# Patient Record
Sex: FEMALE | Race: WHITE | NOT HISPANIC OR LATINO | Employment: FULL TIME | URBAN - METROPOLITAN AREA
[De-identification: names, ages, dates, MRNs, and addresses within clinical notes are randomized per-mention and may not be internally consistent; named-entity substitution may affect disease eponyms.]

---

## 2017-01-30 ENCOUNTER — TELEPHONE (OUTPATIENT)
Dept: FAMILY MEDICINE | Facility: CLINIC | Age: 37
End: 2017-01-30

## 2017-01-30 ENCOUNTER — OFFICE VISIT (OUTPATIENT)
Dept: ORTHOPEDICS | Facility: CLINIC | Age: 37
End: 2017-01-30
Payer: COMMERCIAL

## 2017-01-30 VITALS
BODY MASS INDEX: 27.16 KG/M2 | DIASTOLIC BLOOD PRESSURE: 62 MMHG | SYSTOLIC BLOOD PRESSURE: 118 MMHG | HEIGHT: 65 IN | WEIGHT: 163 LBS

## 2017-01-30 DIAGNOSIS — R20.0 NUMBNESS AND TINGLING IN RIGHT HAND: ICD-10-CM

## 2017-01-30 DIAGNOSIS — R20.2 NUMBNESS AND TINGLING IN RIGHT HAND: ICD-10-CM

## 2017-01-30 DIAGNOSIS — M77.11 RIGHT LATERAL EPICONDYLITIS: Primary | ICD-10-CM

## 2017-01-30 DIAGNOSIS — M25.511 ACUTE PAIN OF RIGHT SHOULDER: ICD-10-CM

## 2017-01-30 PROCEDURE — 99203 OFFICE O/P NEW LOW 30 MIN: CPT | Performed by: PEDIATRICS

## 2017-01-30 NOTE — TELEPHONE ENCOUNTER
Reason for Call:  Other appointment    Detailed comments: Patient requesting to see provider that can remove and replace her IUD, please call to schedule    Phone Number Patient can be reached at: Cell phone number: [phone]    Best Time: Any    Can we leave a detailed message on this number? YES    Call taken on 1/30/2017 at 2:55 PM by Estrellita Billingsley

## 2017-01-30 NOTE — MR AVS SNAPSHOT
After Visit Summary   1/30/2017    Angelica Gomez    MRN: [id number]           Patient Information     Date Of Birth          1980        Visit Information        Provider Department      1/30/2017 5:00 PM Preet Lewis DO [facility] Diagnoses     Right lateral epicondylitis    -  1     Numbness and tingling in right hand            Follow-ups after your visit        Additional Services     [facility] PT, HAND, AND CHIROPRACTIC REFERRAL       **This order will print in the EMIL Scheduling Office**    Physical Therapy, Hand Therapy and Chiropractic Care are available through:    *[facility]  *[facility]  *[facility]    Call one number to schedule at any of the above locations: [phone].    Your provider has referred you to: Hand Therapy    Indication/Reason for Referral: Elbow Pain and Hand Pain  Onset of Illness:   Therapy Orders: Evaluate and Treat  Special Programs: None  Special Request: None    April Chen      Additional Comments for the Therapist or Chiropractor:       Please be aware that coverage of these services is subject to the terms and limitations of your health insurance plan.  Call member services at your health plan with any benefit or coverage questions.      Please bring the following to your appointment:    *Your personal calendar for scheduling future appointments  *Comfortable clothing                  Your next 10 appointments already scheduled     Feb 06, 2017  9:00 AM   SHORT with Ale Michelle DO   [facility] ([facility]    [address] [place] [id number] [phone]            Feb 22, 2017 11:15 AM   Office Visit with Ale Michelle DO, NE PROC RM OB/COLP   [facility] ([facility])    [address] [place] [id number]    [phone]           Bring a current list of meds and any records pertaining to this visit.  For Physicals, please bring immunization records and any forms needing to be filled out.  Please arrive 10 minutes early to complete paperwork.              Who to contact     If you have questions or need follow up information about today's clinic visit or your schedule please contact [facility] directly at [phone].  Normal or non-critical lab and imaging results will be communicated to you by G[employer]on[employer], letter or phone within 4 business days after the clinic has received the results. If you do not hear from us within 7 days, please contact the clinic through [employer] or phone. If you have a critical or abnormal lab result, we will notify you by phone as soon as possible.  Submit refill requests through [employer] or call your pharmacy and they will forward the refill request to us. Please allow 3 business days for your refill to be completed.          Additional Information About Your Visit        [employer] Information     [employer] gives you secure access to your electronic health record. If you see a primary care provider, you can also send messages to your care team and make appointments. If you have questions, please call your primary care clinic.  If you do not have a primary care provider, please call [phone] and they will assist you.        Care EveryWhere ID     This is your Care EveryWhere ID. This could be used by other organizations to access your [facility] medical records  [id number]         Blood Pressure from Last 3 Encounters:   01/30/17 118/62   01/07/14 120/82   12/28/12 135/74    Weight from Last 3 Encounters:   01/07/14 163 lb (73.936 kg)   12/28/12 153 lb 14.4 oz (69.809 kg)   09/10/12 156 lb (70.761 kg)              We Performed the Following     [facility] PT, HAND, AND CHIROPRACTIC REFERRAL        Primary Care Provider Office Phone # Fax #    Analisa Harper PA-C  [phone]       [facility] [address] [place] [id number]        Thank you!     Thank you for choosing [facility]  for your care. Our goal is always to provide you with excellent care. Hearing back from our patients is one way we can continue to improve our services. Please take a few minutes to complete the written survey that you may receive in the mail after your visit with us. Thank you!             Your Updated Medication List - Protect others around you: Learn how to safely use, store and throw away your medicines at www.disposemymeds.org.          This list is accurate as of: 1/30/17  5:53 PM.  Always use your most recent med list.                   Brand Name Dispense Instructions for use    EXCEDRIN MIGRAINE 250-250-65 MG per tablet   Generic drug:  aspirin-acetaminophen-caffeine      Take 1 tablet by mouth every 6 hours as needed.        MG tablet   Generic drug:  ibuprofen      Take 400 mg by mouth every 6 hours as needed.       MIRENA (52 MG) 20 MCG/24HR IUD   Generic drug:  levonorgestrel     1    one in utero       MULTIVITAMIN & MINERAL PO      Take 1 each by mouth daily. [employer] women's active supplement       PROBIOTIC PO      Take  by mouth.

## 2017-01-30 NOTE — PROGRESS NOTES
Sports Medicine Clinic Visit    PCP: Analisa Harper    Angelica Gomez is a 36 year old female who is seen  as a self referral presenting with right elbow pain.  Pain has been present for about 3 weeks.  Pain on the lateral aspect of her right elbow.  Began with lifting.  Does occasionally have numbness and tingling in her thumb and index finger.    Does also have pain in her right shoulder, anterior.  Pain began around the same time as her right elbow.  No specific injury.  Pain with pulling or lifting.  Patient is right hand dominant.     Injury: gradual onset.  Some current numbness/tingling in right ulnar 2 digits. Also some pain lateral elbow and dorsal forearm.  Pain in shoulder is anterior, points to subacromial space; does not migrate from there.  N/T not as long as other overall symptoms. Has to use pillow/support overnight. Notes any degree of flexion causes symptoms.    Location of Pain: right lateral elbow and anterior shoulder  Duration of Pain: 3 week(s)  Rating of Pain at worst: 8/10  Rating of Pain Currently: 3/10  Symptoms are better with: Ice  Symptoms are worse with: lifting, pulling  Additional Features:   Positive: parasthesia    Negative: swelling, bruising, popping, grinding, catching, locking, instability, numbness, weakness, pain in other joints and systemic symptoms  Other evaluation and/or treatments so far consists of: Ibuprofen  Prior History of related problems: denies    Social History: pediatric hospitalist at [employer]     Review of Systems  Musculoskeletal: as above  Remainder of review of systems is negative including constitutional, CV, pulmonary, GI, Skin and Neurologic except as noted in HPI or medical history.    Past Medical History   Diagnosis Date     NO ACTIVE PROBLEMS      Past Surgical History   Procedure Laterality Date     C  delivery only       superficial wound dehiscence     Tubal ligation  2007     Family History   Problem Relation Age of Onset      Alcohol/Drug Paternal Grandfather      Thyroid Disease Mother      Breast Cancer Mother 56     breast     Asthma Brother      HEART DISEASE Father      2x heart attacks     Circulatory Father      blood clots     Cardiovascular Father      patent foramen ovale, repaired x 2, afib     CEREBROVASCULAR DISEASE Father      x2     C.A.D. Father      x2     Genitourinary Problems Father      kidney disease     CANCER Paternal Aunt      cervical cancer     Breast Cancer Paternal Aunt      Cancer - colorectal No family hx of      Social History     Social History     Marital Status:      Spouse Name: N/A     Number of Children: N/A     Years of Education: N/A     Occupational History     Not on file.     Social History Main Topics     Smoking status: Former Smoker     Smokeless tobacco: Never Used     Alcohol Use: Yes      Comment: 0-1     Drug Use: No     Sexual Activity:     Partners: Male     Birth Control/ Protection: Surgical      Comment: Tubal ligation, 12/13/2007     Other Topics Concern     Parent/Sibling W/ Cabg, Mi Or Angioplasty Before 65f 55m? Yes     father 2 MIs     Social History Narrative    Caffeine intake/servings daily - 2-3 pop    Calcium intake/servings daily - 2 yogurts and 1 glass of milk    Exercise 6 times weekly - describe strength training and cardio    Sunscreen used - Yes    Seatbelts used - Yes    Guns stored in the home - No    Self Breast Exam - Yes    Pap test up to date -  Yes, as of today    Eye exam up to date -  Yes    Dental exam up to date -  Yes    DEXA scan up to date -  Not Applicable    Flex Sig/Colonoscopy up to date -  Not Applicable    Mammography up to date -  Not Applicable    Immunizations reviewed and up to date - Yes, 03/2008    Abuse: Current or Past (Physical, Sexual or Emotional) - No    Do you feel safe in your environment - Yes    Do you cope well with stress - Yes    Do you suffer from insomnia - No    Last updated by: Lit Licona  10/27/2009              "      Objective  /62 mmHg  Ht 5' 5.25\" (1.657 m)  Wt 163 lb (73.936 kg)  BMI 26.93 kg/m2    GENERAL APPEARANCE: healthy, alert and no distress   GAIT: NORMAL  SKIN: no suspicious lesions or rashes  NEURO: Normal strength and tone, mentation intact and speech normal  PSYCH:  mentation appears normal and affect normal/bright  HEENT: no scleral icterus  CV: no extremity edema  RESP: nonlabored breathing    Right Elbow exam:    Inspection:       no ecchymosis       no edema or effusion    ROM:       full with flexion, extension, forearm supination and pronation.    Strength:       flexion 5/5       extension 5/5       forearm supination 5/5       forearm pronation 5/5    Tender:       lateral epicondyle    Non-Tender:       remainder of elbow area    Sensation:  Decreased light touch small finger     Skin:       well perfused       capillary refill less than 2 seconds    Tinel mild positive cubital tunnel.      Right Shoulder exam    ROM:        Full active and passive ROM with flexion, extension, abduction, internal and external rotation.    Tender:        acromioclavicular joint       subacromial space       Biceps tendon    Non Tender:       remainder of shoulder    Strength:        abduction 5/5       internal rotation 5/5       external rotation 5/5       adduction 5/5    Impingement testing:        neg (-) Neer       Min pain with Kramer       Mild pain with empty can       neg (-) crossover       neg (-) O'kaylie    Skin:        no visible deformities       well perfused       capillary refill brisk     speed positive  yergason negative      **  Spurling negative.      Radiology:  None today.    Assessment:  1. Right lateral epicondylitis    2. Numbness and tingling in right hand    3. Acute pain of right shoulder    shoulder most consistent with impingement.  N/T suspect ulnar neuropathy at elbow.    Plan:  Discussed the assessment with the patient. We discussed the following treatment options: symptom " treatment, activity modification/rest, imaging, rehab, support for the affected area and electrodiagnostic testing. Following discussion, plan:  Topical Treatments: Ice or Heat  Over the counter medication: Patient's preferred OTC medication as directed on packaging.  No imaging of the shoulder or elbow required currently  Activity Modification: discussed  Rehab: Occupational Therapy: [facility] - [phone]; mostly address elbow issues   Monitor shoulder for now, possible therapy pending course  Medical Equipment: discussed use of counterforce brace, provided  Recommend trial of towel roll overnight for her hand symptoms  Follow up: 3-4 weeks, sooner prn.  We discussed potentially concerning signs and symptoms related to the condition(s) listed above, including increase in pain, changing pain, increasing neurologic symptoms, and the patient was instructed to seek appropriate medical care if noted. All questions answered to patient's satisfaction. The patient expressed understanding of the plan.     Preet Lewis DO, CAQ          Disclaimer: This note consists of symbols derived from keyboarding, dictation and/or voice recognition software. As a result, there may be errors in the script that have gone undetected. Please consider this when interpreting information found in this chart.

## 2017-02-06 ENCOUNTER — OFFICE VISIT (OUTPATIENT)
Dept: FAMILY MEDICINE | Facility: CLINIC | Age: 37
End: 2017-02-06
Payer: COMMERCIAL

## 2017-02-06 VITALS
RESPIRATION RATE: 16 BRPM | TEMPERATURE: 98.4 F | HEIGHT: 65 IN | DIASTOLIC BLOOD PRESSURE: 72 MMHG | HEART RATE: 60 BPM | BODY MASS INDEX: 27.49 KG/M2 | WEIGHT: 165 LBS | SYSTOLIC BLOOD PRESSURE: 118 MMHG

## 2017-02-06 DIAGNOSIS — Z00.01 ENCOUNTER FOR ROUTINE ADULT HEALTH EXAMINATION WITH ABNORMAL FINDINGS: Primary | ICD-10-CM

## 2017-02-06 DIAGNOSIS — Z13.220 LIPID SCREENING: ICD-10-CM

## 2017-02-06 DIAGNOSIS — Z80.3 FAMILY HISTORY OF MALIGNANT NEOPLASM OF BREAST: ICD-10-CM

## 2017-02-06 DIAGNOSIS — D22.9 NUMEROUS MOLES: ICD-10-CM

## 2017-02-06 DIAGNOSIS — Z97.5 IUD (INTRAUTERINE DEVICE) IN PLACE: ICD-10-CM

## 2017-02-06 DIAGNOSIS — Z13.1 SCREENING FOR DIABETES MELLITUS: ICD-10-CM

## 2017-02-06 DIAGNOSIS — Z13.29 SCREENING FOR THYROID DISORDER: ICD-10-CM

## 2017-02-06 PROCEDURE — 99395 PREV VISIT EST AGE 18-39: CPT | Performed by: FAMILY MEDICINE

## 2017-02-06 NOTE — MR AVS SNAPSHOT
After Visit Summary   2/6/2017    Angelica Gomez    MRN: [id number]           Patient Information     Date Of Birth          1980        Visit Information        Provider Department      2/6/2017 9:00 AM Ale Michelle DO [facility]        Today's Diagnoses     Family history of malignant neoplasm of breast    -  1     Encounter for routine adult health examination with abnormal findings         IUD (intrauterine device) in place         Lipid screening         Screening for diabetes mellitus         Screening for thyroid disorder         Numerous moles           Care Instructions    Follow up for labs  Read iud handout  Use ibuprofen before coming in  Follow up with dermatology, genetics    Preventive Health Recommendations  Female Ages 26 - 39  Yearly exam:   See your health care provider every year in order to    Review health changes.     Discuss preventive care.      Review your medicines if you your doctor has prescribed any.    Until age 30: Get a Pap test every three years (more often if you have had an abnormal result).    After age 30: Talk to your doctor about whether you should have a Pap test every 3 years or have a Pap test with HPV screening every 5 years.   You do not need a Pap test if your uterus was removed (hysterectomy) and you have not had cancer.  You should be tested each year for STDs (sexually transmitted diseases), if you're at risk.   Talk to your provider about how often to have your cholesterol checked.  If you are at risk for diabetes, you should have a diabetes test (fasting glucose).  Shots: Get a flu shot each year. Get a tetanus shot every 10 years.   Nutrition:     Eat at least 5 servings of fruits and vegetables each day.    Eat whole-grain bread, whole-wheat pasta and brown rice instead of white grains and rice.    Talk to your provider about Calcium and Vitamin D.     Lifestyle    Exercise at least 150 minutes a week (30 minutes  a day, 5 days of the week). This will help you control your weight and prevent disease.    Limit alcohol to one drink per day.    No smoking.     Wear sunscreen to prevent skin cancer.    See your dentist every six months for an exam and cleaning.      [facility]   Discharged by : Sharri AVELAR Certified Medical Assistant (SANTI)February 6, 2017 9:40 AM    Paper scripts provided to patient : none   If you have any questions regarding to your visit please contact your care team:   Team Gold Clinic Hours Telephone Number   Dr. Sharri Chung, SAMANTHA   7am-7pm Monday - Thursday   7am-5pm Fridays  [phone]   (Appointment scheduling available 24/7)   RN Line   [phone] option 2     What options do I have for visits at the clinic other than the traditional office visit?   To expand how we care for you, many of our providers are utilizing electronic visits (e-visits) and telephone visits, when medically appropriate, for interactions with their patients rather than a visit in the clinic. We also offer nurse visits for many medical concerns. Just like any other service, we will bill your insurance company for this type of visit based on time spent on the phone with your provider. Not all insurance companies cover these visits. Please check with your medical insurance if this type of visit is covered. You will be responsible for any charges that are not paid by your insurance.   E-visits via Livemap: generally incur a $35.00 fee.   Telephone visits:   Time spent on the phone: *charged based on time that is spent on the phone in increments of 10 minutes. Estimated cost:   5-10 mins $30.00   11-20 mins. $59.00   21-30 mins. $85.00   Use FastSpringt (secure email communication and access to your chart) to send your primary care provider a message or make an appointment. Ask someone on your Team how to sign up for Livemap.   For a Price Quote for your services,  please call our Consumer Price Line at [phone].   As always, Thank you for trusting us with your health care needs!                    [facility]:    Scheduling Appointments  [place]  Call: [phone]    [place]ablo Ramirez, [facility]  Call: [phone]    [facility]  Call: [phone]    WHERE TO GO FOR CARE?    Clinic    Make an appointment if you:       Are sick (cold, cough, flu, sore throat, earache or in pain).       Have a small injury (sprain, small cut, burn or broken bone).       Need a physical exam, Pap smear, vaccine or prescription refill.       Have questions about your health or medicines.    To reach us:      Call [phone] ([phone]). Open 24 hours every day. (For counseling services, call [phone].)    Log into [employer] at eSellerPro. (Visit [email or web address] to create an account.) Hospital emergency room    An emergency is a serious or life- threatening problem that must be treated right away.    Call 993 or get to the hospital if you have:      Very bad or sudden:            - Chest pain or pressure         - Bleeding         - Head or belly pain         - Dizziness or trouble seeing, walking or                          Speaking      Problems breathing      Blood in your vomit or you are coughing up blood      A major injury (knocked out, loss of a finger or limb, rape, broken bone protruding from skin)    A mental health crisis. (Or call the Mental Health Crisis line at 1-490.864.1754 or Suicide Prevention Hotline at 1-553.834.3843.)    Open 24 hours every day. You don't need an appointment.     Urgent care    Visit urgent care for sickness or small injuries when the clinic is closed. You don't need an appointment. To check hours or find an urgent care near you, visit [email or web address]. Online care    Get online care from [facility] for more than 70 common problems, like  colds, allergies and infections. Open 24 hours every day at: [email or web address]   Need help deciding?    For advice about where to be seen, you may call your clinic and ask to speak with a nurse. We're here for you 24 hours every day.         If you are deaf or hard of hearing, please let us know. We provide many free services including sign language interpreters, oral interpreters, TTYs, telephone amplifiers, note takers and written materials.               Follow-ups after your visit        Additional Services     CANCER RISK MGMT/CANCER GENETIC COUNSELING REFERRAL       Your provider has referred you to the Cancer Risk Management Program - Cancer Genetic Counseling.    Reason for Referral: mother and aunts with breast cancers    We have a sent a notice to a staff member of the Cancer Risk Management Program to give you a call to assist with scheduling your appointment.  You may also call  [phone] ([phone]) to initiate scheduling.    Please be aware that coverage of these services is subject to the terms and limitations of your health insurance plan.  Call member services at your health plan with any benefit or coverage questions.      Please bring the completed family history sheet to your appointment in addition to any available outside medical records documenting your cancer diagnosis.            DERMATOLOGY REFERRAL       Your provider has referred you to: [facility] ER: [facility] [place] [phone]   [email or web address]    Please be aware that coverage of these services is subject to the terms and limitations of your health insurance plan.  Call member services at your health plan with any benefit or coverage questions.      Please bring the following with you to your appointment:    (1) Any X-Rays, CTs or MRIs which have been performed.  Contact the facility where they were done to arrange for  prior to your scheduled appointment.    (2) List of  current medications  (3) This referral request   (4) Any documents/labs given to you for this referral                  Your next 10 appointments already scheduled     Feb 22, 2017 11:15 AM   Office Visit with Ale Michelle DO, NE PROC RM OB/COLP   [facility]    [address] [address] [phone]           Bring a current list of meds and any records pertaining to this visit.  For Physicals, please bring immunization records and any forms needing to be filled out.  Please arrive 10 minutes early to complete paperwork.              Future tests that were ordered for you today     Open Future Orders        Priority Expected Expires Ordered    Glucose Routine  2/24/2017 2/6/2017    TSH with free T4 reflex Routine  2/24/2017 2/6/2017    Lipid panel reflex to direct LDL Routine  2/24/2017 2/6/2017    Hemoglobin Routine  2/24/2017 2/6/2017            Who to contact     If you have questions or need follow up information about today's clinic visit or your schedule please contact [facility] directly at [phone].  Normal or non-critical lab and imaging results will be communicated to you by [employer], letter or phone within 4 business days after the clinic has received the results. If you do not hear from us within 7 days, please contact the clinic through [employer] or phone. If you have a critical or abnormal lab result, we will notify you by phone as soon as possible.  Submit refill requests through [employer] or call your pharmacy and they will forward the refill request to us. Please allow 3 business days for your refill to be completed.          Additional Information About Your Visit        [employer] Information     [employer] gives you secure access to your electronic health record. If you see a primary care provider, you can also send messages to your care team and make appointments. If you have questions, please  "call your primary care clinic.  If you do not have a primary care provider, please call [phone] and they will assist you.        Care EveryWhere ID     This is your Care EveryWhere ID. This could be used by other organizations to access your [place] medical records  [id number]        Your Vitals Were     Pulse Temperature Respirations Height BMI (Body Mass Index) Breastfeeding?    60 98.4  F (36.9  C) (Oral) 16 5' 5.25\" (1.657 m) 27.26 kg/m2 No       Blood Pressure from Last 3 Encounters:   02/06/17 118/72   01/30/17 118/62   01/07/14 120/82    Weight from Last 3 Encounters:   02/06/17 165 lb (74.844 kg)   01/30/17 163 lb (73.936 kg)   01/07/14 163 lb (73.936 kg)              We Performed the Following     CANCER RISK MGMT/CANCER GENETIC COUNSELING REFERRAL     DERMATOLOGY REFERRAL          Today's Medication Changes          These changes are accurate as of: 2/6/17  9:40 AM.  If you have any questions, ask your nurse or doctor.               Stop taking these medicines if you haven't already. Please contact your care team if you have questions.     order for DME   Stopped by:  Ale Michelle,                     Primary Care Provider Office Phone # Fax #    Analisa Harper PA-C [phone]       [address] [address]        Thank you!     Thank you for choosing [facility]  for your care. Our goal is always to provide you with excellent care. Hearing back from our patients is one way we can continue to improve our services. Please take a few minutes to complete the written survey that you may receive in the mail after your visit with us. Thank you!             Your Updated Medication List - Protect others around you: Learn how to safely use, store and throw away your medicines at www.disposemymeds.org.          This list is accurate as of: 2/6/17  9:40 AM.  Always use your most recent med list.                "    Brand Name Dispense Instructions for use    EXCEDRIN MIGRAINE 250-250-65 MG per tablet   Generic drug:  aspirin-acetaminophen-caffeine      Take 1 tablet by mouth every 6 hours as needed.        MG tablet   Generic drug:  ibuprofen      Take 400 mg by mouth every 6 hours as needed.       MIRENA (52 MG) 20 MCG/24HR IUD   Generic drug:  levonorgestrel     1    one in utero       MULTIVITAMIN & MINERAL PO      Take 1 each by mouth daily. [employer] women's active supplement       PROBIOTIC PO      Take  by mouth.

## 2017-02-06 NOTE — PATIENT INSTRUCTIONS
Follow up for labs  Read iud handout  Use ibuprofen before coming in  Follow up with dermatology, genetics    Preventive Health Recommendations  Female Ages 26 - 39  Yearly exam:   See your health care provider every year in order to    Review health changes.     Discuss preventive care.      Review your medicines if you your doctor has prescribed any.    Until age 30: Get a Pap test every three years (more often if you have had an abnormal result).    After age 30: Talk to your doctor about whether you should have a Pap test every 3 years or have a Pap test with HPV screening every 5 years.   You do not need a Pap test if your uterus was removed (hysterectomy) and you have not had cancer.  You should be tested each year for STDs (sexually transmitted diseases), if you're at risk.   Talk to your provider about how often to have your cholesterol checked.  If you are at risk for diabetes, you should have a diabetes test (fasting glucose).  Shots: Get a flu shot each year. Get a tetanus shot every 10 years.   Nutrition:     Eat at least 5 servings of fruits and vegetables each day.    Eat whole-grain bread, whole-wheat pasta and brown rice instead of white grains and rice.    Talk to your provider about Calcium and Vitamin D.     Lifestyle    Exercise at least 150 minutes a week (30 minutes a day, 5 days of the week). This will help you control your weight and prevent disease.    Limit alcohol to one drink per day.    No smoking.     Wear sunscreen to prevent skin cancer.    See your dentist every six months for an exam and cleaning.      [facility]   Discharged by : Sharri AVELAR, Certified Medical Assistant (AAMA)February 6, 2017 9:40 AM    Paper scripts provided to patient : none   If you have any questions regarding to your visit please contact your care team:   [facility] Hours Telephone Number   Dr. Sharri Chung, SAMANTHA   7am-7pm Monday -  Thursday   7am-5pm Fridays  [phone]   (Appointment scheduling available 24/7)   RN Line   [phone] option 2     What options do I have for visits at the clinic other than the traditional office visit?   To expand how we care for you, many of our providers are utilizing electronic visits (e-visits) and telephone visits, when medically appropriate, for interactions with their patients rather than a visit in the clinic. We also offer nurse visits for many medical concerns. Just like any other service, we will bill your insurance company for this type of visit based on time spent on the phone with your provider. Not all insurance companies cover these visits. Please check with your medical insurance if this type of visit is covered. You will be responsible for any charges that are not paid by your insurance.   E-visits via [employer]: generally incur a $35.00 fee.   Telephone visits:   Time spent on the phone: *charged based on time that is spent on the phone in increments of 10 minutes. Estimated cost:   5-10 mins $30.00   11-20 mins. $59.00   21-30 mins. $85.00   Use [employer] (secure email communication and access to your chart) to send your primary care provider a message or make an appointment. Ask someone on your Team how to sign up for [employer].   For a Price Quote for your services, please call our Consumer Price Line at [phone].   As always, Thank you for trusting us with your health care needs!                    [facility]:    Scheduling Appointments  [place]  Call: [phone]    [facility]  Call: [phone]    [facility]  Call: [phone]    WHERE TO GO FOR CARE?    Clinic    Make an appointment if you:       Are sick (cold, cough, flu, sore throat, earache or in pain).       Have a small injury (sprain, small cut, burn or broken bone).       Need a physical exam, Pap smear, vaccine or  prescription refill.       Have questions about your health or medicines.    To reach us:      Call [phone] ([phone]). Open 24 hours every day. (For counseling services, call [phone].)    Log into [employer] at VivaReal. (Visit [email or web address] to create an account.) Hospital emergency room    An emergency is a serious or life- threatening problem that must be treated right away.    Call 911 or get to the hospital if you have:      Very bad or sudden:            - Chest pain or pressure         - Bleeding         - Head or belly pain         - Dizziness or trouble seeing, walking or                          Speaking      Problems breathing      Blood in your vomit or you are coughing up blood      A major injury (knocked out, loss of a finger or limb, rape, broken bone protruding from skin)    A mental health crisis. (Or call the Mental Health Crisis line at 1-130.133.3171 or Suicide Prevention Hotline at 1-741.675.7854.)    Open 24 hours every day. You don't need an appointment.     Urgent care    Visit urgent care for sickness or small injuries when the clinic is closed. You don't need an appointment. To check hours or find an urgent care near you, visit [email or web address]. Online care    Get online care from [employer] for more than 70 common problems, like colds, allergies and infections. Open 24 hours every day at: [email or web address]   Need help deciding?    For advice about where to be seen, you may call your clinic and ask to speak with a nurse. We're here for you 24 hours every day.         If you are deaf or hard of hearing, please let us know. We provide many free services including sign language interpreters, oral interpreters, TTYs, telephone amplifiers, note takers and written materials.

## 2017-02-06 NOTE — PROGRESS NOTES
SUBJECTIVE:     CC: Angelica Gomez is an 36 year old woman who presents for preventive health visit.     Healthy Habits:    Do you get at least three servings of calcium containing foods daily (dairy, green leafy vegetables, etc.)? yes    Amount of exercise or daily activities, outside of work: 5-6 day(s) per week    Problems taking medications regularly No    Medication side effects: No    Have you had an eye exam in the past two years? yes    Do you see a dentist twice per year? yes    Do you have sleep apnea, excessive snoring or daytime drowsiness?no      The patient is interested in Mirena IUD.  The patient meets and is agreeable to the following conditions:  She is parous.  She is not interested in conception in the near future.no0  She currently is in a stable, monogamous relationship.Yes  There is no previous history of pelvic inflammatory disease.Yes  There is no previous history of ectopic pregnancy.Yes  She is willing to check monthly for the IUD string.Yes  She is at least 6 weeks post-partum.Yes  There is no history of unresolved abnormal uterine bleeding.Yes  There is no history of an unresolved abnormal PAP smear.Yes  She has no history of Dejuan's disease or an allergy to copper (for ParaGard).Yes  She has no history of diabetes, AIDS, leukemia, IV drug use or chronic steroid use.Yes  She is willing to return annually for PAP smears.Yes  She has had a PAP smear within the past 6 months.Yes  She denies the possibility of pregnancy.Yes  Pregnancy test today is negative.no    The following risks were discussed with the patient:  Possibility of pregnancy and ectopic pregnancy.Yes  Possibility of pelvic inflammatory disease, particularly with new partners.Yes  Risk of uterine perforation or IUD expulsion.Yes  Possibility of difficult removal.Yes  Spotting or heavy bleeding.  Cramping, pain or infection during or after insertion.      The patient was given patient information on the IUD and the  patient education brochure from the .  This patient has completed her IUD consult and can be scheduled for the placement procedure.        Today's PHQ-2 Score:   PHQ-2 (  [employer]) 2017   Q1: Little interest or pleasure in doing things 0 0   Q2: Feeling down, depressed or hopeless 0 0   PHQ-2 Score 0 0       Abuse: Current or Past(Physical, Sexual or Emotional)- No  Do you feel safe in your environment - Yes    Social History   Substance Use Topics     Smoking status: Former Smoker     Smokeless tobacco: Never Used     Alcohol Use: Yes      Comment: 0-1     The patient does not drink >3 drinks per day nor >7 drinks per week.    Recent Labs   Lab Test  09   1325  09   0934   CHOL  122  146   HDL  58  66   LDL  52  70   TRIG  58  50   CHOLHDLRATIO  2.1  2.2       Reviewed orders with patient.  Reviewed health maintenance and updated orders accordingly - Yes    Mammo Decision Support:  Mammogram not appropriate for this patient based on age.    Pertinent mammograms are reviewed under the imaging tab.  History of abnormal Pap smear: NO - age 21-29 PAP every 3 years recommended  All Histories reviewed and updated in Epic.  Past Medical History   Diagnosis Date     NO ACTIVE PROBLEMS       Past Surgical History   Procedure Laterality Date     C  delivery only       superficial wound dehiscence     Tubal ligation       Obstetric History       T2      TAB0   SAB0   E0   M0   L2       # Outcome Date GA Lbr Morteza/2nd Weight Sex Delivery Anes PTL Lv   2 Term 07 40w0d   F CS-Unspec   Y   1 Term 03/10/03 40w0d   M    Y          ROS:  C: NEGATIVE for fever, chills, change in weight  I: NEGATIVE for worrisome rashes, moles or lesions  E: NEGATIVE for vision changes or irritation  ENT: NEGATIVE for ear, mouth and throat problems  R: NEGATIVE for significant cough or SOB  B: NEGATIVE for masses, tenderness or discharge  CV: NEGATIVE for chest pain,  "palpitations or peripheral edema  GI: NEGATIVE for nausea, abdominal pain, heartburn, or change in bowel habits  : NEGATIVE for unusual urinary or vaginal symptoms. Periods are regular.  M: NEGATIVE for significant arthralgias or myalgia  N: NEGATIVE for weakness, dizziness or paresthesias  P: NEGATIVE for changes in mood or affect    Problem list, Medication list, Allergies, and Medical/Social/Surgical histories reviewed in [facility] and updated as appropriate.  OBJECTIVE:     /72 mmHg  Pulse 60  Temp(Src) 98.4  F (36.9  C) (Oral)  Resp 16  Ht 5' 5.25\" (1.657 m)  Wt 165 lb (74.844 kg)  BMI 27.26 kg/m2  Breastfeeding? No  EXAM:  GENERAL: healthy, alert and no distress  EYES: Eyes grossly normal to inspection, PERRL and conjunctivae and sclerae normal  HENT: ear canals and TM's normal, nose and mouth without ulcers or lesions  NECK: no adenopathy, no asymmetry, masses, or scars and thyroid normal to palpation  RESP: lungs clear to auscultation - no rales, rhonchi or wheezes  BREAST: normal without masses, tenderness or nipple discharge and no palpable axillary masses or adenopathy  CV: regular rate and rhythm, normal S1 S2, no S3 or S4, no murmur, click or rub, no peripheral edema and peripheral pulses strong  ABDOMEN: soft, nontender, no hepatosplenomegaly, no masses and bowel sounds normal  MS: no gross musculoskeletal defects noted, no edema  SKIN: numerous moles  NEURO: Normal strength and tone, mentation intact and speech normal  PSYCH: mentation appears normal, affect normal/bright    ASSESSMENT/PLAN:         ICD-10-CM    1. Encounter for routine adult health examination with abnormal findings Z00.01 Hemoglobin   2. Family history of malignant neoplasm of breast Z80.3 CANCER RISK MGMT/CANCER GENETIC COUNSELING REFERRAL     OFFICE/OUTPT VISIT,EST,LEVL II   3. Numerous moles D22.9 DERMATOLOGY REFERRAL     OFFICE/OUTPT VISIT,EST,LEVL II   4. IUD (intrauterine device) in place Z97.5 OFFICE/OUTPT " "VISIT,EST,LEVL II   5. Lipid screening Z13.220 Lipid panel reflex to direct LDL   6. Screening for diabetes mellitus Z13.1 Glucose   7. Screening for thyroid disorder Z13.29 TSH with free T4 reflex   iud consult done-change mirena, reviewed risks and benefits, follow up as planned  Many moles-advised dermatology evalutaion  Family history of breast cancer-mother diagnosed at 56 and  from breast cancer, and aunts at a later age, advised genetic counseling  Screening fasting labs ordered    COUNSELING:   Reviewed preventive health counseling, as reflected in patient instructions         reports that she has quit smoking. She has never used smokeless tobacco.    Estimated body mass index is 27.26 kg/(m^2) as calculated from the following:    Height as of this encounter: 5' 5.25\" (1.657 m).    Weight as of this encounter: 165 lb (74.844 kg).       Counseling Resources:  ATP IV Guidelines  Pooled Cohorts Equation Calculator  Breast Cancer Risk Calculator  FRAX Risk Assessment  ICSI Preventive Guidelines  Dietary Guidelines for Americans, 2010  USDA's MyPlate  ASA Prophylaxis  Lung CA Screening    DO SHARON Balbuena [facility]  "

## 2017-02-06 NOTE — NURSING NOTE
"Chief Complaint   Patient presents with     Physical       Initial /72 mmHg  Pulse 60  Temp(Src) 98.4  F (36.9  C) (Oral)  Resp 16  Ht 5' 5.25\" (1.657 m)  Wt 165 lb (74.844 kg)  BMI 27.26 kg/m2  Breastfeeding? No Estimated body mass index is 27.26 kg/(m^2) as calculated from the following:    Height as of this encounter: 5' 5.25\" (1.657 m).    Weight as of this encounter: 165 lb (74.844 kg).  Medication Reconciliation: complete   Heather England CMA (AAMA)      "

## 2017-02-21 ENCOUNTER — THERAPY VISIT (OUTPATIENT)
Dept: OCCUPATIONAL THERAPY | Facility: CLINIC | Age: 37
End: 2017-02-21
Payer: COMMERCIAL

## 2017-02-21 ENCOUNTER — TELEPHONE (OUTPATIENT)
Dept: FAMILY MEDICINE | Facility: CLINIC | Age: 37
End: 2017-02-21

## 2017-02-21 DIAGNOSIS — M25.521 RIGHT ELBOW PAIN: Primary | ICD-10-CM

## 2017-02-21 DIAGNOSIS — G56.21 LESION OF RIGHT ULNAR NERVE: ICD-10-CM

## 2017-02-21 DIAGNOSIS — M77.11 LATERAL EPICONDYLITIS OF RIGHT ELBOW: ICD-10-CM

## 2017-02-21 DIAGNOSIS — Z13.1 SCREENING FOR DIABETES MELLITUS: ICD-10-CM

## 2017-02-21 DIAGNOSIS — Z13.29 SCREENING FOR THYROID DISORDER: ICD-10-CM

## 2017-02-21 DIAGNOSIS — Z00.01 ENCOUNTER FOR ROUTINE ADULT HEALTH EXAMINATION WITH ABNORMAL FINDINGS: ICD-10-CM

## 2017-02-21 DIAGNOSIS — Z13.220 LIPID SCREENING: ICD-10-CM

## 2017-02-21 LAB
CHOLEST SERPL-MCNC: 140 MG/DL
GLUCOSE SERPL-MCNC: 94 MG/DL (ref 70–99)
HDLC SERPL-MCNC: 58 MG/DL
HGB BLD-MCNC: 14.6 G/DL (ref 11.7–15.7)
LDLC SERPL CALC-MCNC: 67 MG/DL
NONHDLC SERPL-MCNC: 82 MG/DL
TRIGL SERPL-MCNC: 73 MG/DL
TSH SERPL DL<=0.005 MIU/L-ACNC: 1.62 MU/L (ref 0.4–4)

## 2017-02-21 PROCEDURE — 36415 COLL VENOUS BLD VENIPUNCTURE: CPT | Performed by: FAMILY MEDICINE

## 2017-02-21 PROCEDURE — 84443 ASSAY THYROID STIM HORMONE: CPT | Performed by: FAMILY MEDICINE

## 2017-02-21 PROCEDURE — 97165 OT EVAL LOW COMPLEX 30 MIN: CPT | Mod: GO | Performed by: OCCUPATIONAL THERAPIST

## 2017-02-21 PROCEDURE — 85018 HEMOGLOBIN: CPT | Performed by: FAMILY MEDICINE

## 2017-02-21 PROCEDURE — 80061 LIPID PANEL: CPT | Performed by: FAMILY MEDICINE

## 2017-02-21 PROCEDURE — 97110 THERAPEUTIC EXERCISES: CPT | Mod: GO | Performed by: OCCUPATIONAL THERAPIST

## 2017-02-21 PROCEDURE — 82947 ASSAY GLUCOSE BLOOD QUANT: CPT | Performed by: FAMILY MEDICINE

## 2017-02-21 PROCEDURE — 97112 NEUROMUSCULAR REEDUCATION: CPT | Mod: GO | Performed by: OCCUPATIONAL THERAPIST

## 2017-02-21 NOTE — PROGRESS NOTES
Hand Therapy Initial Evaluation    Current Date:  2017    Subjective:  Angelica Gomez is a 36 year old right hand dominant female.    Diagnosis:   Right elbow pain  DOI:  17 (therapy referral)    Patient reports symptoms of pain, stiffness/loss of motion, weakness/loss of strength, numbness and tingling  of the right elbow and shoulder which occurred due to lifting in the gym over the past 2 months. Since onset symptoms are gradually getting worse.  Special tests:  None.  Previous treatment: Wrapping.  General health as reported by patient is excellent.  Pertinent medical history includes:numbness/tingling, pain at rest/night  Medical allergies:none.  Surgical history: other: .  Medication history: anti-inflammatory.    Current occupation is Nurse Practitioner    Currently working in normal job without restrictions  Job Tasks: lifting/carrying, repetitive tasks, computer work  Barriers include:none  Prior functional level:  no limitations    Additional Occupational Profile Information (patterns of daily living, interests, values and needs): None    Functional Outcome Measure:  Upper Extremity Functional Index  SCORE:   Column Totals: 65/80  (A lower score indicates greater disability.)    Functional Exam:  - no pain, + mild, ++ moderate, +++ severe    ROM:  Note: Elbow hypermobility   Wrist 17   AROM(PROM) Right   Flexion with elbow at 90 70   Flexion with elbow extended 75     Resisted Testin17   Elbow Ext -   Elbow Flex in Pronation (bicipital bursa) -   Elbow Flex -   Supination + slight   Pronation + slight   Wrist Ext and RD ++   Wrist Ext and UD +   EDC +   Long finger test -   Wrist Flex and RD -   Wrist Flex and UD -     Strength:   (Measured in pounds)    17   Trials Left Right   1  2  3 48  55  46 55-  58-  47+   Average: 50 53       Elbow Ext  17   Trials Left Right   1 50 40++     Special Tests:   17   ULTT Radial Nerve + pain   Tinels at cubital  tunnel -   Elbow flexion test + 15 secs   ULTT Ulnar Nerve + pain     Palpation:  TP trigger point, + mild pain, ++ moderate pain, +++ severe pain   2/21/17   Supraspinatus -   Triceps -   Anconeus -   Bicep +   ECRL +   Supinator + slight   LEP/ECRB +   LEP/ECU ++   LEP/EDC ++   PIN ++   Extensors -     Sensation:  Decreased Ulnar Nerve distribution intermittently especially sleeping per patient report    Pain Report:  VAS(0-10) 2/21/17   At Rest: 0/10   With Use: 6-7/10   Location: Lateral elbow and forearm extensors, also shoulder at times  Pain Quality:  Sharp and Throbbing  Frequency: intermittent    Pain is worst:  daytime  Exacerbated by:  Lifting  Relieved by:  rest and wrap/taping  Progression:  Gradually worsening  Assessment:  Patient presents with symptoms consistent with diagnosis of Lateral Epicondylitis and ulnar neuropathy, with conservative intervention.     Patient's limitations or Problem List includes:  Pain, Weakness, Sensory disturbance, Decreased  and Tightness in musculature of the right elbow and forearm which interferes with the patient's ability to perform Sleep Patterns, Recreational Activities and Household Chores as compared to previous level of function.    Rehab Potential:  Excellent - Return to full activity, no limitations    Patient will benefit from skilled Occupational Therapy to increase flexibility, overall strength,  strength and sensation and decrease pain to return to previous activity level and resume normal daily tasks and to reach their rehab potential.    Barriers to Learning:  No barrier    Communication Issues:  Patient appears to be able to clearly communicate and understand verbal and written communication and follow directions correctly.    Assessment of Occupational Performance:  1-3 Performance Deficits  Identified Performance Deficits: home establishment and management, sleep and leisure activities      Clinical Decision Making (Complexity): Low  complexity    Treatment Explanation:  The following has been discussed with the patient:    RX ordered/plan of care  Anticipated outcomes  Possible risks and side effects    Plan:  Frequency:  1 X week, once daily  Duration:  for 6 weeks    Treatment Plan:    Modalities:  US   Therapeutic Exercise: PROM with stretch to wrist extensors and flexors,  and eccentric bicep and wrist extension strengthening  Manual Techniques: Friction massage, myofascial release   Neuromuscular:  Nerve gliding, Kinesiotaping  Orthotic Fabrication:  Elbow flexion block, wrist cock up orthosis, arm band     Discharge Plan:    Achieve all LTG.  Independent in home treatment program.  Reach maximal therapeutic benefit.    Home Program:   Warmth for stiffness to forearm extensors and bicep  Ice to lateral elbow after activity for pain  TFM to LEP  MFR and trigger point release with tennis ball to forearm extensors, supinator and bicep  PROM with stretch to forearm extensors and flexors  Eccentric wrist extension strengthening  Trial Kinesiotaping to LEP   Elbow strap/arm band as needed with activities, monitor for PIN site irritation  Avoid activities that exacerbate pain in the elbow  Lift with elbows at sides and palms up  Avoid leaning on elbow and prolonged elbow flexion to avoid ulnar nerve irritation   Use soft elbow flexion block sleeping    Next Visit:  Assess response to HEP and kinesiotaping   Consider US to LEP  Consider custom elbow flexion block or wrist cock up orthosis sleeping if no change  Progress to eccentric bicep strengthening and 3 position  strengthening

## 2017-02-21 NOTE — MR AVS SNAPSHOT
After Visit Summary   2/21/2017    Angelica Gomez    MRN: [id number]           Patient Information     Date Of Birth          1980        Visit Information        Provider Department      2/21/2017 10:00 AM Edith Boss [facility] [place]        Today's Diagnoses     Right elbow pain    -  1    Lateral epicondylitis of right elbow        Lesion of right ulnar nerve           Follow-ups after your visit        Your next 10 appointments already scheduled     Feb 22, 2017 11:15 AM CST   Office Visit with Ale Michelle DO, NE PROC RM OB/COLP   [facility] ([facility]    [address] [place] [id number] [phone]           Bring a current list of meds and any records pertaining to this visit.  For Physicals, please bring immunization records and any forms needing to be filled out.  Please arrive 10 minutes early to complete paperwork.            Feb 23, 2017  2:00 PM CST   EMIL Hand with Edith Boss   [facility] [place] ([facility] [place] [facility])    [address]  [place] [address]   [phone]            Mar 01, 2017  1:00 PM CST   EMIL Hand with Edith Boss   [facility] [place] ([facility] [place] [facility])    [address]  [place] [address]   [phone]              Who to contact     If you have questions or need follow up information about today's clinic visit or your schedule please contact [facility] [place] directly at [phone].  Normal or non-critical lab and imaging results will be communicated to you by MyChart, letter or phone within 4 business days after the clinic has received the results. If you do not hear from us within 7 days, please contact the clinic through MyChart or phone. If you have a critical or abnormal lab result, we  will notify you by phone as soon as possible.  Submit refill requests through [employer] or call your pharmacy and they will forward the refill request to us. Please allow 3 business days for your refill to be completed.          Additional Information About Your Visit        [employer] Information     [employer] gives you secure access to your electronic health record. If you see a primary care provider, you can also send messages to your care team and make appointments. If you have questions, please call your primary care clinic.  If you do not have a primary care provider, please call [phone] and they will assist you.        Care EveryWhere ID     This is your Care EveryWhere ID. This could be used by other organizations to access your [facility] medical records  [id number]         Blood Pressure from Last 3 Encounters:   02/06/17 118/72   01/30/17 118/62   01/07/14 120/82    Weight from Last 3 Encounters:   02/06/17 74.8 kg (165 lb)   01/30/17 73.9 kg (163 lb)   01/07/14 73.9 kg (163 lb)              We Performed the Following     HC OT EVAL, LOW COMPLEXITY     NEUROMUSCULAR RE-EDUCATION     THERAPEUTIC EXERCISES        Primary Care Provider Office Phone # Fax #    Analisa Harper PA-C [phone]       [address] [address]        Thank you!     Thank you for choosing [facility]  for your care. Our goal is always to provide you with excellent care. Hearing back from our patients is one way we can continue to improve our services. Please take a few minutes to complete the written survey that you may receive in the mail after your visit with us. Thank you!             Your Updated Medication List - Protect others around you: Learn how to safely use, store and throw away your medicines at www.disposemymeds.org.          This list is accurate as of: 2/21/17 12:24 PM.  Always use your most recent med list.                    Brand Name Dispense Instructions for use    EXCEDRIN MIGRAINE 250-250-65 MG per tablet   Generic drug:  aspirin-acetaminophen-caffeine      Take 1 tablet by mouth every 6 hours as needed.        MG tablet   Generic drug:  ibuprofen      Take 400 mg by mouth every 6 hours as needed.       MIRENA (52 MG) 20 MCG/24HR IUD   Generic drug:  levonorgestrel     1    one in utero       MULTIVITAMIN & MINERAL PO      Take 1 each by mouth daily. [employer] women's active supplement       PROBIOTIC PO      Take  by mouth.

## 2017-02-21 NOTE — LETTER
[facility]  [address] [place] [id number] [phone]      February 27, 2017      Angelica Harrellremba  [address] [place] [id number]          Dear Ms. Gomez    The results of your recent lab tests were within normal limits. Enclosed is a copy of these results.  If you have any further questions or problems, please contact our office.    Sincerely,      Ale Michelle, DO/ap    Results for orders placed or performed in visit on 02/21/17   Lipid panel reflex to direct LDL   Result Value Ref Range    Cholesterol 140 <200 mg/dL    Triglycerides 73 <150 mg/dL    HDL Cholesterol 58 >49 mg/dL    LDL Cholesterol Calculated 67 <100 mg/dL    Non HDL Cholesterol 82 <130 mg/dL   Hemoglobin   Result Value Ref Range    Hemoglobin 14.6 11.7 - 15.7 g/dL   Glucose   Result Value Ref Range    Glucose 94 70 - 99 mg/dL   TSH with free T4 reflex   Result Value Ref Range    TSH 1.62 0.40 - 4.00 mU/L

## 2017-02-22 NOTE — TELEPHONE ENCOUNTER
Reason for Call:  Other appointment    Detailed comments: Patient is needing a call back from the  to reschedule her appointment for IUD removal and placement.  It needs a procedure room.    Phone Number Patient can be reached at: Cell number on file:    Telephone Information:   Mobile [phone]       Best Time: Any     Can we leave a detailed message on this number? YES    Call taken on 2/21/2017 at 6:46 PM by Kiara Blackwood

## 2017-02-23 ENCOUNTER — THERAPY VISIT (OUTPATIENT)
Dept: OCCUPATIONAL THERAPY | Facility: CLINIC | Age: 37
End: 2017-02-23
Payer: COMMERCIAL

## 2017-02-23 DIAGNOSIS — G56.21 LESION OF RIGHT ULNAR NERVE: ICD-10-CM

## 2017-02-23 DIAGNOSIS — M25.521 RIGHT ELBOW PAIN: ICD-10-CM

## 2017-02-23 DIAGNOSIS — M77.11 LATERAL EPICONDYLITIS OF RIGHT ELBOW: ICD-10-CM

## 2017-02-23 PROCEDURE — 97035 APP MDLTY 1+ULTRASOUND EA 15: CPT | Mod: GO | Performed by: OCCUPATIONAL THERAPIST

## 2017-02-23 PROCEDURE — 97140 MANUAL THERAPY 1/> REGIONS: CPT | Mod: GO | Performed by: OCCUPATIONAL THERAPIST

## 2017-02-23 PROCEDURE — 97110 THERAPEUTIC EXERCISES: CPT | Mod: GO | Performed by: OCCUPATIONAL THERAPIST

## 2017-02-23 NOTE — MR AVS SNAPSHOT
After Visit Summary   2/23/2017    Angelica Gomez    MRN: [id number]           Patient Information     Date Of Birth          1980        Visit Information        Provider Department      2/23/2017 2:00 PM Edith Boss [facility] [place]        Today's Diagnoses     Right elbow pain        Lateral epicondylitis of right elbow        Lesion of right ulnar nerve           Follow-ups after your visit        Your next 10 appointments already scheduled     Mar 01, 2017  1:00 PM CST   EMIL Hand with Edith Boss   [facility] [place] ([facility] [place] [facility])    [address]  [place] [address]   [phone]            Mar 08, 2017  2:40 PM CST   Office Visit with Ale Michelle DO, NE PROC RM OB/COLP   [facility] ([facility])    [address] [address] [phone]           Bring a current list of meds and any records pertaining to this visit.  For Physicals, please bring immunization records and any forms needing to be filled out.  Please arrive 10 minutes early to complete paperwork.              Who to contact     If you have questions or need follow up information about today's clinic visit or your schedule please contact [facility] [place] directly at [phone].  Normal or non-critical lab and imaging results will be communicated to you by MyChart, letter or phone within 4 business days after the clinic has received the results. If you do not hear from us within 7 days, please contact the clinic through MyChart or phone. If you have a critical or abnormal lab result, we will notify you by phone as soon as possible.  Submit refill requests through [employer] or call your pharmacy and they will forward the refill request to us. Please allow 3 business days for your refill to be completed.           Additional Information About Your Visit        MyChart Information     [employer] gives you secure access to your electronic health record. If you see a primary care provider, you can also send messages to your care team and make appointments. If you have questions, please call your primary care clinic.  If you do not have a primary care provider, please call [phone] and they will assist you.        Care EveryWhere ID     This is your Care EveryWhere ID. This could be used by other organizations to access your [facility] medical records  [id number]         Blood Pressure from Last 3 Encounters:   02/06/17 118/72   01/30/17 118/62   01/07/14 120/82    Weight from Last 3 Encounters:   02/06/17 74.8 kg (165 lb)   01/30/17 73.9 kg (163 lb)   01/07/14 73.9 kg (163 lb)              We Performed the Following     MANUAL THER TECH,1+REGIONS,EA 15 MIN     THERAPEUTIC EXERCISES     ULTRASOUND THERAPY        Primary Care Provider Office Phone # Fax #    Analisa Lucie Harper PA-C [phone]       [address] [address]        Thank you!     Thank you for choosing [facility]  for your care. Our goal is always to provide you with excellent care. Hearing back from our patients is one way we can continue to improve our services. Please take a few minutes to complete the written survey that you may receive in the mail after your visit with us. Thank you!             Your Updated Medication List - Protect others around you: Learn how to safely use, store and throw away your medicines at www.disposemymeds.org.          This list is accurate as of: 2/23/17  2:33 PM.  Always use your most recent med list.                   Brand Name Dispense Instructions for use    EXCEDRIN MIGRAINE 250-250-65 MG per tablet   Generic drug:  aspirin-acetaminophen-caffeine      Take 1 tablet by mouth every 6 hours as needed.        MG  tablet   Generic drug:  ibuprofen      Take 400 mg by mouth every 6 hours as needed.       MIRENA (52 MG) 20 MCG/24HR IUD   Generic drug:  levonorgestrel     1    one in utero       MULTIVITAMIN & MINERAL PO      Take 1 each by mouth daily. [employer] women's active supplement       PROBIOTIC PO      Take  by mouth.

## 2017-02-23 NOTE — PROGRESS NOTES
HEP:   Added eccentric bicep strengthening      Please refer to the daily flowsheet for treatment today, total treatment time and time spent performing 1:1 timed codes.

## 2017-03-01 ENCOUNTER — OFFICE VISIT (OUTPATIENT)
Dept: FAMILY MEDICINE | Facility: CLINIC | Age: 37
End: 2017-03-01
Payer: COMMERCIAL

## 2017-03-01 VITALS
HEART RATE: 58 BPM | SYSTOLIC BLOOD PRESSURE: 120 MMHG | HEIGHT: 65 IN | WEIGHT: 167.4 LBS | TEMPERATURE: 98.3 F | OXYGEN SATURATION: 100 % | DIASTOLIC BLOOD PRESSURE: 73 MMHG | BODY MASS INDEX: 27.89 KG/M2

## 2017-03-01 DIAGNOSIS — J20.9 ACUTE BRONCHITIS WITH SYMPTOMS > 10 DAYS: Primary | ICD-10-CM

## 2017-03-01 PROCEDURE — 99213 OFFICE O/P EST LOW 20 MIN: CPT | Performed by: NURSE PRACTITIONER

## 2017-03-01 RX ORDER — FLUCONAZOLE 150 MG/1
150 TABLET ORAL ONCE
Qty: 1 TABLET | Refills: 0 | Status: SHIPPED | OUTPATIENT
Start: 2017-03-01 | End: 2017-03-01

## 2017-03-01 NOTE — PROGRESS NOTES
SUBJECTIVE:                                                    Angelica Gomez is a 36 year old female who presents to clinic today for the following health issues:      ENT Symptoms             Symptoms: cc Present Absent Comment   Fever/Chills  x  Fever and chills five days ago    Fatigue  x     Muscle Aches   x    Eye Irritation  x  Itchy    Sneezing  x     Nasal Dimitris/Drg  x     Sinus Pressure/Pain  x     Loss of smell  x     Dental pain  x     Sore Throat   x    Swollen Glands  x     Ear Pain/Fullness  x  Fullness and lack of hearing    Cough  x     Wheeze  x     Chest Pain  x  With cough    Shortness of breath  x     Rash  x     Other         Symptom duration:  20 days    Symptom severity:  Moderate    Treatments tried:  Day/Nitequil, Zyrtec    Contacts:  Yes   Patient is a pediatric hospitalist.      Problem list and histories reviewed & adjusted, as indicated.  Additional history: as documented    Recent Labs   Lab Test  02/21/17   0839  09/10/12   1229   10/24/10   0955  11/09/09   1325  08/07/09   0934   LDL  67   --    --    --   52  70   HDL  58   --    --    --   58  66   TRIG  73   --    --    --   58  50   ALT   --   27   --    --    --   16   CR   --   0.81   --   0.76   --   0.90   GFRESTIMATED   --   82   --   90   --   75   GFRESTBLACK   --   >90   --   >90   --   >90   POTASSIUM   --   4.2   --   3.9   --   3.8   TSH  1.62  1.66   < >   --   1.60  1.28    < > = values in this interval not displayed.      BP Readings from Last 3 Encounters:   03/01/17 120/73   02/06/17 118/72   01/30/17 118/62    Wt Readings from Last 3 Encounters:   03/01/17 167 lb 6.4 oz (75.9 kg)   02/06/17 165 lb (74.8 kg)   01/30/17 163 lb (73.9 kg)                  Labs reviewed in EPIC    Reviewed and updated as needed this visit by clinical staff       Reviewed and updated as needed this visit by Provider         ROS:  Constitutional, HEENT, cardiovascular, pulmonary, gi and gu systems are negative, except as otherwise  "noted.    OBJECTIVE:                                                    /73 (BP Location: Left arm, Patient Position: Chair, Cuff Size: Adult Regular)  Pulse 58  Temp 98.3  F (36.8  C) (Oral)  Ht 5' 5.25\" (1.657 m)  Wt 167 lb 6.4 oz (75.9 kg)  SpO2 100%  BMI 27.64 kg/m2  Body mass index is 27.64 kg/(m^2).  GENERAL: healthy, alert and no distress  EYES: Eyes grossly normal to inspection, PERRL and conjunctivae and sclerae normal  HENT: ear canals and TM's normal, nose and mouth without ulcers or lesions,  _ maxillary sinus tenderness, thick yellow nasal discharge  NECK: no adenopathy, no asymmetry, masses, or scars and thyroid normal to palpation  RESP: lungs clear to auscultation - no rales, rhonchi or wheezes  CV: regular rate and rhythm, normal S1 S2, no S3 or S4, no murmur, click or rub, no peripheral edema and peripheral pulses strong  MS: no gross musculoskeletal defects noted, no edema  PSYCH: mentation appears normal, affect normal/bright    Diagnostic Test Results:  none      ASSESSMENT/PLAN:                                                        BP Screening:   Last 3 BP Readings:    BP Readings from Last 3 Encounters:   03/01/17 120/73   02/06/17 118/72   01/30/17 118/62       The following was recommended to the patient:  Re-screen BP within a year and recommended lifestyle modifications  BMI:   Estimated body mass index is 27.64 kg/(m^2) as calculated from the following:    Height as of this encounter: 5' 5.25\" (1.657 m).    Weight as of this encounter: 167 lb 6.4 oz (75.9 kg).   Weight management plan: not discussed      1. Acute bronchitis with symptoms > 10 days  BRONCHITIS  Antibiotics indicated, see orders.  We discussed the pathophysiology of bronchitis,  reviewed the risks and benefits of various over the counter and prescription medications.  Additionally, we reviewed the infectious nature of this condition and techniques to minimize transmission and future infections.    Patient advised to " follow up if symptoms worsen or fail to improve as anticipated.     - amoxicillin-clavulanate (AUGMENTIN) 875-125 MG per tablet; Take 1 tablet by mouth 2 times daily  Dispense: 20 tablet; Refill: 0  - fluconazole (DIFLUCAN) 150 MG tablet; Take 1 tablet (150 mg) by mouth once for 1 dose  Dispense: 1 tablet; Refill: 0    See Patient Instructions    DEISI Turk [facility]

## 2017-03-01 NOTE — PATIENT INSTRUCTIONS
Based on your medical history and these are the current health maintenance or preventive care services that you are due for (some may have been done at this visit)  There are no preventive care reminders to display for this patient.      At [facility], we strive to deliver an exceptional experience to you, every time we see you.    If you receive a survey in the mail, please send us back your thoughts. We really do value your feedback.    Your care team's suggested websites for health information:  Www.[employer].org : Up to date and easily searchable information on multiple topics.  Www.medlineplus.gov : medication info, interactive tutorials, watch real surgeries online  Www.familydoctor.org : good info from the Academy of Family Physicians  Www.cdc.gov : public health info, travel advisories, epidemics (H1N1)  Www.aap.org : children's health info, normal development, vaccinations  Www.health.[facility].[place].us : [place] dept of health, public health issues in [place], N1N1    How to contact your care team:   Team Elise/Spirit [phone]         Pharmacy [phone]    Dr. Brumfield, Mahsa Gomez PA-C, Dr. Collado, Jennie LISPCOMB CNP, Lois Jamison PA-C, Dr. Jimenez, and DEISI Tolbert CNP    Team RNs: Ellie & Emily      Clinic hours  M-Th 7 am-7 pm   Fri 7 am-5 pm.   Urgent care M-F 11 am-9 pm,   Sat/Sun 9 am-5 pm.  Pharmacy M-Th 8 am-8 pm Fri 8 am-6 pm  Sat/Sun 9 am-5 pm.     All password changes, disabled accounts, or ID changes in [employer]/MyHealth will be done by our Access Services Department.    If you need help with your account or password, call: [phone]. Clinic staff no longer has the ability to change passwords.       Sinusitis (Antibiotic Treatment)    The sinuses are air-filled spaces within the bones of the face. They connect to the inside of the nose. Sinusitis is an inflammation of the tissue lining the sinus cavity. Sinus inflammation can occur during a cold. It  can also be due to allergies to pollens and other particles in the air. Sinusitis can cause symptoms of sinus congestion and fullness. A sinus infection causes fever, headache and facial pain. There is often green or yellow drainage from the nose or into the back of the throat (post-nasal drip). You have been given antibiotics to treat this condition.  Home care:    Take the full course of antibiotics as instructed. Do not stop taking them, even if you feel better.    Drink plenty of water, hot tea, and other liquids. This may help thin mucus. It also may promote sinus drainage.    Heat may help soothe painful areas of the face. Use a towel soaked in hot water. Or,  the shower and direct the hot spray onto your face. Using a vaporizer along with a menthol rub at night may also help.     An expectorant containing guaifenesin may help thin the mucus and promote drainage from the sinuses.    Over-the-counter decongestants may be used unless a similar medicine was prescribed. Nasal sprays work the fastest. Use one that contains phenylephrine or oxymetazoline. First blow the nose gently. Then use the spray. Do not use these medicines more often than directed on the label or symptoms may get worse. You may also use tablets containing pseudoephedrine. Avoid products that combine ingredients, because side effects may be increased. Read labels. You can also ask the pharmacist for help. (NOTE: Persons with high blood pressure should not use decongestants. They can raise blood pressure.)    Over-the-counter antihistamines may help if allergies contributed to your sinusitis.      Do not use nasal rinses or irrigation during an acute sinus infection, unless told to by your health care provider. Rinsing may spread the infection to other sinuses.    Use acetaminophen or ibuprofen to control pain, unless another pain medicine was prescribed. (If you have chronic liver or kidney disease or ever had a stomach ulcer, talk with  your doctor before using these medicines. Aspirin should never be used in anyone under 18 years of age who is ill with a fever. It may cause severe liver damage.)    Don't smoke. This can worsen symptoms.  Follow-up care  Follow up with your healthcare provider or our staff if you are not improving within the next week.  When to seek medical advice  Call your healthcare provider if any of these occur:    Facial pain or headache becoming more severe    Stiff neck    Unusual drowsiness or confusion    Swelling of the forehead or eyelids    Vision problems, including blurred or double vision    Fever of 100.4 F (38 C) or higher, or as directed by your healthcare provider    Seizure    Breathing problems    Symptoms not resolving within 10 days    3335-0135 [employer]. [address] [place] [address], [place] [address]. All rights reserved. This information is not intended as a substitute for professional medical care. Always follow your healthcare professional's instructions.

## 2017-03-01 NOTE — NURSING NOTE
"Chief Complaint   Patient presents with     URI       Initial /73 (BP Location: Left arm, Patient Position: Chair, Cuff Size: Adult Regular)  Pulse 58  Temp 98.3  F (36.8  C) (Oral)  Ht 5' 5.25\" (1.657 m)  Wt 167 lb 6.4 oz (75.9 kg)  SpO2 100%  BMI 27.64 kg/m2 Estimated body mass index is 27.64 kg/(m^2) as calculated from the following:    Height as of this encounter: 5' 5.25\" (1.657 m).    Weight as of this encounter: 167 lb 6.4 oz (75.9 kg).  Medication Reconciliation: complete   Althea Fernandez MA      "

## 2017-03-01 NOTE — MR AVS SNAPSHOT
After Visit Summary   3/1/2017    Angelica Gomez    MRN: [id number]           Patient Information     Date Of Birth          1980        Visit Information        Provider Department      3/1/2017 3:00 PM Beverley More APRN CNP [facility]        Today's Diagnoses     Acute bronchitis with symptoms > 10 days    -  1      Care Instructions    Based on your medical history and these are the current health maintenance or preventive care services that you are due for (some may have been done at this visit)  There are no preventive care reminders to display for this patient.      At [facility], we strive to deliver an exceptional experience to you, every time we see you.    If you receive a survey in the mail, please send us back your thoughts. We really do value your feedback.    Your care team's suggested websites for health information:  Www.[employer].Leadjini : Up to date and easily searchable information on multiple topics.  Www.medlineplus.gov : medication info, interactive tutorials, watch real surgeries online  Www.familydoctor.org : good info from the Academy of Family Physicians  Www.cdc.gov : public health info, travel advisories, epidemics (H1N1)  Www.aap.org : children's health info, normal development, vaccinations  Www.health.[facility].[place].us : [place] dept of health, public health issues in [place], N1N1    How to contact your care team:   Team Elise/Ulisses [phone]         Pharmacy [phone]    Dr. Brumfield, Mahsa Gomez PA-C, Dr. Collado, Jennie LIPSCOMB CNP, Lois Jamison PA-C, Dr. Jimenez, and DEISI Tolbert CNP    Team RNs: Ellie & Emily      Clinic hours  M-Th 7 am-7 pm   Fri 7 am-5 pm.   Urgent care M-F 11 am-9 pm,   Sat/Sun 9 am-5 pm.  Pharmacy M-Th 8 am-8 pm Fri 8 am-6 pm  Sat/Sun 9 am-5 pm.     All password changes, disabled accounts, or ID changes in [employer]/MyHealth will be done by our Access Services Department.    If  you need help with your account or password, call: [phone]. Clinic staff no longer has the ability to change passwords.       Sinusitis (Antibiotic Treatment)    The sinuses are air-filled spaces within the bones of the face. They connect to the inside of the nose. Sinusitis is an inflammation of the tissue lining the sinus cavity. Sinus inflammation can occur during a cold. It can also be due to allergies to pollens and other particles in the air. Sinusitis can cause symptoms of sinus congestion and fullness. A sinus infection causes fever, headache and facial pain. There is often green or yellow drainage from the nose or into the back of the throat (post-nasal drip). You have been given antibiotics to treat this condition.  Home care:    Take the full course of antibiotics as instructed. Do not stop taking them, even if you feel better.    Drink plenty of water, hot tea, and other liquids. This may help thin mucus. It also may promote sinus drainage.    Heat may help soothe painful areas of the face. Use a towel soaked in hot water. Or,  the shower and direct the hot spray onto your face. Using a vaporizer along with a menthol rub at night may also help.     An expectorant containing guaifenesin may help thin the mucus and promote drainage from the sinuses.    Over-the-counter decongestants may be used unless a similar medicine was prescribed. Nasal sprays work the fastest. Use one that contains phenylephrine or oxymetazoline. First blow the nose gently. Then use the spray. Do not use these medicines more often than directed on the label or symptoms may get worse. You may also use tablets containing pseudoephedrine. Avoid products that combine ingredients, because side effects may be increased. Read labels. You can also ask the pharmacist for help. (NOTE: Persons with high blood pressure should not use decongestants. They can raise blood pressure.)    Over-the-counter antihistamines may help if  allergies contributed to your sinusitis.      Do not use nasal rinses or irrigation during an acute sinus infection, unless told to by your health care provider. Rinsing may spread the infection to other sinuses.    Use acetaminophen or ibuprofen to control pain, unless another pain medicine was prescribed. (If you have chronic liver or kidney disease or ever had a stomach ulcer, talk with your doctor before using these medicines. Aspirin should never be used in anyone under 18 years of age who is ill with a fever. It may cause severe liver damage.)    Don't smoke. This can worsen symptoms.  Follow-up care  Follow up with your healthcare provider or our staff if you are not improving within the next week.  When to seek medical advice  Call your healthcare provider if any of these occur:    Facial pain or headache becoming more severe    Stiff neck    Unusual drowsiness or confusion    Swelling of the forehead or eyelids    Vision problems, including blurred or double vision    Fever of 100.4 F (38 C) or higher, or as directed by your healthcare provider    Seizure    Breathing problems    Symptoms not resolving within 10 days    4050-1682 [employer]. [address] [place] [address]. All rights reserved. This information is not intended as a substitute for professional medical care. Always follow your healthcare professional's instructions.              Follow-ups after your visit        Your next 10 appointments already scheduled     Mar 08, 2017  2:40 PM CST   Office Visit with Ale Michelle DO, NE PROC  [facility]P   [facility]    [address] [address] [phone]           Bring a current list of meds and any records pertaining to this visit.  For Physicals, please bring immunization records and any forms needing to be filled out.  Please arrive 10 minutes early to complete paperwork.            Mar  "15, 2017  8:30 AM CDT   [facility] Hand with Edith Boss   [facility] [place] ([facility] [place] [facility])    [address]  [place] [address] [phone]              Who to contact     If you have questions or need follow up information about today's clinic visit or your schedule please contact [facility] directly at [phone].  Normal or non-critical lab and imaging results will be communicated to you by [employer], letter or phone within 4 business days after the clinic has received the results. If you do not hear from us within 7 days, please contact the clinic through [employer] or phone. If you have a critical or abnormal lab result, we will notify you by phone as soon as possible.  Submit refill requests through [employer] or call your pharmacy and they will forward the refill request to us. Please allow 3 business days for your refill to be completed.          Additional Information About Your Visit        [employer] Information     [employer] gives you secure access to your electronic health record. If you see a primary care provider, you can also send messages to your care team and make appointments. If you have questions, please call your primary care clinic.  If you do not have a primary care provider, please call [phone] and they will assist you.        Care EveryWhere ID     This is your Care EveryWhere ID. This could be used by other organizations to access your [facility] medical records  [id number]        Your Vitals Were     Pulse Temperature Height Pulse Oximetry BMI (Body Mass Index)       58 98.3  F (36.8  C) (Oral) 5' 5.25\" (1.657 m) 100% 27.64 kg/m2        Blood Pressure from Last 3 Encounters:   03/01/17 120/73   02/06/17 118/72   01/30/17 118/62    Weight from Last 3 Encounters:   03/01/17 167 lb 6.4 oz (75.9 kg)   02/06/17 165 lb (74.8 kg)   01/30/17 163 lb (73.9 kg)              Today, you had the following     No orders " found for display         Today's Medication Changes          These changes are accurate as of: 3/1/17  3:15 PM.  If you have any questions, ask your nurse or doctor.               Start taking these medicines.        Dose/Directions    amoxicillin-clavulanate 875-125 MG per tablet   Commonly known as:  AUGMENTIN   Used for:  Acute bronchitis with symptoms > 10 days   Started by:  Beverley More APRN CNP        Dose:  1 tablet   Take 1 tablet by mouth 2 times daily   Quantity:  20 tablet   Refills:  0       fluconazole 150 MG tablet   Commonly known as:  DIFLUCAN   Used for:  Acute bronchitis with symptoms > 10 days   Started by:  Beverley More APRN CNP        Dose:  150 mg   Take 1 tablet (150 mg) by mouth once for 1 dose   Quantity:  1 tablet   Refills:  0            Where to get your medicines      These medications were sent to [facility] [address] [place] [address].  [address] [place] [address], [place] [address]     Phone:  [phone]     amoxicillin-clavulanate 875-125 MG per tablet    fluconazole 150 MG tablet                Primary Care Provider Office Phone # Fax #    Analisa Harper PA-C [phone]       [address] [address]        Thank you!     Thank you for choosing [facility]  for your care. Our goal is always to provide you with excellent care. Hearing back from our patients is one way we can continue to improve our services. Please take a few minutes to complete the written survey that you may receive in the mail after your visit with us. Thank you!             Your Updated Medication List - Protect others around you: Learn how to safely use, store and throw away your medicines at www.disposemymeds.org.          This list is accurate as of: 3/1/17  3:15 PM.  Always use your most recent med list.                   Brand Name Dispense Instructions for use     amoxicillin-clavulanate 875-125 MG per tablet    AUGMENTIN    20 tablet    Take 1 tablet by mouth 2 times daily       EXCEDRIN MIGRAINE 250-250-65 MG per tablet   Generic drug:  aspirin-acetaminophen-caffeine      Take 1 tablet by mouth every 6 hours as needed.       fluconazole 150 MG tablet    DIFLUCAN    1 tablet    Take 1 tablet (150 mg) by mouth once for 1 dose        MG tablet   Generic drug:  ibuprofen      Take 400 mg by mouth every 6 hours as needed.       MIRENA (52 MG) 20 MCG/24HR IUD   Generic drug:  levonorgestrel     1    one in utero       MULTIVITAMIN & MINERAL PO      Take 1 each by mouth daily. [facility] women's active supplement       PROBIOTIC PO      Reported on 3/1/2017

## 2017-03-08 ENCOUNTER — OFFICE VISIT (OUTPATIENT)
Dept: FAMILY MEDICINE | Facility: CLINIC | Age: 37
End: 2017-03-08
Payer: COMMERCIAL

## 2017-03-08 VITALS
TEMPERATURE: 98 F | WEIGHT: 162 LBS | BODY MASS INDEX: 26.99 KG/M2 | OXYGEN SATURATION: 100 % | DIASTOLIC BLOOD PRESSURE: 74 MMHG | HEART RATE: 88 BPM | RESPIRATION RATE: 20 BRPM | SYSTOLIC BLOOD PRESSURE: 118 MMHG | HEIGHT: 65 IN

## 2017-03-08 DIAGNOSIS — Z30.430 ENCOUNTER FOR IUD INSERTION: ICD-10-CM

## 2017-03-08 DIAGNOSIS — J34.89 SINUS PRESSURE: ICD-10-CM

## 2017-03-08 DIAGNOSIS — R05.9 COUGH: Primary | ICD-10-CM

## 2017-03-08 DIAGNOSIS — Z12.4 ENCOUNTER FOR PAPANICOLAOU SMEAR FOR CERVICAL CANCER SCREENING: ICD-10-CM

## 2017-03-08 DIAGNOSIS — Z30.432 ENCOUNTER FOR IUD REMOVAL: ICD-10-CM

## 2017-03-08 PROCEDURE — 58300 INSERT INTRAUTERINE DEVICE: CPT | Performed by: FAMILY MEDICINE

## 2017-03-08 PROCEDURE — 99213 OFFICE O/P EST LOW 20 MIN: CPT | Mod: 25 | Performed by: FAMILY MEDICINE

## 2017-03-08 PROCEDURE — 87801 DETECT AGNT MULT DNA AMPLI: CPT | Performed by: FAMILY MEDICINE

## 2017-03-08 PROCEDURE — G0145 SCR C/V CYTO,THINLAYER,RESCR: HCPCS | Performed by: FAMILY MEDICINE

## 2017-03-08 PROCEDURE — 58301 REMOVE INTRAUTERINE DEVICE: CPT | Performed by: FAMILY MEDICINE

## 2017-03-08 PROCEDURE — 87624 HPV HI-RISK TYP POOLED RSLT: CPT | Performed by: FAMILY MEDICINE

## 2017-03-08 NOTE — MR AVS SNAPSHOT
After Visit Summary   3/8/2017    Angelica Gomez    MRN: [id number]           Patient Information     Date Of Birth          1980        Visit Information        Provider Department      3/8/2017 2:40 PM Ale Michelle DO; NE PROC RM OB/[facility]        Today's Diagnoses     Cough    -  1    Sinus pressure        Encounter for IUD insertion        Encounter for IUD removal        Encounter for Papanicolaou smear for cervical cancer screening          Care Instructions    Finish antibiotics  Use flonase and nasal rinse  Call for levaquin if not resolving  Follow up as planned in one month  Ale Michelle D.O.          Follow-ups after your visit        Your next 10 appointments already scheduled     Mar 15, 2017  8:30 AM CDT   [facility] Hand with Edith Boss   [facility] [place] ([facility] [place] [facility])    [address]  [address]  [place] [address] [phone]              Who to contact     If you have questions or need follow up information about today's clinic visit or your schedule please contact [facility] directly at [phone].  Normal or non-critical lab and imaging results will be communicated to you by MyChart, letter or phone within 4 business days after the clinic has received the results. If you do not hear from us within 7 days, please contact the clinic through MyChart or phone. If you have a critical or abnormal lab result, we will notify you by phone as soon as possible.  Submit refill requests through [employer] or call your pharmacy and they will forward the refill request to us. Please allow 3 business days for your refill to be completed.          Additional Information About Your Visit        MyChart Information     [employer] gives you secure access to your electronic health record. If you see a primary care provider, you can also send messages to your care team and make  "appointments. If you have questions, please call your primary care clinic.  If you do not have a primary care provider, please call [phone] and they will assist you.        Care EveryWhere ID     This is your Care EveryWhere ID. This could be used by other organizations to access your [place] medical records  [id number]        Your Vitals Were     Pulse Temperature Respirations Height Pulse Oximetry BMI (Body Mass Index)    88 98  F (36.7  C) (Oral) 20 5' 5.25\" (1.657 m) 100% 26.75 kg/m2       Blood Pressure from Last 3 Encounters:   03/08/17 118/74   03/01/17 120/73   02/06/17 118/72    Weight from Last 3 Encounters:   03/08/17 162 lb (73.5 kg)   03/01/17 167 lb 6.4 oz (75.9 kg)   02/06/17 165 lb (74.8 kg)              We Performed the Following     Rodrigo marrero parapert DNA pcr     HPV High Risk Types DNA Cervical     INSERTION INTRAUTERINE DEVICE     Pap imaged thin layer screen with HPV - recommended age 30 - 65 years (select HPV order below)     REMOVE INTRAUTERINE DEVICE          Today's Medication Changes          These changes are accurate as of: 3/8/17  3:42 PM.  If you have any questions, ask your nurse or doctor.               These medicines have changed or have updated prescriptions.        Dose/Directions    * MIRENA (52 MG) 20 MCG/24HR IUD   This may have changed:  Another medication with the same name was added. Make sure you understand how and when to take each.   Used for:  Insertion of IUD   Generic drug:  levonorgestrel   Changed by:  Sarah Padron MD        one in utero   Quantity:  1   Refills:  0       * levonorgestrel 20 MCG/24HR IUD   Commonly known as:  MIRENA   This may have changed:  You were already taking a medication with the same name, and this prescription was added. Make sure you understand how and when to take each.   Used for:  Encounter for IUD removal   Changed by:  Ale Michelle DO        Dose:  1 each   1 each (20 mcg) by Intrauterine route once for " 1 dose   Quantity:  1 each   Refills:  0       * Notice:  This list has 2 medication(s) that are the same as other medications prescribed for you. Read the directions carefully, and ask your doctor or other care provider to review them with you.         Where to get your medicines      Some of these will need a paper prescription and others can be bought over the counter.  Ask your nurse if you have questions.     You don't need a prescription for these medications     levonorgestrel 20 MCG/24HR IUD                Primary Care Provider Office Phone # Fax #    Analisa Harper PA-C [phone]       [facility] [address] [address]        Thank you!     Thank you for choosing [facility]  for your care. Our goal is always to provide you with excellent care. Hearing back from our patients is one way we can continue to improve our services. Please take a few minutes to complete the written survey that you may receive in the mail after your visit with us. Thank you!             Your Updated Medication List - Protect others around you: Learn how to safely use, store and throw away your medicines at www.disposemymeds.org.          This list is accurate as of: 3/8/17  3:42 PM.  Always use your most recent med list.                   Brand Name Dispense Instructions for use    amoxicillin-clavulanate 875-125 MG per tablet    AUGMENTIN    20 tablet    Take 1 tablet by mouth 2 times daily       EXCEDRIN MIGRAINE 250-250-65 MG per tablet   Generic drug:  aspirin-acetaminophen-caffeine      Take 1 tablet by mouth every 6 hours as needed.        MG tablet   Generic drug:  ibuprofen      Take 400 mg by mouth every 6 hours as needed.       * MIRENA (52 MG) 20 MCG/24HR IUD   Generic drug:  levonorgestrel     1    one in utero       * levonorgestrel 20 MCG/24HR IUD    MIRENA    1 each    1 each (20 mcg) by Intrauterine route once for 1 dose        MULTIVITAMIN & MINERAL PO      Take 1 each by mouth daily. [facility] women's active supplement       PROBIOTIC PO      Reported on 3/1/2017       * Notice:  This list has 2 medication(s) that are the same as other medications prescribed for you. Read the directions carefully, and ask your doctor or other care provider to review them with you.

## 2017-03-08 NOTE — PROGRESS NOTES
SUBJECTIVE:                                                    Angelica Gomez is a 36 year old female who presents to clinic today for the following health issues:      ENT Symptoms             Symptoms: cc Present Absent Comment   Fever/Chills  x     Fatigue  x     Muscle Aches  x     Eye Irritation   x    Sneezing  x     Nasal Dimitris/Drg  x     Sinus Pressure/Pain x      Loss of smell  x     Dental pain  x     Sore Throat   x    Swollen Glands   x    Ear Pain/Fullness  x     Cough  x     Wheeze  x     Chest Pain   x    Shortness of breath  x     Rash   x    Other  x  Diarrhea since starting antibiotics     Symptom duration:  Since    Symptom severity:  moderate   Treatments tried:  Augmentin, Dayquil, Zyrtec D   Contacts:   and daughter             Problem list and histories reviewed & adjusted, as indicated.  Additional history: as documented    Patient Active Problem List   Diagnosis     Galactorrhea     Dyspareunia     Ovarian cyst     CARDIOVASCULAR SCREENING; LDL GOAL LESS THAN 160     Headache     Dysesthesia     Fatigue     Leg pain     Right elbow pain     Lateral epicondylitis of right elbow     Lesion of right ulnar nerve     Past Surgical History   Procedure Laterality Date     C  delivery only       superficial wound dehiscence     Tubal ligation         Social History   Substance Use Topics     Smoking status: Former Smoker     Smokeless tobacco: Never Used     Alcohol use Yes      Comment: 0-1     Family History   Problem Relation Age of Onset     Alcohol/Drug Paternal Grandfather      Thyroid Disease Mother      Breast Cancer Mother 56     breast, 3 years later     Asthma Brother      HEART DISEASE Father      2x heart attacks     Circulatory Father      blood clots     Cardiovascular Father      patent foramen ovale, repaired x 2, afib     CEREBROVASCULAR DISEASE Father      x2     C.A.D. Father      x2     Genitourinary Problems Father      kidney disease      "Hypertension Paternal Grandmother      CANCER Paternal Aunt      cervical cancer(another sisiter)     Breast Cancer Paternal Aunt      breast cancer at 70's     Cancer - colorectal No family hx of      DIABETES No family hx of            Reviewed and updated as needed this visit by clinical staff  Tobacco  Allergies  Meds  Med Hx  Surg Hx  Fam Hx  Soc Hx      Reviewed and updated as needed this visit by Provider         ROS:  Constitutional, HEENT, cardiovascular, pulmonary, GI, , musculoskeletal, neuro, skin, endocrine and psych systems are negative, except as otherwise noted.    OBJECTIVE:                                                    /74 (BP Location: Right arm, Patient Position: Chair, Cuff Size: Adult Regular)  Pulse 88  Temp 98  F (36.7  C) (Oral)  Resp 20  Ht 5' 5.25\" (1.657 m)  Wt 162 lb (73.5 kg)  SpO2 100%  BMI 26.75 kg/m2  Body mass index is 26.75 kg/(m^2).  GENERAL: healthy, alert and no distress  EYES: Eyes grossly normal to inspection, PERRL and conjunctivae and sclerae normal  HENT: ear canals and TM's normal, nose and mouth without ulcers or lesions  NECK: no adenopathy, no asymmetry, masses, or scars and thyroid normal to palpation  RESP: lungs clear to auscultation - no rales, rhonchi or wheezes  CV: regular rate and rhythm, normal S1 S2, no S3 or S4, no murmur, click or rub, no peripheral edema and peripheral pulses strong  ABDOMEN: soft, nontender, no hepatosplenomegaly, no masses and bowel sounds normal         IUD  INSERTION PROCEDURE   Angelica Gomez who presents for mirena IUD insertion. Indication for IUD insertion is contraception. Patient's last menstrual period was No LMP recorded. Patient is not currently having periods (Reason: IUD).. . The patient is currently using OCPs for contraception. She is in a monogamous sexual relationship.   Lab Results   Component Value Date    PAP NIL 12/28/2012      pregnancy test :neg  A complete discussion of the risks and benefits " of IUD use and the details of the insertion procedure was held with the patient.   All questions were answered. A consent form was signed.   Prior to the beginning of the procedure the team paused to verify the patient's identity, as well as the procedure to be performed and the site. All equipment required was ready and available. The patient was positioned appropriately.   IUD Lot #  [id number]  NDC # 85294-939-27 USE ONLY FOR MIRENA  NDC # 57862-336-20 USE ONLY FOR PARAGUARD  The patient was placed in low lithotomy. A bimanual exam was performed and the uterus noted to be retro.Sterile spec inserted, cervix visualized and cleaned with betadine x 3. IUD removed with ring forceps without difficulty. A speculum was placed and the cervix swabbed with Betadine. A tenaculum was placed on the anterior cervical lip. The uterus sounded to 6cm. The Mirena IUD was placed to the uterine fundus without difficulty. The strings were cut to 3 cms. The tenaculum was removed and hemostasis was ensured. The speculum was removed. The patient tolerated the procedure well.   PLAN:   The patient was asked to contact the clinic for any fever/chills/severe pelvic or abdominal pain or heavy bleeding. She was instructed in how to palpate her IUD strings.   FOLLOW-UP:   She was asked to follow up in one month for IUD check, and for her routine annual screening.                ASSESSMENT/PLAN:                                                        ICD-10-CM    1. Cough R05 Bordetella pert parapert DNA pcr   2. Sinus pressure J34.89    3. Encounter for IUD insertion Z30.430 INSERTION INTRAUTERINE DEVICE   4. Encounter for IUD removal Z30.432 REMOVE INTRAUTERINE DEVICE   5. Encounter for Papanicolaou smear for cervical cancer screening Z12.4 HPV High Risk Types DNA Cervical     Pap imaged thin layer screen with HPV - recommended age 30 - 65 years (select HPV order below)     iud placed without difficulty-patient tolerated it well  Follow up in  one month, pap done today    Cough , sinus issues for a month-treated with Augmentin for 7 days now, finishing out her antibiotics, get pertussus test today  Use zpak if positive, consider levaquin if persisting, she has had it few years back and worked, reviewed risks and benefits of meds.     See Patient Instructions    Ale Michelle,   [facility]

## 2017-03-08 NOTE — NURSING NOTE
"Chief Complaint   Patient presents with     IUD     Mirena Removal and Replacement     Sinus Problem     Has been on Augmentin since last week-- not seeing any improvement       Initial /74 (BP Location: Right arm, Patient Position: Chair, Cuff Size: Adult Regular)  Pulse 88  Temp 98  F (36.7  C) (Oral)  Resp 20  Ht 5' 5.25\" (1.657 m)  Wt 162 lb (73.5 kg)  SpO2 100%  BMI 26.75 kg/m2 Estimated body mass index is 26.75 kg/(m^2) as calculated from the following:    Height as of this encounter: 5' 5.25\" (1.657 m).    Weight as of this encounter: 162 lb (73.5 kg).  Medication Reconciliation: complete    "

## 2017-03-08 NOTE — PATIENT INSTRUCTIONS
Finish antibiotics  Use flonase and nasal rinse  Call for levaquin if not resolving  Follow up as planned in one month  Ale Michelle D.O.

## 2017-03-08 NOTE — LETTER
[facility]  [address] [address] [phone]      March 13, 2017      Angelica Jason  [address] [place] [id number]          Dear Ms. HarrellJason    The results of your recent lab tests were within normal limits. Enclosed is a copy of these results.  If you have any further questions or problems, please contact our office.      Component      Latest Ref Rng & Units 3/8/2017   Specimen Description       Nasal   Bordetella Per/Paraper PCR      NEG Negative . . .         Sincerely,      Ale Michelle, DO/jesus

## 2017-03-10 LAB
B PERT+PARAPERT DNA PNL SPEC NAA+PROBE: NORMAL
COPATH REPORT: NORMAL
PAP: NORMAL
SPECIMEN SOURCE: NORMAL

## 2017-03-13 LAB
FINAL DIAGNOSIS: NORMAL
HPV HR 12 DNA CVX QL NAA+PROBE: NEGATIVE
HPV16 DNA SPEC QL NAA+PROBE: NEGATIVE
HPV18 DNA SPEC QL NAA+PROBE: NEGATIVE
SPECIMEN DESCRIPTION: NORMAL

## 2017-04-05 ENCOUNTER — OFFICE VISIT (OUTPATIENT)
Dept: FAMILY MEDICINE | Facility: CLINIC | Age: 37
End: 2017-04-05
Payer: COMMERCIAL

## 2017-04-05 VITALS
HEIGHT: 65 IN | DIASTOLIC BLOOD PRESSURE: 72 MMHG | SYSTOLIC BLOOD PRESSURE: 110 MMHG | BODY MASS INDEX: 27.99 KG/M2 | WEIGHT: 168 LBS | TEMPERATURE: 97.4 F

## 2017-04-05 DIAGNOSIS — Z98.82 S/P BREAST AUGMENTATION: ICD-10-CM

## 2017-04-05 DIAGNOSIS — Z30.431 IUD CHECK UP: Primary | ICD-10-CM

## 2017-04-05 DIAGNOSIS — N64.4 BREAST PAIN: ICD-10-CM

## 2017-04-05 DIAGNOSIS — D22.9 NUMEROUS MOLES: ICD-10-CM

## 2017-04-05 PROCEDURE — 99214 OFFICE O/P EST MOD 30 MIN: CPT | Performed by: FAMILY MEDICINE

## 2017-04-05 NOTE — PROGRESS NOTES
"  SUBJECTIVE:                                                    Angelica Gomez is a 37 year old female who presents to clinic today for the following health issues:      Recheck after IUD placement on 3/8/17     Had mild period last week.  No pain or problems.    - Pt is request Dr Michelle look at a possible swollen lymph node .spot is tender and pt states she noticed area about a month ago . No discoloration or swelling. Pt describes as a small lump[.      History of breast augmentation, has some left side chest/laura breast pain off and on for weeks, she had recent UPPER RESPIRATORY INFECTION so thought it was due to that  She has family history of breast cancer, mother  at 59 of breast cancer, paternal aunt breast cancer in her 70's  No personal history of cancers  No swelling    iud without complications    History of moles, never seen dermatology, no family or personal history of melanoma, tries to stay out of the sun, history of biopsies as a teenager and all results negative      Problem list and histories reviewed & adjusted, as indicated.  Additional history: as documented        Reviewed and updated as needed this visit by clinical staff  Tobacco  Allergies  Med Hx  Surg Hx  Fam Hx  Soc Hx      Reviewed and updated as needed this visit by Provider         ROS:  Constitutional, HEENT, cardiovascular, pulmonary, GI, , musculoskeletal, neuro, skin, endocrine and psych systems are negative, except as otherwise noted.    OBJECTIVE:                                                    /72 (BP Location: Right arm, Patient Position: Chair, Cuff Size: Adult Regular)  Temp 97.4  F (36.3  C) (Oral)  Ht 5' 5.25\" (1.657 m)  Wt 168 lb (76.2 kg)  LMP 2017  BMI 27.74 kg/m2  Body mass index is 27.74 kg/(m^2).  GENERAL: healthy, alert and no distress  EYES: Eyes grossly normal to inspection, PERRL and conjunctivae and sclerae normal  HENT: ear canals and TM's normal, nose and mouth without ulcers " or lesions  NECK: no adenopathy, no asymmetry, masses, or scars and thyroid normal to palpation  RESP: lungs clear to auscultation - no rales, rhonchi or wheezes  BREAST: normal without masses, left outer quadrant tenderness, no nipple discharge and no palpable axillary masses or adenopathy  CV: regular rate and rhythm, normal S1 S2, no S3 or S4, no murmur, click or rub, no peripheral edema and peripheral pulses strong  ABDOMEN: soft, nontender, no hepatosplenomegaly, no masses and bowel sounds normal  -normal exam with IUD strings present  MS: no gross musculoskeletal defects noted, no edema  SKIN: numerous moles, one with irregular boarders and color on abdomen  NEURO: Normal strength and tone, mentation intact and speech normal  PSYCH: mentation appears normal, affect normal/bright    Diagnostic Test Results:  none      ASSESSMENT/PLAN:                                                        ICD-10-CM    1. IUD check up Z30.431    2. Breast pain N64.4 MA Diagnostic Digital Bilateral   3. Numerous moles D22.9 DERMATOLOGY REFERRAL   4. S/P breast augmentation Z98.82 MA Diagnostic Digital Bilateral   IUD check up-in place   Breast pain, family history of breast cancer-advised mammo/US  Numerous moles-advised dermatology follow up           See Patient Instructions    Ale Michelle DO  [facility]

## 2017-04-05 NOTE — NURSING NOTE
"Chief Complaint   Patient presents with     RECHECK     F/U after IUD placement       Initial /72 (BP Location: Right arm, Patient Position: Chair, Cuff Size: Adult Regular)  Temp 97.4  F (36.3  C) (Oral)  Ht 5' 5.25\" (1.657 m)  Wt 168 lb (76.2 kg)  LMP 03/29/2017  BMI 27.74 kg/m2 Estimated body mass index is 27.74 kg/(m^2) as calculated from the following:    Height as of this encounter: 5' 5.25\" (1.657 m).    Weight as of this encounter: 168 lb (76.2 kg).  Medication Reconciliation: complete    "

## 2017-04-05 NOTE — PATIENT INSTRUCTIONS
iud is in place  Follow up with dermatology and breast center for mammogram as advised  Ale Michelle D.O.    [facility]   Discharged by : Estrellita Wheeler MA    Paper scripts provided to patient : no     If you have any questions regarding your visit please contact your care team:     [facility] Clinic Hours Telephone Number   Dr. Sharri Chung PA-C   7am-7pm Monday - Thursday   7am-5pm Fridays  [phone]   (Appointment scheduling available 24/7)   RN Line   [phone] option 2       For a Price Quote for your services, please call our Consumer Price Line at [phone].     What options do I have for visits at the clinic other than the traditional office visit?     To expand how we care for you, many of our providers are utilizing electronic visits (e-visits) and telephone visits, when medically appropriate, for interactions with their patients rather than a visit in the clinic. We also offer nurse visits for many medical concerns. Just like any other service, we will bill your insurance company for this type of visit based on time spent on the phone with your provider. Not all insurance companies cover these visits. Please check with your medical insurance if this type of visit is covered. You will be responsible for any charges that are not paid by your insurance.   E-visits via [employer]: generally incur a $35.00 fee.     Telephone visits:   Time spent on the phone: *charged based on time that is spent on the phone in increments of 10 minutes. Estimated cost:   5-10 mins $30.00   11-20 mins. $59.00   21-30 mins. $85.00     Use [employer] (secure email communication and access to your chart) to send your primary care provider a message or make an appointment. Ask someone on your Team how to sign up for [employer].     As always, Thank you for trusting us with your health care needs!      [facility]  Services:    Scheduling Appointments  [facility]  Call: [phone]    [facility]  Call: [phone]    [facility]  Call: [phone]      WHERE TO GO FOR CARE?    Clinic    Make an appointment if you:       Are sick (cold, cough, flu, sore throat, earache or in pain).       Have a small injury (sprain, small cut, burn or broken bone).       Need a physical exam, Pap smear, vaccine or prescription refill.       Have questions about your health or medicines.    To reach us:      Call [phone] ([phone]). Open 24 hours every day. (For counseling services, call [phone].)    Log into [employer] at [employer]. (Visit [employer] to create an account.) Hospital emergency room    An emergency is a serious or life- threatening problem that must be treated right away.    Call 663 or get to the hospital if you have:      Very bad or sudden:            - Chest pain or pressure         - Bleeding         - Head or belly pain         - Dizziness or trouble seeing, walking or                          Speaking      Problems breathing      Blood in your vomit or you are coughing up blood      A major injury (knocked out, loss of a finger or limb, rape, broken bone protruding from skin)    A mental health crisis. (Or call the Mental Health Crisis line at 1-433.132.3610 or Suicide Prevention Hotline at 1-748.515.4535.)    Open 24 hours every day. You don't need an appointment.     Urgent care    Visit urgent care for sickness or small injuries when the clinic is closed. You don't need an appointment. To check hours or find an urgent care near you, visit www.[email or web address]. Online care    Get online care from [employer] for more than 70 common problems, like colds, allergies and infections. Open 24 hours every day at: [email or web address]   Need help deciding?    For advice about where to be seen, you may call your clinic  and ask to speak with a nurse. We're here for you 24 hours every day.         If you are deaf or hard of hearing, please let us know. We provide many free services including sign language interpreters, oral interpreters, TTYs, telephone amplifiers, note takers and written materials.

## 2017-04-05 NOTE — MR AVS SNAPSHOT
After Visit Summary   4/5/2017    Angelica Gomez    MRN: [id number]           Patient Information     Date Of Birth          1980        Visit Information        Provider Department      4/5/2017 2:40 PM Ale Michelle DO [facility]        Today's Diagnoses     IUD check up    -  1    Breast pain        Numerous moles          Care Instructions    iud is in place  Follow up with dermatology and breast center for mammogram as advised  Ale Michelle D.O.    [facility]   Discharged by : Estrellita Wheeler MA    Paper scripts provided to patient : no     If you have any questions regarding your visit please contact your care team:     Team Gold Clinic Hours Telephone Number   Dr. Sharri Chung PA-C   7am-7pm Monday - Thursday   7am-5pm Fridays  [phone]   (Appointment scheduling available 24/7)   RN Line   [phone] option 2       For a Price Quote for your services, please call our Consumer Price Line at [phone].     What options do I have for visits at the clinic other than the traditional office visit?     To expand how we care for you, many of our providers are utilizing electronic visits (e-visits) and telephone visits, when medically appropriate, for interactions with their patients rather than a visit in the clinic. We also offer nurse visits for many medical concerns. Just like any other service, we will bill your insurance company for this type of visit based on time spent on the phone with your provider. Not all insurance companies cover these visits. Please check with your medical insurance if this type of visit is covered. You will be responsible for any charges that are not paid by your insurance.   E-visits via Bunch: generally incur a $35.00 fee.     Telephone visits:   Time spent on the phone: *charged based on time that is spent on the phone in increments of 10  minutes. Estimated cost:   5-10 mins $30.00   11-20 mins. $59.00   21-30 mins. $85.00     Use Infomous (secure email communication and access to your chart) to send your primary care provider a message or make an appointment. Ask someone on your Team how to sign up for Infomous.     As always, Thank you for trusting us with your health care needs!      [facility]:    Scheduling Appointments  [facility]  Call: [phone]    [facility]  Call: [phone]    [facility]  Call: [phone]      WHERE TO GO FOR CARE?    Clinic    Make an appointment if you:       Are sick (cold, cough, flu, sore throat, earache or in pain).       Have a small injury (sprain, small cut, burn or broken bone).       Need a physical exam, Pap smear, vaccine or prescription refill.       Have questions about your health or medicines.    To reach us:      Call [phone] ([phone]). Open 24 hours every day. (For counseling services, call [phone].)    Log into Infomous at [email or web address]. (Visit [email or web address] to create an account.) Hospital emergency room    An emergency is a serious or life- threatening problem that must be treated right away.    Call 800 or get to the hospital if you have:      Very bad or sudden:            - Chest pain or pressure         - Bleeding         - Head or belly pain         - Dizziness or trouble seeing, walking or                          Speaking      Problems breathing      Blood in your vomit or you are coughing up blood      A major injury (knocked out, loss of a finger or limb, rape, broken bone protruding from skin)    A mental health crisis. (Or call the Mental Health Crisis line at 1-792.269.2901 or Suicide Prevention Hotline at 1-378.560.6123.)    Open 24 hours every day. You don't need an appointment.     Urgent care    Visit urgent care for sickness or small injuries when the  clinic is closed. You don't need an appointment. To check hours or find an urgent care near you, visit [email or web address]. Online care    Get online care from [facility] for more than 70 common problems, like colds, allergies and infections. Open 24 hours every day at: [email or web address]   Need help deciding?    For advice about where to be seen, you may call your clinic and ask to speak with a nurse. We're here for you 24 hours every day.         If you are deaf or hard of hearing, please let us know. We provide many free services including sign language interpreters, oral interpreters, TTYs, telephone amplifiers, note takers and written materials.                         Follow-ups after your visit        Additional Services     DERMATOLOGY REFERRAL       Your provider has referred you to: [facility]: Dermatology Clinic [facility] [phone]   [email or web address]    Please be aware that coverage of these services is subject to the terms and limitations of your health insurance plan.  Call member services at your health plan with any benefit or coverage questions.      Please bring the following with you to your appointment:    (1) Any X-Rays, CTs or MRIs which have been performed.  Contact the facility where they were done to arrange for  prior to your scheduled appointment.    (2) List of current medications  (3) This referral request   (4) Any documents/labs given to you for this referral                  Future tests that were ordered for you today     Open Future Orders        Priority Expected Expires Ordered    MA Diagnostic Digital Bilateral Routine  4/5/2018 4/5/2017            Who to contact     If you have questions or need follow up information about today's clinic visit or your schedule please contact [facility] directly at [phone].  Normal or non-critical lab and imaging results will be communicated to you by Gavin  "letter or phone within 4 business days after the clinic has received the results. If you do not hear from us within 7 days, please contact the clinic through ebindle or phone. If you have a critical or abnormal lab result, we will notify you by phone as soon as possible.  Submit refill requests through ebindle or call your pharmacy and they will forward the refill request to us. Please allow 3 business days for your refill to be completed.          Additional Information About Your Visit        AiCurisharHomeTouch Information     ebindle gives you secure access to your electronic health record. If you see a primary care provider, you can also send messages to your care team and make appointments. If you have questions, please call your primary care clinic.  If you do not have a primary care provider, please call [phone] and they will assist you.        Care EveryWhere ID     This is your Care EveryWhere ID. This could be used by other organizations to access your [facility] medical records  [id number]        Your Vitals Were     Temperature Height Last Period BMI (Body Mass Index)          97.4  F (36.3  C) (Oral) 5' 5.25\" (1.657 m) 03/29/2017 27.74 kg/m2         Blood Pressure from Last 3 Encounters:   04/05/17 110/72   03/08/17 118/74   03/01/17 120/73    Weight from Last 3 Encounters:   04/05/17 168 lb (76.2 kg)   03/08/17 162 lb (73.5 kg)   03/01/17 167 lb 6.4 oz (75.9 kg)              We Performed the Following     DERMATOLOGY REFERRAL        Primary Care Provider Office Phone # Fax #    Analisa Harper PA-C [phone]       [facility] [address] [facility] [facility] [address]        Thank you!     Thank you for choosing [facility]  for your care. Our goal is always to provide you with excellent care. Hearing back from our patients is one way we can continue to improve our services. Please take a few minutes to complete the written survey that you may receive " in the mail after your visit with us. Thank you!             Your Updated Medication List - Protect others around you: Learn how to safely use, store and throw away your medicines at www.disposemymeds.org.          This list is accurate as of: 4/5/17  3:15 PM.  Always use your most recent med list.                   Brand Name Dispense Instructions for use    EXCEDRIN MIGRAINE 250-250-65 MG per tablet   Generic drug:  aspirin-acetaminophen-caffeine      Take 1 tablet by mouth every 6 hours as needed.        MG tablet   Generic drug:  ibuprofen      Take 400 mg by mouth every 6 hours as needed.       MIRENA (52 MG) 20 MCG/24HR IUD   Generic drug:  levonorgestrel     1    one in utero       MULTIVITAMIN & MINERAL PO      Take 1 each by mouth daily. [facility] women's active supplement       PROBIOTIC PO      Reported on 3/1/2017

## 2017-05-17 PROBLEM — M77.11 LATERAL EPICONDYLITIS OF RIGHT ELBOW: Status: RESOLVED | Noted: 2017-02-21 | Resolved: 2017-05-17

## 2017-05-17 PROBLEM — M25.521 RIGHT ELBOW PAIN: Status: RESOLVED | Noted: 2017-02-21 | Resolved: 2017-05-17

## 2017-05-17 PROBLEM — G56.21 LESION OF RIGHT ULNAR NERVE: Status: RESOLVED | Noted: 2017-02-21 | Resolved: 2017-05-17

## 2017-05-17 NOTE — PROGRESS NOTES
Discharge Summary - Hand Therapy  Pt cancelled appointments on 3/1/17 and 3/15/17 and has not returned for therapy.  Assume all goals met to pt satisfaction.  D/C Hand Therapy.

## 2017-10-16 ENCOUNTER — RADIANT APPOINTMENT (OUTPATIENT)
Dept: MAMMOGRAPHY | Facility: CLINIC | Age: 37
End: 2017-10-16
Attending: FAMILY MEDICINE
Payer: COMMERCIAL

## 2017-10-16 ENCOUNTER — RADIANT APPOINTMENT (OUTPATIENT)
Dept: ULTRASOUND IMAGING | Facility: CLINIC | Age: 37
End: 2017-10-16
Attending: FAMILY MEDICINE
Payer: COMMERCIAL

## 2017-10-16 DIAGNOSIS — N64.4 BREAST PAIN: ICD-10-CM

## 2017-10-16 DIAGNOSIS — Z98.82 S/P BREAST AUGMENTATION: ICD-10-CM

## 2017-10-16 PROCEDURE — G0204 DX MAMMO INCL CAD BI: HCPCS | Performed by: RADIOLOGY

## 2017-10-16 PROCEDURE — 76642 ULTRASOUND BREAST LIMITED: CPT | Mod: LT | Performed by: RADIOLOGY

## 2017-10-16 PROCEDURE — G0279 TOMOSYNTHESIS, MAMMO: HCPCS | Performed by: RADIOLOGY

## 2017-10-22 NOTE — PROGRESS NOTES
Mammo results managed by the breast center. Results communicated to the patient by their office.   Ale Michelle D.O.

## 2017-12-29 ENCOUNTER — OFFICE VISIT (OUTPATIENT)
Dept: FAMILY MEDICINE | Facility: CLINIC | Age: 37
End: 2017-12-29
Payer: COMMERCIAL

## 2017-12-29 VITALS
OXYGEN SATURATION: 100 % | HEART RATE: 68 BPM | DIASTOLIC BLOOD PRESSURE: 77 MMHG | BODY MASS INDEX: 27.71 KG/M2 | TEMPERATURE: 97.1 F | SYSTOLIC BLOOD PRESSURE: 134 MMHG | WEIGHT: 167.8 LBS

## 2017-12-29 DIAGNOSIS — N76.0 BACTERIAL VAGINOSIS: ICD-10-CM

## 2017-12-29 DIAGNOSIS — N89.8 VAGINAL DISCHARGE: Primary | ICD-10-CM

## 2017-12-29 DIAGNOSIS — B96.89 BACTERIAL VAGINOSIS: ICD-10-CM

## 2017-12-29 LAB
SPECIMEN SOURCE: ABNORMAL
WET PREP SPEC: ABNORMAL

## 2017-12-29 PROCEDURE — 99213 OFFICE O/P EST LOW 20 MIN: CPT | Performed by: NURSE PRACTITIONER

## 2017-12-29 PROCEDURE — 87210 SMEAR WET MOUNT SALINE/INK: CPT | Performed by: NURSE PRACTITIONER

## 2017-12-29 RX ORDER — METRONIDAZOLE 500 MG/1
500 TABLET ORAL 2 TIMES DAILY
Qty: 14 TABLET | Refills: 0 | Status: SHIPPED | OUTPATIENT
Start: 2017-12-29 | End: 2021-08-08

## 2017-12-29 NOTE — MR AVS SNAPSHOT
After Visit Summary   12/29/2017    Angelica Gomez    MRN: [id number]           Patient Information     Date Of Birth          1980        Visit Information        Provider Department      12/29/2017 3:20 PM Beverley More APRN CNP [facility]        Today's Diagnoses     Vaginal discharge    -  1    Bacterial vaginosis          Care Instructions    At [facility], we strive to deliver an exceptional experience to you, every time we see you.  If you receive a survey in the mail, please send us back your thoughts. We really do value your feedback.    Based on your medical history, these are the current health maintenance/preventive care services that you are due for (some may have been done at this visit.)  Health Maintenance Due   Topic Date Due     INFLUENZA VACCINE (SYSTEM ASSIGNED)  09/01/2017         Suggested websites for health information:  Www.OPPRTUNITY : Up to date and easily searchable information on multiple topics.  Www.[employer]gov : medication info, interactive tutorials, watch real surgeries online  Www.familydoctor.org : good info from the Academy of Family Physicians  Www.cdc.gov : public health info, travel advisories, epidemics (H1N1)  Www.aap.org : children's health info, normal development, vaccinations  Www.health.[facility].[place].us : [place] dept of health, public health issues in [place], N1N1    Your care team:                            Family Medicine Internal Medicine   MD Calixto Quiroz MD Shantel Branch-Fleming, MD Katya Georgiev PA-C Nam Ho, MD Pediatrics   SAMANTHA Adam, MD Temi Lane CNP, MD Deborah Mielke, MD Kim Thein, APRN CNP      Clinic hours: Monday - Thursday 7 am-7 pm; Fridays 7 am-5 pm.   Urgent care: Monday - Friday 11 am-9 pm; Saturday and Sunday 9 am-5 pm.  Pharmacy : Monday -Thursday 8 am-8 pm; Friday 8 am-6 pm; Saturday and  Sunday 9 am-5 pm.     Clinic: [phone]   Pharmacy: [phone]      Preventing Vaginitis     Use mild, unscented soap when you bathe or shower to avoid irritating your vagina.    Vaginitis is irritation or infection of the vagina or vulva (the outside opening of the vagina). Vaginitis can be caused by bacteria, viruses, parasites, or yeast. Chemicals (such as in perfumes or soaps or in spermicides) can sometimes be a cause. Vaginitis can be caused by hormone changes in pregnancy or with menopause. You can help prevent vaginitis. Follow the tips below. And see your healthcare provider if you have any symptoms.  Hygiene    Avoid chemicals. Do not use vaginal sprays. Do not use scented toilet paper or tampons that are scented. Sprays and scents have chemicals that can irritate your vagina.    Do not douche unless you are told to by your healthcare provider. Douching is rarely needed. And it upsets the normal balance in the vagina.    Wash yourself well. Wash the outer vaginal area (vulva) every day with mild, unscented soap. Keep it as dry as possible.    Wipe correctly. Make sure to wipe from front to back after a bowel movement. This helps keep from spreading bacteria from your anus to your vagina.    Change your tampon often. During your period, make sure to change your tampon as often as directed on the package. This allows the normal flow of vaginal discharge and blood.  Lifestyle    Limit your number of sexual partners. The more partners you have, the greater your risk of infection. Using condoms helps reduce your risk.    Get enough sleep. Sleep helps keep your body s immune system healthy. This helps you fight infection.    Lose weight, if needed. Excess weight can reduce air circulation around your vagina. This can increase your risk of infection.    Exercise regularly. Regular activity helps keep your body healthy.    Take antibiotics only as directed. Antibiotics can change the normal chemical  balance in the vagina.    Clothing    Don t sit in wet clothes. Yeast thrives when it s warm and damp.    Don t wear tight pants. And don t wear tights, leggings, or hose without a cotton crotch. These types of clothing trap warmth and moisture.    Wear cotton underwear. Cotton lets air circulate around the vagina.  Symptoms of vaginitis    Irritation, swelling, or itching of the genital area    Vaginal discharge    Bad vaginal odor    Pain or burning during urination   Date Last Reviewed: 12/1/2016 2000-2017 [employer]. [address] [place] [address]. All rights reserved. This information is not intended as a substitute for professional medical care. Always follow your healthcare professional's instructions.                Follow-ups after your visit        Follow-up notes from your care team     Return if symptoms worsen or fail to improve.      Who to contact     If you have questions or need follow up information about today's clinic visit or your schedule please contact [facility] directly at [phone].  Normal or non-critical lab and imaging results will be communicated to you by Whiskhart, letter or phone within 4 business days after the clinic has received the results. If you do not hear from us within 7 days, please contact the clinic through Jumpidot or phone. If you have a critical or abnormal lab result, we will notify you by phone as soon as possible.  Submit refill requests through Carena or call your pharmacy and they will forward the refill request to us. Please allow 3 business days for your refill to be completed.          Additional Information About Your Visit        WhiskharPlanspot Information     Carena gives you secure access to your electronic health record. If you see a primary care provider, you can also send messages to your care team and make appointments. If you have questions, please call your primary care clinic.  If you do not have a primary  care provider, please call [phone] and they will assist you.        Care EveryWhere ID     This is your Care EveryWhere ID. This could be used by other organizations to access your [facility] medical records  [id number]        Your Vitals Were     Pulse Temperature Pulse Oximetry BMI (Body Mass Index)          68 97.1  F (36.2  C) (Oral) 100% 27.71 kg/m2         Blood Pressure from Last 3 Encounters:   12/29/17 134/77   04/05/17 110/72   03/08/17 118/74    Weight from Last 3 Encounters:   12/29/17 167 lb 12.8 oz (76.1 kg)   04/05/17 168 lb (76.2 kg)   03/08/17 162 lb (73.5 kg)              We Performed the Following     Wet prep          Today's Medication Changes          These changes are accurate as of: 12/29/17  6:26 PM.  If you have any questions, ask your nurse or doctor.               Start taking these medicines.        Dose/Directions    metroNIDAZOLE 500 MG tablet   Commonly known as:  FLAGYL   Used for:  Bacterial vaginosis   Started by:  Beverley More APRN CNP        Dose:  500 mg   Take 1 tablet (500 mg) by mouth 2 times daily   Quantity:  14 tablet   Refills:  0            Where to get your medicines      These medications were sent to [facility] [place] [address]     Phone:  [phone]     metroNIDAZOLE 500 MG tablet                Primary Care Provider Office Phone # Fax #    Analisa Harper PA-C [phone]       [address] [place] [address]        Equal Access to Services     [facility] AH: Hadii constance ku hadasho Soomaali, waaxda luqadaha, qaybta kaalmada augie lazo. So [facility] [phone].    ATENCIÓN: Si habla español, tiene a vega disposición servicios gratuitos de asistencia lingüística. Llame al [phone].    We comply with applicable federal civil rights laws and [place] laws. We do not discriminate on the basis of race,  color, national origin, age, disability, sex, sexual orientation, or gender identity.            Thank you!     Thank you for choosing [facility]  for your care. Our goal is always to provide you with excellent care. Hearing back from our patients is one way we can continue to improve our services. Please take a few minutes to complete the written survey that you may receive in the mail after your visit with us. Thank you!             Your Updated Medication List - Protect others around you: Learn how to safely use, store and throw away your medicines at www.disposemymeds.org.          This list is accurate as of: 12/29/17  6:26 PM.  Always use your most recent med list.                   Brand Name Dispense Instructions for use Diagnosis    EXCEDRIN MIGRAINE 250-250-65 MG per tablet   Generic drug:  aspirin-acetaminophen-caffeine      Take 1 tablet by mouth every 6 hours as needed.        FLONASE NA      Spray 1 spray in nostril daily         MG tablet   Generic drug:  ibuprofen      Take 400 mg by mouth every 6 hours as needed.        metroNIDAZOLE 500 MG tablet    FLAGYL    14 tablet    Take 1 tablet (500 mg) by mouth 2 times daily    Bacterial vaginosis       MIRENA (52 MG) 20 MCG/24HR IUD   Generic drug:  levonorgestrel     1    one in utero    Insertion of IUD       MULTIVITAMIN & MINERAL PO      Take 1 each by mouth daily. [facility] women's active supplement        PROBIOTIC PO      Reported on 3/1/2017        ZYRTEC ALLERGY PO      Take 10 mg by mouth daily

## 2017-12-29 NOTE — PATIENT INSTRUCTIONS
At [facility], we strive to deliver an exceptional experience to you, every time we see you.  If you receive a survey in the mail, please send us back your thoughts. We really do value your feedback.    Based on your medical history, these are the current health maintenance/preventive care services that you are due for (some may have been done at this visit.)  Health Maintenance Due   Topic Date Due     INFLUENZA VACCINE (SYSTEM ASSIGNED)  09/01/2017         Suggested websites for health information:  Www.E-Generator.org : Up to date and easily searchable information on multiple topics.  Www.[employer]ly[employer].gov : medication info, interactive tutorials, watch real surgeries online  Www.familydoctor.org : good info from the Academy of Family Physicians  Www.cdc.gov : public health info, travel advisories, epidemics (H1N1)  Www.aap.org : children's health info, normal development, vaccinations  Www.health.[facility].[place].us : [place] dept of health, public health issues in [place], N1N1    Your care team:                            Family Medicine Internal Medicine   MD Calixto Quiroz MD Shantel Branch-Fleming, MD Katya Georgiev PA-C Nam Ho, MD Pediatrics   SAMANTHA Adam, CNP Jennie Brown APRN CNP   MD Temi Ventura MD Deborah Mielke, MD Kim Thein, APRN CNP      Clinic hours: Monday - Thursday 7 am-7 pm; Fridays 7 am-5 pm.   Urgent care: Monday - Friday 11 am-9 pm; Saturday and Sunday 9 am-5 pm.  Pharmacy : Monday -Thursday 8 am-8 pm; Friday 8 am-6 pm; Saturday and Sunday 9 am-5 pm.     Clinic: [phone]   Pharmacy: [phone]      Preventing Vaginitis     Use mild, unscented soap when you bathe or shower to avoid irritating your vagina.    Vaginitis is irritation or infection of the vagina or vulva (the outside opening of the vagina). Vaginitis can be caused by bacteria, viruses, parasites, or yeast. Chemicals (such as in perfumes or soaps or  in spermicides) can sometimes be a cause. Vaginitis can be caused by hormone changes in pregnancy or with menopause. You can help prevent vaginitis. Follow the tips below. And see your healthcare provider if you have any symptoms.  Hygiene    Avoid chemicals. Do not use vaginal sprays. Do not use scented toilet paper or tampons that are scented. Sprays and scents have chemicals that can irritate your vagina.    Do not douche unless you are told to by your healthcare provider. Douching is rarely needed. And it upsets the normal balance in the vagina.    Wash yourself well. Wash the outer vaginal area (vulva) every day with mild, unscented soap. Keep it as dry as possible.    Wipe correctly. Make sure to wipe from front to back after a bowel movement. This helps keep from spreading bacteria from your anus to your vagina.    Change your tampon often. During your period, make sure to change your tampon as often as directed on the package. This allows the normal flow of vaginal discharge and blood.  Lifestyle    Limit your number of sexual partners. The more partners you have, the greater your risk of infection. Using condoms helps reduce your risk.    Get enough sleep. Sleep helps keep your body s immune system healthy. This helps you fight infection.    Lose weight, if needed. Excess weight can reduce air circulation around your vagina. This can increase your risk of infection.    Exercise regularly. Regular activity helps keep your body healthy.    Take antibiotics only as directed. Antibiotics can change the normal chemical balance in the vagina.    Clothing    Don t sit in wet clothes. Yeast thrives when it s warm and damp.    Don t wear tight pants. And don t wear tights, leggings, or hose without a cotton crotch. These types of clothing trap warmth and moisture.    Wear cotton underwear. Cotton lets air circulate around the vagina.  Symptoms of vaginitis    Irritation, swelling, or itching of the genital  area    Vaginal discharge    Bad vaginal odor    Pain or burning during urination   Date Last Reviewed: 12/1/2016 2000-2017 [employer]. [address] [place] [address], [place] [address]. All rights reserved. This information is not intended as a substitute for professional medical care. Always follow your healthcare professional's instructions.

## 2017-12-29 NOTE — PROGRESS NOTES
SUBJECTIVE:   Angelica Gomez is a 37 year old female who presents to clinic today for the following health issues:    Vaginal Symptoms  Onset: x2-3 weeks     Description:   Vaginal Discharge: white curd-like   Itching (Pruritis): no   Burning sensation:  no   Odor: YES    Accompanying Signs & Symptoms:  Pain with Urination: no   Abdominal Pain: intermittent plevic pain   Fever: no     History:   Sexually active: YES  Possibility of Pregnancy:  No    Precipitating factors:   Recent Antibiotic Use: no     Alleviating factors:  None     Therapies Tried and outcome: OTC vaginal cream for yeast     PT reports vaginal dryness, no dysuria, frequency, urgency, abdominal or flank pain.  NO nausea/vomiting, taking fluids/food well.        Problem list and histories reviewed & adjusted, as indicated.  Additional history: as documented    Patient Active Problem List   Diagnosis     Galactorrhea     Dyspareunia     Ovarian cyst     CARDIOVASCULAR SCREENING; LDL GOAL LESS THAN 160     Headache     Dysesthesia     Fatigue     Leg pain     Past Surgical History:   Procedure Laterality Date     C  DELIVERY ONLY      superficial wound dehiscence     TUBAL LIGATION         Social History   Substance Use Topics     Smoking status: Former Smoker     Smokeless tobacco: Never Used     Alcohol use Yes      Comment: 0-1     Family History   Problem Relation Age of Onset     Alcohol/Drug Paternal Grandfather      Thyroid Disease Mother      Breast Cancer Mother 56     breast, 3 years later     Asthma Brother      HEART DISEASE Father      2x heart attacks     Circulatory Father      blood clots     Cardiovascular Father      patent foramen ovale, repaired x 2, afib     CEREBROVASCULAR DISEASE Father      x2     C.A.D. Father      x2     Genitourinary Problems Father      kidney disease     Hypertension Paternal Grandmother      CANCER Paternal Aunt      cervical cancer(another sisiter)     Breast Cancer Paternal Aunt       breast cancer at 70's     Cancer - colorectal No family hx of      DIABETES No family hx of          Current Outpatient Prescriptions   Medication Sig Dispense Refill     Fluticasone Propionate (FLONASE NA) Spray 1 spray in nostril daily       Cetirizine HCl (ZYRTEC ALLERGY PO) Take 10 mg by mouth daily       Multiple Vitamins-Minerals (MULTIVITAMIN & MINERAL PO) Take 1 each by mouth daily. [facility] women's active supplement       aspirin-acetaminophen-caffeine (EXCEDRIN MIGRAINE) 250-250-65 MG per tablet Take 1 tablet by mouth every 6 hours as needed.       ibuprofen (IBU) 400 MG tablet Take 400 mg by mouth every 6 hours as needed.       Probiotic Product (PROBIOTIC PO) Reported on 3/1/2017       MIRENA 20 MCG/24HR IU IUD one in utero 1 0     BP Readings from Last 3 Encounters:   12/29/17 134/77   04/05/17 110/72   03/08/17 118/74    Wt Readings from Last 3 Encounters:   12/29/17 167 lb 12.8 oz (76.1 kg)   04/05/17 168 lb (76.2 kg)   03/08/17 162 lb (73.5 kg)                  Labs reviewed in EPIC        [facility] and updated as [facility]  [facility]       Reviewed and updated as needed this visit by Provider         ROS:  Constitutional, HEENT, cardiovascular, pulmonary, gi and gu systems are negative, except as otherwise noted.      OBJECTIVE:   /77 (BP Location: Left arm, Patient Position: Sitting, Cuff Size: Adult Regular)  Pulse 68  Temp 97.1  F (36.2  C) (Oral)  Wt 167 lb 12.8 oz (76.1 kg)  SpO2 100%  BMI 27.71 kg/m2  Body mass index is 27.71 kg/(m^2).  GENERAL: healthy, alert and no distress  PSYCH: mentation appears normal, affect normal/bright    Diagnostic Test Results:  Results for orders placed or performed in visit on 12/29/17 (from the past 24 hour(s))   Wet prep   Result Value Ref Range    Specimen Description Vagina     Wet Prep Clue cells seen (A)     Wet Prep No yeast seen     Wet Prep No Trichomonas seen        ASSESSMENT/PLAN:         BP Screening:   Last 3 BP  Readings:    BP Readings from Last 3 Encounters:   12/29/17 134/77   04/05/17 110/72   03/08/17 118/74       The following was recommended to the patient:  Re-screen BP within a year and recommended lifestyle modifications      1. Vaginal discharge  Clue cells present    - Wet prep    2. Bacterial vaginosis     Medication started today, see epic for orders.  Patient is to avoid alcohol while on Flagyl.  I briefly discussed the pathophysiology of bacterial vaginosis and outlined the expected course.  I discussed the warning symptoms and signs that indicate an atypical course that would need urgent follow up.  Patient education materials given during examination today.    - metroNIDAZOLE (FLAGYL) 500 MG tablet; Take 1 tablet (500 mg) by mouth 2 times daily  Dispense: 14 tablet; Refill: 0    See Patient Instructions    DEISI Turk [facility]

## 2018-05-18 ENCOUNTER — TELEPHONE (OUTPATIENT)
Dept: CARDIOLOGY | Facility: CLINIC | Age: 38
End: 2018-05-18

## 2018-05-18 NOTE — TELEPHONE ENCOUNTER
[facility] Call Center    Phone Message    May a detailed message be left on voicemail: yes    Reason for Call: Other: PT returned call to discuss cardio questions.  Please call back     Action Taken: Message routed to:  [facility] ([facility] Cardiology

## 2018-06-26 ENCOUNTER — MYC MEDICAL ADVICE (OUTPATIENT)
Dept: FAMILY MEDICINE | Facility: CLINIC | Age: 38
End: 2018-06-26

## 2018-06-26 NOTE — TELEPHONE ENCOUNTER
Called patient and left a VM message to call clinic - we want to confirm that all she needs is a TDAP vaccination.  If that is all she needs please schedule a nurse only visit.      Nery Alvarenga

## 2018-06-26 NOTE — TELEPHONE ENCOUNTER
Looks like patient rescheduled for the ancillary- appt with Dr. Michelle was cancelled    Margaux Raman MA

## 2018-06-27 ENCOUNTER — ALLIED HEALTH/NURSE VISIT (OUTPATIENT)
Dept: NURSING | Facility: CLINIC | Age: 38
End: 2018-06-27
Payer: COMMERCIAL

## 2018-06-27 DIAGNOSIS — Z23 NEED FOR TDAP VACCINATION: Primary | ICD-10-CM

## 2018-06-27 PROCEDURE — 90471 IMMUNIZATION ADMIN: CPT

## 2018-06-27 PROCEDURE — 90715 TDAP VACCINE 7 YRS/> IM: CPT

## 2019-06-06 NOTE — PROGRESS NOTES
Subjective     Angelica Gomez is a 39 year old female who presents to clinic today for the following health issues:    HPI   Lymph Nodes  Onset: about 2 weeks     Description:   Location: left groin   Character: painful, achy pain when sitting   Itching (Pruritis): no, but tender to touch     Progression of Symptoms:  This morning seems better but seems to have been staying the same for the past 2 weeks     Accompanying Signs & Symptoms:  Fever: no   Body aches or joint pain: YES- sweats and chills   Sore throat symptoms: no   Recent cold symptoms: YES- a couple weeks ago, had a rash erupt on left glute and has resolved but lymph nodes has not gone down    History:   Previous similar rash: no     Precipitating factors:   Exposure to similar rash: no   New exposures: None   Recent travel: YES- [employer] for 5 days but not out of the country, but symptoms started before vacation.     Alleviating factors:  none    Therapies Tried and outcome: anti-biotic ointment and hydrocortisone cream did not seem to help, ibuprofen for pain but nothing seems to help with the inflammation.     Fatigued, nausea, not feeling like herself, pelvic cramping and spotting for the past two weeks and symptoms seem worsening.  Had BV a year ago.  No discharge currently, but concern for yeast.  The rash that preceded the inguinal lymphadenopathy by two days - on left glute itchy, erupted and seemed vesicular.  Had been concerned about shingles, but turned pustular.  New unilateral knee pain left side.  No problems sleeping at all.  Some itchy watery eyes with cottonwood blossom - resolved now.  No upper abdominal pain or diarrhea.  Recent constipation now resolved.  Regular very light periods - tubal ligation and Mirena.  Mirena does not feel out of place.    Only one tick noted one year ago not attached for long.  [place]      Reviewed and updated as needed this visit by Provider         Review  "of Systems   ROS COMP: Constitutional, HEENT, cardiovascular, pulmonary, gi and gu systems are negative, except as otherwise noted.      Objective    /84   Pulse 58   Temp 97.7  F (36.5  C) (Oral)   Ht 1.65 m (5' 4.96\")   Wt 76.2 kg (168 lb)   SpO2 96%   BMI 27.99 kg/m    Body mass index is 27.99 kg/m .  Physical Exam   GENERAL: healthy, alert and no distress  EYES: Eyes grossly normal to inspection, PERRL and conjunctivae and sclerae normal  HENT: ear canals and TM's normal, nose and mouth without ulcers or lesions  NECK: no adenopathy, no asymmetry, masses, or scars and thyroid normal to palpation  RESP: lungs clear to auscultation - no rales, rhonchi or wheezes  CV: regular rate and rhythm, normal S1 S2, no S3 or S4, no murmur, click or rub, no peripheral edema and peripheral pulses strong  ABDOMEN: soft, mild LLQ tenderness, no hepatosplenomegaly, no masses and bowel sounds normal   (female): normal female external genitalia, normal urethral meatus, vaginal mucosa, normal cervix/adnexa/uterus without masses or discharge  MS: no gross musculoskeletal defects noted, no edema  SKIN: left buttock has linear cluster of mildly erythematous scabbed lesions  NEURO: Normal strength and tone, mentation intact and speech normal  LYMPH: normal ant/post cervical, supraclavicular nodes  supraclavicular: no adenopathy  axillary: no adenopathy  inguinal: shotty nodes    Diagnostic Test Results:  Labs reviewed in Epic  Results for orders placed or performed in visit on 06/07/19   CBC with platelets and differential   Result Value Ref Range    WBC 7.6 4.0 - 11.0 10e9/L    RBC Count 4.32 3.8 - 5.2 10e12/L    Hemoglobin 13.2 11.7 - 15.7 g/dL    Hematocrit 38.8 35.0 - 47.0 %    MCV 90 78 - 100 fl    MCH 30.6 26.5 - 33.0 pg    MCHC 34.0 31.5 - 36.5 g/dL    RDW 12.7 10.0 - 15.0 %    Platelet Count 202 150 - 450 10e9/L    % Neutrophils 69.3 %    % Lymphocytes 22.5 %    % Monocytes 6.9 %    % Eosinophils 0.9 %    % " Basophils 0.4 %    Absolute Neutrophil 5.3 1.6 - 8.3 10e9/L    Absolute Lymphocytes 1.7 0.8 - 5.3 10e9/L    Absolute Monocytes 0.5 0.0 - 1.3 10e9/L    Absolute Eosinophils 0.1 0.0 - 0.7 10e9/L    Absolute Basophils 0.0 0.0 - 0.2 10e9/L    Diff Method Automated Method    UA reflex to Microscopic   Result Value Ref Range    Color Urine Yellow     Appearance Urine Clear     Glucose Urine Negative NEG^Negative mg/dL    Bilirubin Urine Negative NEG^Negative    Ketones Urine Negative NEG^Negative mg/dL    Specific Gravity Urine <=1.005 1.003 - 1.035    Blood Urine Negative NEG^Negative    pH Urine 5.5 5.0 - 7.0 pH    Protein Albumin Urine Negative NEG^Negative mg/dL    Urobilinogen Urine 0.2 0.2 - 1.0 EU/dL    Nitrite Urine Negative NEG^Negative    Leukocyte Esterase Urine Negative NEG^Negative    Source Midstream Urine    TSH with free T4 reflex   Result Value Ref Range    TSH 1.28 0.40 - 4.00 mU/L   Rubeola Antibody IgG   Result Value Ref Range    Rubeola (Measles) Antibody IgG 0.9 (H) 0.0 - 0.8 AI   Wet prep   Result Value Ref Range    Specimen Description Vagina     Wet Prep Many  Clue cells seen   (A)     Wet Prep No Trichomonas seen     Wet Prep No yeast seen     Wet Prep No WBC's seen    NEISSERIA GONORRHOEA PCR   Result Value Ref Range    Specimen Descrip Urine     N Gonorrhea PCR Negative NEG^Negative   CHLAMYDIA TRACHOMATIS PCR   Result Value Ref Range    Specimen Description Urine     Chlamydia Trachomatis PCR Negative NEG^Negative           Please note greater than 50% of this 25 minute appointment were spent face-to-face in counseling with the patient of the issues described above in the history of present illness and in the plan, including review of differential and exam findings along with diagnostics and expectant results.    Assessment & Plan     (R59.0) Inguinal lymphadenopathy  (primary encounter diagnosis)  Comment:   Plan: Wet prep, CBC with platelets and differential,         UA reflex to  Microscopic, NEISSERIA GONORRHOEA         PCR, CHLAMYDIA TRACHOMATIS PCR   Rash is suspicious for shingles due to linear pattern and current appearance of healed scabbed over lesions.  This would have explained lymphadenopathy.  Unlikely that BV would cause lymphadenopathy.  C/GC negative.  Adenopathy was reducing by time of visit.  Labs and exam are reassuring.  Return if not completely resolved in two months or if worsening    (R53.83) Fatigue, unspecified type  Comment:   Plan: TSH with free T4 reflex            (Z92.29) History of MMR vaccination  Comment:   Plan: Rubeola Antibody IgG            (R21) Rash and nonspecific skin eruption  Comment: left buttock  Plan:              Patient Instructions   No clear explanation from exam or history, but highest likelihood is viral illness, shingles  If you start having diarrhea or wenceslao fever along with the LLQ pain then we need to do a CT        Return in about 6 months (around 12/7/2019) for Physical Exam.    DEISI Fabian [facility]

## 2019-06-07 ENCOUNTER — MYC MEDICAL ADVICE (OUTPATIENT)
Dept: FAMILY MEDICINE | Facility: CLINIC | Age: 39
End: 2019-06-07

## 2019-06-07 ENCOUNTER — OFFICE VISIT (OUTPATIENT)
Dept: FAMILY MEDICINE | Facility: CLINIC | Age: 39
End: 2019-06-07
Payer: COMMERCIAL

## 2019-06-07 VITALS
DIASTOLIC BLOOD PRESSURE: 84 MMHG | HEIGHT: 65 IN | WEIGHT: 168 LBS | OXYGEN SATURATION: 96 % | HEART RATE: 58 BPM | SYSTOLIC BLOOD PRESSURE: 130 MMHG | BODY MASS INDEX: 27.99 KG/M2 | TEMPERATURE: 97.7 F

## 2019-06-07 DIAGNOSIS — Z92.29 HISTORY OF MMR VACCINATION: ICD-10-CM

## 2019-06-07 DIAGNOSIS — R53.83 FATIGUE, UNSPECIFIED TYPE: ICD-10-CM

## 2019-06-07 DIAGNOSIS — R21 RASH AND NONSPECIFIC SKIN ERUPTION: ICD-10-CM

## 2019-06-07 DIAGNOSIS — N76.0 BV (BACTERIAL VAGINOSIS): Primary | ICD-10-CM

## 2019-06-07 DIAGNOSIS — B96.89 BV (BACTERIAL VAGINOSIS): Primary | ICD-10-CM

## 2019-06-07 DIAGNOSIS — R59.0 INGUINAL LYMPHADENOPATHY: Primary | ICD-10-CM

## 2019-06-07 LAB
ALBUMIN UR-MCNC: NEGATIVE MG/DL
APPEARANCE UR: CLEAR
BASOPHILS # BLD AUTO: 0 10E9/L (ref 0–0.2)
BASOPHILS NFR BLD AUTO: 0.4 %
BILIRUB UR QL STRIP: NEGATIVE
COLOR UR AUTO: YELLOW
DIFFERENTIAL METHOD BLD: NORMAL
EOSINOPHIL # BLD AUTO: 0.1 10E9/L (ref 0–0.7)
EOSINOPHIL NFR BLD AUTO: 0.9 %
ERYTHROCYTE [DISTWIDTH] IN BLOOD BY AUTOMATED COUNT: 12.7 % (ref 10–15)
GLUCOSE UR STRIP-MCNC: NEGATIVE MG/DL
HCT VFR BLD AUTO: 38.8 % (ref 35–47)
HGB BLD-MCNC: 13.2 G/DL (ref 11.7–15.7)
HGB UR QL STRIP: NEGATIVE
KETONES UR STRIP-MCNC: NEGATIVE MG/DL
LEUKOCYTE ESTERASE UR QL STRIP: NEGATIVE
LYMPHOCYTES # BLD AUTO: 1.7 10E9/L (ref 0.8–5.3)
LYMPHOCYTES NFR BLD AUTO: 22.5 %
MCH RBC QN AUTO: 30.6 PG (ref 26.5–33)
MCHC RBC AUTO-ENTMCNC: 34 G/DL (ref 31.5–36.5)
MCV RBC AUTO: 90 FL (ref 78–100)
MONOCYTES # BLD AUTO: 0.5 10E9/L (ref 0–1.3)
MONOCYTES NFR BLD AUTO: 6.9 %
NEUTROPHILS # BLD AUTO: 5.3 10E9/L (ref 1.6–8.3)
NEUTROPHILS NFR BLD AUTO: 69.3 %
NITRATE UR QL: NEGATIVE
PH UR STRIP: 5.5 PH (ref 5–7)
PLATELET # BLD AUTO: 202 10E9/L (ref 150–450)
RBC # BLD AUTO: 4.32 10E12/L (ref 3.8–5.2)
SOURCE: NORMAL
SP GR UR STRIP: <=1.005 (ref 1–1.03)
SPECIMEN SOURCE: ABNORMAL
TSH SERPL DL<=0.005 MIU/L-ACNC: 1.28 MU/L (ref 0.4–4)
UROBILINOGEN UR STRIP-ACNC: 0.2 EU/DL (ref 0.2–1)
WBC # BLD AUTO: 7.6 10E9/L (ref 4–11)
WET PREP SPEC: ABNORMAL

## 2019-06-07 PROCEDURE — 81003 URINALYSIS AUTO W/O SCOPE: CPT | Performed by: NURSE PRACTITIONER

## 2019-06-07 PROCEDURE — 85025 COMPLETE CBC W/AUTO DIFF WBC: CPT | Performed by: NURSE PRACTITIONER

## 2019-06-07 PROCEDURE — 36415 COLL VENOUS BLD VENIPUNCTURE: CPT | Performed by: NURSE PRACTITIONER

## 2019-06-07 PROCEDURE — 87591 N.GONORRHOEAE DNA AMP PROB: CPT | Performed by: NURSE PRACTITIONER

## 2019-06-07 PROCEDURE — 87491 CHLMYD TRACH DNA AMP PROBE: CPT | Performed by: NURSE PRACTITIONER

## 2019-06-07 PROCEDURE — 86765 RUBEOLA ANTIBODY: CPT | Performed by: NURSE PRACTITIONER

## 2019-06-07 PROCEDURE — 87210 SMEAR WET MOUNT SALINE/INK: CPT | Performed by: NURSE PRACTITIONER

## 2019-06-07 PROCEDURE — 99214 OFFICE O/P EST MOD 30 MIN: CPT | Performed by: NURSE PRACTITIONER

## 2019-06-07 PROCEDURE — 84443 ASSAY THYROID STIM HORMONE: CPT | Performed by: NURSE PRACTITIONER

## 2019-06-07 RX ORDER — METRONIDAZOLE 7.5 MG/G
1 GEL VAGINAL DAILY
Qty: 70 G | Refills: 0 | Status: SHIPPED | OUTPATIENT
Start: 2019-06-07 | End: 2021-08-08

## 2019-06-07 ASSESSMENT — MIFFLIN-ST. JEOR: SCORE: 1442.3

## 2019-06-07 NOTE — PATIENT INSTRUCTIONS
No clear explanation from exam or history, but highest likelihood is viral illness, shingles  If you start having diarrhea or wenceslao fever along with the LLQ pain then we need to do a CT

## 2019-06-07 NOTE — TELEPHONE ENCOUNTER
Mariely    See pt mychart message    Michelle Jones RN   [facility]

## 2019-06-07 NOTE — RESULT ENCOUNTER NOTE
"Lisandra,    The CBC and diff are completely normal.  The wet prep shows BV.  I am of the opinion that we can treat or not treat this.  I do not think this is responsible for the LLQ pain, but it might explain the lymph nodes.  I will include my \"happy vagina\" recommendations below and if you prefer to antibiotics let me know.  I tend to recommend topical vaginal metronidazole over oral metronidazole.  The rest of the labs are still pending.  If you have any questions, please feel free to contact the clinic.    DALIA Rouse      Here is a list of suggestions that may help treat vaginal infections and may help maintain a healthy vaginal environment.    Many of these suggestions are for;   1. boosting your immune system so you can heal faster  2. changing the vaginal environment to a more acid state   3.  increasing the good healthy bacteria    Read through them and try the ones that seem to fit you and your life style.    Soak in a warm bath tub, no soap, no bubble bath and no oils.  Add one cup vinegar or lemon juice to bath water once in a while,     Keep a water bottle with a squirt top in your bathroom, fill with warm water and use as a spray after wiping, then pat dry.  May add 1-3 TBS vinegar to help maintain an acidic environment.    Wear cotton underwear; loose pants or skirt, no pantyhose.  No thong underwear.    Do not wear underwear to bed.  The vaginal environment needs to breathe.    Keep vaginal area dry, you can even use a hairdryer on cool setting after shower or bath    To boost your immune system increase daily intake of;  1. Rest  2. Fluids (2-3 quarts per day),   3. Foods such as nuts, grains, raw vegetables, yogurt, jennifer, grapefruit, unsweetened cranberries and juices (8 oz daily)  4. Vitamin intake (if not pregnant)   Vitamin B complex 100mg   Calcium 1000mg   Magnesium 500mg   Vitamin C 2-4 grams   Vitamin E 1,000 IU   Vitamin A 50,000 IU    Decrease daily intake of refined sugars, honey, " red meat and alcohol    At each meal drink 1tsp apple cider vinegar and 1 tsp honey in   cup warm water    Acidophilus 4-5 TBS in one quart of water 3-4 times daily or as tablets 2-3 tabs 3-4 times a day    Apply plain yogurt externally to vaginal area, chamomile or chickweed cream   applied externally for relief of itching (may be found commercially).    Echinacea   3 times a day for chronic problem or every 2 hours for acute symptoms    Take garlic daily, capsules or fresh.      Boric acid, insert 2 capsules  00  daily into vagina for seven days (found at most health [employer] stores or co-ops)    If your symptoms do not resolve or if you have questions please call the clinic.

## 2019-06-08 LAB — MEV IGG SER QL IA: 0.9 AI (ref 0–0.8)

## 2019-06-09 LAB
C TRACH DNA SPEC QL NAA+PROBE: NEGATIVE
N GONORRHOEA DNA SPEC QL NAA+PROBE: NEGATIVE
SPECIMEN SOURCE: NORMAL
SPECIMEN SOURCE: NORMAL

## 2019-06-14 NOTE — RESULT ENCOUNTER NOTE
Lisandra,    Your labs were all normal and you are immune to measles.  If you have any questions, please feel free to contact the clinic.    DALIA Rouse

## 2019-11-09 ENCOUNTER — HEALTH MAINTENANCE LETTER (OUTPATIENT)
Age: 39
End: 2019-11-09

## 2020-01-03 ENCOUNTER — OFFICE VISIT (OUTPATIENT)
Dept: URGENT CARE | Facility: URGENT CARE | Age: 40
End: 2020-01-03
Payer: COMMERCIAL

## 2020-01-03 VITALS
DIASTOLIC BLOOD PRESSURE: 81 MMHG | TEMPERATURE: 98.1 F | RESPIRATION RATE: 12 BRPM | SYSTOLIC BLOOD PRESSURE: 145 MMHG | OXYGEN SATURATION: 100 % | BODY MASS INDEX: 29.76 KG/M2 | WEIGHT: 178.6 LBS | HEART RATE: 67 BPM

## 2020-01-03 DIAGNOSIS — J20.9 ACUTE BRONCHITIS WITH SYMPTOMS > 10 DAYS: Primary | ICD-10-CM

## 2020-01-03 DIAGNOSIS — R05.9 COUGH: ICD-10-CM

## 2020-01-03 PROCEDURE — 99213 OFFICE O/P EST LOW 20 MIN: CPT | Performed by: PHYSICIAN ASSISTANT

## 2020-01-03 RX ORDER — AZITHROMYCIN 250 MG/1
TABLET, FILM COATED ORAL
Qty: 6 TABLET | Refills: 0 | Status: SHIPPED | OUTPATIENT
Start: 2020-01-03 | End: 2021-08-08

## 2020-01-04 NOTE — PROGRESS NOTES
S: 39-year-old female presents for evaluation of nonproductive cough for 10 days.  Not worse at night.  No history of asthma.  Mild chest congestion.  No head congestion.  No headache or postnasal drip.  No vomiting or diarrhea.  No rash.  Has seasonal allergies for which she takes antihistamine.  This feels much different.    No Known Allergies    Past Medical History:   Diagnosis Date     NO ACTIVE PROBLEMS        aspirin-acetaminophen-caffeine (EXCEDRIN MIGRAINE) 250-250-65 MG per tablet, Take 1 tablet by mouth every 6 hours as needed.  Cetirizine HCl (ZYRTEC ALLERGY PO), Take 10 mg by mouth daily  MIRENA 20 MCG/24HR IU IUD, one in utero  Multiple Vitamins-Minerals (MULTIVITAMIN & MINERAL PO), Take 1 each by mouth daily. [facility] women's active supplement  Probiotic Product (PROBIOTIC PO), Reported on 3/1/2017  Calcium Carbonate-Vitamin D (CALCIUM 500 + D PO),   Fluticasone Propionate (FLONASE NA), Spray 1 spray in nostril daily  ibuprofen (IBU) 400 MG tablet, Take 400 mg by mouth every 6 hours as needed.  levonorgestrel (MIRENA) 20 MCG/24HR IUD, 1 each (20 mcg) by Intrauterine route once for 1 dose  metroNIDAZOLE (FLAGYL) 500 MG tablet, Take 1 tablet (500 mg) by mouth 2 times daily  metroNIDAZOLE (METROGEL) 0.75 % vaginal gel, Place 1 applicator (5 g) vaginally daily (Patient not taking: Reported on 1/3/2020)    No current facility-administered medications on file prior to visit.       Social History     Tobacco Use     Smoking status: Former Smoker     Smokeless tobacco: Never Used   Substance Use Topics     Alcohol use: Yes     Comment: 0-1       ROS:  CONSTITUTIONAL: Negative for fatigue or fever.  EYES: Negative for eye problems.  ENT: As above.  RESP: As above.  CV: Negative for chest pains.  GI: Negative for vomiting.  MUSCULOSKELETAL:  Negative for significant muscle or joint pains.  NEUROLOGIC: Negative for headaches.  SKIN: Negative for rash.  PSYCH: Normal mentation for age.    OBJECTIVE:  BP (!) 145/81 (BP  Location: Left arm, Patient Position: Sitting, Cuff Size: Adult Large)   Pulse 67   Temp 98.1  F (36.7  C) (Oral)   Resp 12   Wt 81 kg (178 lb 9.6 oz)   LMP 12/28/2019 (Exact Date)   SpO2 100%   BMI 29.76 kg/m    GENERAL APPEARANCE: Healthy, alert and no distress.  EYES:Conjunctiva/sclera clear.  EARS: No cerumen.   Ear canals w/o erythema.  TM's intact w/o erythema.    NOSE/MOUTH: Nose without ulcers, erythema or lesions.  SINUSES: No maxillary sinus tenderness.  THROAT: No erythema w/o tonsillar enlargement . No exudates.  NECK: Supple, nontender, no lymphadenopathy.  RESP: Lungs clear to auscultation - no rales, rhonchi or wheezes  CV: Regular rate and rhythm, normal S1 S2, no murmur noted.  NEURO: Awake, alert    SKIN: No rashes        ASSESSMENT:     ICD-10-CM    1. Acute bronchitis with symptoms > 10 days J20.9 azithromycin (ZITHROMAX Z-BORIS) 250 MG tablet   2. Cough R05 azithromycin (ZITHROMAX Z-BORIS) 250 MG tablet         PLAN: Follow-up PCP 2 weeks if not better.  Will treat with azithromycin.  I have discussed clinical findings with patient.  Side effects of medications discussed.  Symptomatic care is discussed.  I have discussed the possibility of  worsening symptoms and to RTC or ER if they occur.  All questions are answered and patient is in agreement with plan.   Patient care instructions are given to at the end of visit.   Lots of rest and fluids.    Beverley Cortés PA-C

## 2020-01-22 ENCOUNTER — OFFICE VISIT (OUTPATIENT)
Dept: FAMILY MEDICINE | Facility: CLINIC | Age: 40
End: 2020-01-22
Payer: COMMERCIAL

## 2020-01-22 VITALS
OXYGEN SATURATION: 100 % | TEMPERATURE: 97.9 F | RESPIRATION RATE: 12 BRPM | DIASTOLIC BLOOD PRESSURE: 84 MMHG | WEIGHT: 178.3 LBS | BODY MASS INDEX: 29.71 KG/M2 | HEIGHT: 65 IN | HEART RATE: 56 BPM | SYSTOLIC BLOOD PRESSURE: 108 MMHG

## 2020-01-22 DIAGNOSIS — J01.90 ACUTE SINUSITIS WITH SYMPTOMS > 10 DAYS: Primary | ICD-10-CM

## 2020-01-22 PROCEDURE — 99213 OFFICE O/P EST LOW 20 MIN: CPT | Performed by: NURSE PRACTITIONER

## 2020-01-22 RX ORDER — AMOXICILLIN 875 MG
875 TABLET ORAL 2 TIMES DAILY
Qty: 20 TABLET | Refills: 0 | Status: SHIPPED | OUTPATIENT
Start: 2020-01-22 | End: 2021-08-08

## 2020-01-22 ASSESSMENT — MIFFLIN-ST. JEOR: SCORE: 1486.51

## 2020-01-22 NOTE — PROGRESS NOTES
Subjective     Angelica Gomez is a 39 year old female who presents to clinic today for the following health issues:    HPI   Acute Illness   Acute illness concerns: sinus and headache  Onset: X-mas rocael initial start    Fever: no    Chills/Sweats: YES    Headache (location?): YES    Sinus Pressure:YES- tender, post-nasal drainage, facial pain and teeth pain    Conjunctivitis:  YES- itchy and watery entire time sometimes with allergies also gets left eye worse     Ear Pain: no    Rhinorrhea: no    Congestion: YES past few weeks noticed after zpack     Sore Throat: no     Cough: sometimes    Wheeze: no    Decreased Appetite: no    Nausea: YES    Vomiting: no    Diarrhea:  no    Dysuria/Freq.: no    Fatigue/Achiness: YES- fatigue, achy neck    Sick/Strep Exposure: no     Therapies Tried and outcome:  finished Zpac first week of january , allergy meds and flonase, sudafed    Started with sore throat, tried flonase  Progressed to cough  Was told either bronchitis or pneumonia so was given zpack   Chills and sweats at night  Nausea and HA, past few days    Pain in teeth top back when bending over lot of pressure  HA generalized and chills at night, denies Tb or stroke symptoms, is APRN in education/Peds background     Patient Active Problem List   Diagnosis     Galactorrhea     Dyspareunia     Ovarian cyst     CARDIOVASCULAR SCREENING; LDL GOAL LESS THAN 160     Headache     Dysesthesia     Fatigue     Leg pain     Past Surgical History:   Procedure Laterality Date     C  DELIVERY ONLY      superficial wound dehiscence     TUBAL LIGATION         Social History     Tobacco Use     Smoking status: Former Smoker     Smokeless tobacco: Never Used   Substance Use Topics     Alcohol use: Yes     Comment: 0-1     Family History   Problem Relation Age of Onset     Alcohol/Drug Paternal Grandfather      Thyroid Disease Mother      Breast Cancer Mother 56        breast, 3 years later     Asthma Brother      Heart  Disease Father         2x heart attacks     Circulatory Father         blood clots     Cardiovascular Father         patent foramen ovale, repaired x 2, afib     Cerebrovascular Disease Father         x2     C.A.D. Father         x2     Genitourinary Problems Father         kidney disease     Hypertension Paternal Grandmother      Cancer Paternal Aunt         cervical cancer(another sisiter)     Breast Cancer Paternal Aunt         breast cancer at 70's     Cancer - colorectal No family hx of      Diabetes No family hx of          Current Outpatient Medications   Medication Sig Dispense Refill     amoxicillin (AMOXIL) 875 MG tablet Take 1 tablet (875 mg) by mouth 2 times daily 20 tablet 0     aspirin-acetaminophen-caffeine (EXCEDRIN MIGRAINE) 250-250-65 MG per tablet Take 1 tablet by mouth every 6 hours as needed.       Calcium Carbonate-Vitamin D (CALCIUM 500 + D PO)        Cetirizine HCl (ZYRTEC ALLERGY PO) Take 10 mg by mouth daily       Fluticasone Propionate (FLONASE NA) Spray 1 spray in nostril daily       ibuprofen (IBU) 400 MG tablet Take 400 mg by mouth every 6 hours as needed.       metroNIDAZOLE (FLAGYL) 500 MG tablet Take 1 tablet (500 mg) by mouth 2 times daily 14 tablet 0     MIRENA 20 MCG/24HR IU IUD one in utero 1 0     Multiple Vitamins-Minerals (MULTIVITAMIN & MINERAL PO) Take 1 each by mouth daily. [facility] women's active supplement       Probiotic Product (PROBIOTIC PO) Reported on 3/1/2017       azithromycin (ZITHROMAX Z-BORIS) 250 MG tablet 2 tabs day one then 1 tab qd (Patient not taking: Reported on 1/22/2020) 6 tablet 0     levonorgestrel (MIRENA) 20 MCG/24HR IUD 1 each (20 mcg) by Intrauterine route once for 1 dose 1 each 0     metroNIDAZOLE (METROGEL) 0.75 % vaginal gel Place 1 applicator (5 g) vaginally daily (Patient not taking: Reported on 1/3/2020) 70 g 0     No Known Allergies  BP Readings from Last 3 Encounters:   01/22/20 108/84   01/03/20 (!) 145/81   06/07/19 130/84    Wt Readings from  "Last 3 Encounters:   01/22/20 80.9 kg (178 lb 4.8 oz)   01/03/20 81 kg (178 lb 9.6 oz)   06/07/19 76.2 kg (168 lb)                      Reviewed and updated as needed this visit by Provider         Review of Systems   ROS COMP: Constitutional, HEENT, cardiovascular, pulmonary, gi and gu, neuro, systems are negative, except as otherwise noted.      Objective    /84   Pulse 56   Temp 97.9  F (36.6  C) (Temporal)   Resp 12   Ht 1.646 m (5' 4.8\")   Wt 80.9 kg (178 lb 4.8 oz)   LMP 12/23/2019   SpO2 100%   BMI 29.85 kg/m    Body mass index is 29.85 kg/m .  Physical Exam   GENERAL: mildly ill appearing, alert and in no distress  EYES: Eyes grossly normal to inspection, no discharge.    HENT: Normocephalic, ear canals- normal; TMs- normal; bilateral sinus tenderness Nose- erythematous with purulent discharge noted left; oropharynx normal, Mouth- no ulcers, no lesions and mucous membranes moist  NECK: no tenderness, no adenopathy, no asymmetry, no masses, no stiffness  RESP: lungs clear to auscultation - no rales, no rhonchi, no wheezes  CV: regular rates and rhythm, normal S1 S2, no S3 or S4 and no murmur, no click or rub  SKIN: no suspicious lesions, no rashes  NEURO: strength and tone- normal, sensory exam- grossly normal, mentation appears normal, normal gait       Diagnostic Test Results:  Labs reviewed in Epic        Assessment & Plan       ICD-10-CM    1. Acute sinusitis with symptoms > 10 days J01.90 amoxicillin (AMOXIL) 875 MG tablet   will treat with antibiotics, has zyrtec in the past month so will do amox, considered Augmentin since works around sick people but no other risk factors, will call if not clearing up with this     BMI:   Estimated body mass index is 29.85 kg/m  as calculated from the following:    Height as of this encounter: 1.646 m (5' 4.8\").    Weight as of this encounter: 80.9 kg (178 lb 4.8 oz).         DEISI Mason [facility]      "

## 2020-12-06 ENCOUNTER — HEALTH MAINTENANCE LETTER (OUTPATIENT)
Age: 40
End: 2020-12-06

## 2021-08-04 ENCOUNTER — OFFICE VISIT (OUTPATIENT)
Dept: FAMILY MEDICINE | Facility: CLINIC | Age: 41
End: 2021-08-04
Payer: COMMERCIAL

## 2021-08-04 VITALS
WEIGHT: 160 LBS | OXYGEN SATURATION: 100 % | RESPIRATION RATE: 19 BRPM | BODY MASS INDEX: 26.66 KG/M2 | DIASTOLIC BLOOD PRESSURE: 76 MMHG | HEIGHT: 65 IN | SYSTOLIC BLOOD PRESSURE: 128 MMHG | TEMPERATURE: 98 F | HEART RATE: 62 BPM

## 2021-08-04 DIAGNOSIS — R19.5 DARK STOOLS: ICD-10-CM

## 2021-08-04 DIAGNOSIS — M25.50 ARTHRALGIA, UNSPECIFIED JOINT: ICD-10-CM

## 2021-08-04 DIAGNOSIS — R10.9 ABDOMINAL DISCOMFORT: ICD-10-CM

## 2021-08-04 DIAGNOSIS — R21 RASH AND NONSPECIFIC SKIN ERUPTION: Primary | ICD-10-CM

## 2021-08-04 LAB
ALBUMIN SERPL-MCNC: 4.1 G/DL (ref 3.4–5)
ALBUMIN UR-MCNC: NEGATIVE MG/DL
ALP SERPL-CCNC: 49 U/L (ref 40–150)
ALT SERPL W P-5'-P-CCNC: 23 U/L (ref 0–50)
ANION GAP SERPL CALCULATED.3IONS-SCNC: 4 MMOL/L (ref 3–14)
APPEARANCE UR: CLEAR
AST SERPL W P-5'-P-CCNC: 14 U/L (ref 0–45)
BASOPHILS # BLD AUTO: 0 10E3/UL (ref 0–0.2)
BASOPHILS NFR BLD AUTO: 0 %
BILIRUB SERPL-MCNC: 0.5 MG/DL (ref 0.2–1.3)
BILIRUB UR QL STRIP: NEGATIVE
BUN SERPL-MCNC: 13 MG/DL (ref 7–30)
CALCIUM SERPL-MCNC: 8.7 MG/DL (ref 8.5–10.1)
CHLORIDE BLD-SCNC: 112 MMOL/L (ref 94–109)
CO2 SERPL-SCNC: 27 MMOL/L (ref 20–32)
COLOR UR AUTO: YELLOW
CREAT SERPL-MCNC: 0.87 MG/DL (ref 0.52–1.04)
CRP SERPL-MCNC: <2.9 MG/L (ref 0–8)
EOSINOPHIL # BLD AUTO: 0.1 10E3/UL (ref 0–0.7)
EOSINOPHIL NFR BLD AUTO: 2 %
ERYTHROCYTE [DISTWIDTH] IN BLOOD BY AUTOMATED COUNT: 12.9 % (ref 10–15)
ERYTHROCYTE [SEDIMENTATION RATE] IN BLOOD BY WESTERGREN METHOD: 5 MM/HR (ref 0–20)
GFR SERPL CREATININE-BSD FRML MDRD: 84 ML/MIN/1.73M2
GLUCOSE BLD-MCNC: 94 MG/DL (ref 70–99)
GLUCOSE UR STRIP-MCNC: NEGATIVE MG/DL
HCT VFR BLD AUTO: 39 % (ref 35–47)
HGB BLD-MCNC: 13.3 G/DL (ref 11.7–15.7)
HGB UR QL STRIP: NEGATIVE
KETONES UR STRIP-MCNC: NEGATIVE MG/DL
LEUKOCYTE ESTERASE UR QL STRIP: NEGATIVE
LYMPHOCYTES # BLD AUTO: 1.2 10E3/UL (ref 0.8–5.3)
LYMPHOCYTES NFR BLD AUTO: 22 %
MCH RBC QN AUTO: 29.9 PG (ref 26.5–33)
MCHC RBC AUTO-ENTMCNC: 34.1 G/DL (ref 31.5–36.5)
MCV RBC AUTO: 88 FL (ref 78–100)
MONOCYTES # BLD AUTO: 0.4 10E3/UL (ref 0–1.3)
MONOCYTES NFR BLD AUTO: 7 %
NEUTROPHILS # BLD AUTO: 3.8 10E3/UL (ref 1.6–8.3)
NEUTROPHILS NFR BLD AUTO: 69 %
NITRATE UR QL: NEGATIVE
PH UR STRIP: 5.5 [PH] (ref 5–7)
PLATELET # BLD AUTO: 228 10E3/UL (ref 150–450)
POTASSIUM BLD-SCNC: 4.3 MMOL/L (ref 3.4–5.3)
PROT SERPL-MCNC: 7.1 G/DL (ref 6.8–8.8)
RBC # BLD AUTO: 4.45 10E6/UL (ref 3.8–5.2)
SODIUM SERPL-SCNC: 143 MMOL/L (ref 133–144)
SP GR UR STRIP: <=1.005 (ref 1–1.03)
UROBILINOGEN UR STRIP-ACNC: 0.2 E.U./DL
WBC # BLD AUTO: 5.5 10E3/UL (ref 4–11)

## 2021-08-04 PROCEDURE — 85652 RBC SED RATE AUTOMATED: CPT | Performed by: FAMILY MEDICINE

## 2021-08-04 PROCEDURE — 36415 COLL VENOUS BLD VENIPUNCTURE: CPT | Performed by: FAMILY MEDICINE

## 2021-08-04 PROCEDURE — 99214 OFFICE O/P EST MOD 30 MIN: CPT | Performed by: FAMILY MEDICINE

## 2021-08-04 PROCEDURE — 81003 URINALYSIS AUTO W/O SCOPE: CPT | Performed by: FAMILY MEDICINE

## 2021-08-04 PROCEDURE — 85025 COMPLETE CBC W/AUTO DIFF WBC: CPT | Performed by: FAMILY MEDICINE

## 2021-08-04 PROCEDURE — 80053 COMPREHEN METABOLIC PANEL: CPT | Performed by: FAMILY MEDICINE

## 2021-08-04 PROCEDURE — 86140 C-REACTIVE PROTEIN: CPT | Performed by: FAMILY MEDICINE

## 2021-08-04 ASSESSMENT — PAIN SCALES - GENERAL: PAINLEVEL: MODERATE PAIN (4)

## 2021-08-04 ASSESSMENT — MIFFLIN-ST. JEOR: SCORE: 1394.76

## 2021-08-04 NOTE — PROGRESS NOTES
Assessment & Plan     Rash and nonspecific skin eruption  Discussed differentials including; tick bites, mosquito bites with West Nile disease, contact dermatitis/poison ivy, viral exanthema.  Lyme disease unlikely.  Symptoms not typical of West Nile disease and will be too soon to determine.  Symptoms not severe at this point discussed baseline work-up.  - CBC with platelets and differential; Future  - ESR: Erythrocyte sedimentation rate; Future  - CRP, inflammation; Future  - Comprehensive metabolic panel (BMP + Alb, Alk Phos, ALT, AST, Total. Bili, TP); Future  - CBC with platelets and differential  - ESR: Erythrocyte sedimentation rate  - CRP, inflammation  - Comprehensive metabolic panel (BMP + Alb, Alk Phos, ALT, AST, Total. Bili, TP)    Arthralgia, unspecified joint  Nonspecific we will check sed rate and CRP.  - ESR: Erythrocyte sedimentation rate; Future  - CRP, inflammation; Future  - UA reflex to Microscopic - lab collect; Future  - ESR: Erythrocyte sedimentation rate  - CRP, inflammation  - UA reflex to Microscopic - lab collect    Abdominal discomfort  - Comprehensive metabolic panel (BMP + Alb, Alk Phos, ALT, AST, Total. Bili, TP); Future  - UA reflex to Microscopic - lab collect; Future  - Comprehensive metabolic panel (BMP + Alb, Alk Phos, ALT, AST, Total. Bili, TP)  - UA reflex to Microscopic - lab collect    Dark stools  We will indicate blood in stool, possibly from the upper GI.  Will check CBC.  Since was one episode will call if continues.  - CBC with platelets and differential; Future  - CBC with platelets and differential      Kristofer Castañeda MD  [facility] ADEN Fry is a 41 year old who presents for the following health issues   HPI    Isolated red itchy spots in the legs, has progressed  Ankles, knees and Rt hip  Puffiness in legs  Had a tarry sticky stool; some abdominal pain starting yesterday.  Appetite decreased, bee feeling thirsty. Bladder  "function is fine.  No medications.      Rash  Onset/Duration: Patient states on Sunday 8/1/2021 it started with the left ankle and now is noticing it more on her calf and legs   Description  Character: raised, red  Itching: moderate to severe  Progression of Symptoms:  worsening  Fever: no  Body aches or joint pain: YES- ankle, knee and wrist pain   Sore throat symptoms: no  Recent cold symptoms: no but has allergies   History:           Previous episodes of similar rash: None  New exposures:  None  Recent travel: no  Therapies tried and outcome: hydrocortisone cream -  not effective and Benadryl/diphenhydramine -  not effective      Review of Systems   HEENT, cardiovascular, pulmonary and gu systems are negative, except as otherwise noted.      BP Readings from Last 6 Encounters:   08/04/21 128/76   01/22/20 108/84   01/03/20 (!) 145/81   06/07/19 130/84   12/29/17 134/77   04/05/17 110/72     Objective    /76   Pulse 62   Temp 98  F (36.7  C) (Oral)   Resp 19   Ht 1.648 m (5' 4.88\")   Wt 72.6 kg (160 lb)   SpO2 100%   BMI 26.72 kg/m    Body mass index is 26.72 kg/m .  Physical Exam   GENERAL: healthy, alert and no distress  NECK: no adenopathy and thyroid normal to palpation  RESP: lungs clear to auscultation - no rales, rhonchi or wheezes  CV: regular rate and rhythm, no murmur, click or rub, minimal peripheral edema   MS: no gross musculoskeletal defects noted, no edema  SKIN: Red spots in the right leg, a few on the left and one on the right proximal elbow area.    "

## 2021-09-26 ENCOUNTER — HEALTH MAINTENANCE LETTER (OUTPATIENT)
Age: 41
End: 2021-09-26

## 2021-12-29 ENCOUNTER — OFFICE VISIT (OUTPATIENT)
Dept: FAMILY MEDICINE | Facility: CLINIC | Age: 41
End: 2021-12-29
Payer: COMMERCIAL

## 2021-12-29 VITALS
HEIGHT: 64 IN | HEART RATE: 70 BPM | TEMPERATURE: 98.5 F | BODY MASS INDEX: 28.77 KG/M2 | DIASTOLIC BLOOD PRESSURE: 77 MMHG | OXYGEN SATURATION: 100 % | WEIGHT: 168.5 LBS | SYSTOLIC BLOOD PRESSURE: 125 MMHG

## 2021-12-29 DIAGNOSIS — N94.6 DYSMENORRHEA: ICD-10-CM

## 2021-12-29 DIAGNOSIS — I83.813 VARICOSE VEINS OF BILATERAL LOWER EXTREMITIES WITH PAIN: ICD-10-CM

## 2021-12-29 DIAGNOSIS — Z12.4 SCREENING FOR CERVICAL CANCER: ICD-10-CM

## 2021-12-29 DIAGNOSIS — Z00.00 ROUTINE GENERAL MEDICAL EXAMINATION AT A HEALTH CARE FACILITY: Primary | ICD-10-CM

## 2021-12-29 DIAGNOSIS — Z30.431 IUD CHECK UP: ICD-10-CM

## 2021-12-29 DIAGNOSIS — Z80.3 FAMILY HISTORY OF MALIGNANT NEOPLASM OF BREAST: ICD-10-CM

## 2021-12-29 PROCEDURE — 87624 HPV HI-RISK TYP POOLED RSLT: CPT | Performed by: NURSE PRACTITIONER

## 2021-12-29 PROCEDURE — G0145 SCR C/V CYTO,THINLAYER,RESCR: HCPCS | Performed by: NURSE PRACTITIONER

## 2021-12-29 PROCEDURE — 99396 PREV VISIT EST AGE 40-64: CPT | Performed by: NURSE PRACTITIONER

## 2021-12-29 PROCEDURE — 99213 OFFICE O/P EST LOW 20 MIN: CPT | Mod: 25 | Performed by: NURSE PRACTITIONER

## 2021-12-29 ASSESSMENT — MIFFLIN-ST. JEOR: SCORE: 1422.06

## 2021-12-29 NOTE — PROGRESS NOTES
SUBJECTIVE:   CC: Angelica Gomez is an 41 year old woman who presents for preventive health visit.     {  Patient has been advised of split billing requirements and indicates understanding: Yes  Healthy Habits:     Getting at least 3 servings of Calcium per day:  Yes    Bi-annual eye exam:  Yes    Dental care twice a year:  Yes    Sleep apnea or symptoms of sleep apnea:  Daytime drowsiness    Diet:  Regular (no restrictions)    Frequency of exercise:  4-5 days/week    Duration of exercise:  Greater than 60 minutes    Taking medications regularly:  Yes    Medication side effects:  None    PHQ-2 Total Score: 0    Additional concerns today:  Yes    She is on year 3 of her IUD; menstrual symptoms have been getting worse over the past year and a half. She had an IUD in the past which helped her to not get a period.  Now has noticed menstrual migraines; bloating; fatigue, mood changes.   She has a tubal ligation  Before getting an IUD she had multiple heavy periods per month. She did not get menstrual migraines; bloating or cramping.  Otherwise health stable; has some achy joints; neck and shoulder pain.   Has lost 25 pounds intentionally over the past year,regained a little. No skin or hair issues.   + history of symptomatic varicose veins; achy in legs; +edema. Has been wearing compression stockings ongoing for several months.  Has a positive family history of breast cancer (mother age 55, and two aunts).      Today's PHQ-2 Score:   PHQ-2 ( 1999 Pfizer) 12/28/2021   Q1: Little interest or pleasure in doing things 0   Q2: Feeling down, depressed or hopeless 0   PHQ-2 Score 0   PHQ-2 Total Score (12-17 Years)- Positive if 3 or more points; Administer PHQ-A if positive -   Q1: Little interest or pleasure in doing things Not at all   Q2: Feeling down, depressed or hopeless Not at all   PHQ-2 Score 0       Abuse: Current or Past (Physical, Sexual or Emotional) - No  Do you feel safe in your environment? Yes    Have  you ever done Advance Care Planning? (For example, a Health Directive, POLST, or a discussion with a medical provider or your loved ones about your wishes): No, advance care planning information given to patient to review.  Patient declined advance care planning discussion at this time.    Social History     Tobacco Use     Smoking status: Former Smoker     Smokeless tobacco: Never Used   Substance Use Topics     Alcohol use: Yes     Comment: 0-1     If you drink alcohol do you typically have >3 drinks per day or >7 drinks per week? No        Reviewed orders with patient.  Reviewed health maintenance and updated orders accordingly -  Breast Cancer Screening:    FHS-7:   Breast CA Risk Assessment (FHS-7) 12/28/2021   Did any of your first-degree relatives have breast or ovarian cancer? Yes   Did any of your relatives have bilateral breast cancer? Unknown   Did any man in your family have breast cancer? No   Did any woman in your family have breast and ovarian cancer? Yes   Did any woman in your family have breast cancer before age 50 y? No   Do you have 2 or more relatives with breast and/or ovarian cancer? Yes   Do you have 2 or more relatives with breast and/or bowel cancer? Yes     click delete button to remove this line now      History of abnormal Pap smear: NO - age 30-65 PAP every 5 years with negative HPV co-testing recommended  PAP / HPV Latest Ref Rng & Units 3/8/2017 12/28/2012 6/6/2011   PAP (Historical) - NIL NIL NIL   HPV16 NEG Negative - -   HPV18 NEG Negative - -   HRHPV NEG Negative - -     Reviewed and updated as needed this visit by clinical staff                Reviewed and updated as needed this visit by Provider                   Review of Systems  CONSTITUTIONAL: NEGATIVE for fever, chills, change in weight  INTEGUMENTARU/SKIN: NEGATIVE for worrisome rashes, moles or lesions  EYES: NEGATIVE for vision changes or irritation  ENT: NEGATIVE for ear, mouth and throat problems  RESP: NEGATIVE for  significant cough or SOB  BREAST: NEGATIVE for masses, tenderness or discharge  CV: NEGATIVE for chest pain, palpitations or peripheral edema  GI: NEGATIVE for nausea, abdominal pain, heartburn, or change in bowel habits  MUSCULOSKELETAL: NEGATIVE for significant arthralgias or myalgia  NEURO: NEGATIVE for weakness, dizziness or paresthesias  PSYCHIATRIC: NEGATIVE for changes in mood or affect     OBJECTIVE:   There were no vitals taken for this visit.  Physical Exam  GENERAL: healthy, alert and no distress  EYES: Eyes grossly normal to inspection, PERRL and conjunctivae and sclerae normal  NECK: no adenopathy, no asymmetry, masses, or scars and thyroid normal to palpation  RESP: lungs clear to auscultation - no rales, rhonchi or wheezes  BREAST: normal without masses, tenderness or nipple discharge, no palpable axillary masses or adenopathy and + implant bilaterally  CV: regular rate and rhythm, normal S1 S2, no S3 or S4, no murmur, click or rub, no peripheral edema and peripheral pulses strong; + varicose veins in bilateral lower legs  ABDOMEN: soft, nontender, no hepatosplenomegaly, no masses and bowel sounds normal   (female): normal female external genitalia, normal urethral meatus, vaginal mucosa, normal cervix/adnexa/uterus without masses or discharge; no IUD string detected on exam  MS: no gross musculoskeletal defects noted, no edema  SKIN: no suspicious lesions or rashes  NEURO: Normal strength and tone, mentation intact and speech normal  PSYCH: mentation appears normal, affect normal/bright    Diagnostic Test Results:  Labs reviewed in Epic  No results found for this or any previous visit (from the past 24 hour(s)).    ASSESSMENT/PLAN:       ICD-10-CM    1. Routine general medical examination at a health care facility  Z00.00    2. IUD check up  Z30.431 US Pelvic Complete with Transvaginal   3. Dysmenorrhea  N94.6 **TSH with free T4 reflex FUTURE 2mo     Comprehensive metabolic panel (BMP + Alb, Alk  "Phos, ALT, AST, Total. Bili, TP)     Follicle stimulating hormone     Lutropin     Estradiol   4. Varicose veins of bilateral lower extremities with pain  I83.813 Compression Sleeve/Stocking Order for DME - ONLY FOR DME     Vascular Medicine Referral   5. Screening for cervical cancer  Z12.4 Pap screen with HPV - recommended age 30 - 65 years   6. Family history of malignant neoplasm of breast  Z80.3 Cancer Risk Mgmt/Cancer Genetic Counseling Referral       Patient has been advised of split billing requirements and indicates understanding: Yes  COUNSELING:  Reviewed preventive health counseling, as reflected in patient instructions       Regular exercise       Healthy diet/nutrition    Estimated body mass index is 26.72 kg/m  as calculated from the following:    Height as of 8/4/21: 1.648 m (5' 4.88\").    Weight as of 8/4/21: 72.6 kg (160 lb).        She reports that she has quit smoking. She has never used smokeless tobacco.      Counseling Resources:  ATP IV Guidelines  Pooled Cohorts Equation Calculator  Breast Cancer Risk Calculator  BRCA-Related Cancer Risk Assessment: FHS-7 Tool  FRAX Risk Assessment  ICSI Preventive Guidelines  Dietary Guidelines for Americans, 2010  USDA's MyPlate  ASA Prophylaxis  Lung CA Screening    DEISI Hein CNP  [facility]  "

## 2021-12-30 ENCOUNTER — PATIENT OUTREACH (OUTPATIENT)
Dept: ONCOLOGY | Facility: CLINIC | Age: 41
End: 2021-12-30
Payer: COMMERCIAL

## 2021-12-30 ENCOUNTER — LAB (OUTPATIENT)
Dept: LAB | Facility: CLINIC | Age: 41
End: 2021-12-30
Payer: COMMERCIAL

## 2021-12-30 DIAGNOSIS — N94.6 DYSMENORRHEA: ICD-10-CM

## 2021-12-30 LAB
ALBUMIN SERPL-MCNC: 3.8 G/DL (ref 3.4–5)
ALP SERPL-CCNC: 52 U/L (ref 40–150)
ALT SERPL W P-5'-P-CCNC: 30 U/L (ref 0–50)
ANION GAP SERPL CALCULATED.3IONS-SCNC: 5 MMOL/L (ref 3–14)
AST SERPL W P-5'-P-CCNC: 14 U/L (ref 0–45)
BILIRUB SERPL-MCNC: 0.3 MG/DL (ref 0.2–1.3)
BUN SERPL-MCNC: 16 MG/DL (ref 7–30)
CALCIUM SERPL-MCNC: 8.8 MG/DL (ref 8.5–10.1)
CHLORIDE BLD-SCNC: 110 MMOL/L (ref 94–109)
CO2 SERPL-SCNC: 27 MMOL/L (ref 20–32)
CREAT SERPL-MCNC: 0.82 MG/DL (ref 0.52–1.04)
ESTRADIOL SERPL-MCNC: 227 PG/ML
FSH SERPL-ACNC: 2.2 IU/L
GFR SERPL CREATININE-BSD FRML MDRD: >90 ML/MIN/1.73M2
GLUCOSE BLD-MCNC: 73 MG/DL (ref 70–99)
LH SERPL-ACNC: 0.7 IU/L
POTASSIUM BLD-SCNC: 3.9 MMOL/L (ref 3.4–5.3)
PROT SERPL-MCNC: 6.4 G/DL (ref 6.8–8.8)
SODIUM SERPL-SCNC: 142 MMOL/L (ref 133–144)

## 2021-12-30 PROCEDURE — 83001 ASSAY OF GONADOTROPIN (FSH): CPT

## 2021-12-30 PROCEDURE — 83002 ASSAY OF GONADOTROPIN (LH): CPT

## 2021-12-30 PROCEDURE — 36415 COLL VENOUS BLD VENIPUNCTURE: CPT

## 2021-12-30 PROCEDURE — 80053 COMPREHEN METABOLIC PANEL: CPT

## 2021-12-30 PROCEDURE — 82670 ASSAY OF TOTAL ESTRADIOL: CPT

## 2021-12-31 DIAGNOSIS — Z12.4 SCREENING FOR CERVICAL CANCER: ICD-10-CM

## 2021-12-31 DIAGNOSIS — Z13.29 SCREENING FOR THYROID DISORDER: Primary | ICD-10-CM

## 2022-01-19 ENCOUNTER — OFFICE VISIT (OUTPATIENT)
Dept: OBGYN | Facility: CLINIC | Age: 42
End: 2022-01-19
Attending: NURSE PRACTITIONER
Payer: COMMERCIAL

## 2022-01-19 ENCOUNTER — ANCILLARY PROCEDURE (OUTPATIENT)
Dept: ULTRASOUND IMAGING | Facility: CLINIC | Age: 42
End: 2022-01-19
Attending: NURSE PRACTITIONER
Payer: COMMERCIAL

## 2022-01-19 VITALS
HEART RATE: 68 BPM | SYSTOLIC BLOOD PRESSURE: 131 MMHG | DIASTOLIC BLOOD PRESSURE: 80 MMHG | BODY MASS INDEX: 28 KG/M2 | WEIGHT: 164 LBS | OXYGEN SATURATION: 99 %

## 2022-01-19 DIAGNOSIS — Z80.41 FAMILY HISTORY OF MALIGNANT NEOPLASM OF OVARY: ICD-10-CM

## 2022-01-19 DIAGNOSIS — N93.9 ABNORMAL UTERINE BLEEDING (AUB): Primary | ICD-10-CM

## 2022-01-19 DIAGNOSIS — Z00.00 ROUTINE HEALTH MAINTENANCE: ICD-10-CM

## 2022-01-19 DIAGNOSIS — Z30.431 IUD CHECK UP: ICD-10-CM

## 2022-01-19 DIAGNOSIS — Z80.3 FAMILY HISTORY OF MALIGNANT NEOPLASM OF BREAST: ICD-10-CM

## 2022-01-19 DIAGNOSIS — T83.32XA MALPOSITIONED INTRAUTERINE DEVICE (IUD), INITIAL ENCOUNTER: ICD-10-CM

## 2022-01-19 LAB — PROLACTIN SERPL-MCNC: 9 UG/L (ref 3–27)

## 2022-01-19 PROCEDURE — 84443 ASSAY THYROID STIM HORMONE: CPT | Performed by: OBSTETRICS & GYNECOLOGY

## 2022-01-19 PROCEDURE — 58301 REMOVE INTRAUTERINE DEVICE: CPT | Performed by: OBSTETRICS & GYNECOLOGY

## 2022-01-19 PROCEDURE — 76856 US EXAM PELVIC COMPLETE: CPT | Performed by: OBSTETRICS & GYNECOLOGY

## 2022-01-19 PROCEDURE — 86803 HEPATITIS C AB TEST: CPT | Performed by: OBSTETRICS & GYNECOLOGY

## 2022-01-19 PROCEDURE — 84146 ASSAY OF PROLACTIN: CPT | Performed by: OBSTETRICS & GYNECOLOGY

## 2022-01-19 PROCEDURE — 76830 TRANSVAGINAL US NON-OB: CPT | Performed by: OBSTETRICS & GYNECOLOGY

## 2022-01-19 PROCEDURE — 99203 OFFICE O/P NEW LOW 30 MIN: CPT | Mod: 25 | Performed by: OBSTETRICS & GYNECOLOGY

## 2022-01-19 PROCEDURE — 87389 HIV-1 AG W/HIV-1&-2 AB AG IA: CPT | Performed by: OBSTETRICS & GYNECOLOGY

## 2022-01-19 PROCEDURE — 36415 COLL VENOUS BLD VENIPUNCTURE: CPT | Performed by: OBSTETRICS & GYNECOLOGY

## 2022-01-20 LAB
HCV AB SERPL QL IA: NONREACTIVE
HIV 1+2 AB+HIV1 P24 AG SERPL QL IA: NONREACTIVE
TSH SERPL DL<=0.005 MIU/L-ACNC: 1.63 MU/L (ref 0.4–4)

## 2022-06-13 ENCOUNTER — VIRTUAL VISIT (OUTPATIENT)
Dept: ONCOLOGY | Facility: CLINIC | Age: 42
End: 2022-06-13
Attending: NURSE PRACTITIONER
Payer: COMMERCIAL

## 2022-06-13 DIAGNOSIS — Z80.3 FAMILY HISTORY OF MALIGNANT NEOPLASM OF BREAST: Primary | ICD-10-CM

## 2022-06-13 DIAGNOSIS — Z80.41 FAMILY HISTORY OF MALIGNANT NEOPLASM OF OVARY: ICD-10-CM

## 2022-06-13 PROCEDURE — 96040 HC GENETIC COUNSELING, EACH 30 MINUTES: CPT | Mod: GT,95 | Performed by: GENETIC COUNSELOR, MS

## 2022-06-13 NOTE — LETTER
Cancer Risk Management  Program Locations    [facility]  [facility]  [facility]  [facility]  [facility]  Mailing Address  Cancer Risk Management Program  [facility]  [address]  [place] [address]    New patient appointments  [phone]  2022    Angelica Gomez  [address]  [place] [address]      Dear Angelica,    It was a pleasure speaking with you over video on 2022. Here is a copy of the progress note from our discussion. If you have any additional questions, please feel free to call.    Referring Provider: DEISI Robb CNP    Presenting Information:   I met with Angelica for her video genetic counseling visit, through the Cancer Risk Management Program, to discuss her family history of breast and ovarian cancer. Today we reviewed this history, cancer screening recommendations, and available genetic testing options.    Personal History:  Angelica is a 42 year old year old female. She does not have any personal history of cancer. She had her first menstrual period at age 13, her first child at age 22, and is premenopausal. Angelica has her ovaries, fallopian tubes and uterus in place, and she has had no ovarian cancer screening to date. She reports that she has not had hormone replacement therapy. She had her first mammogram in 2018 which was normal. Angelica has not had a colonoscopy. She does not regularly do any other cancer screening at this time.     Family History: (Please see scanned pedigree for detailed family history information)    Angelica's mother was diagnosed with breast cancer at age 55 and  at age 59.    A maternal aunt was diagnosed with breast cancer in her 50's and  at age 80.    A paternal aunt was diagnosed with ovarian cancer in her 60's and  in her late 70's.    A paternal  aunt was diagnosed with breast cancer in her early 70's.     Angelica's paternal grandfather  from liver cancer in his 40's or 50's. He is reported to have been an alcoholic and had a history of smoking.    Her maternal ethnicity is [place] and [place]. Her paternal ethnicity is [place]ese and Scandinavian. There is no known Ashkenazi Denominational ancestry on either side of her family. There is no reported consanguinity.    Discussion:    Angelica's family history of breast and ovarian cancer is suggestive of a hereditary cancer syndrome.    We reviewed the features of sporadic, familial, and hereditary cancers. We discussed that mutations in either BRCA1 or BRCA2 could be possible hereditary explanations for her family history of cancer. Mutations in the BRCA1 or BRCA2 gene are known to cause Hereditary Breast and Ovarian Cancer Syndrome (HBOC). HBOC typically presents with multiple family members diagnosed with breast cancer before age 50 and/or ovarian cancer. Other cancer risks associated with HBOC include male breast cancer, prostate cancer, pancreatic cancer, and melanoma.     We discussed the natural history and genetics of hereditary cancer. A detailed handout regarding hereditary cancer, along with the other information we discussed, will be mailed to Angelica at the end of our appointment today and can be found in the after visit summary. Topics included: inheritance pattern, cancer risks, cancer screening recommendations, and also risks, benefits and limitations of testing.    Based on her personal and family history, Angelica meets current National Comprehensive Cancer Network (NCCN) criteria for genetic testing of high-penetrance ovarian cancer susceptibility genes (including BRCA1, BRCA2, BRIP1, MLH1, MSH2, MSH6, PMS2, EPCAM, PALB2, RAD51C, and RAD51D).    We discussed that there are additional genes that could cause increased risk for breast and/or ovarian cancer. As many of these genes present with  overlapping features in a family and accurate cancer risk cannot always be established based upon the pedigree analysis alone, it would be reasonable for Angelica to consider panel genetic testing to analyze multiple genes at once.  Genetic testing is available for 47 genes associated with hereditary cancer: Common Hereditary Cancers panel (APC, TALI, AXIN2, BARD1, BMPR1A, BRCA1, BRCA2, BRIP1, CDH1, CDK4, CDKN2A, CHEK2, CTNNA1, DICER1, EPCAM, GREM1, HOXB13, KIT, MEN1, MLH1, MSH2, MSH3, MSH6, MUTYH, NBN, NF1, NTHL1, PALB2, PDGFRA, PMS2, POLD1, POLE, PTEN, RAD50, RAD51C, RAD51D, SDHA, SDHB, SDHC, SDHD, SMAD4, SMARCA4, STK11, TP53, TSC1, TSC2, and VHL).  We discussed that many of the genes in the Common Hereditary Cancers panel are associated with specific hereditary cancer syndromes and published management guidelines: Hereditary Breast and Ovarian Cancer syndrome (BRCA1, BRCA2), Jang syndrome (MLH1, MSH2, MSH6, PMS2, EPCAM), Familial Adenomatous Polyposis (APC), Hereditary Diffuse Gastric Cancer (CDH1), Familial Atypical Multiple Mole Melanoma syndrome (CDK4, CDKN2A), Juvenile Polyposis syndrome (BMPR1A, SMAD4), Cowden syndrome (PTEN), Li Fraumeni syndrome (TP53), Peutz-Jeghers syndrome (STK11), MUTYH Associated Polyposis (MUTYH), Tuberous Sclerosis complex (TSC1, TSC2), Neurofibromatosis type 1 (NF1), Multiple Endocrine Neoplasia type 1 (MEN1), Hereditary Paraganglioma and Pheochromocytoma (SDHA, SDHB, SDHC, SDHD), and von Hippel-Lindau (VHL).   The TALI, AXIN2, BRIP1, CHEK2, GREM1, MSH3, NBN, NTHL1, PALB2, POLD1, POLE, RAD51C, and RAD51D genes are associated with increased cancer risk and have published management guidelines for certain cancers.    The remaining genes (BARD1, CTNNA1, DICER1, HOXB13, KIT, PDGFRA, RAD50, and SMARCA4) are associated with increased cancer risk and may allow us to make medical recommendations when mutations are identified.      Angelica would like to submit a blood sample for her genetic  testing. She will go to her [facility] at her earliest convenience to get her blood drawn for her genetic testing.       Verbal consent was given over video and written on the consent form. Turnaround time is approximately 4 weeks once the lab receives the sample.    Medical Management: For Angelica, we reviewed that the information from genetic testing may determine:    additional cancer screening for which Angelica may qualify (i.e. mammogram and breast MRI, more frequent colonoscopies, more frequent dermatologic exams, etc.),    options for risk reducing surgeries Angelica could consider (i.e. bilateral mastectomy, surgery to remove her ovaries and/or uterus, etc.),      and targeted chemotherapies if she were to develop certain cancers in the future (i.e. immunotherapy for individuals with Jang syndrome, PARP inhibitors, etc.).     These recommendations and possible targeted chemotherapies will be discussed in detail once genetic testing is completed.     Plan:  1) Today Angelica elected to proceed with [employer]'s Common Hereditary Cancers panel.  2) A copy of the consent form and the after visit summary will be mailed to Angelica.  3) This information should be available in approximately 4 weeks, once the lab receives the sample.  4) I will call Angelica with the results once they become available.    Time spent on video: 43 minutes    Angel Martinez MS, [facility]  Licensed, Certified Genetic Counselor

## 2022-06-13 NOTE — PROGRESS NOTES
2022    Referring Provider: DEISI Robb CNP    Presenting Information:   I met with Angelica for her video genetic counseling visit, through the Cancer Risk Management Program, to discuss her family history of breast and ovarian cancer. Today we reviewed this history, cancer screening recommendations, and available genetic testing options.    Personal History:  Angelica is a 42 year old year old female. She does not have any personal history of cancer. She had her first menstrual period at age 13, her first child at age 22, and is premenopausal. Angelica has her ovaries, fallopian tubes and uterus in place, and she has had no ovarian cancer screening to date. She reports that she has not had hormone replacement therapy. She had her first mammogram in 2018 which was normal. Angelica has not had a colonoscopy. She does not regularly do any other cancer screening at this time.     Family History: (Please see scanned pedigree for detailed family history information)    Angelica's mother was diagnosed with breast cancer at age 55 and  at age 59.    A maternal aunt was diagnosed with breast cancer in her 50's and  at age 80.    A paternal aunt was diagnosed with ovarian cancer in her 60's and  in her late 70's.    A paternal aunt was diagnosed with breast cancer in her early 70's.     Angelica's paternal grandfather  from liver cancer in his 40's or 50's. He is reported to have been an alcoholic and had a history of smoking.    Her maternal ethnicity is [place]ymra[place] and [place]. Her paternal ethnicity is [place]ym[place] and Scandinavian. There is no known Ashkenazi Advent ancestry on either side of her family. There is no reported consanguinity.    Discussion:    Angelica's family history of breast and ovarian cancer is suggestive of a hereditary cancer syndrome.    We reviewed the features of sporadic, familial, and hereditary cancers. We discussed that mutations in either BRCA1 or BRCA2 could be  possible hereditary explanations for her family history of cancer. Mutations in the BRCA1 or BRCA2 gene are known to cause Hereditary Breast and Ovarian Cancer Syndrome (HBOC). HBOC typically presents with multiple family members diagnosed with breast cancer before age 50 and/or ovarian cancer. Other cancer risks associated with HBOC include male breast cancer, prostate cancer, pancreatic cancer, and melanoma.     We discussed the natural history and genetics of hereditary cancer. A detailed handout regarding hereditary cancer, along with the other information we discussed, will be mailed to Angelica at the end of our appointment today and can be found in the after visit summary. Topics included: inheritance pattern, cancer risks, cancer screening recommendations, and also risks, benefits and limitations of testing.    Based on her personal and family history, Angelica meets current National Comprehensive Cancer Network (NCCN) criteria for genetic testing of high-penetrance ovarian cancer susceptibility genes (including BRCA1, BRCA2, BRIP1, MLH1, MSH2, MSH6, PMS2, EPCAM, PALB2, RAD51C, and RAD51D).    We discussed that there are additional genes that could cause increased risk for breast and/or ovarian cancer. As many of these genes present with overlapping features in a family and accurate cancer risk cannot always be established based upon the pedigree analysis alone, it would be reasonable for Angelica to consider panel genetic testing to analyze multiple genes at once.  Genetic testing is available for 47 genes associated with hereditary cancer: Common Hereditary Cancers panel (APC, TALI, AXIN2, BARD1, BMPR1A, BRCA1, BRCA2, BRIP1, CDH1, CDK4, CDKN2A, CHEK2, CTNNA1, DICER1, EPCAM, GREM1, HOXB13, KIT, MEN1, MLH1, MSH2, MSH3, MSH6, MUTYH, NBN, NF1, NTHL1, PALB2, PDGFRA, PMS2, POLD1, POLE, PTEN, RAD50, RAD51C, RAD51D, SDHA, SDHB, SDHC, SDHD, SMAD4, SMARCA4, STK11, TP53, TSC1, TSC2, and VHL).  We discussed that many  of the genes in the Common Hereditary Cancers panel are associated with specific hereditary cancer syndromes and published management guidelines: Hereditary Breast and Ovarian Cancer syndrome (BRCA1, BRCA2), Jang syndrome (MLH1, MSH2, MSH6, PMS2, EPCAM), Familial Adenomatous Polyposis (APC), Hereditary Diffuse Gastric Cancer (CDH1), Familial Atypical Multiple Mole Melanoma syndrome (CDK4, CDKN2A), Juvenile Polyposis syndrome (BMPR1A, SMAD4), Cowden syndrome (PTEN), Li Fraumeni syndrome (TP53), Peutz-Jeghers syndrome (STK11), MUTYH Associated Polyposis (MUTYH), Tuberous Sclerosis complex (TSC1, TSC2), Neurofibromatosis type 1 (NF1), Multiple Endocrine Neoplasia type 1 (MEN1), Hereditary Paraganglioma and Pheochromocytoma (SDHA, SDHB, SDHC, SDHD), and von Hippel-Lindau (VHL).   The TALI, AXIN2, BRIP1, CHEK2, GREM1, MSH3, NBN, NTHL1, PALB2, POLD1, POLE, RAD51C, and RAD51D genes are associated with increased cancer risk and have published management guidelines for certain cancers.    The remaining genes (BARD1, CTNNA1, DICER1, HOXB13, KIT, PDGFRA, RAD50, and SMARCA4) are associated with increased cancer risk and may allow us to make medical recommendations when mutations are identified.      Angelica would like to submit a blood sample for her genetic testing. She will go to her nearest [facility] at her earliest convenience to get her blood drawn for her genetic testing.     Verbal consent was given over video and written on the consent form. Turnaround time is approximately 4 weeks once the lab receives the sample.    Medical Management: For Angelica, we reviewed that the information from genetic testing may determine:    additional cancer screening for which Angelica may qualify (i.e. mammogram and breast MRI, more frequent colonoscopies, more frequent dermatologic exams, etc.),    options for risk reducing surgeries Angelica could consider (i.e. bilateral mastectomy, surgery to remove her ovaries  and/or uterus, etc.),      and targeted chemotherapies if she were to develop certain cancers in the future (i.e. immunotherapy for individuals with Jang syndrome, PARP inhibitors, etc.).     These recommendations and possible targeted chemotherapies will be discussed in detail once genetic testing is completed.     Plan:  1) Today Angelica elected to proceed with [facility] Common Hereditary Cancers panel.  2) A copy of the consent form and the after visit summary will be mailed to Angelica.  3) This information should be available in approximately 4 weeks, once the lab receives the sample.  4) I will call Angelica with the results once they become available.    Time spent on video: 43 minutes    Angel Martinez MS, [facility]  Licensed, Certified Genetic Counselor      Lisandra is a 42 year old who is being evaluated via a billable video visit.      How would you like to obtain your AVS? MyChart  If the video visit is dropped, the invitation should be resent by: Send to e-mail at: [email or web address]  Will anyone else be joining your video visit? No         Leona Cabrera VF

## 2022-06-13 NOTE — PATIENT INSTRUCTIONS
Assessing Cancer Risk  Only about 5-10% of cancers are thought to be due to an inherited cancer susceptibility gene.    These families often have:  Several people with the same or related types of cancer  Cancers diagnosed at a young age (before age 50)  Individuals with more than one primary cancer  Multiple generations of the family affected with cancer    Some people may be candidates for genetic testing of more than one gene.  For these families, genetic testing using a cancer panel may be offered.  These panels will test different genes known to increase the risk for breast, ovarian, uterine, and/or other cancers. All of the genes discussed below have published clinical management guidelines for individuals who are found to carry a mutation. The purpose of this handout is to serve as a brief summary of the genes analyzed by the panels used to inquire about hereditary breast and gynecologic cancer:  TALI, BRCA1, BRCA2, BRIP1, CDH1, CHEK2, MLH1, MSH2, MSH6, PMS2, EPCAM, PTEN, PALB2, RAD51C, RAD51D, and TP53.  ______________________________________________________________________________  Hereditary Breast and Ovarian Cancer Syndrome   (BRCA1 and BRCA2)  A single mutation in one of the copies of BRCA1 or BRCA2 increases the risk for breast and ovarian cancer, among others.  The risk for pancreatic cancer and melanoma may also be slightly increased in some families.  The chart below shows the chance that someone with a BRCA mutation would develop cancer in his or her lifetime1,2,3,4.        A person s ethnic background is also important to consider, as individuals of Ashkenazi Christianity ancestry have a higher chance of having a BRCA gene mutation.  There are three BRCA mutations that occur more frequently in this population.    Jang Syndrome   (MLH1, MSH2, MSH6, PMS2, and EPCAM)  Currently five genes are known to cause Jang Syndrome: MLH1, MSH2, MSH6, PMS2, and EPCAM.  A single mutation in one of the Jang  Syndrome genes increases the risk for colon, endometrial, ovarian, and stomach cancers.  Other cancers that occur less commonly in Jang Syndrome include urinary tract, skin, and brain cancers.  The chart below shows the chance that a person with Jang syndrome would develop cancer in his or her lifetime5.      *Cancer risk varies depending on Jang syndrome gene found    Cowden Syndrome   (PTEN)  Cowden syndrome is a hereditary condition that increases the risk for breast, thyroid, endometrial, colon, and kidney cancer.  Cowden syndrome is caused by a mutation in the PTEN gene.  A single mutation in one of the copies of PTEN causes Cowden syndrome and increases cancer risk.  The chart below shows the chance that someone with a PTEN mutation would develop cancer in their lifetime6,7.  Other benign features seen in some individuals with Cowden syndrome include benign skin lesions (facial papules, keratoses, lipomas), learning disability, autism, thyroid nodules, colon polyps, and larger head size.      *One recent study found breast cancer risk to be increased to 85%    Li-Fraumeni Syndrome   (TP53)  Li-Fraumeni Syndrome (LFS) is a cancer predisposition syndrome caused by a mutation in the TP53 gene. A single mutation in one of the copies of TP53 increases the risk for multiple cancers. Individuals with LFS are at an increased risk for developing cancer at a young age. The lifetime risk for development of a LFS-associated cancer is 50% by age 30 and 90% by age 60.   Core Cancers: Sarcomas, Breast, Brain, Lung, Leukemias/Lymphomas, Adrenocortical carcinomas  Other Cancers: Gastrointestinal, Thyroid, Skin, Genitourinary    Hereditary Diffuse Gastric Cancer   (CDH1)  Currently, one gene is known to cause hereditary diffuse gastric cancer (HDGC): CDH1.  Individuals with HDGC are at increased risk for diffuse gastric cancer and lobular breast cancer. Of people diagnosed with HDGC, 30-50% have a mutation in the CDH1 gene.   This suggests there are likely other genes that may cause HDGC that have not been identified yet.      Lifetime Cancer Risks    General Population HDGC    Diffuse Gastric  <1% ~80%   Breast 12% 39-52%         Additional Genes  TALI  TALI is a moderate-risk breast cancer gene. Women who have a mutation in TALI can have between a 2-4 fold increased risk for breast cancer compared to the general population8. TALI mutations have also been associated with increased risk for pancreatic cancer, however an estimate of this cancer risk is not well understood9. Individuals who inherit two TALI mutations have a condition called ataxia-telangiectasia (AT).  This rare autosomal recessive condition affects the nervous system and immune system, and is associated with progressive cerebellar ataxia beginning in childhood.  Individuals with ataxia-telangiectasia often have a weakened immune system and have an increased risk for childhood cancers.    PALB2  Mutations in PALB2 have been shown to increase the risk of breast cancer up to 33-58% in some families; where individuals fall within this risk range is dependent upon family [id number]. PALB2 mutations have also been associated with increased risk for pancreatic cancer, although this risk has not been quantified yet.  Individuals who inherit two PALB2 mutations--one from their mother and one from their father--have a condition called Fanconi Anemia.  This rare autosomal recessive condition is associated with short stature, developmental delay, bone marrow failure, and increased risk for childhood cancers.    CHEK2   CHEK2 is a moderate-risk breast cancer gene.  Women who have a mutation in CHEK2 have around a 2-fold increased risk for breast cancer compared to the general population, and this risk may be higher depending upon family history.11,12,13 Mutations in CHEK2 have also been shown to increase the risk of a number of other cancers, including colon and prostate, however these  cancer risks are currently not well understood.    BRIP1, RAD51C and RAD51D  Mutations in BRIP1, RAD51C, and RAD51D have been shown to increase the risk of ovarian cancer and possibly female breast cancer as well14,15 .       Lifetime Cancer Risk    General Population BRIP1 RAD51C RAD51D   Ovarian 1-2% ~5-8% ~5-9% ~7-15%           Inheritance  All of the cancer syndromes reviewed above are inherited in an autosomal dominant pattern.  This means that if a parent has a mutation, each of his or her children will have a 50% chance of inheriting that same mutation.  Therefore, each child--male or female--would have a 50% chance of being at increased risk for developing cancer.      Image obtained from Genetics Home Reference, 2013     Mutations in some genes can occur de cherelle, which means that a person s mutation occurred for the first time in them and was not inherited from a parent.  Now that they have the mutation, however, it can be passed on to future generations.    Genetic Testing  Genetic testing involves a blood test and will look at the genetic information in the TALI, BRCA1, BRCA2, BRIP1, CDH1, CHEK2, MLH1, MSH2, MSH6, PMS2, EPCAM, PTEN, PALB2, RAD51C, RAD51D, and TP53 genes for any harmful mutations that are associated with increased cancer risk.  If possible, it is recommended that the person(s) who has had cancer be tested before other family members.  That person will give us the most useful information about whether or not a specific gene is associated with the cancer in the family.    Results  There are three possible results of genetic testing:  Positive--a harmful mutation was identified in one or more of the genes  Negative--no mutation was identified in any of the genes on this panel  Variant of unknown significance--a variation in one of the genes was identified, but it is unclear how this impacts cancer risk in the family    Advantages and Disadvantages   There are advantages and disadvantages to  genetic testing.    Advantages  May clarify your cancer risk  Can help you make medical decisions  May explain the cancers in your family  May give useful information to your family members (if you share your results)    Disadvantages  Possible negative emotional impact of learning about inherited cancer risk  Uncertainty in interpreting a negative test result in some situations  Possible genetic discrimination concerns (see below)    Genetic Information Nondiscrimination Act (DAMON)  DAMON is a federal law that protects individuals from health insurance or employment discrimination based on a genetic test result alone.  Although rare, there are currently no legal discrimination protections in terms of life insurance, long term care, or disability insurances.  Visit the [employer] [place] website to learn more.    Reducing Cancer Risk  All of the genes described above have nationally recognized cancer screening guidelines that would be recommended for individuals who test positive.  In addition to increased cancer screening, surgeries may be offered or recommended to reduce cancer risk.  Recommendations are based upon an individual s genetic test result as well as their personal and family history of cancer.    Questions to Think About Regarding Genetic Testing:  What effect will the test result have on me and my relationship with my family members if I have an inherited gene mutation?  If I don t have a gene mutation?  Should I share my test results, and how will my family react to this news, which may also affect them?  Are my children ready to learn new information that may one day affect their own health?    Hereditary Cancer Resources    FORCE: Facing Our Risk of Cancer Empowered facingourrisk.org   Bright Pink bebrightpink.org   Li-Fraumeni Syndrome Association lfsassociation.org   PTEN World PTENworld.[employer]   No stomach for cancer, Inc. nostomachforcancer.org   Stomach cancer relief network  Scrnet.org   Collaborative Group of the Americas on Inherited Colorectal Cancer (CGA) cgaicc.com    Cancer Care cancercare.org   American Cancer Society (ACS) cancer.org   National Cancer [place] (NCI) cancer.gov     Please call us if you have any questions or concerns.   Cancer Risk Management Program [phone] ([phone])  Angel Martinez, MS [facility] [phone]  Italia Charles, MS, [facility] [phone]  Yanique Rodriguez, MS, [facility]  [phone]  Gwen Chung, MS, [facility]  [phone]  Abeba Milan, MS, [facility]  [phone]  Claudia Aranda, MS, [facility] [phone]  Joyce Bauer, MS, [facility] [phone]    References  Jessica Tran PDP, José Miguel S, Kayla JENKINS, Baljinder JE, Charles JL, He N, Jovanny H, Omega O, Sushila A, Mariana B, Cheko P, Mansanya S, Essie DM, Hipolito N, Aníbal E, Leslie H, Hackett E, Lubinski J, Gronwald J, Corina B, Janell H, Thorlacius S, Eerola H, Manas H, Angela K, Mars OP. Average risks of breast and ovarian cancer associated with BRCA1 or BRCA2 mutations detected in case series unselected for family history: a combined analysis of 222 studies. Am J Hum Sulema. 2003;72:1117-30.  Trista N, Emmy M, Francois G.  BRCA1 and BRCA2 Hereditary Breast and Ovarian Cancer. Gene Reviews online. 2013.  Leandro YC, Corine S, Evelyn G, Zeng S. Breast cancer risk among male BRCA1 and BRCA2 mutation carriers. J Natl Cancer Inst. 2007;99:1811-4.  Ernie TINOCO, Loraine I, Michael J, Eder E, Marie ER, Kamala F. Risk of breast cancer in male BRCA2 carriers. J Med Sulema. 2010;47:710-1.  National Comprehensive Cancer Network. Clinical practice guidelines in oncology, colorectal cancer screening. Available online (registration required). 2015.  Bharathi DIAS, Quyen J, Marni J, Simone LA, Laura MS, Eng C. Lifetime cancer risks in individuals with germline PTEN mutations. Clin Cancer Res. 2012;18:400-7.  Yomaira BENTLEY. Cowden Syndrome: A Critical Review of the Clinical Literature. J Sulema .  2009:18:13-27.  Trisha A, Issac D, Lissette S, Shaunna P, Joshua T, Kamila M, Reinier B, Atul H, Velasquez R, Tomas K, Richi L, Ernie DG, Essie D, Robert DF, Shane MR, The Breast Cancer Susceptibility Collaboration () & Elizabeth HARRINGTON. TALI mutations that cause ataxia-telangiectasia are breast cancer susceptibility alleles. Nature Genetics. 2006;38:873-875  Jagdish N , Nic Y, Caridad J, Willem L, Liam GM , Sukhdev ML, Gallinger S, Lozano AG, Syngal S, Temitope ML, Javid J , Flavia R, Andrzej SZ, Kentrell JR, Pablo VE, Audra M, Vosteven B, Davion N, Nivia RH, Conner KW, and Gerald AP. TALI mutations in patients with hereditary pancreatic cancer. Cancer Discover. 2012;2:41-46  Astrid PERALES., et al. Breast-Cancer Risk in Families with Mutations in PALB2. NEJM. 2014; 371(6):497-506.  CHEK2 Breast Cancer Case-Control Consortium. CHEK2*1100delC and susceptibility to breast cancer: A collaborative analysis involving 10,860 breast cancer cases and 9,065 controls from 10 studies. Am J Hum Sulema, 74 (2004), pp. 8323-6108  Reji T, Trinidad S, Cyndi K, et al. Spectrum of Mutations in BRCA1, BRCA2, CHEK2, and TP53 in Families at High Risk of Breast Cancer. ROSALINE. 2006;295(12):5293-3048.   Tomasz C, Janet D, Guillaume A, et al. Risk of breast cancer in women with a CHEK2 mutation with and without a family history of breast cancer. J Clin Oncol. 2011;29:6286-7777.  Ricardo H, Fantasma E, Prieto SJ, et al. Contribution of germline mutations in the RAD51B, RAD51C, and RAD51D genes to ovarian cancer in the population. J Clin Oncol. 2015;33(26):7552-4083. Doi:10.1200/JCO.2015.61.2408.  Felipe Moodyon DF, Radha P, et al. Mutations in BRIP1 confer high risk of ovarian cancer. Franca Sulema. 2011;43(11):0433-8110. doi:10.1038/ng.955.

## 2022-08-08 ENCOUNTER — LAB (OUTPATIENT)
Dept: LAB | Facility: CLINIC | Age: 42
End: 2022-08-08
Payer: COMMERCIAL

## 2022-08-08 DIAGNOSIS — Z80.3 FAMILY HISTORY OF MALIGNANT NEOPLASM OF BREAST: ICD-10-CM

## 2022-08-08 DIAGNOSIS — Z80.41 FAMILY HISTORY OF MALIGNANT NEOPLASM OF OVARY: ICD-10-CM

## 2022-08-08 PROCEDURE — 36415 COLL VENOUS BLD VENIPUNCTURE: CPT

## 2022-08-22 LAB — SCANNED LAB RESULT: ABNORMAL

## 2022-08-31 ENCOUNTER — VIRTUAL VISIT (OUTPATIENT)
Dept: ONCOLOGY | Facility: CLINIC | Age: 42
End: 2022-08-31
Attending: GENETIC COUNSELOR, MS
Payer: COMMERCIAL

## 2022-08-31 DIAGNOSIS — Z80.41 FAMILY HISTORY OF MALIGNANT NEOPLASM OF OVARY: ICD-10-CM

## 2022-08-31 DIAGNOSIS — Z15.09 MONOALLELIC MUTATION OF CHEK2 GENE IN FEMALE PATIENT: Primary | ICD-10-CM

## 2022-08-31 DIAGNOSIS — Z15.89 MONOALLELIC MUTATION OF CHEK2 GENE IN FEMALE PATIENT: Primary | ICD-10-CM

## 2022-08-31 DIAGNOSIS — Z15.02 MONOALLELIC MUTATION OF CHEK2 GENE IN FEMALE PATIENT: Primary | ICD-10-CM

## 2022-08-31 DIAGNOSIS — Z80.3 FAMILY HISTORY OF MALIGNANT NEOPLASM OF BREAST: ICD-10-CM

## 2022-08-31 DIAGNOSIS — Z15.01 MONOALLELIC MUTATION OF CHEK2 GENE IN FEMALE PATIENT: Primary | ICD-10-CM

## 2022-08-31 PROCEDURE — 999N000069 HC STATISTIC GENETIC COUNSELING, < 16 MIN: Mod: GT,95 | Performed by: GENETIC COUNSELOR, MS

## 2022-08-31 NOTE — PATIENT INSTRUCTIONS
"Dear Relative,     The purpose of this letter is to inform you that your relative recently underwent genetic counseling and genetic testing due to the family history of breast cancer. 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 The requisition number linked to your relative's test report is [id number].    A mutation (or change in the genetic code) causes a specific gene to stop working properly. Ultimately, individuals who have a mutation in this CHEK2 gene have an increased risk for breast, colon cancer, and other cancers. This particular CHEK2 mutation is considered a  moderate risk  mutation. This mutation is called \"moderate risk\" because other mutations in the CHEK2 gene cause higher cancer risks than this mutation.       This particular mutation increases the lifetime risk for female breast cancer by about 1.5 fold. The population lifetime risk for breast cancer in women is around 12%. The lifetime risk for colon cancer for individuals with CHEK2 mutations may be as high as twice that in the general population. Mutations in this gene have also been associated with an increased risk for other cancers; however the exact risks are not well defined at this time.     If individuals are found to have a mutation in the CHEK2 gene, we would discuss increased cancer screening at earlier ages. It is important to note that both men and women have a 50% chance of passing this mutation on to each of their children.    I understand that this may be surprising, unexpected, and even scary news. Because this mutation has been identified in your family, you are at risk for having the same mutation. As mentioned earlier, your children may also be at risk.     Scheduling a genetic counseling appointment does not mean you have to undergo genetic testing. The decision to pursue such testing is a very personal one that is discussed in " more detail during the session. Much of cancer genetic counseling is providing valuable information to individuals who are impacted by genetic information such as this.     If you are interested in scheduling a genetic counseling appointment at [facility], please call [phone] to schedule an appointment. You can also find a genetic counselor close to you or at another health system at [facility]    Sincerely,     Angel Martinez MS, [facility]  Licensed, Certified Genetic Counselor

## 2022-08-31 NOTE — LETTER
Cancer Risk Management  Program [facility]  [facility]  [facility]  Mailing Address  Cancer Risk Management Program  [address]  [place] [address]    New patient appointments  [phone]  August 31, 2022      Angelica Gomez  [address]  [facility] [address]      Dear Angelica,    It was a pleasure speaking with you over video on 8/31/2022. Here is a copy of the progress note from our discussion. If you have any additional questions, please feel free to call.    Referring Provider: DEISI Robb CNP    Presenting Information:   I spoke to Angelica by video today to discuss her genetic testing results. Her blood was drawn on 8/8/22. The Common Hereditary Cancers panel was ordered from [employer]. This testing was done because of Angelica's family history of breast and ovarian cancer.     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 We discussed that this mutation is associated with an increased risk for breast and colon cancer. We discussed the impact of this testing on Angelica in detail.     Of note, Angelica is negative for mutations in APC, TALI, AXIN2, BARD1, BMPR1A, BRCA1, BRCA2, BRIP1, CDH1, CDK4, CDKN2A, CTNNA1, DICER1, EPCAM, GREM1, HOXB13, KIT, MEN1, MLH1, MSH2, MSH3, MSH6, MUTYH, NBN, NF1, NTHL1, PALB2, PDGFRA, PMS2, POLD1, POLE, PTEN, RAD50, RAD51C, RAD51D, SDHA, SDHB, SDHC, SDHD, SMAD4, SMARCA4, STK11, TP53, TSC1, TSC2, and VHL. No mutations were found in any of the other 46 genes analyzed. This test involved sequencing and deletion/duplication analysis of all genes with the exceptions of EPCAM and GREM1 (deletions/duplications only) and SDHA (sequencing only). We reviewed the autosomal  "dominant inheritance of these genes. Angelica cannot pass on a mutation in any of these genes to her children based on this test result. Mutations in these genes do not skip generations.      A copy of the test report can be found in the Laboratory tab, dated 8/8/22, and named \"LABORATORY MISCELLANEOUS ORDER\". The report is scanned in as a linked document.    Cancer Risks:    Mutations in the CHEK2 gene are associated with a moderate risk of breast and colon cancer.  We discussed that this I157T mutation confers a lower breast cancer risk than other mutations in the CHEK2 gene.     The lifetime breast cancer risk for women who carry this specific CHEK2 mutation is approximately 1.5x higher than the general population lifetime risk for breast cancer of about 12%. This equates to a lifetime risk of about 18%. Other mutations in the CHEK2 gene cause about a 15-40% lifetime risk for breast cancer.     The risk of colon cancer may be twice as high as the general population risk of colon cancer of 5%.     There is also a possible association of CHEK2 mutations with increased risk for other cancers, such as prostate, melanoma, thyroid, and other cancers. However data is still limited in this area. No confirmed risk numbers are available for these additional cancers, though they have been reported in families that have a CHEK2 mutation.    We discussed that CHEK2 gene is currently considered a moderate-risk gene. This means that mutations in this gene increase the risk for certain cancers, but are unlikely to be the single cause for an individual's cancer. There are likely other genetic and/or environmental risk factors that, in combination with a CHEK2 gene mutation, cause cancer.    Cancer Screening and Prevention:  The following screening is recommended for individuals who have a mutation in the CHEK2 gene, per current National Comprehensive Cancer Network (NCCN) guidelines.    Typically, women with CHEK2 mutations " qualify for high risk breast screening. However, as this mutation causes a lower risk than other mutations in the CHEK2 gene, NCCN states that patients should be managed per their specific gene mutation. As this mutation is associated with a breast cancer risk of <20%, Angelica would not qualify for high-risk breast screening based on this genetic test result alone.    Angelica's lifetime breast cancer risk was calculated using the LORETTA model to determine if she qualifies for high-risk screening based on family history. Per this model, Angelica has a 14.7-21.1% lifetime risk of developing breast cancer. The reason for this variance is based on breast density. LORETTA uses breast density data for woman over the age of 40, however, Angelica has not yet had a mammogram since turning age 41. As such, I suggested that she have an updated mammogram and contact me when this is completed so I can use more updated breast density data to better estimate her lifetime risk of breast cancer. She agreed to this and we will discuss if further breast screening is warranted depending on her breast density data. If her risk comes back as being greater than 20% she would qualify for increased breast screening.    Recent NCCN guidelines recommend colon cancer screening in individuals who have a mutation in the CHEK2 gene.     Colonoscopies are done every 5 years, beginning at age 40 (or based on family history of colon cancer). Angelica should discuss this colon screening with her physicians.     There are currently no other specific cancer screening guidelines for other cancers potentially associated with a CHEK2 mutation. As such, additional screening should be based on family history.    Other screening based on Angelica's personal and family history:    Other population cancer screening options, such as those recommended by the American Cancer Society and the National Comprehensive Cancer Network (NCCN), are also appropriate for  "Angelica and her family. These screening recommendations may change if there are changes to Angelica's personal and/or family history of cancer. Final screening recommendations should be made by each individual's primary care provider.    Of note, the above information is based on our current understanding of Angelica's genetic findings. Angelica is encouraged to reach out to me regularly regarding any pertinent updates to her personal and/or family history of cancer, as our understanding of the genetic findings in her family may change over time.     Implications for Family Members:  We reviewed that mutations in the CHEK2 gene are inherited in an autosomal dominant pattern. This means that each of Angelica's children have a 50% chance of inheriting the same mutation. Likewise, each of her siblings has a 50% risk of having the same mutation. Extended relatives may also carry this mutation, and she is encouraged to share this information with her family members on both sides of the family. I am happy to help her relatives connect with a genetic counselor in their area if they would like to discuss testing.    Additional Testing Considerations:  Even though Angelica's genetic testing result was positive, other relatives may carry a different gene mutation associated with breast and/or ovarian cancer. Genetic counseling is recommended for other close relatives on both sides of the family, including her brothers, to discuss genetic testing options. If any of these relatives do pursue genetic testing, Angelica is encouraged to contact me so that we may review the impact of their test results on her.    Plan:  1.  A copy of the test results will be mailed to Angelica.  2.  I will provide a \"Dear Relative\" letter for Angelica to share with her family members.  3.  She plans to follow up with her other providers.    If Angelica has additional questions, I encouraged her to contact me directly via [employer].     Time spent on " video: 13 minutes    Angel Martinez MS, [facility] [place], [place]  Licensed, Certified Genetic Counselor

## 2022-08-31 NOTE — PROGRESS NOTES
"8/31/2022    Referring Provider: DEISI Robb CNP    Presenting Information:   I spoke to Angelica by video today to discuss her genetic testing results. Her blood was drawn on 8/8/22. The Common Hereditary Cancers panel was ordered from [employer]. This testing was done because of Angelica's family history of breast and ovarian cancer.     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 We discussed that this mutation is associated with an increased risk for breast and colon cancer. We discussed the impact of this testing on Angelica in detail.     Of note, Angelica is negative for mutations in APC, TALI, AXIN2, BARD1, BMPR1A, BRCA1, BRCA2, BRIP1, CDH1, CDK4, CDKN2A, CTNNA1, DICER1, EPCAM, GREM1, HOXB13, KIT, MEN1, MLH1, MSH2, MSH3, MSH6, MUTYH, NBN, NF1, NTHL1, PALB2, PDGFRA, PMS2, POLD1, POLE, PTEN, RAD50, RAD51C, RAD51D, SDHA, SDHB, SDHC, SDHD, SMAD4, SMARCA4, STK11, TP53, TSC1, TSC2, and VHL. No mutations were found in any of the other 46 genes analyzed. This test involved sequencing and deletion/duplication analysis of all genes with the exceptions of EPCAM and GREM1 (deletions/duplications only) and SDHA (sequencing only). We reviewed the autosomal dominant inheritance of these genes. Angelica cannot pass on a mutation in any of these genes to her children based on this test result. Mutations in these genes do not skip generations.      A copy of the test report can be found in the Laboratory tab, dated 8/8/22, and named \"LABORATORY MISCELLANEOUS ORDER\". The report is scanned in as a linked document.    Cancer Risks:    Mutations in the CHEK2 gene are associated with a moderate risk of breast and colon cancer.  We discussed that this I157T mutation confers a lower breast cancer risk than other mutations in the CHEK2 gene.   The lifetime breast cancer risk for women who carry this specific CHEK2 mutation is approximately " 1.5x higher than the general population lifetime risk for breast cancer of about 12%. This equates to a lifetime risk of about 18%. Other mutations in the CHEK2 gene cause about a 15-40% lifetime risk for breast cancer.   The risk of colon cancer may be twice as high as the general population risk of colon cancer of 5%.   There is also a possible association of CHEK2 mutations with increased risk for other cancers, such as prostate, melanoma, thyroid, and other cancers. However data is still limited in this area. No confirmed risk numbers are available for these additional cancers, though they have been reported in families that have a CHEK2 mutation.    We discussed that CHEK2 gene is currently considered a moderate-risk gene. This means that mutations in this gene increase the risk for certain cancers, but are unlikely to be the single cause for an individual's cancer. There are likely other genetic and/or environmental risk factors that, in combination with a CHEK2 gene mutation, cause cancer.    Cancer Screening and Prevention:  The following screening is recommended for individuals who have a mutation in the CHEK2 gene, per current National Comprehensive Cancer Network (NCCN) guidelines.  Typically, women with CHEK2 mutations qualify for high risk breast screening. However, as this mutation causes a lower risk than other mutations in the CHEK2 gene, NCCN states that patients should be managed per their specific gene mutation. As this mutation is associated with a breast cancer risk of <20%, Angelica would not qualify for high-risk breast screening based on this genetic test result alone.  Based on her personal and family history, Angelica has a 26.8% lifetime risk of developing breast cancer based on the LORETTA model. As such, Angelica meets current National Comprehensive Cancer Network (NCCN) guidelines for high risk breast screening. This includes annual breast MRI in addition to annual mammogram beginning at  age 40. In addition, Angelica should be receiving clinical breast exams by her physician. We discussed that Angelica could participate in our Cancer Risk Management Program in which our nursing specialist provides an individual screening plan and assists with medical management. A referral was made to see AMARI Blood for this service.  Recent NCCN guidelines recommend colon cancer screening in individuals who have a mutation in the CHEK2 gene.   Colonoscopies are done every 5 years, beginning at age 40 (or based on family history of colon cancer). Angelica should discuss this colon screening with her physicians.   There are currently no other specific cancer screening guidelines for other cancers potentially associated with a CHEK2 mutation. As such, additional screening should be based on family history.    Other screening based on Angelica's personal and family history:  Other population cancer screening options, such as those recommended by the American Cancer Society and the National Comprehensive Cancer Network (NCCN), are also appropriate for Angelica and her family. These screening recommendations may change if there are changes to Angelica's personal and/or family history of cancer. Final screening recommendations should be made by each individual's primary care provider.    Of note, the above information is based on our current understanding of Angelica's genetic findings. Angelica is encouraged to reach out to me regularly regarding any pertinent updates to her personal and/or family history of cancer, as our understanding of the genetic findings in her family may change over time.     Implications for Family Members:  We reviewed that mutations in the CHEK2 gene are inherited in an autosomal dominant pattern. This means that each of Angelica's children have a 50% chance of inheriting the same mutation. Likewise, each of her siblings has a 50% risk of having the same mutation. Extended  "relatives may also carry this mutation, and she is encouraged to share this information with her family members on both sides of the family. I am happy to help her relatives connect with a genetic counselor in their area if they would like to discuss testing.    Additional Testing Considerations:  Even though Angelica's genetic testing result was positive, other relatives may carry a different gene mutation associated with breast and/or ovarian cancer. Genetic counseling is recommended for other close relatives on both sides of the family, including her brothers, to discuss genetic testing options. If any of these relatives do pursue genetic testing, Angelica is encouraged to contact me so that we may review the impact of their test results on her.    Plan:  1.  A copy of the test results will be mailed to Angelica.  2.  I will provide a \"Dear Relative\" letter for Angelica to share with her family members.  3.  She plans to follow up with her other providers.  4. A referral was placed for Angelica to meet with AMARI Blood to discuss increased breast screening.    If Angelica has additional questions, I encouraged her to contact me directly via [employer].     Time spent on video: 13 minutes    ADDENDUM: Updated her breast cancer risk given the new information about her breast density from her recent mammogram. Also placed referral for her to meet with Nina to discuss this screening further.    Angel Martinez MS, [facility] [place]  Licensed, Certified Genetic Counselor       Lisandra is a 42 year old who is being evaluated via a billable video visit.     Pt is in [place]     How would you like to obtain your AVS? [employer]  If the video visit is dropped, the invitation should be resent by: Send to e-mail at: [email or web address]  Will anyone else be joining your video visit? No      Mila REED  "

## 2022-08-31 NOTE — Clinical Note
"Hello,    Please enclose a copy of the test report from the laboratory tab titled \"laboratory miscellaneous order\" dated 8/8/22 (Order [id number]) to send to the patient along with the letter.    Referring provider: please see below for summary of genetic test results for your reference.    Thank you,  Angel"

## 2022-09-06 ENCOUNTER — LAB REQUISITION (OUTPATIENT)
Dept: LAB | Facility: CLINIC | Age: 42
End: 2022-09-06

## 2022-09-06 PROCEDURE — 86481 TB AG RESPONSE T-CELL SUSP: CPT | Performed by: INTERNAL MEDICINE

## 2022-09-08 LAB
GAMMA INTERFERON BACKGROUND BLD IA-ACNC: 0.02 IU/ML
M TB IFN-G BLD-IMP: NEGATIVE
M TB IFN-G CD4+ BCKGRND COR BLD-ACNC: 5.99 IU/ML
MITOGEN IGNF BCKGRD COR BLD-ACNC: 0 IU/ML
MITOGEN IGNF BCKGRD COR BLD-ACNC: 0.02 IU/ML
QUANTIFERON MITOGEN: 6.01 IU/ML
QUANTIFERON NIL TUBE: 0.02 IU/ML
QUANTIFERON TB1 TUBE: 0.02 IU/ML
QUANTIFERON TB2 TUBE: 0.04

## 2022-12-03 ENCOUNTER — OFFICE VISIT (OUTPATIENT)
Dept: URGENT CARE | Facility: URGENT CARE | Age: 42
End: 2022-12-03
Payer: COMMERCIAL

## 2022-12-03 VITALS
BODY MASS INDEX: 29.02 KG/M2 | TEMPERATURE: 97.1 F | OXYGEN SATURATION: 100 % | DIASTOLIC BLOOD PRESSURE: 85 MMHG | WEIGHT: 170 LBS | RESPIRATION RATE: 16 BRPM | HEART RATE: 67 BPM | SYSTOLIC BLOOD PRESSURE: 130 MMHG

## 2022-12-03 DIAGNOSIS — N30.90 BLADDER INFECTION: Primary | ICD-10-CM

## 2022-12-03 DIAGNOSIS — R30.0 DYSURIA: ICD-10-CM

## 2022-12-03 LAB
ALBUMIN UR-MCNC: NEGATIVE MG/DL
APPEARANCE UR: CLEAR
BACTERIA #/AREA URNS HPF: ABNORMAL /HPF
BILIRUB UR QL STRIP: NEGATIVE
COLOR UR AUTO: YELLOW
GLUCOSE UR STRIP-MCNC: NEGATIVE MG/DL
HGB UR QL STRIP: ABNORMAL
KETONES UR STRIP-MCNC: NEGATIVE MG/DL
LEUKOCYTE ESTERASE UR QL STRIP: ABNORMAL
NITRATE UR QL: NEGATIVE
PH UR STRIP: 5.5 [PH] (ref 5–7)
RBC #/AREA URNS AUTO: ABNORMAL /HPF
SP GR UR STRIP: 1.01 (ref 1–1.03)
SQUAMOUS #/AREA URNS AUTO: ABNORMAL /LPF
UROBILINOGEN UR STRIP-ACNC: 0.2 E.U./DL
WBC #/AREA URNS AUTO: ABNORMAL /HPF

## 2022-12-03 PROCEDURE — 87086 URINE CULTURE/COLONY COUNT: CPT | Performed by: FAMILY MEDICINE

## 2022-12-03 PROCEDURE — 99213 OFFICE O/P EST LOW 20 MIN: CPT | Performed by: FAMILY MEDICINE

## 2022-12-03 PROCEDURE — 81001 URINALYSIS AUTO W/SCOPE: CPT | Performed by: FAMILY MEDICINE

## 2022-12-03 RX ORDER — SULFAMETHOXAZOLE/TRIMETHOPRIM 800-160 MG
1 TABLET ORAL 2 TIMES DAILY
Qty: 10 TABLET | Refills: 0 | Status: SHIPPED | OUTPATIENT
Start: 2022-12-03 | End: 2022-12-03

## 2022-12-03 RX ORDER — SULFAMETHOXAZOLE/TRIMETHOPRIM 800-160 MG
1 TABLET ORAL 2 TIMES DAILY
Qty: 10 TABLET | Refills: 0 | Status: SHIPPED | OUTPATIENT
Start: 2022-12-03 | End: 2023-07-25

## 2022-12-03 NOTE — PROGRESS NOTES
Assessment & Plan      Diagnosis Comments   1. Bladder infection  sulfamethoxazole-trimethoprim (BACTRIM DS) 800-160 MG tablet       2. Dysuria  UA reflex to Microscopic and Culture, Urine Microscopic, Urine Culture         Increase water intake           Subjective   Lisandra is a 42 year old presenting for the following health issues:  UTI (Urethra spasm, lower abdominal pain and lower back pain. Frequency, urinary discomfort for about 6 days.)    No fever, no chills, no nausea no vomiting.  No blood in the urine, no history of kidney stone      Review of Systems   Constitutional, HEENT, cardiovascular, pulmonary, gi and gu systems are negative, except as otherwise noted.      Objective    /85 (BP Location: Right arm, Patient Position: Sitting, Cuff Size: Adult Regular)   Pulse 67   Temp 97.1  F (36.2  C) (Tympanic)   Resp 16   Wt 77.1 kg (170 lb)   SpO2 100%   BMI 29.02 kg/m    Body mass index is 29.02 kg/m .  Physical Exam   GENERAL: healthy, alert and no distress  BACK: no CVA tenderness, no paralumbar tenderness    UA RESULTS:  Recent Labs   Lab Test 12/03/22  1230   COLOR Yellow   APPEARANCE Clear   URINEGLC Negative   URINEBILI Negative   URINEKETONE Negative   SG 1.015   UBLD Trace*   URINEPH 5.5   PROTEIN Negative   UROBILINOGEN 0.2   NITRITE Negative   LEUKEST Small*   RBCU 0-2   WBCU 10-25*         Nunu Foster MD

## 2022-12-05 LAB — BACTERIA UR CULT: NORMAL

## 2023-04-22 ENCOUNTER — HEALTH MAINTENANCE LETTER (OUTPATIENT)
Age: 43
End: 2023-04-22

## 2023-07-25 ENCOUNTER — OFFICE VISIT (OUTPATIENT)
Dept: OBGYN | Facility: CLINIC | Age: 43
End: 2023-07-25
Payer: COMMERCIAL

## 2023-07-25 VITALS
HEIGHT: 64 IN | HEART RATE: 88 BPM | BODY MASS INDEX: 31.07 KG/M2 | OXYGEN SATURATION: 96 % | DIASTOLIC BLOOD PRESSURE: 87 MMHG | WEIGHT: 182 LBS | SYSTOLIC BLOOD PRESSURE: 136 MMHG

## 2023-07-25 DIAGNOSIS — Z00.00 ANNUAL PHYSICAL EXAM: Primary | ICD-10-CM

## 2023-07-25 DIAGNOSIS — G43.829 MENSTRUAL MIGRAINE WITHOUT STATUS MIGRAINOSUS, NOT INTRACTABLE: ICD-10-CM

## 2023-07-25 DIAGNOSIS — I83.893 VARICOSE VEINS OF BOTH LEGS WITH EDEMA: ICD-10-CM

## 2023-07-25 DIAGNOSIS — N63.25 MASS OVERLAPPING MULTIPLE QUADRANTS OF LEFT BREAST: ICD-10-CM

## 2023-07-25 PROCEDURE — 99396 PREV VISIT EST AGE 40-64: CPT | Performed by: OBSTETRICS & GYNECOLOGY

## 2023-07-25 PROCEDURE — 99214 OFFICE O/P EST MOD 30 MIN: CPT | Mod: 25 | Performed by: OBSTETRICS & GYNECOLOGY

## 2023-07-25 RX ORDER — BUTALBITAL, ASPIRIN, AND CAFFEINE 325; 50; 40 MG/1; MG/1; MG/1
1 CAPSULE ORAL EVERY 4 HOURS PRN
Qty: 30 CAPSULE | Refills: 1 | Status: ON HOLD | OUTPATIENT
Start: 2023-07-25 | End: 2024-02-21

## 2023-07-25 NOTE — PROGRESS NOTES
Angelica is a 43 year old  female who presents for annual exam.     Menses are regular q 28-30 days and normal, painful, and headaches lasting  5-7  days.  Menses flow: normal and spotty.  Patient's last menstrual period was 2023.. Using none for contraception.  She is not currently considering pregnancy.  Besides routine health maintenance, she has no other health concerns today .  Wondering about contraception.  Has tubal ligation, but having more menstrual symptoms than she did before.  Gets migraines before periods, puffy, insomnia.  Periods themselves not terrible.  Didn't notice these symptoms when she had Mirena.  Pos hx of migraines with aura, but aura very infrequent.  Struggling with varicose veins lately.  Knows they typically recommend 6 months of compression stockings prior to considering any procedure, so she does plan to do that.  Placed referral so she can call when she's ready to schedule a consult.  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  Needs mammogram and colonoscopy.  Did have diagnostic mammo in the past due to somewhat chronically swollen lymph node on left.  Hasn't noted that node has changed, but does have new soreness from left axilla to left areola.  GYNECOLOGIC HISTORY:  Menarche: 13  Age at first intercourse: 16 Number of lifetime partners: less than 6  Angelica is sexually active with 1male partner(s) and is currently in monogamous relationship with .    History sexually transmitted infections:No STD history  STI testing offered?  Declined  CARMELA exposure: No  History of abnormal Pap smear: YES - updated in Problem List and Health Maintenance accordingly  Family history of breast CA: Yes (Please explain): mom, m aunt, p aunt pt has gene   Family history of uterine/ovarian CA: Yes (Please explain): p aunt?    Family history of colon CA: No    HEALTH MAINTENANCE:  Cholesterol: (  Cholesterol   Date Value Ref Range Status   2017 140 <200 mg/dL Final   2009 122 0  - 200 mg/dL Final     Comment:     LDL Cholesterol is the primary guide to therapy: LDL-cholesterol goal in high   risk patients is <100 mg/dL and in very high risk patients is <70 mg/dL.   The NCEP recommends further evaluation of: patients with cholesterol <200 mg/dL   if additional risk factors are present, cholesterol >240 mg/dL, triglycerides   >150 mg/dL, or HDL <40 mg/dL.    History of abnormal lipids: No  Mammo: 2017 . History of abnormal Mammo: No.  Regular Self Breast Exams: Yes  Calcium/Vitamin D intake: source:  dairy, dietary supplement(s) Adequate? Yes  TSH: (  TSH   Date Value Ref Range Status   2022 1.63 0.40 - 4.00 mU/L Final   2019 1.28 0.40 - 4.00 mU/L Final    )  Pap; (  Lab Results   Component Value Date    PAP NIL 2017    PAP NIL 2012    PAP NIL 2011    )    HISTORY:  OB History    Para Term  AB Living   2 2 2 0 0 2   SAB IAB Ectopic Multiple Live Births   0 0 0 0 2      # Outcome Date GA Lbr Morteza/2nd Weight Sex Delivery Anes PTL Lv   2 Term 07 40w0d   F CS-Unspec   JUMA   1 Term 03/10/03 40w0d   M    JUMA     Past Medical History:   Diagnosis Date    NO ACTIVE PROBLEMS      Past Surgical History:   Procedure Laterality Date    TUBAL LIGATION      ZZC  DELIVERY ONLY      superficial wound dehiscence     Family History   Problem Relation Age of Onset    Thyroid Disease Mother     Breast Cancer Mother 56        breast, 3 years later    Heart Disease Father         2x heart attacks    Circulatory Father         blood clots    Cardiovascular Father         patent foramen ovale, repaired x 2, afib    Cerebrovascular Disease Father         x2    C.A.D. Father         x2    Genitourinary Problems Father         kidney disease    Asthma Brother     Food Allergy Brother     Eczema Brother     No Known Problems Maternal Grandmother     Myocardial Infarction Maternal Grandfather     Hypertension Paternal Grandmother     Alcohol/Drug  Paternal Grandfather     No Known Problems Daughter     No Known Problems Son     Cancer Paternal Aunt         cervical cancer(another sisiter)    Breast Cancer Paternal Aunt         breast cancer at 70's    Breast Cancer Paternal Aunt     Ovarian Cancer Paternal Aunt     Cancer - colorectal No family hx of     Diabetes No family hx of      Social History     Socioeconomic History    Marital status:      Spouse name: None    Number of children: None    Years of education: None    Highest education level: None   Tobacco Use    Smoking status: Former     Packs/day: 0.25     Years: 1.00     Pack years: 0.25     Types: Cigarettes    Smokeless tobacco: Never   Substance and Sexual Activity    Alcohol use: Yes     Comment: 0-1    Drug use: No    Sexual activity: Yes     Partners: Male     Birth control/protection: Surgical, I.U.D., Female Surgical     Comment: Tubal ligation, 12/13/2007   Other Topics Concern    Parent/sibling w/ CABG, MI or angioplasty before 65F 55M? Yes     Comment: father 2 MIs   Social History Narrative    Caffeine intake/servings daily - 2-3 pop    Calcium intake/servings daily - 2 yogurts and 1 glass of milk    Exercise 6 times weekly - describe strength training and cardio    Sunscreen used - Yes    Seatbelts used - Yes    Guns stored in the home - No    Self Breast Exam - Yes    Pap test up to date -  Yes, as of today    Eye exam up to date -  Yes    Dental exam up to date -  Yes    DEXA scan up to date -  Not Applicable    Flex Sig/Colonoscopy up to date -  Not Applicable    Mammography up to date -  Not Applicable    Immunizations reviewed and up to date - Yes, 03/2008    Abuse: Current or Past (Physical, Sexual or Emotional) - No    Do you feel safe in your environment - Yes    Do you cope well with stress - Yes    Do you suffer from insomnia - No    Last updated by: Lit Licona  10/27/2009                   Current Outpatient Medications:     aspirin-acetaminophen-caffeine (EXCEDRIN  "MIGRAINE) 250-250-65 MG tablet, Take 1 tablet by mouth every 6 hours as needed., Disp: , Rfl:     Calcium Carbonate-Vitamin D (CALCIUM 500 + D PO), , Disp: , Rfl:     Cetirizine HCl (ZYRTEC ALLERGY PO), Take 10 mg by mouth daily, Disp: , Rfl:     Fluticasone Propionate (FLONASE NA), Spray 1 spray in nostril daily, Disp: , Rfl:     Multiple Vitamins-Minerals (MULTIVITAMIN & MINERAL PO), Take 1 each by mouth daily. [facility] women's active supplement, Disp: , Rfl:     Probiotic Product (PROBIOTIC PO), Reported on 3/1/2017, Disp: , Rfl:     MIRENA 20 MCG/24HR IU IUD, one in utero, Disp: 1, Rfl: 0    sulfamethoxazole-trimethoprim (BACTRIM DS) 800-160 MG tablet, Take 1 tablet by mouth 2 times daily, Disp: 10 tablet, Rfl: 0   No Known Allergies    Past medical, surgical, social and family history were reviewed and updated in EPIC.    ROS:   C:     NEGATIVE for fever, chills, change in weight  I:       NEGATIVE for worrisome rashes, moles or lesions  E:     NEGATIVE for vision changes or irritation  E/M: NEGATIVE for ear, mouth and throat problems  R:     NEGATIVE for significant cough or SOB  CV:   NEGATIVE for chest pain, palpitations or peripheral edema  GI:     NEGATIVE for nausea, abdominal pain, heartburn, or change in bowel habits  :   NEGATIVE for frequency, dysuria, hematuria, vaginal discharge, or irregular bleeding  M:     NEGATIVE for significant arthralgias or myalgia  N:      NEGATIVE for weakness, dizziness or paresthesias  E:      NEGATIVE for temperature intolerance, skin/hair changes  P:      NEGATIVE for changes in mood or affect.    EXAM:  /87   Pulse 88   Ht 1.626 m (5' 4\")   Wt 82.6 kg (182 lb)   LMP 06/27/2023   SpO2 96%   BMI 31.24 kg/m     BMI: Body mass index is 31.24 kg/m .  Constitutional: healthy, alert and no distress  Head: Normocephalic. No masses, lesions, tenderness or abnormalities  Neck: Neck supple. Trachea midline. No adenopathy. Thyroid symmetric, normal size. "   Cardiovascular: RRR.   Respiratory: Negative.   Breast: bilateral breast implants.  No palpable masses bilaterally.  No notable LAD bilaterally  Gastrointestinal: Abdomen soft, non-tender, non-distended. No masses, organomegaly.  :  Vulva:  No external lesions, normal female hair distribution, no inguinal adenopathy.    Urethra:  Midline, non-tender, well supported, no discharge  Uterus:  Normal size, non-tender, freely mobile  Ovaries:  No masses appreciated, mobile  Rectal Exam: deferred  Musculoskeletal: extremities normal  Skin: no suspicious lesions or rashes  Psychiatric: Affect appropriate, cooperative,mentation appears normal.     COUNSELING:   Reviewed preventive health counseling, as reflected in patient instructions  Special attention given to:        Colorectal Cancer Screening   reports that she has quit smoking. Her smoking use included cigarettes. She has a 0.25 pack-year smoking history. She has never used smokeless tobacco.    Body mass index is 31.24 kg/m .    FRAX Risk Assessment    ASSESSMENT:  43 year old female with satisfactory annual exam  (Z00.00) Annual physical exam  (primary encounter diagnosis)  Comment: Reviewed HM.  UTD other than lipid profile.  Did have Hep B vaccines in childhood and also had boosters when she started working at [employer].  Will have fasting lipids done at some point.  Plan: Lipid Profile, Colonoscopy Screening          Referral, MA Screening Digital         Right    (G43.829) Menstrual migraine without status migrainosus, not intractable  Comment: Discussed that menstrual migraines are generally associated with changes in estrogen level prior to cycle.  However, estrogen doesn't come without risks for her, especially when keeping in mind her associated auras and strong family history of breast cancer.  Estrogen in migraines with aura is associated with increased risk of storke.  In thinking of estrogen with breast cancer, I wouldn't expect it to start a  cancer that wasn't previously there, but if she were to have a hormone responsive cancer, estrogen could make it worse.  After that discussion, we decided to trial Fioricet.  That wasn't covered by her insurance, so ordered Fiorinal.  Discussed awareness of NSAID use if taking FIORINAL  Also placed Neurology/HA referral to look into other, non-hormonal options for her migraines  Plan: Adult Neurology  Referral,         butalbital-aspirin-caffeine (FIORINAL)         -40 MG capsule            (N63.25) Mass overlapping multiple quadrants of left breast  Comment: No notable findings on exam, but ordered diagnostic imaging on left due to her self report.   Plan: MA Diagnostic Digital Left, US Breast Left         Limited 1-3 Quadrants,     (I83.893) Varicose veins of both legs with edema  Comment: Will wear compression stockings for now and call to schedule vascular consult when she's ready  Plan: Vascular Surgery Referral          RTC 1 year and prn.

## 2023-10-16 ENCOUNTER — VIRTUAL VISIT (OUTPATIENT)
Dept: FAMILY MEDICINE | Facility: CLINIC | Age: 43
End: 2023-10-16
Payer: COMMERCIAL

## 2023-10-16 ENCOUNTER — LAB (OUTPATIENT)
Dept: LAB | Facility: CLINIC | Age: 43
End: 2023-10-16
Payer: COMMERCIAL

## 2023-10-16 DIAGNOSIS — Z00.00 ANNUAL PHYSICAL EXAM: ICD-10-CM

## 2023-10-16 DIAGNOSIS — G43.809 OTHER MIGRAINE WITHOUT STATUS MIGRAINOSUS, NOT INTRACTABLE: ICD-10-CM

## 2023-10-16 DIAGNOSIS — N93.9 ABNORMAL UTERINE BLEEDING (AUB): Primary | ICD-10-CM

## 2023-10-16 DIAGNOSIS — N93.9 ABNORMAL UTERINE BLEEDING (AUB): ICD-10-CM

## 2023-10-16 LAB
BASO+EOS+MONOS # BLD AUTO: NORMAL 10*3/UL
BASO+EOS+MONOS NFR BLD AUTO: NORMAL %
BASOPHILS # BLD AUTO: 0.1 10E3/UL (ref 0–0.2)
BASOPHILS NFR BLD AUTO: 1 %
CHOLEST SERPL-MCNC: 183 MG/DL
EOSINOPHIL # BLD AUTO: 0.1 10E3/UL (ref 0–0.7)
EOSINOPHIL NFR BLD AUTO: 1 %
ERYTHROCYTE [DISTWIDTH] IN BLOOD BY AUTOMATED COUNT: 12.1 % (ref 10–15)
HCT VFR BLD AUTO: 41.6 % (ref 35–47)
HDLC SERPL-MCNC: 82 MG/DL
HGB BLD-MCNC: 14.1 G/DL (ref 11.7–15.7)
IMM GRANULOCYTES # BLD: 0 10E3/UL
IMM GRANULOCYTES NFR BLD: 0 %
IRON BINDING CAPACITY (ROCHE): 327 UG/DL (ref 240–430)
IRON SATN MFR SERPL: 34 % (ref 15–46)
IRON SERPL-MCNC: 111 UG/DL (ref 37–145)
LDLC SERPL CALC-MCNC: 88 MG/DL
LYMPHOCYTES # BLD AUTO: 1.4 10E3/UL (ref 0.8–5.3)
LYMPHOCYTES NFR BLD AUTO: 21 %
MCH RBC QN AUTO: 29.9 PG (ref 26.5–33)
MCHC RBC AUTO-ENTMCNC: 33.9 G/DL (ref 31.5–36.5)
MCV RBC AUTO: 88 FL (ref 78–100)
MONOCYTES # BLD AUTO: 0.5 10E3/UL (ref 0–1.3)
MONOCYTES NFR BLD AUTO: 8 %
NEUTROPHILS # BLD AUTO: 4.5 10E3/UL (ref 1.6–8.3)
NEUTROPHILS NFR BLD AUTO: 69 %
NONHDLC SERPL-MCNC: 101 MG/DL
NRBC # BLD AUTO: 0 10E3/UL
NRBC BLD AUTO-RTO: 0 /100
PLATELET # BLD AUTO: 249 10E3/UL (ref 150–450)
RBC # BLD AUTO: 4.72 10E6/UL (ref 3.8–5.2)
TRIGL SERPL-MCNC: 67 MG/DL
WBC # BLD AUTO: 6.5 10E3/UL (ref 4–11)

## 2023-10-16 PROCEDURE — 99213 OFFICE O/P EST LOW 20 MIN: CPT | Mod: VID | Performed by: NURSE PRACTITIONER

## 2023-10-16 PROCEDURE — 80061 LIPID PANEL: CPT

## 2023-10-16 PROCEDURE — 83550 IRON BINDING TEST: CPT

## 2023-10-16 PROCEDURE — 85025 COMPLETE CBC W/AUTO DIFF WBC: CPT

## 2023-10-16 PROCEDURE — 36415 COLL VENOUS BLD VENIPUNCTURE: CPT

## 2023-10-16 RX ORDER — NORGESTIMATE AND ETHINYL ESTRADIOL 0.25-0.035
1 KIT ORAL DAILY
Qty: 90 TABLET | Refills: 1 | Status: SHIPPED | OUTPATIENT
Start: 2023-10-16 | End: 2023-12-04

## 2023-10-16 NOTE — PROGRESS NOTES
"Answers submitted by the patient for this visit:  General Questionnaire (Submitted on 10/13/2023)  Chief Complaint: Chronic problems general questions HPI Form  What is the reason for your visit today? : Prolonged menstrual bleeding  How many servings of fruits and vegetables do you eat daily?: 2-3  On average, how many sweetened beverages do you drink each day (Examples: soda, juice, sweet tea, etc.  Do NOT count diet or artificially sweetened beverages)?: 0  How many minutes a day do you exercise enough to make your heart beat faster?: 60 or more  How many days a week do you exercise enough to make your heart beat faster?: 5  How many days per week do you miss taking your medication?: 0  Lisandra is a 43 year old who is being evaluated via a billable video visit.      How would you like to obtain your AVS? MyChart  If the video visit is dropped, the invitation should be resent by: Text to cell phone: [phone]  Will anyone else be joining your video visit? No          Assessment & Plan   Problem List Items Addressed This Visit    None  Visit Diagnoses       Abnormal uterine bleeding (AUB)    -  Primary    Relevant Medications    norgestimate-ethinyl estradiol (ORTHO-CYCLEN) 0.25-35 MG-MCG tablet    Other Relevant Orders    **CBC with platelets differential FUTURE 2mo    **Iron and iron binding capacity FUTURE 2mo    Other migraine without status migrainosus, not intractable               Follow up with OB within the next three months         BMI:   Estimated body mass index is 31.24 kg/m  as calculated from the following:    Height as of 7/25/23: 1.626 m (5' 4\").    Weight as of 7/25/23: 82.6 kg (182 lb).       See Patient Instructions    DEISI Hein CNP  [facility]    Geovany Fry is a 43 year old, presenting for the following health issues:  No chief complaint on file.         No data to display                HPI     Lisandra got her IUD several months ago. She started having " menstrual bleeding on 9/9/2023. It has varied between heavy and light flow; at one point she was going through a superplus tampon every two hours. She has felt more tired, but not dizzy, weak or lightheaded. She has previously used oral contraceptives) started as a teen due to heavy bleeding) and did not find that these caused side effects for her or worsened headaches. 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 Not currently having severe headaches. She is a non-smoker.    Review of Systems   Constitutional, HEENT, cardiovascular, pulmonary, gi and gu systems are negative, except as otherwise noted.      Objective           Vitals:  No vitals were obtained today due to virtual visit.    Physical Exam   GENERAL: Healthy, alert and no distress  EYES: Eyes grossly normal to inspection.  No discharge or erythema, or obvious scleral/conjunctival abnormalities.  RESP: No audible wheeze, cough, or visible cyanosis.  No visible retractions or increased work of breathing.    SKIN: Visible skin clear. No significant rash, abnormal pigmentation or lesions.  NEURO: Cranial nerves grossly intact.  Mentation and speech appropriate for age.  PSYCH: Mentation appears normal, affect normal/bright, judgement and insight intact, normal speech and appearance well-groomed.    Office Visit on 12/03/2022   Component Date Value Ref Range Status    Color Urine 12/03/2022 Yellow  Colorless, Straw, Light Yellow, Yellow Final    Appearance Urine 12/03/2022 Clear  Clear Final    Glucose Urine 12/03/2022 Negative  Negative mg/dL Final    Bilirubin Urine 12/03/2022 Negative  Negative Final    Ketones Urine 12/03/2022 Negative  Negative mg/dL Final    Specific Gravity Urine 12/03/2022 1.015  1.003 - 1.035 Final    Blood Urine 12/03/2022 Trace (A)  Negative Final    pH Urine 12/03/2022 5.5  5.0 - 7.0 Final    Protein Albumin Urine 12/03/2022 Negative  Negative  mg/dL Final    Urobilinogen Urine 12/03/2022 0.2  0.2, 1.0 E.U./dL Final    Nitrite Urine 12/03/2022 Negative  Negative Final    Leukocyte Esterase Urine 12/03/2022 Small (A)  Negative Final    Bacteria Urine 12/03/2022 Few (A)  None Seen /HPF Final    RBC Urine 12/03/2022 0-2  0-2 /HPF /HPF Final    WBC Urine 12/03/2022 10-25 (A)  0-5 /HPF /HPF Final    Squamous Epithelials Urine 12/03/2022 Few (A)  None Seen /LPF Final    Culture 12/03/2022 10,000-50,000 CFU/mL Mixture of urogenital epifanio   Final             Video-Visit Details    Type of service:  Video Visit     Originating Location (pt. Location): Home    Distant Location (provider location):  On-site  Platform used for Video Visit: Bebe

## 2023-11-24 ENCOUNTER — TELEPHONE (OUTPATIENT)
Dept: GASTROENTEROLOGY | Facility: CLINIC | Age: 43
End: 2023-11-24
Payer: COMMERCIAL

## 2023-11-24 ENCOUNTER — MYC MEDICAL ADVICE (OUTPATIENT)
Dept: RADIOLOGY | Facility: CLINIC | Age: 43
End: 2023-11-24
Payer: COMMERCIAL

## 2023-11-24 DIAGNOSIS — I87.2 VENOUS (PERIPHERAL) INSUFFICIENCY: Primary | ICD-10-CM

## 2023-11-24 NOTE — TELEPHONE ENCOUNTER
"Endoscopy Scheduling Screen    Have you had a positive Covid test in the last 14 days?  No    Are you active on MyChart?   Yes    What insurance is in the chart?  Other:  [facility]    Ordering/Referring Provider: RITA SHUKLA    (If ordering provider performs procedure, schedule with ordering provider unless otherwise instructed. )    BMI: Estimated body mass index is 31.24 kg/m  as calculated from the following:    Height as of 7/25/23: 1.626 m (5' 4\").    Weight as of 7/25/23: 82.6 kg (182 lb).     Sedation Ordered  moderate sedation.   If patient BMI > 50 do not schedule in [facility].    If patient BMI > 45 do not schedule at [facility].    Are you taking methadone or Suboxone?  No    Are you taking any prescription medications for pain 3 or more times per week?   No    Do you have a history of malignant hyperthermia or adverse reaction to anesthesia?  No    (Females) Are you currently pregnant?   No     Have you been diagnosed or told you have pulmonary hypertension?   No    Do you have an LVAD?  No    Have you been told you have moderate to severe sleep apnea?  No    Have you been told you have COPD, asthma, or any other lung disease?  No    Do you have any heart conditions?  No     Have you ever had an organ transplant?   No    Have you ever had or are you awaiting a heart or lung transplant?   No    Have you had a stroke or transient ischemic attack (TIA aka \"mini stroke\" in the last 6 months?   No    Have you been diagnosed with or been told you have cirrhosis of the liver?   No    Are you currently on dialysis?   No    Do you need assistance transferring?   No    BMI: Estimated body mass index is 31.24 kg/m  as calculated from the following:    Height as of 7/25/23: 1.626 m (5' 4\").    Weight as of 7/25/23: 82.6 kg (182 lb).     Is patients BMI > 40 and scheduling location [facility]?  No    Do you take an injectable medication for weight loss or diabetes (excluding insulin)?  No    Do you take the medication " Naltrexone?  No    Do you take blood thinners?  No       Prep   Are you currently on dialysis or do you have chronic kidney disease?  No    Do you have a diagnosis of diabetes?  No    Do you have a diagnosis of cystic fibrosis (CF)?  No    On a regular basis do you go 3 -5 days between bowel movements?  No    BMI > 40?  No    Preferred Pharmacy:    [id number] [place] [facility] [place]  [address]  [address]  [place] [address]  Phone: [phone] Fax: [phone]          Final Scheduling Details   Colonoscopy prep sent?  Standard MiraLAX    Procedure scheduled  Colonoscopy    Surgeon:  bj     Date of procedure:  2/1     Pre-OP / PAC:   No - Not required for this site.    Location  [facility] - [facility] - Patient preference.    Sedation   Moderate Sedation - Per order.      Patient Reminders:   You will receive a call from a Nurse to review instructions and health history.  This assessment must be completed prior to your procedure.  Failure to complete the Nurse assessment may result in the procedure being cancelled.      On the day of your procedure, please designate an adult(s) who can drive you home stay with you for the next 24 hours. The medicines used in the exam will make you sleepy. You will not be able to drive.      You cannot take public transportation, ride share services, or non-medical taxi service without a responsible caregiver.  Medical transport services are allowed with the requirement that a responsible caregiver will receive you at your destination.  We require that drivers and caregivers are confirmed prior to your procedure.

## 2023-12-01 ENCOUNTER — ANCILLARY PROCEDURE (OUTPATIENT)
Dept: ULTRASOUND IMAGING | Facility: CLINIC | Age: 43
End: 2023-12-01
Attending: RADIOLOGY
Payer: COMMERCIAL

## 2023-12-01 DIAGNOSIS — I87.2 VENOUS (PERIPHERAL) INSUFFICIENCY: ICD-10-CM

## 2023-12-01 PROCEDURE — 93970 EXTREMITY STUDY: CPT | Performed by: RADIOLOGY

## 2023-12-04 ENCOUNTER — VIRTUAL VISIT (OUTPATIENT)
Dept: OBGYN | Facility: CLINIC | Age: 43
End: 2023-12-04
Payer: COMMERCIAL

## 2023-12-04 DIAGNOSIS — N93.9 ABNORMAL UTERINE BLEEDING (AUB): ICD-10-CM

## 2023-12-04 PROCEDURE — 99214 OFFICE O/P EST MOD 30 MIN: CPT | Mod: VID | Performed by: OBSTETRICS & GYNECOLOGY

## 2023-12-04 RX ORDER — NORGESTIMATE AND ETHINYL ESTRADIOL 0.25-0.035
1 KIT ORAL DAILY
Qty: 112 TABLET | Refills: 3 | Status: SHIPPED | OUTPATIENT
Start: 2023-12-04 | End: 2023-12-26

## 2023-12-04 NOTE — PROGRESS NOTES
Lisandra is a 43 year old who is being evaluated via a billable video visit.      How would you like to obtain your AVS? MyChart  If the video visit is dropped, the invitation should be resent by: Send to e-mail at: [email or web address]  Will anyone else be joining your video visit? No          Assessment & Plan     Abnormal uterine bleeding (AUB)  Discussed differential diagnosis: fibroids, adenomyosis, polyp, perimenopausal bleeding  Recommend ultrasound to assess for structural causes  Discussed some options for management: replace IUD, endometrial ablation, COCs. Leaning toward more long acting or definitive options.   Will start with OCPs now to acutely stop bleeding, then re-evaluate after ultrasound.   - norgestimate-ethinyl estradiol (ORTHO-CYCLEN) 0.25-35 MG-MCG tablet; Take 1 tablet by mouth daily  - US Transvaginal Pelvic Non-OB; Future    Ordering of each unique test  Prescription drug management             No follow-ups on file.    Kori Javier MD  [facility]    Subjective   Lisandra is a 43 year old, presenting for the following health issues:  Vaginal Problem (Abnormal menstraul bleeding)      HPI   Presents for evaluation of problems with periods  Has had IUD from 2003 on, except for pregnancies.   Had a malpositioned IUD removed 1/2022  Decided to do expectant management for a while.   Previously had 8 day periods every 2 weeks so used IUD    Had a normal 5-7 day period after removal.   Then had a period a little later than normal and bled Sept through October. Not just light bleeding. Saturating a super tampon every 4 hours.   Saw her PCP 10/16 and was started on OCP. Bleeding stopped within the first week. Did continuous, but got bleeding 11/8 and has been bleeding since.     Mom had hysterectomy age 30 for bleeding.   Cousin had endometrial ablation.    Had hemorrhage at time of CS in labor.       Past Medical History:   Diagnosis Date    NO ACTIVE PROBLEMS        Past  Surgical History:   Procedure Laterality Date    TUBAL LIGATION  2007    ZZC  DELIVERY ONLY      superficial wound dehiscence       Family History   Problem Relation Age of Onset    Thyroid Disease Mother     Breast Cancer Mother 56        breast, 3 years later    Heart Disease Father         2x heart attacks    Circulatory Father         blood clots    Cardiovascular Father         patent foramen ovale, repaired x 2, afib    Cerebrovascular Disease Father         x2    C.A.D. Father         x2    Genitourinary Problems Father         kidney disease    Asthma Brother     Food Allergy Brother     Eczema Brother     No Known Problems Maternal Grandmother     Myocardial Infarction Maternal Grandfather     Hypertension Paternal Grandmother     Alcohol/Drug Paternal Grandfather     No Known Problems Daughter     No Known Problems Son     Cancer Paternal Aunt         cervical cancer(another sisiter)    Breast Cancer Paternal Aunt         breast cancer at 70's    Breast Cancer Paternal Aunt     Ovarian Cancer Paternal Aunt     Cancer - colorectal No family hx of     Diabetes No family hx of        Social History     Socioeconomic History    Marital status:      Spouse name: Not on file    Number of children: Not on file    Years of education: Not on file    Highest education level: Not on file   Occupational History    Not on file   Tobacco Use    Smoking status: Former     Packs/day: 0.25     Years: 1.00     Additional pack years: 0.00     Total pack years: 0.25     Types: Cigarettes    Smokeless tobacco: Never   Substance and Sexual Activity    Alcohol use: Yes     Comment: 0-1    Drug use: No    Sexual activity: Yes     Partners: Male     Birth control/protection: Surgical, I.U.D., Female Surgical     Comment: Tubal ligation, 2007   Other Topics Concern    Parent/sibling w/ CABG, MI or angioplasty before 65F 55M? Yes     Comment: father 2 MIs   Social History Narrative    Caffeine  intake/servings daily - 2-3 pop    Calcium intake/servings daily - 2 yogurts and 1 glass of milk    Exercise 6 times weekly - describe strength training and cardio    Sunscreen used - Yes    Seatbelts used - Yes    Guns stored in the home - No    Self Breast Exam - Yes    Pap test up to date -  Yes, as of today    Eye exam up to date -  Yes    Dental exam up to date -  Yes    DEXA scan up to date -  Not Applicable    Flex Sig/Colonoscopy up to date -  Not Applicable    Mammography up to date -  Not Applicable    Immunizations reviewed and up to date - Yes, 03/2008    Abuse: Current or Past (Physical, Sexual or Emotional) - No    Do you feel safe in your environment - Yes    Do you cope well with stress - Yes    Do you suffer from insomnia - No    Last updated by: Lit Licona  10/27/2009                 Social Determinants of Health     Financial Resource Strain: High Risk (10/13/2023)    Financial Resource Strain     Within the past 12 months, have you or your family members you live with been unable to get utilities (heat, electricity) when it was really needed?: Yes   Food Insecurity: Low Risk  (10/13/2023)    Food Insecurity     Within the past 12 months, did you worry that your food would run out before you got money to buy more?: No     Within the past 12 months, did the food you bought just not last and you didn t have money to get more?: No   Transportation Needs: Low Risk  (10/13/2023)    Transportation Needs     Within the past 12 months, has lack of transportation kept you from medical appointments, getting your medicines, non-medical meetings or appointments, work, or from getting things that you need?: No   Physical Activity: Not on file   Stress: Not on file   Social Connections: Not on file   Interpersonal Safety: Not on file   Housing Stability: Low Risk  (10/13/2023)    Housing Stability     Do you have housing? : Yes     Are you worried about losing your housing?: No       Current Outpatient  Medications   Medication    aspirin-acetaminophen-caffeine (EXCEDRIN MIGRAINE) 250-250-65 MG tablet    butalbital-aspirin-caffeine (FIORINAL) -40 MG capsule    Calcium Carbonate-Vitamin D (CALCIUM 500 + D PO)    Cetirizine HCl (ZYRTEC ALLERGY PO)    Fluticasone Propionate (FLONASE NA)    Multiple Vitamins-Minerals (MULTIVITAMIN & MINERAL PO)    norgestimate-ethinyl estradiol (ORTHO-CYCLEN) 0.25-35 MG-MCG tablet    Probiotic Product (PROBIOTIC PO)     No current facility-administered medications for this visit.        No Known Allergies        Review of Systems   Constitutional, HEENT, cardiovascular, pulmonary, gi and gu systems are negative, except as otherwise noted.      Objective           Vitals:  No vitals were obtained today due to virtual visit.    Physical Exam   GENERAL: Healthy, alert and no distress  EYES: Eyes grossly normal to inspection.  No discharge or erythema, or obvious scleral/conjunctival abnormalities.  RESP: No audible wheeze, cough, or visible cyanosis.  No visible retractions or increased work of breathing.    SKIN: Visible skin clear. No significant rash, abnormal pigmentation or lesions.  NEURO: Cranial nerves grossly intact.  Mentation and speech appropriate for age.  PSYCH: Mentation appears normal, affect normal/bright, judgement and insight intact, normal speech and appearance well-groomed.            Video-Visit Details    Type of service:  Video Visit     Originating Location (pt. Location): Other work at [facility]    Distant Location (provider location):  On-site  Platform used for Video Visit: Well   Time: 17 minutes

## 2023-12-06 ENCOUNTER — OFFICE VISIT (OUTPATIENT)
Dept: VASCULAR SURGERY | Facility: CLINIC | Age: 43
End: 2023-12-06
Payer: COMMERCIAL

## 2023-12-06 VITALS — DIASTOLIC BLOOD PRESSURE: 84 MMHG | OXYGEN SATURATION: 97 % | SYSTOLIC BLOOD PRESSURE: 128 MMHG | HEART RATE: 84 BPM

## 2023-12-06 DIAGNOSIS — I83.92 ASYMPTOMATIC VARICOSE VEIN OF LEFT LOWER EXTREMITY WITHOUT COMPLICATION: Primary | ICD-10-CM

## 2023-12-06 PROCEDURE — 99203 OFFICE O/P NEW LOW 30 MIN: CPT | Performed by: PHYSICIAN ASSISTANT

## 2023-12-06 ASSESSMENT — PAIN SCALES - GENERAL: PAINLEVEL: NO PAIN (0)

## 2023-12-06 NOTE — PROGRESS NOTES
"VASCULAR AND INTERVENTIONAL RADIOLOGY CLINIC NOTE    Impression: Asymptomatic varicose veins of bilateral lower extremities without complication, L worse than R  [id number]    Plan:  -3 month trial of medical grade compression, thigh  -cosmetic vein consultation  -elevate legs at least 1 hour/day  -encourage walking  -ongoing symptoms warrant return visit with Interventional Radiologist to discuss treatment options    HPI:  Slowly progressing heaviness, swelling and numbness/tingling in the left lower extremity associated with bulging veins for years. First noticed bulging vein behind left knee before she was 21. 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 RIGHT LEG:       A. No superficial or deep venous thrombosis demonstrated.       B. Common femoral vein incompetent above and below the  saphenofemoral junction with Valsalva.       C. Great saphenous vein incompetent in the distal thigh, knee,  and mid calf.       D. Incompetent branch and varicose veins as described in the  report.       E. No incompetent  veins demonstrated.     2. LEFT LEG:       A. No superficial or deep venous thrombosis demonstrated.       B. Common femoral vein incompetent above the saphenofemoral  junction with Valsalva.       C. Anterior accessory saphenous vein incompetent.       D. Great saphenous vein incompetent in the proximal calf.       E. Incompetent varicose veins as described in the report.       F. No incompetent  vein demonstrated.    Exam: /84 (BP Location: Right arm, Patient Position: " Chair, Cuff Size: Adult Regular)   Pulse 84   SpO2 97%   Lower extremities: No swelling bilaterally, normal PT and DP pulses bilaterally  Anterior shins with reticular veins, worse on left, single isolated varicose vein posterior left calf extending up to distal posterior left thigh

## 2023-12-06 NOTE — PATIENT INSTRUCTIONS
You have been given medical grade compression stockings, 20-30 mmHg thigh high  Please ensure proper sizing and fit  Wear during the day to help with symptoms, do not need to wear compression during the night  Continue exercise regimen - a 20-minute walk/day  Elevated legs for an hour each day  Consider cosmetic vein consultation  Otherwise return with lifestyle limiting symptoms  If questions or concerns please call [phone]

## 2023-12-06 NOTE — NURSING NOTE
Chief Complaint   Patient presents with    New Patient     New Vascular - pt wants to discuss  VENOUS COMPETENCY BILATERAL Ultrasound        Vitals were taken, medications reconciled.    Raghavendra Castillo, Facilitator   8:05 AM

## 2023-12-07 ENCOUNTER — HOSPITAL ENCOUNTER (OUTPATIENT)
Dept: MAMMOGRAPHY | Facility: CLINIC | Age: 43
Discharge: HOME | End: 2023-12-07
Attending: OBSTETRICS & GYNECOLOGY
Payer: COMMERCIAL

## 2023-12-07 DIAGNOSIS — N63.25 MASS OVERLAPPING MULTIPLE QUADRANTS OF LEFT BREAST: ICD-10-CM

## 2023-12-07 PROCEDURE — G0279 TOMOSYNTHESIS, MAMMO: HCPCS

## 2023-12-07 PROCEDURE — 76642 ULTRASOUND BREAST LIMITED: CPT | Mod: LT

## 2023-12-08 ENCOUNTER — TELEPHONE (OUTPATIENT)
Dept: INTERVENTIONAL RADIOLOGY/VASCULAR | Facility: CLINIC | Age: 43
End: 2023-12-08
Payer: COMMERCIAL

## 2023-12-13 ENCOUNTER — PATIENT OUTREACH (OUTPATIENT)
Dept: ONCOLOGY | Facility: CLINIC | Age: 43
End: 2023-12-13
Payer: COMMERCIAL

## 2023-12-13 NOTE — PROGRESS NOTES
Writer received referral to Cancer Risk Management/Genetic Counseling.    Referred for: Please schedule with [facility] to discuss increased breast screening (LORETTA 26.8%)     Referred by: Angel Martinez CGC     Referral reviewed for appropriate plan, and sent to New Patient Scheduling for completion.    Marcela Stephen, RN, BSN  Oncology New Patient Nurse Navigator   [facility]  [phone]

## 2023-12-15 ENCOUNTER — HOSPITAL ENCOUNTER (OUTPATIENT)
Dept: MAMMOGRAPHY | Facility: CLINIC | Age: 43
Discharge: HOME | End: 2023-12-15
Attending: OBSTETRICS & GYNECOLOGY
Payer: COMMERCIAL

## 2023-12-15 DIAGNOSIS — N63.25 MASS OVERLAPPING MULTIPLE QUADRANTS OF LEFT BREAST: ICD-10-CM

## 2023-12-15 PROCEDURE — 88360 TUMOR IMMUNOHISTOCHEM/MANUAL: CPT | Mod: 26 | Performed by: PATHOLOGY

## 2023-12-15 PROCEDURE — 999N000065 MA POST PROCEDURE LEFT

## 2023-12-15 PROCEDURE — 88342 IMHCHEM/IMCYTCHM 1ST ANTB: CPT | Mod: 26 | Performed by: PATHOLOGY

## 2023-12-15 PROCEDURE — 250N000009 HC RX 250: Performed by: RADIOLOGY

## 2023-12-15 PROCEDURE — 88305 TISSUE EXAM BY PATHOLOGIST: CPT | Mod: TC | Performed by: OBSTETRICS & GYNECOLOGY

## 2023-12-15 PROCEDURE — A4648 IMPLANTABLE TISSUE MARKER: HCPCS

## 2023-12-15 PROCEDURE — 88305 TISSUE EXAM BY PATHOLOGIST: CPT | Mod: 26 | Performed by: PATHOLOGY

## 2023-12-15 RX ADMIN — LIDOCAINE HYDROCHLORIDE 5 ML: 10 INJECTION, SOLUTION INFILTRATION; PERINEURAL at 09:07

## 2023-12-15 NOTE — DISCHARGE INSTRUCTIONS
After Your Breast Biopsy  Bleeding, bruising, and pain  Breast tenderness and some bruising is normal and may last several days. You may wear your bra overnight to support the breast.  You may use an ice pack for pain. Place it over the area for 15 to 20 minutes, several times a day.  You may take over-the-counter pain medicine:  On the day of the biopsy, we recommend Tylenol (acetaminophen) because it does not raise your risk of bleeding.  The next day, you may take an anti-inflammatory medicine (aspirin, ibuprofen, Motrin, Aleve, Advil), unless your doctor tells you not to.  Bandages and showering  Keep your bandage in place until tomorrow morning. Don't get it wet.  If you have small pieces of tape on the skin, leave them in place. They will fall off on their own, or you can remove them after 5 days.  You may shower the next morning after your biopsy.  Activity  No heavy activity (no running, no gym workouts, no lifting, no vacuuming, etc.) on the day of your biopsy.  You may go back to normal activity the next day. But limit what you do if you still have pain or discomfort.  Infection  Infection is rare. Signs of infection include:  Fever (including sweats and chills)  Redness  Pain that gets worse  Fluid draining from the biopsy site  Biopsy results  Results may take up to 5 business days.  A nurse or doctor from the Breast Center will call with your results. We will also send the results to the doctor that ordered your biopsy.  If you have not gotten your results in 5 days, please call the Breast Center.  Call the Breast Center with questions or if:   You have bleeding that lasts more than 20 minutes.  You have pain that you can't control.  You have signs of infection (fever, sweats, chills, redness, increasing pain, or drainage).  After hours, please call the doctor who ordered your biopsy.  For informational purposes only. Not to replace the advice of your health care provider. Copyright   2010 [facility]. All rights reserved. Clinically reviewed by Lesley Randolph, Director, [facility]. WhoisEDI [id number] - REV 08/23.

## 2023-12-19 LAB
PATH REPORT.COMMENTS IMP SPEC: ABNORMAL
PATH REPORT.COMMENTS IMP SPEC: ABNORMAL
PATH REPORT.COMMENTS IMP SPEC: YES
PATH REPORT.FINAL DX SPEC: ABNORMAL
PATH REPORT.GROSS SPEC: ABNORMAL
PATH REPORT.MICROSCOPIC SPEC OTHER STN: ABNORMAL
PATHOLOGY SYNOPTIC REPORT: ABNORMAL
PHOTO IMAGE: ABNORMAL

## 2023-12-20 NOTE — PROGRESS NOTES
Malignant Path:  Pathology report reviewed with our breast radiologist Dr. Jacob Garcia, who confirmed the recent breast imaging is concordant with the final surgical pathology results below.    I phoned Ms. Angelica Gomez, confirmed her full name, date of birth, and notified patient of Ultrasound Guided Left Breast Biopsy results showing Invasive Ductal Carcinoma, grade 2 and Ductal Carcinoma In Situ (DCIS), grade 3.  Estrogen/ Progesterone Receptors are (+), and HER2 is (-) negative.    Patient states no problems with biopsy site.  Recommended follow up is Medical Oncology and Surgical Consultations.  Medical Oncology Consult has been arranged with Dr. Magali Roland on 12/26/23 at 8:00 a.m., then following she will have her Surgical Consultation with Dr Aba Phillips on 12/26/23 at 8:45 a.m.  Patient has directions and phone numbers.    Questions were answered and I explained my role as Breast Care Nurse Coordinator in assisting her with appointments, resources and social support.  New diagnosis information packet will be available for patient at surgical consult.  Nurse navigator - Yahaira will follow up with the patient. She has Yahaira's phone number if she has further questions.  Patient verbalized understanding and agrees with the plan of care.  Ordering provider- Dr. Isabel Matson, as well as PCP- Maddi Sneed- DONNY have been notified of the results, recommendations for follow up, and the scheduled medical oncology consult and surgical consultation.  I will forward this note, along with the pathology results.  Karen Mathew RN, BSN  Breast Care Nurse Coordinator  [facility] [place]  [phone]        Angelica Gomez [id number]  F, 1980  Surgical Pathology Report (Final result) [id number]  Authorizing Provider: Isabel Matson MD Ordering Provider: Isabel Matson MD  Ordering Location: [facility]  Collected: 12/15/2023 09:11 AM  Pathologist: Bailey Doshi MD Received: 12/15/2023 10:50 AM  .  Specimens  A Breast, Left  .  .  Final Diagnosis  A. Left breast, 1:30, 6 cm from the nipple, 0.6 cm size; Ultrasound-guided core biopsies:  -INVASIVE CARCINOMA OF NO SPECIAL TYPE (DUCTAL), Grade: 2, Size at least 3 mm, 2 separate foci.  -DUCTAL CARCINOMA IN SITU (DCIS): Cribriform type, nuclear grade 3, with focal single cell necrosis.  -Microcalcifications: Not identified.  -Lymphatic/Vascular Invasion: Not identified.  -ESTROGEN RECEPTOR (ER): Positive, 20%, Moderate  -PROGESTERONE RECEPTOR (MN): Positive, 20%, Moderate  -HER2(I[facility]): Negative ( 1+).  -See Tumor Synoptic Report.  Electronically signed by Bailey Doshi MD on 12/19/2023 at 3:49 PM

## 2023-12-21 ENCOUNTER — TELEPHONE (OUTPATIENT)
Dept: OBGYN | Facility: CLINIC | Age: 43
End: 2023-12-21
Payer: COMMERCIAL

## 2023-12-21 DIAGNOSIS — N93.9 ABNORMAL UTERINE BLEEDING (AUB): Primary | ICD-10-CM

## 2023-12-21 RX ORDER — TRANEXAMIC ACID 650 MG/1
1300 TABLET ORAL 3 TIMES DAILY
Qty: 30 TABLET | Refills: 3 | Status: SHIPPED | OUTPATIENT
Start: 2023-12-21

## 2023-12-21 NOTE — PROGRESS NOTES
[facility]    Hematology/Oncology New Patient Note      Today's Date: 12/26/23    Reason for Consult: Left breast cancer.    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  12/7/2023: Due to worsening left upper outer breast pain, bilateral diagnostic mammogram performed. No mammographic abnormality in left upper outer breast site of symptoms but at 1:00, there is oval asymmetry measuring 0.7 cm. Remaining left breast and left axillary U/S showed morphologically normal lymph node and breast tissue.  At 1:30, 5 cm from nipple is irregular hypoechoic mass measuring 0.5 x 0.5 cm.  2. 12/15/2023: U/S-guided biopsy of left breast at 1:30 showed grade 2 invasive ductal carcinoma (3 mm) with associated grade 3 DCIS, no LVI; ER positive at 20%, NY positive at 20%, HER-2 negative with IHC = 1+.    Lisandra reports she did not feel a lump in her left breast but had left upper inner breast, not at the site of the cancer.  She recently came off OCPs to counter heavy menstrual flow issues since receiving her breast cancer diagnosis.    REVIEW OF SYSTEMS:   14 point ROS was reviewed and is negative other than as noted above in HPI.       HOME MEDICATIONS:  Current Outpatient Medications   Medication Sig Dispense Refill    aspirin-acetaminophen-caffeine (EXCEDRIN MIGRAINE) 250-250-65 MG tablet Take 1 tablet by mouth every 6 hours as needed.      butalbital-aspirin-caffeine (FIORINAL) -40 MG capsule Take 1 capsule by mouth every 4 hours as needed for headaches 30 capsule 1    Calcium Carbonate-Vitamin D (CALCIUM 500 + D PO)       Cetirizine HCl (ZYRTEC ALLERGY PO) Take 10 mg by mouth daily      COMPRESSION STOCKINGS Please measure and distribute 2 pair of 20mmHg - 30mmHg thigh high open or closed toe compression stockings with extra refills as indicated. 2 each 4    Fluticasone Propionate (FLONASE NA) Spray 1  spray in nostril daily      Multiple Vitamins-Minerals (MULTIVITAMIN & MINERAL PO) Take 1 each by mouth daily. [facility] women's active supplement      Probiotic Product (PROBIOTIC PO) Reported on 3/1/2017      tranexamic acid (LYSTEDA) 650 MG tablet Take 2 tablets (1,300 mg) by mouth 3 times daily 30 tablet 3         ALLERGIES:  No Known Allergies      PAST MEDICAL HISTORY:  Past Medical History:   Diagnosis Date    NO ACTIVE PROBLEMS    Menometrorrhagia.    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 Used IUD for 20 years.      PAST SURGICAL HISTORY:  Past Surgical History:   Procedure Laterality Date    TUBAL LIGATION      [facility] [[email or web address]]  DELIVERY ONLY      superficial wound dehiscence   Bilateral breast augmentation in 2013.      SOCIAL HISTORY:  Social History     Socioeconomic History    Marital status:      Spouse name: Not on file    Number of children: Not on file    Years of education: Not on file    Highest education level: Not on file   Occupational History    Not on file   Tobacco Use    Smoking status: Former     Packs/day: 0.25     Years: 1.00     Additional pack years: 0.00     Total pack years: 0.25     Types: Cigarettes    Smokeless tobacco: Never   Substance and Sexual Activity    Alcohol use: Yes     Comment: 0-1    Drug use: No    Sexual activity: Yes     Partners: Male     Birth control/protection: Surgical, I.U.D., Female Surgical     Comment: Tubal ligation, 2007   Other Topics Concern    Parent/sibling w/ CABG, MI or angioplasty before 65F 55M? Yes     Comment: father 2 MIs   Social History Narrative    Caffeine intake/servings daily - 2-3 pop    Calcium intake/servings daily - 2 yogurts and 1 glass of milk    Exercise 6 times weekly - describe strength training and cardio    Sunscreen used - Yes    Seatbelts used - Yes    Guns stored in the home - No    Self Breast Exam - Yes    Pap test up to date -  Yes, as of  today    Eye exam up to date -  Yes    Dental exam up to date -  Yes    DEXA scan up to date -  Not Applicable    Flex Sig/Colonoscopy up to date -  Not Applicable    Mammography up to date -  Not Applicable    Immunizations reviewed and up to date - Yes, 03/2008    Abuse: Current or Past (Physical, Sexual or Emotional) - No    Do you feel safe in your environment - Yes    Do you cope well with stress - Yes    Do you suffer from insomnia - No    Last updated by: Lit Licona  10/27/2009                 Social Determinants of Health     Financial Resource Strain: High Risk (10/13/2023)    Financial Resource Strain     Within the past 12 months, have you or your family members you live with been unable to get utilities (heat, electricity) when it was really needed?: Yes   Food Insecurity: Low Risk  (10/13/2023)    Food Insecurity     Within the past 12 months, did you worry that your food would run out before you got money to buy more?: No     Within the past 12 months, did the food you bought just not last and you didn t have money to get more?: No   Transportation Needs: Low Risk  (10/13/2023)    Transportation Needs     Within the past 12 months, has lack of transportation kept you from medical appointments, getting your medicines, non-medical meetings or appointments, work, or from getting things that you need?: No   Physical Activity: Not on file   Stress: Not on file   Social Connections: Not on file   Interpersonal Safety: Not on file   Housing Stability: Low Risk  (10/13/2023)    Housing Stability     Do you have housing? : Yes     Are you worried about losing your housing?: No   Has 2 grown children.  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      FAMILY HISTORY:  Family History   Problem Relation Age of Onset    Thyroid Disease Mother     Breast Cancer Mother 56        breast, 3 years later    Heart Disease Father         2x heart  "attacks    Circulatory Father         blood clots    Cardiovascular Father         patent foramen ovale, repaired x 2, afib    Cerebrovascular Disease Father         x2    C.A.D. Father         x2    Genitourinary Problems Father         kidney disease    Asthma Brother     Food Allergy Brother     Eczema Brother     No Known Problems Maternal Grandmother     Myocardial Infarction Maternal Grandfather     Hypertension Paternal Grandmother     Alcohol/Drug Paternal Grandfather     No Known Problems Daughter     No Known Problems Son     Cancer Paternal Aunt         cervical cancer(another sisiter)    Breast Cancer Paternal Aunt         breast cancer at 70's    Breast Cancer Paternal Aunt     Ovarian Cancer Paternal Aunt     Cancer - colorectal No family hx of     Diabetes No family hx of          PHYSICAL EXAM:  Vital signs:  BP (!) 142/90   Pulse 66   Temp 98.5  F (36.9  C) (Oral)   Resp 14   Ht 1.651 m (5' 5\")   Wt 82.6 kg (182 lb)   SpO2 100%   BMI 30.29 kg/m     ECO  GENERAL/CONSTITUTIONAL: No acute distress.  EYES:  No scleral icterus.  ENT/MOUTH: Neck supple. Oropharynx grossly clear.  LYMPH: No cervical, supraclavicular, axillary adenopathy.   BREAST: No discrete masses in either breast; ecchymosis at the left upper outer breast; implants in place bilaterally. Nipples everted bilaterally with no discharge.  No skin erythema or dimpling.  RESPIRATORY: No audible cough or wheezing.   GASTROINTESTINAL: No hepatosplenomegaly, masses, or tenderness.  No guarding.  No distention.  MUSCULOSKELETAL: Warm and well-perfused, no cyanosis, clubbing, or edema.  NEUROLOGIC: No focal motor deficits. Alert, oriented, answers questions appropriately.  INTEGUMENTARY: No rashes or jaundice.  GAIT: Steady, does not use assistive device      LABS:  CBC RESULTS:   Recent Labs   Lab Test 10/16/23  0856   WBC 6.5   RBC 4.72   HGB 14.1   HCT 41.6   MCV 88   MCH 29.9   MCHC 33.9   RDW 12.1          Recent Labs   Lab " Test 12/30/21  1350 08/04/21  1335    143   POTASSIUM 3.9 4.3   CHLORIDE 110* 112*   CO2 27 27   ANIONGAP 5 4   GLC 73 94   BUN 16 13   CR 0.82 0.87   CYRUS 8.8 8.7         PATHOLOGY:  Reviewed as per HPI.    IMAGING:  Reviewed as per HPI.    ASSESSMENT/PLAN:  Aneglica Gomez is a 43 year old female with the following issues:  1. Stage IA, cT1a vs G1w-W3-O5, grade 2 invasive ductal carcinoma of the left upper outer breast, ER positive 20%, NC positive 20%, HER-2 negative  2. CHEK2 mutation  3. Menometrorrhagia  --I reviewed Lisandra's breast imaging and biopsy pathology.  She has a small grade 2 tumor but with less favorable biomarkers in which the ER is weakly positive at 20% as well as the NC positive at 20%.  It is HER-2 negative.  --Discussed that such tumors can often times behave similarly to triple negative breast cancers and as such, I would likely advise adjuvant chemotherapy depending on final tumor size.  Surgical resection can be done upfront as this is a small tumor with no overt lymph node involvement on ultrasound, although we cannot rule out the possibility of microscopic or even macroscopic lymph node involvement.  --If the final pathology reconfirms a greater than 0.5 cm tumor with no lymph node involvement, then I would advise adjuvant chemo with 4 cycles of Taxotere and Cytoxan.  Discussed potential adverse effects of this regimen. Will discuss her case at tumor board.  --Given her CHEK2 mutation, which already places her at higher risk for breast cancer of 1.5 times higher than the general population, bilateral mastectomies would be reasonable to reduce the risk of a 2nd de cherelle breast cancer in the future. She wishes to pursue this along with breast reconstruction.  --Following adjuvant chemo, I would also strongly recommend either tamoxifen or ovarian suppression therapy with Zoladex and aromatase inhibitor such as anastrozole to further reduce her risk of breast cancer recurrence for at  least 5 years.  However, she has had menometrorrhagia and might favor Zoladex + AI in order to induce cessation of menstruation if she is able to tolerate possible menopause effects.  Will discuss again after surgery.   --She will continue with colonoscopy screening on high-risk basis.  --Will obtain CBC and CMP after [place] [facility]    3. Fertility discussion  --She is s/p tubal ligation and does no wish to have any more children.      Magali Roland MD  Hematology/Oncology  [facility]    Total time spent today: 60 minutes in chart review, patient evaluation, counseling, documentation, test and/or medication/prescription orders, and coordination of care.

## 2023-12-21 NOTE — TELEPHONE ENCOUNTER
Called Lisandra to discuss breast cancer diagnosis.   Recommended stopping birth control pills, which have just started to work for AUB.   Prescribed lysteda. Discussed withdrawal bleed will likely start a few days after stopping OCPs.     Will discuss long term plan with medical oncologist next week.   Discussed IUD will probably be contra-indicated and ablation may not be best idea as she may be on tamoxifen at some point.    Has ultrasound and follow-up scheduled with me.

## 2023-12-26 ENCOUNTER — MEDICAL CORRESPONDENCE (OUTPATIENT)
Dept: HEALTH INFORMATION MANAGEMENT | Facility: CLINIC | Age: 43
End: 2023-12-26

## 2023-12-26 ENCOUNTER — ONCOLOGY VISIT (OUTPATIENT)
Dept: ONCOLOGY | Facility: CLINIC | Age: 43
End: 2023-12-26
Attending: NURSE PRACTITIONER
Payer: COMMERCIAL

## 2023-12-26 ENCOUNTER — OFFICE VISIT (OUTPATIENT)
Dept: SURGERY | Facility: CLINIC | Age: 43
End: 2023-12-26
Attending: NURSE PRACTITIONER
Payer: COMMERCIAL

## 2023-12-26 VITALS
RESPIRATION RATE: 14 BRPM | TEMPERATURE: 98.5 F | DIASTOLIC BLOOD PRESSURE: 90 MMHG | BODY MASS INDEX: 30.32 KG/M2 | SYSTOLIC BLOOD PRESSURE: 142 MMHG | HEIGHT: 65 IN | WEIGHT: 182 LBS | HEART RATE: 66 BPM | OXYGEN SATURATION: 100 %

## 2023-12-26 VITALS
RESPIRATION RATE: 14 BRPM | HEART RATE: 66 BPM | TEMPERATURE: 98.5 F | SYSTOLIC BLOOD PRESSURE: 142 MMHG | HEIGHT: 65 IN | DIASTOLIC BLOOD PRESSURE: 90 MMHG | OXYGEN SATURATION: 100 % | WEIGHT: 182 LBS | BODY MASS INDEX: 30.32 KG/M2

## 2023-12-26 DIAGNOSIS — N92.1 MENOMETRORRHAGIA: ICD-10-CM

## 2023-12-26 DIAGNOSIS — C50.912 INVASIVE DUCTAL CARCINOMA OF BREAST, FEMALE, LEFT (H): Primary | ICD-10-CM

## 2023-12-26 DIAGNOSIS — Z17.0 MALIGNANT NEOPLASM OF UPPER-OUTER QUADRANT OF LEFT BREAST IN FEMALE, ESTROGEN RECEPTOR POSITIVE (H): Primary | ICD-10-CM

## 2023-12-26 DIAGNOSIS — C50.412 MALIGNANT NEOPLASM OF UPPER-OUTER QUADRANT OF LEFT BREAST IN FEMALE, ESTROGEN RECEPTOR POSITIVE (H): Primary | ICD-10-CM

## 2023-12-26 PROCEDURE — 99204 OFFICE O/P NEW MOD 45 MIN: CPT | Performed by: SURGERY

## 2023-12-26 PROCEDURE — 99417 PROLNG OP E/M EACH 15 MIN: CPT | Performed by: INTERNAL MEDICINE

## 2023-12-26 PROCEDURE — 99213 OFFICE O/P EST LOW 20 MIN: CPT | Performed by: SURGERY

## 2023-12-26 PROCEDURE — 99215 OFFICE O/P EST HI 40 MIN: CPT | Performed by: INTERNAL MEDICINE

## 2023-12-26 PROCEDURE — G0463 HOSPITAL OUTPT CLINIC VISIT: HCPCS

## 2023-12-26 PROCEDURE — 99213 OFFICE O/P EST LOW 20 MIN: CPT | Mod: 27 | Performed by: SURGERY

## 2023-12-26 ASSESSMENT — PAIN SCALES - GENERAL
PAINLEVEL: MILD PAIN (2)
PAINLEVEL: MILD PAIN (2)

## 2023-12-26 NOTE — PROGRESS NOTES
Breast Health History Form     BREAST PATIENTS (ALL)     1-Do you have any of the following symptoms? Breast Pain  2-In which breast are you having the symptoms? both  3-Have you had a Mammogram? Nataly Shah - Date:  12/7/23  4-Have you ever had a breast cyst drained? No  5-Have you ever had a breast biopsy? No  6-Have you ever had Breast Cancer? No   7-Is there a history of Breast Cancer in your family? Yes   Relationship to you:    Mother  Aunts  8-Have you ever had Ovarian Cancer? No  9-Is there a history of Ovarian Cancer in your family? No  10-Is there a history of Colon Cancer in your family?  No  11-Is there a history of Uterine Cancer in your family?  Yes  Paternal Aunt  12-Any known genetic abnormalities in your family?  Yes    Date: CHEK2  10-Summarize your caffeine intake (i.e. coffee, tea, chocolate, soda etc.): tea - 1 per day - 100 mg     BREAST PATIENTS (FEMALE)     14-What age did your periods begin? 12  15-Date your last menstrual period began? 11/8/23  16-Number of full-term pregnancies: 2  17-Your age when your first child was born? 22  18-Did you nurse your children? Yes  19-Are you pregnant now? No  20-Have you begun menopause? No  21-Have you had either ovary removed?No  22-Do you have breast implants? Yes   23-Do you use hormone replacement therapy?  No  24-Have you taken oral contraceptive pills?  Yes, For how many years?  7-8 yrs  25-Have you had an intrauterine device (IUD) placed?  Yes, For how many years?  20 yrs  26-What is your current bra size?  36 C

## 2023-12-26 NOTE — NURSING NOTE
Breast Nurse Care Coordination:      I introduced self to patient and Simone (), and explained my role of breast nurse coordinator. I accompanied Lisandra to her surgical consultation on 12/26/23 with Dr. Phillips at the [facility].       Lisandra was given the new breast cancer patient packet which includes educational material and support resources such as American Cancer Society, Fighting Cancer through Diet and Lifestyle, [employer], [employer], [employer] and the [facility] Breast Cancer Support Group.  I also enclosed a copy of her Lisandra breast biopsy pathology report (12/15/23).     Lisandra and Simone requested resources for their older children. We discussed the [employer] and that they have resources that are age appropriate. I gave them an [employer] folder.       At the end of the consultation, we reviewed Lisandra's plan of care and education.  The plan is for patient to meet with Plastics to discuss bilateral mastectomies.  We discussed that once she meets with a Plastic Surgeon, then our offices will coordinate and reach out with a surgery date.     Lisandra had some a paperwork needed for work. I gave this to the MA's to help fill out once she has a surgery date.     I gave patient educational materials regarding Exercises After Breast Surgery and Mastectomy.  Informed patient for mastectomy surgery, she will want to have two good supportive post mastectomy garments that open in the front to wear the 2 weeks following her surgery. I gave patient information on different options to purchase post mastectomy garments. I send a prescription to [facility].      I answered her questions and encouraged patient to call me back with any future questions or concerns.  Lisandra has my contact information, and knows to contact me in the future with any questions or concerns.     Yahaira Mckeon, RN, BSN, PHN  Breast Care Nurse Coordinator  [facility]  [facility]  [phone]

## 2023-12-26 NOTE — PROGRESS NOTES
"Oncology Rooming Note    December 26, 2023 7:57 AM   Angelica Gomez is a 43 year old female who presents for:    Chief Complaint   Patient presents with    Oncology Clinic Visit     Initial Vitals: There were no vitals taken for this visit. Estimated body mass index is 31.24 kg/m  as calculated from the following:    Height as of 7/25/23: 1.626 m (5' 4\").    Weight as of 7/25/23: 82.6 kg (182 lb). There is no height or weight on file to calculate BSA.  Data Unavailable Comment: Data Unavailable   No LMP recorded.  Allergies reviewed: Yes  Medications reviewed: Yes    Medications: Medication refills not needed today.  Pharmacy name entered into [employer]:    [facility] [place], [place] - [address]  [facility] [place], [place] - [address]  [id number] - [place], [place] - [address] AT [facility] [address]      Shari J. Schoenberger, CMA              "

## 2023-12-26 NOTE — LETTER
12/26/2023         RE: Angelica Gomez  [address]  [facility] [address]        Dear Colleague,    Thank you for referring your patient, Angelica Gomez, to the [facility]. Please see a copy of my visit note below.    [facility]    Hematology/Oncology New Patient Note      Today's Date: 12/26/23    Reason for Consult: Left breast cancer.    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  12/7/2023: Due to worsening left upper outer breast pain, bilateral diagnostic mammogram performed. No mammographic abnormality in left upper outer breast site of symptoms but at 1:00, there is oval asymmetry measuring 0.7 cm. Remaining left breast and left axillary U/S showed morphologically normal lymph node and breast tissue.  At 1:30, 5 cm from nipple is irregular hypoechoic mass measuring 0.5 x 0.5 cm.  2. 12/15/2023: U/S-guided biopsy of left breast at 1:30 showed grade 2 invasive ductal carcinoma (3 mm) with associated grade 3 DCIS, no LVI; ER positive at 20%, DC positive at 20%, HER-2 negative with IHC = 1+.    Lisandra reports she did not feel a lump in her left breast but had left upper inner breast, not at the site of the cancer.  She recently came off OCPs to counter heavy menstrual flow issues since receiving her breast cancer diagnosis.    REVIEW OF SYSTEMS:   14 point ROS was reviewed and is negative other than as noted above in HPI.       HOME MEDICATIONS:  Current Outpatient Medications   Medication Sig Dispense Refill     aspirin-acetaminophen-caffeine (EXCEDRIN MIGRAINE) 250-250-65 MG tablet Take 1 tablet by mouth every 6 hours as needed.       butalbital-aspirin-caffeine (FIORINAL) -40 MG capsule Take 1 capsule by mouth every 4 hours as needed for headaches 30 capsule 1     Calcium Carbonate-Vitamin D (CALCIUM 500 + D PO)        Cetirizine  HCl (ZYRTEC ALLERGY PO) Take 10 mg by mouth daily       COMPRESSION STOCKINGS Please measure and distribute 2 pair of 20mmHg - 30mmHg thigh high open or closed toe compression stockings with extra refills as indicated. 2 each 4     Fluticasone Propionate (FLONASE NA) Spray 1 spray in nostril daily       Multiple Vitamins-Minerals (MULTIVITAMIN & MINERAL PO) Take 1 each by mouth daily. [facility] women's active supplement       Probiotic Product (PROBIOTIC PO) Reported on 3/1/2017       tranexamic acid (LYSTEDA) 650 MG tablet Take 2 tablets (1,300 mg) by mouth 3 times daily 30 tablet 3         ALLERGIES:  No Known Allergies      PAST MEDICAL HISTORY:  Past Medical History:   Diagnosis Date     NO ACTIVE PROBLEMS    Menometrorrhagia.    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 Used IUD for 20 years.      PAST SURGICAL HISTORY:  Past Surgical History:   Procedure Laterality Date     TUBAL LIGATION       [facility]  DELIVERY ONLY      superficial wound dehiscence   Bilateral breast augmentation in .      SOCIAL HISTORY:  Social History     Socioeconomic History     Marital status:      Spouse name: Not on file     Number of children: Not on file     Years of education: Not on file     Highest education level: Not on file   Occupational History     Not on file   Tobacco Use     Smoking status: Former     Packs/day: 0.25     Years: 1.00     Additional pack years: 0.00     Total pack years: 0.25     Types: Cigarettes     Smokeless tobacco: Never   Substance and Sexual Activity     Alcohol use: Yes     Comment: 0-1     Drug use: No     Sexual activity: Yes     Partners: Male     Birth control/protection: Surgical, I.U.D., Female Surgical     Comment: Tubal ligation, 2007   Other Topics Concern     Parent/sibling w/ CABG, MI or angioplasty before 65F 55M? Yes     Comment: father 2 MIs   Social History Narrative    Caffeine  intake/servings daily - 2-3 pop    Calcium intake/servings daily - 2 yogurts and 1 glass of milk    Exercise 6 times weekly - describe strength training and cardio    Sunscreen used - Yes    Seatbelts used - Yes    Guns stored in the home - No    Self Breast Exam - Yes    Pap test up to date -  Yes, as of today    Eye exam up to date -  Yes    Dental exam up to date -  Yes    DEXA scan up to date -  Not Applicable    Flex Sig/Colonoscopy up to date -  Not Applicable    Mammography up to date -  Not Applicable    Immunizations reviewed and up to date - Yes, 03/2008    Abuse: Current or Past (Physical, Sexual or Emotional) - No    Do you feel safe in your environment - Yes    Do you cope well with stress - Yes    Do you suffer from insomnia - No    Last updated by: Lit Licona  10/27/2009                 Social Determinants of Health     Financial Resource Strain: High Risk (10/13/2023)    Financial Resource Strain      Within the past 12 months, have you or your family members you live with been unable to get utilities (heat, electricity) when it was really needed?: Yes   Food Insecurity: Low Risk  (10/13/2023)    Food Insecurity      Within the past 12 months, did you worry that your food would run out before you got money to buy more?: No      Within the past 12 months, did the food you bought just not last and you didn t have money to get more?: No   Transportation Needs: Low Risk  (10/13/2023)    Transportation Needs      Within the past 12 months, has lack of transportation kept you from medical appointments, getting your medicines, non-medical meetings or appointments, work, or from getting things that you need?: No   Physical Activity: Not on file   Stress: Not on file   Social Connections: Not on file   Interpersonal Safety: Not on file   Housing Stability: Low Risk  (10/13/2023)    Housing Stability      Do you have housing? : Yes      Are you worried about losing your housing?: No   Has 2 grown children.   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      FAMILY HISTORY:  Family History   Problem Relation Age of Onset     Thyroid Disease Mother      Breast Cancer Mother 56        breast, 3 years later     Heart Disease Father         2x heart attacks     Circulatory Father         blood clots     Cardiovascular Father         patent foramen ovale, repaired x 2, afib     Cerebrovascular Disease Father         x2     C.A.D. Father         x2     Genitourinary Problems Father         kidney disease     Asthma Brother      Food Allergy Brother      Eczema Brother      No Known Problems Maternal Grandmother      Myocardial Infarction Maternal Grandfather      Hypertension Paternal Grandmother      Alcohol/Drug Paternal Grandfather      No Known Problems Daughter      No Known Problems Son      Cancer Paternal Aunt         cervical cancer(another sisiter)     Breast Cancer Paternal Aunt         breast cancer at 70's     Breast Cancer Paternal Aunt      Ovarian Cancer Paternal Aunt      Cancer - colorectal No family hx of      Diabetes No family hx of          PHYSICAL EXAM:  Vital signs:  BP (!) 142/90   Pulse 66   Temp 98.5  F (36.9  C) (Oral)   Resp 14   Ht 1.651 m (5' 5\")   Wt 82.6 kg (182 lb)   SpO2 100%   BMI 30.29 kg/m     ECO  GENERAL/CONSTITUTIONAL: No acute distress.  EYES:  No scleral icterus.  ENT/MOUTH: Neck supple. Oropharynx grossly clear.  LYMPH: No cervical, supraclavicular, axillary adenopathy.   BREAST: No discrete masses in either breast; ecchymosis at the left upper outer breast; implants in place bilaterally. Nipples everted bilaterally with no discharge.  No skin erythema or dimpling.  RESPIRATORY: No audible cough or wheezing.   GASTROINTESTINAL: No hepatosplenomegaly, masses, or tenderness.  No guarding.  No distention.  MUSCULOSKELETAL: Warm and well-perfused, no cyanosis, clubbing, or edema.  NEUROLOGIC: No focal " motor deficits. Alert, oriented, answers questions appropriately.  INTEGUMENTARY: No rashes or jaundice.  GAIT: Steady, does not use assistive device      LABS:  CBC RESULTS:   Recent Labs   Lab Test 10/16/23  0856   WBC 6.5   RBC 4.72   HGB 14.1   HCT 41.6   MCV 88   MCH 29.9   MCHC 33.9   RDW 12.1          Recent Labs   Lab Test 12/30/21  1350 08/04/21  1335    143   POTASSIUM 3.9 4.3   CHLORIDE 110* 112*   CO2 27 27   ANIONGAP 5 4   GLC 73 94   BUN 16 13   CR 0.82 0.87   CYRUS 8.8 8.7         PATHOLOGY:  Reviewed as per HPI.    IMAGING:  Reviewed as per HPI.    ASSESSMENT/PLAN:  Angelica Gomez is a 43 year old female with the following issues:  1. Stage IA, cT1a vs I3a-E7-T1, grade 2 invasive ductal carcinoma of the left upper outer breast, ER positive 20%, VT positive 20%, HER-2 negative  2. CHEK2 mutation  3. Menometrorrhagia  --I reviewed Lisandra's breast imaging and biopsy pathology.  She has a small grade 2 tumor but with less favorable biomarkers in which the ER is weakly positive at 20% as well as the VT positive at 20%.  It is HER-2 negative.  --Discussed that such tumors can often times behave similarly to triple negative breast cancers and as such, I would likely advise adjuvant chemotherapy depending on final tumor size.  Surgical resection can be done upfront as this is a small tumor with no overt lymph node involvement on ultrasound, although we cannot rule out the possibility of microscopic or even macroscopic lymph node involvement.  --If the final pathology reconfirms a greater than 0.5 cm tumor with no lymph node involvement, then I would advise adjuvant chemo with 4 cycles of Taxotere and Cytoxan.  Discussed potential adverse effects of this regimen. Will discuss her case at tumor board.  --Given her CHEK2 mutation, which already places her at higher risk for breast cancer of 1.5 times higher than the general population, bilateral mastectomies would be reasonable to reduce the risk of  "a 2nd de cherelle breast cancer in the future. She wishes to pursue this along with breast reconstruction.  --Following adjuvant chemo, I would also strongly recommend either tamoxifen or ovarian suppression therapy with Zoladex and aromatase inhibitor such as anastrozole to further reduce her risk of breast cancer recurrence for at least 5 years.  However, she has had menometrorrhagia and might favor Zoladex + AI in order to induce cessation of menstruation if she is able to tolerate possible menopause effects.  Will discuss again after surgery.   --She will continue with colonoscopy screening on high-risk basis.  --Will obtain CBC and CMP after she returns to [facility].    3. Fertility discussion  --She is s/p tubal ligation and does no wish to have any more children.      Magali Roland MD  Hematology/Oncology  [facility]    Total time spent today: 60 minutes in chart review, patient evaluation, counseling, documentation, test and/or medication/prescription orders, and coordination of care.      Oncology Rooming Note    December 26, 2023 7:57 AM   Angelica Gomez is a 43 year old female who presents for:    Chief Complaint   Patient presents with     Oncology Clinic Visit     Initial Vitals: There were no vitals taken for this visit. Estimated body mass index is 31.24 kg/m  as calculated from the following:    Height as of 7/25/23: 1.626 m (5' 4\").    Weight as of 7/25/23: 82.6 kg (182 lb). There is no height or weight on file to calculate BSA.  Data Unavailable Comment: Data Unavailable   No LMP recorded.  Allergies reviewed: Yes  Medications reviewed: Yes    Medications: Medication refills not needed today.  Pharmacy name entered into [facility]:    [facility] [place], [place] - [address]  [facility] [place], [place] - [address]  [id number] - [place], [place] - [address] AT [facility] [address]      Shari J. Schoenberger, CMA                 " Breast Health History Form     BREAST PATIENTS (ALL)     1-Do you have any of the following symptoms? Breast Pain  2-In which breast are you having the symptoms? both  3-Have you had a Mammogram? Nataly Shah - Date:  12/7/23  4-Have you ever had a breast cyst drained? No  5-Have you ever had a breast biopsy? No  6-Have you ever had Breast Cancer? No   7-Is there a history of Breast Cancer in your family? Yes   Relationship to you:    Mother  Aunts  8-Have you ever had Ovarian Cancer? No  9-Is there a history of Ovarian Cancer in your family? No  10-Is there a history of Colon Cancer in your family?  No  11-Is there a history of Uterine Cancer in your family?  Yes  Paternal Aunt  12-Any known genetic abnormalities in your family?  Yes    Date: CHEK2  10-Summarize your caffeine intake (i.e. coffee, tea, chocolate, soda etc.): tea - 1 per day - 100 mg     BREAST PATIENTS (FEMALE)     14-What age did your periods begin? 12  15-Date your last menstrual period began? 11/8/23  16-Number of full-term pregnancies: 2  17-Your age when your first child was born? 22  18-Did you nurse your children? Yes  19-Are you pregnant now? No  20-Have you begun menopause? No  21-Have you had either ovary removed?No  22-Do you have breast implants? Yes   23-Do you use hormone replacement therapy?  No  24-Have you taken oral contraceptive pills?  Yes, For how many years?  7-8 yrs  25-Have you had an intrauterine device (IUD) placed?  Yes, For how many years?  20 yrs  26-What is your current bra size?  36 C                   Again, thank you for allowing me to participate in the care of your patient.        Sincerely,        Magali Roland MD

## 2023-12-26 NOTE — PATIENT INSTRUCTIONS
You are scheduled with Dr. Elisabet Venegas at [facility] on 1/8/24 at 11:30am.

## 2023-12-26 NOTE — LETTER
2023          Maddi Sneed, APRN CNP  [address]  [place] [address]      RE:   Angelica Gomez 1980      Dear Colleague,    Thank you for referring your patient, Angelica Gomez, to [facility]. Please see a copy of my visit note below.     Angelica Gomez is a 43 year old female who presented with left breast pain.  Patient has a history of mastalgia at various times throughout the last 4 years.  This most recent pain was upper inner breast although she does have a history of pain in the upper outer breast as well.  She was sent for imaging in assessment of her pain and nothing was identified of concern in the upper inner breast.  In the upper outer left breast, however, a small oval area of moderate suspicion was identified.  Patient does not have a history of previous breast biopsies but does have a history of breast augmentation performed in the .  This ovoid area in the upper outer breast was biopsied and was found to be a grade 2 invasive ductal carcinoma.  This was ER/OK weakly positive, HER2/krissy negative.  Patient has no personal history of breast cancer but does have a strong family history of breast cancer.  Mother was diagnosed in her early to mid 50s and  of either breast cancer or lung cancer several years later.  Patient also has history of breast cancer in a maternal aunt as well as a paternal aunt.  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  Patient has 2 children, aged 16 and 20, both of whom she breast-fed.  Took hormonal supplementation for many years but has not been on anything recently.  She has had a tubal ligation but still has her ovaries and uterus.  Patient is here for discussion of her new breast cancer diagnosis.     PMH:  Angelica Gomez  has a past medical history of NO ACTIVE PROBLEMS.  PSH:  Angelica Gomez  has a  "past surgical history that includes  DELIVERY ONLY () and tubal ligation ().     Home medications and allergies reviewed.     Social History:  Angelica Gomez  reports that she has quit smoking. Her smoking use included cigarettes. She has a 0.3 pack-year smoking history. She has never used smokeless tobacco. She reports current alcohol use. She reports that she does not use drugs.  Family History:  Angelica Gomez family history includes Alcohol/Drug in her paternal grandfather; Asthma in her brother; Breast Cancer in her paternal aunt and paternal aunt; Breast Cancer (age of onset: 56) in her mother; C.A.D. in her father; Cancer in her paternal aunt; Cardiovascular in her father; Cerebrovascular Disease in her father; Circulatory in her father; Eczema in her brother; Food Allergy in her brother; Genitourinary Problems in her father; Heart Disease in her father; Hypertension in her paternal grandmother; Myocardial Infarction in her maternal grandfather; No Known Problems in her daughter, maternal grandmother, and son; Ovarian Cancer in her paternal aunt; Thyroid Disease in her mother.     ROS:  The 10 point Review of Systems is negative other than noted in the HPI.  No recent weight loss or weight gain.  No fevers or chills.     Physical Exam:  Blood pressure (!) 142/90, pulse 66, temperature 98.5  F (36.9  C), temperature source Oral, resp. rate 14, height 1.651 m (5' 5\"), weight 82.6 kg (182 lb), SpO2 100%, not currently breastfeeding.  182 lbs 0 oz  Healthy appearing young female in no distress.  Patient has a pleasant affect and communicates well.   Pupils equal round and reactive to light.   No cervical lymphadenopathy or thyromegaly.   Lung fields clear, breathing comfortably.   Heart normal sinus rhythm.  No murmurs rubs or gallops.  Bilateral breast exam performed.  Some periaxillary scarring from her previous augmentation.  What I believed to be subpectoral implants palpated.  No " discrete lumps or bumps.  Small amount of bruising in the left tail of the breast.  No axillary lymphadenopathy.  No nipple inversion or skin thickening.  Skin warm, dry.  No obvious rashes or lesions.     All new lab and imaging data was reviewed.  This includes mammogram images, ultrasound images, pathology reports.      Assessment/plan: Pleasant healthy 43-year-old female with a new diagnosis of left-sided breast cancer.  Because of the hormone receptor status, this breast cancer could behave like a triple negative.  Fortunately the tumor is small, and chemotherapy may not be indicated.  Because of the patient's strong family history and CHEK2 mutation, she is interested in proceeding with bilateral mastectomy.  She is more worried about the possibility of developing a new breast cancer in her lifetime which I think is reasonable.  I do not think an MRI would add a whole lot to her case because of her surgical preference.  I have explained that mastectomy does not carry a survival benefit over lumpectomy, but will leave her with the lowest possible risk of developing a new breast cancer in her lifetime.  Patient was interested in breast reconstruction, and I think she would be a good candidate for nipple sparing mastectomy.  I will have her meet with one of the plastic surgeons to have that discussion.  The recommendation for postmastectomy radiation, endocrine therapy, and chemotherapy will likely be made after the final surgical pathology is available.  Overall, I think the patient has an excellent prognosis and should have a low likelihood for recurrent breast cancer or new breast cancers in her lifetime.  Surgical co-morbities include galactorrhea, headaches, ovarian cysts.       Again, thank you for allowing me to participate in the care of your patient.      Sincerely,      Aba Phillips MD

## 2023-12-26 NOTE — PROGRESS NOTES
Surgery Consultation, [facility], BEBE Phillips MD, MD    Angelica Gomez MRN# [id number]   YOB: 1980 Age: 43 year old     PCP:  Maddi Sneed [phone]    Chief Complaint: New diagnosis left breast cancer    Pt was seen in consultation from Maddi Sneed.    History of Present Illness:  Angelica Gomez is a 43 year old female who presented with left breast pain.  Patient has a history of mastalgia at various times throughout the last 4 years.  This most recent pain was upper inner breast although she does have a history of pain in the upper outer breast as well.  She was sent for imaging in assessment of her pain and nothing was identified of concern in the upper inner breast.  In the upper outer left breast, however, a small oval area of moderate suspicion was identified.  Patient does not have a history of previous breast biopsies but does have a history of breast augmentation performed in the .  This ovoid area in the upper outer breast was biopsied and was found to be a grade 2 invasive ductal carcinoma.  This was ER/MI weakly positive, HER2/krissy negative.  Patient has no personal history of breast cancer but does have a strong family history of breast cancer.  Mother was diagnosed in her early to mid 50s and  of either breast cancer or lung cancer several years later.  Patient also has history of breast cancer in a maternal aunt as well as a paternal aunt.  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  Patient has 2 children, aged 16 and 20, both of whom she breast-fed.  Took hormonal supplementation for many years but has not been on anything recently.  She has had a tubal ligation but still has her ovaries and uterus.  Patient is here for discussion of her new breast cancer diagnosis.    PMH:  Angelica Gomez  has a past medical history of NO ACTIVE PROBLEMS.  PSH:  " Angelica Gomez  has a past surgical history that includes  DELIVERY ONLY () and tubal ligation ().    Home medications and allergies reviewed.    Social History:  Angelica Gomez  reports that she has quit smoking. Her smoking use included cigarettes. She has a 0.3 pack-year smoking history. She has never used smokeless tobacco. She reports current alcohol use. She reports that she does not use drugs.  Family History:  Angelica Gomez family history includes Alcohol/Drug in her paternal grandfather; Asthma in her brother; Breast Cancer in her paternal aunt and paternal aunt; Breast Cancer (age of onset: 56) in her mother; C.A.D. in her father; Cancer in her paternal aunt; Cardiovascular in her father; Cerebrovascular Disease in her father; Circulatory in her father; Eczema in her brother; Food Allergy in her brother; Genitourinary Problems in her father; Heart Disease in her father; Hypertension in her paternal grandmother; Myocardial Infarction in her maternal grandfather; No Known Problems in her daughter, maternal grandmother, and son; Ovarian Cancer in her paternal aunt; Thyroid Disease in her mother.    ROS:  The 10 point Review of Systems is negative other than noted in the HPI.  No recent weight loss or weight gain.  No fevers or chills.    Physical Exam:  Blood pressure (!) 142/90, pulse 66, temperature 98.5  F (36.9  C), temperature source Oral, resp. rate 14, height 1.651 m (5' 5\"), weight 82.6 kg (182 lb), SpO2 100%, not currently breastfeeding.  182 lbs 0 oz  Healthy appearing young female in no distress.  Patient has a pleasant affect and communicates well.   Pupils equal round and reactive to light.   No cervical lymphadenopathy or thyromegaly.   Lung fields clear, breathing comfortably.   Heart normal sinus rhythm.  No murmurs rubs or gallops.  Bilateral breast exam performed.  Some periaxillary scarring from her previous augmentation.  What I believed to be subpectoral " implants palpated.  No discrete lumps or bumps.  Small amount of bruising in the left tail of the breast.  No axillary lymphadenopathy.  No nipple inversion or skin thickening.  Skin warm, dry.  No obvious rashes or lesions.    All new lab and imaging data was reviewed.  This includes mammogram images, ultrasound images, pathology reports.     Assessment/plan: Pleasant healthy 43-year-old female with a new diagnosis of left-sided breast cancer.  Because of the hormone receptor status, this breast cancer could behave like a triple negative.  Fortunately the tumor is small, and chemotherapy may not be indicated.  Because of the patient's strong family history and CHEK2 mutation, she is interested in proceeding with bilateral mastectomy.  She is more worried about the possibility of developing a new breast cancer in her lifetime which I think is reasonable.  I do not think an MRI would add a whole lot to her case because of her surgical preference.  I have explained that mastectomy does not carry a survival benefit over lumpectomy, but will leave her with the lowest possible risk of developing a new breast cancer in her lifetime.  Patient was interested in breast reconstruction, and I think she would be a good candidate for nipple sparing mastectomy.  I will have her meet with one of the plastic surgeons to have that discussion.  The recommendation for postmastectomy radiation, endocrine therapy, and chemotherapy will likely be made after the final surgical pathology is available.  Overall, I think the patient has an excellent prognosis and should have a low likelihood for recurrent breast cancer or new breast cancers in her lifetime.  Surgical co-morbities include galactorrhea, headaches, ovarian cysts.    Bean Phillips M.D.  [facility]  [phone]    Please route or send letter to:  Primary Care Provider (PCP) and Referring Provider

## 2024-01-10 ENCOUNTER — DOCUMENTATION ONLY (OUTPATIENT)
Dept: ONCOLOGY | Facility: CLINIC | Age: 44
End: 2024-01-10

## 2024-01-10 ENCOUNTER — TUMOR CONFERENCE (OUTPATIENT)
Dept: ONCOLOGY | Facility: CLINIC | Age: 44
End: 2024-01-10
Payer: COMMERCIAL

## 2024-01-10 NOTE — PROGRESS NOTES
Breast tumor board discussion:    Given ER and NJ staining is moderate on the initial left breast biopsy, plan for repeat ER/NJ stains and MammaPrint with Blueprint -- if high risk molecular type, will advise adjuvant chemo with 4 cycles of Taxotere and Cytoxan.    Magali Roland MD  Hematology/Oncology  [facility]

## 2024-01-12 RX ORDER — CEFAZOLIN SODIUM 2 G/100ML
2 INJECTION, SOLUTION INTRAVENOUS
Status: CANCELLED | OUTPATIENT
Start: 2024-01-12

## 2024-01-12 RX ORDER — CEFAZOLIN SODIUM 2 G/100ML
2 INJECTION, SOLUTION INTRAVENOUS SEE ADMIN INSTRUCTIONS
Status: CANCELLED | OUTPATIENT
Start: 2024-01-12

## 2024-01-16 ENCOUNTER — MEDICAL CORRESPONDENCE (OUTPATIENT)
Dept: HEALTH INFORMATION MANAGEMENT | Facility: CLINIC | Age: 44
End: 2024-01-16

## 2024-01-17 ENCOUNTER — TELEPHONE (OUTPATIENT)
Dept: GASTROENTEROLOGY | Facility: CLINIC | Age: 44
End: 2024-01-17
Payer: COMMERCIAL

## 2024-01-17 NOTE — TELEPHONE ENCOUNTER
Pre assessment completed for upcoming procedure.   (Please see previous telephone encounter notes for complete details)      Procedure details:    Arrival time and facility location reviewed.    Pre op exam needed? N/A    Designated  policy reviewed. Instructed to have someone stay 6 hours post procedure.     COVID policy reviewed.      Medication review:    Medications reviewed. Please see supporting documentation below. Holding recommendations discussed (if applicable).       Prep for procedure:     Procedure prep instructions reviewed.        Additional information needed?  N/A      Patient  verbalized understanding and had no questions or concerns at this time.      Joana Kim RN  Endoscopy Procedure Pre Assessment RN  [phone] option 4

## 2024-01-17 NOTE — TELEPHONE ENCOUNTER
Pre visit planning completed.      Procedure details:    Patient scheduled for Colonoscopy  on 2/1/24.     Arrival time: 1335. Procedure time 1420    Pre op exam needed? N/A    Facility location: [facility]; [address], [place] [address]    Sedation type: Conscious sedation     Indication for procedure:     Screening         Chart review:     Electronic implanted devices? No    Recent diagnosis of diverticulitis within the last 6 weeks? No    Diabetic? No      Medication review:    Anticoagulants? No    NSAIDS? Yes.  Naproxen (Naprosyn, Aleve).  Holding interval of 4 days.    Other medication HOLDING recommendations:  N/A      Prep for procedure:     Bowel prep recommendation: Standard Miralax   Due to:  standard bowel prep.    Prep instructions sent via Bidstalk     Corinne Kliber, RN  Endoscopy Procedure Pre Assessment RN  [phone] option 4

## 2024-01-19 ENCOUNTER — TELEPHONE (OUTPATIENT)
Dept: SURGERY | Facility: CLINIC | Age: 44
End: 2024-01-19

## 2024-01-19 ENCOUNTER — TELEPHONE (OUTPATIENT)
Dept: SURGERY | Facility: CLINIC | Age: 44
End: 2024-01-19
Payer: COMMERCIAL

## 2024-01-19 DIAGNOSIS — C50.912 INVASIVE DUCTAL CARCINOMA OF BREAST, FEMALE, LEFT (H): Primary | ICD-10-CM

## 2024-01-19 NOTE — TELEPHONE ENCOUNTER
Type of surgery: Bilateral nipple sparing mastectomy with left sentinel lymph node biopsy and bilateral implant removal  Location of surgery: [facility]  Date and time of surgery: 2/21/24 at 1:45pm  Surgeon: Dr. Aba Phillips  Pre-Op Appt Date: patient to schedule  Post-Op Appt Date: patient to schedule   Packet sent out: Yes  Pre-cert/Authorization completed:  Not Applicable  Date: 1/19/24

## 2024-01-23 ENCOUNTER — MYC MEDICAL ADVICE (OUTPATIENT)
Dept: MAMMOGRAPHY | Facility: CLINIC | Age: 44
End: 2024-01-23

## 2024-01-23 ENCOUNTER — ANCILLARY PROCEDURE (OUTPATIENT)
Dept: ULTRASOUND IMAGING | Facility: CLINIC | Age: 44
End: 2024-01-23
Attending: OBSTETRICS & GYNECOLOGY
Payer: COMMERCIAL

## 2024-01-23 DIAGNOSIS — N93.9 ABNORMAL UTERINE BLEEDING (AUB): ICD-10-CM

## 2024-01-23 PROCEDURE — 76830 TRANSVAGINAL US NON-OB: CPT | Performed by: OBSTETRICS & GYNECOLOGY

## 2024-01-24 NOTE — TUMOR CONFERENCE
Tumor Conference Information  Tumor Conference: Breast  Specialties Present: Medical oncology, Radiation oncology, Pathology, Radiology, Surgery  Patient Status: Prospective  Stage: Stage IA, cT1a vs S6u-O1-P9, grade 2  Treatment to Date: Biopsy  Clinical Trials: Not discussed  Genetic Testing Discussed/Recommended?: Already Completed (Comment: CHEK2 (+))  Supportive Care Services Discussed/Recommended?: No  Recommended Plan: Follows evidence-based guidelines  Did the review exceed 30 minutes?: did not       Bilateral Mastectomy- sentinel lymph node biopsy w/reconstruction:  date pending  Adjuvent chemo:  Taxol  Karen Mathew RN, BSN      Documentation / Disclaimer Cancer Tumor Board Note  Cancer tumor board recommendations do not override what is determined to be reasonable care and treatment, which is dependent on the circumstances of a patient's case; the patient's medical, social, and personal concerns; and the clinical judgment of the oncologist [physician].

## 2024-01-25 ENCOUNTER — OFFICE VISIT (OUTPATIENT)
Dept: OBGYN | Facility: CLINIC | Age: 44
End: 2024-01-25
Attending: OBSTETRICS & GYNECOLOGY
Payer: COMMERCIAL

## 2024-01-25 VITALS
TEMPERATURE: 97.9 F | HEART RATE: 68 BPM | BODY MASS INDEX: 30.95 KG/M2 | SYSTOLIC BLOOD PRESSURE: 142 MMHG | DIASTOLIC BLOOD PRESSURE: 92 MMHG | WEIGHT: 186 LBS

## 2024-01-25 DIAGNOSIS — N93.9 ABNORMAL UTERINE BLEEDING (AUB): Primary | ICD-10-CM

## 2024-01-25 PROCEDURE — 99213 OFFICE O/P EST LOW 20 MIN: CPT | Performed by: OBSTETRICS & GYNECOLOGY

## 2024-01-25 NOTE — PROGRESS NOTES
Assessment & Plan     Abnormal uterine bleeding (AUB)  Heavy periods.   Reviewed ultrasound report and images - normal.   Discussed option for management. For now will use lysteda as needed. Only used for a couple of days with last period.  Recommended that tamoxifen vs aromatase inhibitor is chosen according to best anticipated outcome for her breast cancer. If she continues to have heavy bleeding after chemotherapy she is a good candidate for TLH.   Discussed that menopause symptoms with aromatase inhibitor could possibly be managed with veozah.     Review of the result(s) of each unique test - ultrasound            No follow-ups on file.    Subjective   Lisandra is a 43 year old, presenting for the following health issues:  Ultrasound (Follow up/)    HPI   Lisandra presents for ultrasound review.   She has been experiencing very heavy periods  These were managed previously with Mirena and MARIAH  She has recently been diagnosed with breast cancer and is about to have bilateral mastectomy, reconstruction, and chemotherapy.     Her oncologist has told her that bleeding would likely worsen on tamoxifen. She could consider aromatase inhibitor with ovarian suppression.  Periods will likely stop during chemotherapy.   Lisandra is open to hysterectomy ultimately.     Doing ok emotionally with diagnosis. Very pleased with team and has good support.   Last period was well managed with lysteda.     Past Medical History:   Diagnosis Date    NO ACTIVE PROBLEMS        Past Surgical History:   Procedure Laterality Date    TUBAL LIGATION      [facility]  DELIVERY ONLY      superficial wound dehiscence       Family History   Problem Relation Age of Onset    Thyroid Disease Mother     Breast Cancer Mother 56        breast, 3 years later    Heart Disease Father         2x heart attacks    Circulatory Father         blood clots    Cardiovascular Father         patent foramen ovale, repaired x 2, afib    Cerebrovascular Disease Father          x2    C.A.D. Father         x2    Genitourinary Problems Father         kidney disease    Asthma Brother     Food Allergy Brother     Eczema Brother     No Known Problems Maternal Grandmother     Myocardial Infarction Maternal Grandfather     Hypertension Paternal Grandmother     Alcohol/Drug Paternal Grandfather     No Known Problems Daughter     No Known Problems Son     Cancer Paternal Aunt         cervical cancer(another sisiter)    Breast Cancer Paternal Aunt         breast cancer at 70's    Breast Cancer Paternal Aunt     Ovarian Cancer Paternal Aunt     Cancer - colorectal No family hx of     Diabetes No family hx of        Social History     Socioeconomic History    Marital status:      Spouse name: Not on file    Number of children: Not on file    Years of education: Not on file    Highest education level: Not on file   Occupational History    Not on file   Tobacco Use    Smoking status: Former     Packs/day: 0.25     Years: 1.00     Additional pack years: 0.00     Total pack years: 0.25     Types: Cigarettes    Smokeless tobacco: Never   Substance and Sexual Activity    Alcohol use: Yes     Comment: 0-1    Drug use: No    Sexual activity: Yes     Partners: Male     Birth control/protection: Surgical, I.U.D., Female Surgical     Comment: Tubal ligation, 12/13/2007   Other Topics Concern    Parent/sibling w/ CABG, MI or angioplasty before 65F 55M? Yes     Comment: father 2 MIs   Social History Narrative    Caffeine intake/servings daily - 2-3 pop    Calcium intake/servings daily - 2 yogurts and 1 glass of milk    Exercise 6 times weekly - describe strength training and cardio    Sunscreen used - Yes    Seatbelts used - Yes    Guns stored in the home - No    Self Breast Exam - Yes    Pap test up to date -  Yes, as of today    Eye exam up to date -  Yes    Dental exam up to date -  Yes    DEXA scan up to date -  Not Applicable    Flex Sig/Colonoscopy up to date -  Not Applicable    Mammography up to  date -  Not Applicable    Immunizations reviewed and up to date - Yes, 03/2008    Abuse: Current or Past (Physical, Sexual or Emotional) - No    Do you feel safe in your environment - Yes    Do you cope well with stress - Yes    Do you suffer from insomnia - No    Last updated by: Lit Licona  10/27/2009                 Social Determinants of Health     Financial Resource Strain: High Risk (10/13/2023)    Financial Resource Strain     Within the past 12 months, have you or your family members you live with been unable to get utilities (heat, electricity) when it was really needed?: Yes   Food Insecurity: Low Risk  (10/13/2023)    Food Insecurity     Within the past 12 months, did you worry that your food would run out before you got money to buy more?: No     Within the past 12 months, did the food you bought just not last and you didn t have money to get more?: No   Transportation Needs: Low Risk  (10/13/2023)    Transportation Needs     Within the past 12 months, has lack of transportation kept you from medical appointments, getting your medicines, non-medical meetings or appointments, work, or from getting things that you need?: No   Physical Activity: Not on file   Stress: Not on file   Social Connections: Not on file   Interpersonal Safety: Not on file   Housing Stability: Low Risk  (10/13/2023)    Housing Stability     Do you have housing? : Yes     Are you worried about losing your housing?: No       Current Outpatient Medications   Medication    aspirin-acetaminophen-caffeine (EXCEDRIN MIGRAINE) 250-250-65 MG tablet    butalbital-aspirin-caffeine (FIORINAL) -40 MG capsule    Calcium Carbonate-Vitamin D (CALCIUM 500 + D PO)    Cetirizine HCl (ZYRTEC ALLERGY PO)    COMPRESSION STOCKINGS    Fluticasone Propionate (FLONASE NA)    Multiple Vitamins-Minerals (MULTIVITAMIN & MINERAL PO)    Probiotic Product (PROBIOTIC PO)    tranexamic acid (LYSTEDA) 650 MG tablet     No current facility-administered  medications for this visit.        No Known Allergies        Review of Systems  Constitutional, HEENT, cardiovascular, pulmonary, gi and gu systems are negative, except as otherwise noted.      Objective    BP (!) 142/92   Pulse 68   Temp 97.9  F (36.6  C)   Wt 84.4 kg (186 lb)   BMI 30.95 kg/m    Body mass index is 30.95 kg/m .  Physical Exam   GENERAL: alert and no distress  MS: no gross musculoskeletal defects noted, no edema  PSYCH: mentation appears normal, affect normal/bright    Gynecological Ultrasound Report  Pelvic U/S - Transvaginal   [facility]  Referring Provider: Kori Javier MD  Sonographer:  Jackie Gardner RDMS  Indication: Bleeding/Menses- Abnormal uterine bleeding (AUB)  LMP: 2024  History:  surgery     Gynecological Ultrasonography:   Uterus: anteverted. Contour is smooth/regular.  Size: 10.1 x 4.2 x 4.4 cm  Endometrium: Thickness Total 8.8 mm     Right Ovary: 1.5 x 2.0 x 2.4 cm. wnl  Left Ovary: 3.2 x 2.6 x 3.0 cm. wnl  Cul de Sac Free Fluid: No free fluid     Technique: Transvaginal Imaging performed     Impression:      The uterus and ovaries were visualized and no abnormalities were seen.     Angelica Richards MD           Signed Electronically by: Kori Javier MD

## 2024-01-31 ENCOUNTER — ANESTHESIA EVENT (OUTPATIENT)
Dept: SURGERY | Facility: AMBULATORY SURGERY CENTER | Age: 44
End: 2024-01-31
Payer: COMMERCIAL

## 2024-01-31 NOTE — TELEPHONE ENCOUNTER
Patient called to confirm her arrival time for her procedure. Per chart review, patient is scheduled at 0700. Instructed patient to arrive 45 minutes early, 0615.     Patient verbalized understanding and had no questions or concerns at this time.     Emily Salazar RN  Endoscopy Procedure Pre-Assessment RN

## 2024-01-31 NOTE — ANESTHESIA PREPROCEDURE EVALUATION
Anesthesia Pre-Procedure Evaluation    Patient: Angelica Gomez   MRN: [id number] : 1980        Procedure : Procedure(s):  Colonoscopy with CO2 insufflation          Past Medical History:   Diagnosis Date    NO ACTIVE PROBLEMS       Past Surgical History:   Procedure Laterality Date    TUBAL LIGATION  2007    [facility]  DELIVERY ONLY      superficial wound dehiscence      No Known Allergies   Social History     Tobacco Use    Smoking status: Former     Packs/day: 0.25     Years: 1.00     Additional pack years: 0.00     Total pack years: 0.25     Types: Cigarettes    Smokeless tobacco: Never   Substance Use Topics    Alcohol use: Yes     Comment: 0-1      Wt Readings from Last 1 Encounters:   24 84.4 kg (186 lb)           Physical Exam    Airway        Mallampati: II   TM distance: > 3 FB   Neck ROM: full   Mouth opening: > 3 cm    Respiratory Devices and Support         Dental       (+) Minor Abnormalities - some fillings, tiny chips      Cardiovascular   cardiovascular exam normal          Pulmonary   pulmonary exam normal                OUTSIDE LABS:  CBC:   Lab Results   Component Value Date    WBC 6.5 10/16/2023    WBC 5.5 2021    HGB 14.1 10/16/2023    HGB 13.3 2021    HCT 41.6 10/16/2023    HCT 39.0 2021     10/16/2023     2021     BMP:   Lab Results   Component Value Date     2021     2021    POTASSIUM 3.9 2021    POTASSIUM 4.3 2021    CHLORIDE 110 (H) 2021    CHLORIDE 112 (H) 2021    CO2 27 2021    CO2 27 2021    BUN 16 2021    BUN 13 2021    CR 0.82 2021    CR 0.87 2021    GLC 73 2021    GLC 94 2021     COAGS:   Lab Results   Component Value Date    PTT 27 2009    INR 1.00 2009     POC:   Lab Results   Component Value Date    HCG  10/27/2009     Negative   This test provides a presumptive diagnosis of pregnancy or non-pregnancy. A   confirmed  pregnancy diagnosis should only be made by a physician after all   clinical and laboratory findings have been evaluated.     HEPATIC:   Lab Results   Component Value Date    ALBUMIN 3.8 12/30/2021    PROTTOTAL 6.4 (L) 12/30/2021    ALT 30 12/30/2021    AST 14 12/30/2021    ALKPHOS 52 12/30/2021    BILITOTAL 0.3 12/30/2021     OTHER:   Lab Results   Component Value Date    CYRUS 8.8 12/30/2021    TSH 1.63 01/19/2022    CRP <2.9 08/04/2021    SED 5 08/04/2021       Anesthesia Plan    ASA Status:  2    NPO Status:  NPO Appropriate    Anesthesia Type: MAC.     - Reason for MAC: straight local not clinically adequate   Induction: Intravenous, Propofol.   Maintenance: TIVA.        Consents    Anesthesia Plan(s) and associated risks, benefits, and realistic alternatives discussed. Questions answered and patient/representative(s) expressed understanding.     - Discussed:     - Discussed with:  Patient      - Extended Intubation/Ventilatory Support Discussed: No.      - Patient is DNR/DNI Status: No     Use of blood products discussed: No .     Postoperative Care    Pain management: IV analgesics, Oral pain medications, Multi-modal analgesia.   PONV prophylaxis: Dexamethasone or Solumedrol, Ondansetron (or other 5HT-3), Background Propofol Infusion     Comments:               Juan Alberto Woody MD    I have reviewed the pertinent notes and labs in the chart from the past 30 days and (re)examined the patient.  Any updates or changes from those notes are reflected in this note.

## 2024-02-01 ENCOUNTER — HOSPITAL ENCOUNTER (OUTPATIENT)
Facility: AMBULATORY SURGERY CENTER | Age: 44
Discharge: HOME | End: 2024-02-01
Attending: INTERNAL MEDICINE
Payer: COMMERCIAL

## 2024-02-01 ENCOUNTER — ANESTHESIA (OUTPATIENT)
Dept: SURGERY | Facility: AMBULATORY SURGERY CENTER | Age: 44
End: 2024-02-01
Payer: COMMERCIAL

## 2024-02-01 VITALS
OXYGEN SATURATION: 98 % | TEMPERATURE: 98.8 F | DIASTOLIC BLOOD PRESSURE: 86 MMHG | SYSTOLIC BLOOD PRESSURE: 127 MMHG | RESPIRATION RATE: 16 BRPM | HEART RATE: 69 BPM

## 2024-02-01 VITALS — HEART RATE: 71 BPM

## 2024-02-01 LAB — COLONOSCOPY: NORMAL

## 2024-02-01 PROCEDURE — G8918 PT W/O PREOP ORDER IV AB PRO: HCPCS

## 2024-02-01 PROCEDURE — G8907 PT DOC NO EVENTS ON DISCHARG: HCPCS

## 2024-02-01 PROCEDURE — 45378 DIAGNOSTIC COLONOSCOPY: CPT

## 2024-02-01 RX ORDER — ONDANSETRON 4 MG/1
4 TABLET, ORALLY DISINTEGRATING ORAL EVERY 6 HOURS PRN
Status: DISCONTINUED | OUTPATIENT
Start: 2024-02-01 | End: 2024-02-02 | Stop reason: HOSPADM

## 2024-02-01 RX ORDER — FLUMAZENIL 0.1 MG/ML
0.2 INJECTION, SOLUTION INTRAVENOUS
Status: ACTIVE | OUTPATIENT
Start: 2024-02-01 | End: 2024-02-01

## 2024-02-01 RX ORDER — LIDOCAINE HYDROCHLORIDE 20 MG/ML
INJECTION, SOLUTION INFILTRATION; PERINEURAL PRN
Status: DISCONTINUED | OUTPATIENT
Start: 2024-02-01 | End: 2024-02-01

## 2024-02-01 RX ORDER — NALOXONE HYDROCHLORIDE 0.4 MG/ML
0.2 INJECTION, SOLUTION INTRAMUSCULAR; INTRAVENOUS; SUBCUTANEOUS
Status: DISCONTINUED | OUTPATIENT
Start: 2024-02-01 | End: 2024-02-02 | Stop reason: HOSPADM

## 2024-02-01 RX ORDER — ONDANSETRON 2 MG/ML
4 INJECTION INTRAMUSCULAR; INTRAVENOUS EVERY 6 HOURS PRN
Status: DISCONTINUED | OUTPATIENT
Start: 2024-02-01 | End: 2024-02-02 | Stop reason: HOSPADM

## 2024-02-01 RX ORDER — PROPOFOL 10 MG/ML
INJECTION, EMULSION INTRAVENOUS CONTINUOUS PRN
Status: DISCONTINUED | OUTPATIENT
Start: 2024-02-01 | End: 2024-02-01

## 2024-02-01 RX ORDER — PROCHLORPERAZINE MALEATE 10 MG
10 TABLET ORAL EVERY 6 HOURS PRN
Status: DISCONTINUED | OUTPATIENT
Start: 2024-02-01 | End: 2024-02-02 | Stop reason: HOSPADM

## 2024-02-01 RX ORDER — SODIUM CHLORIDE, SODIUM LACTATE, POTASSIUM CHLORIDE, CALCIUM CHLORIDE 600; 310; 30; 20 MG/100ML; MG/100ML; MG/100ML; MG/100ML
INJECTION, SOLUTION INTRAVENOUS CONTINUOUS
Status: DISCONTINUED | OUTPATIENT
Start: 2024-02-01 | End: 2024-02-02 | Stop reason: HOSPADM

## 2024-02-01 RX ORDER — NALOXONE HYDROCHLORIDE 0.4 MG/ML
0.4 INJECTION, SOLUTION INTRAMUSCULAR; INTRAVENOUS; SUBCUTANEOUS
Status: DISCONTINUED | OUTPATIENT
Start: 2024-02-01 | End: 2024-02-02 | Stop reason: HOSPADM

## 2024-02-01 RX ORDER — LIDOCAINE 40 MG/G
CREAM TOPICAL
Status: DISCONTINUED | OUTPATIENT
Start: 2024-02-01 | End: 2024-02-02 | Stop reason: HOSPADM

## 2024-02-01 RX ORDER — ONDANSETRON 2 MG/ML
4 INJECTION INTRAMUSCULAR; INTRAVENOUS
Status: DISCONTINUED | OUTPATIENT
Start: 2024-02-01 | End: 2024-02-02 | Stop reason: HOSPADM

## 2024-02-01 RX ADMIN — LIDOCAINE HYDROCHLORIDE 80 MG: 20 INJECTION, SOLUTION INFILTRATION; PERINEURAL at 07:02

## 2024-02-01 RX ADMIN — SODIUM CHLORIDE, SODIUM LACTATE, POTASSIUM CHLORIDE, CALCIUM CHLORIDE: 600; 310; 30; 20 INJECTION, SOLUTION INTRAVENOUS at 06:57

## 2024-02-01 RX ADMIN — PROPOFOL 100 MG: 10 INJECTION, EMULSION INTRAVENOUS at 07:03

## 2024-02-01 RX ADMIN — PROPOFOL 150 MCG/KG/MIN: 10 INJECTION, EMULSION INTRAVENOUS at 07:02

## 2024-02-01 NOTE — ANESTHESIA POSTPROCEDURE EVALUATION
Patient: Angelica Gomez    Procedure: Procedure(s):  Colonoscopy with CO2 insufflation       Anesthesia Type:  MAC    Note:  Disposition: Outpatient   Postop Pain Control: Uneventful            Sign Out: Well controlled pain   PONV: No   Neuro/Psych: Uneventful            Sign Out: Acceptable/Baseline neuro status   Airway/Respiratory: Uneventful            Sign Out: Acceptable/Baseline resp. status   CV/Hemodynamics: Uneventful            Sign Out: Acceptable CV status; No obvious hypovolemia; No obvious fluid overload   Other NRE:    DID A NON-ROUTINE EVENT OCCUR?            Last vitals:  Vitals Value Taken Time   /86 02/01/24 0737   Temp     Pulse 69 02/01/24 0724   Resp 16 02/01/24 0737   SpO2 98 % 02/01/24 0737       Electronically Signed By: Juan Alberto Woody MD  February 1, 2024  8:26 AM

## 2024-02-01 NOTE — H&P
[facility] Anesthesia Pre-op History and Physical    Angelica Gomez MRN# [id number]   Age: 43 year old YOB: 1980      Date of Exam 2/1/2024       Primary care provider: Maddi Sneed         Chief Complaint and/or Reason for Procedure:     CHEK2 mutation         Active problem list:     Patient Active Problem List    Diagnosis Date Noted    Invasive ductal carcinoma of breast, female, left (H) 12/26/2023     Priority: Medium    Headache 09/10/2012     Priority: Medium     Problem list name updated by automated process. Provider to review and confirm  Problem list name updated by automated process. Provider to review      Dysesthesia 09/10/2012     Priority: Medium     Both feet during exercise but otherwise not present  (Problem list name updated by automated process. Provider to review and confirm.)      Fatigue 09/10/2012     Priority: Medium    Leg pain 09/10/2012     Priority: Medium     Pain in right mid-tibial level and adjacent calf      CARDIOVASCULAR SCREENING; LDL GOAL LESS THAN 160 10/31/2010     Priority: Medium    Ovarian cyst 11/08/2009     Priority: Medium    Galactorrhea 10/27/2009     Priority: Medium    Dyspareunia 10/27/2009     Priority: Medium            Medications (include herbals and vitamins):   Any Plavix use in the last 7 days? No     Current Outpatient Medications   Medication Sig    aspirin-acetaminophen-caffeine (EXCEDRIN MIGRAINE) 250-250-65 MG tablet Take 1 tablet by mouth every 6 hours as needed.    butalbital-aspirin-caffeine (FIORINAL) -40 MG capsule Take 1 capsule by mouth every 4 hours as needed for headaches    Calcium Carbonate-Vitamin D (CALCIUM 500 + D PO)     Cetirizine HCl (ZYRTEC ALLERGY PO) Take 10 mg by mouth daily    COMPRESSION STOCKINGS Please measure and distribute 2 pair of 20mmHg - 30mmHg thigh high open or closed toe compression stockings with extra refills as indicated.    Fluticasone Propionate (FLONASE NA) Spray 1 spray in  nostril daily    Multiple Vitamins-Minerals (MULTIVITAMIN & MINERAL PO) Take 1 each by mouth daily. [facility] women's active supplement    Probiotic Product (PROBIOTIC PO) Reported on 3/1/2017    tranexamic acid (LYSTEDA) 650 MG tablet Take 2 tablets (1,300 mg) by mouth 3 times daily     Current Facility-Administered Medications   Medication    lactated ringers infusion    lidocaine (LMX4) kit    lidocaine 1 % 0.1-1 mL    ondansetron (ZOFRAN) injection 4 mg    sodium chloride (PF) 0.9% PF flush 3 mL    sodium chloride (PF) 0.9% PF flush 3 mL             Allergies:    No Known Allergies  Allergy to Latex? No  Allergy to tape?   No  Intolerances:             Physical Exam:   All vitals have been reviewed  Patient Vitals for the past 8 hrs:   BP Temp Temp src Resp SpO2   02/01/24 0652 (!) 141/91 98.8  F (37.1  C) Temporal 18 97 %     No intake/output data recorded.  Lungs:   No increased work of breathing, good air exchange, clear to auscultation bilaterally, no crackles or wheezing     Cardiovascular:   Normal apical impulse, regular rate and rhythm, normal S1 and S2, no S3 or S4, and no murmur noted             Lab / Radiology Results:            Anesthetic risk and/or ASA classification:     2  Cherise Liam DO

## 2024-02-01 NOTE — ANESTHESIA CARE TRANSFER NOTE
Patient: Angelica Gomez    Procedure: Procedure(s):  Colonoscopy with CO2 insufflation       Diagnosis: Encounter for annual physical exam [Z00.00]  Diagnosis Additional Information: No value filed.    Anesthesia Type:   MAC     Note:    Oropharynx: oropharynx clear of all foreign objects and spontaneously breathing  Level of Consciousness: drowsy  Oxygen Supplementation: room air    Independent Airway: airway patency satisfactory and stable  Dentition: dentition unchanged  Vital Signs Stable: post-procedure vital signs reviewed and stable  Report to RN Given: handoff report given  Patient transferred to: Phase II    Handoff Report: Identifed the Patient, Identified the Reponsible Provider, Reviewed the pertinent medical history, Discussed the surgical course, Reviewed Intra-OP anesthesia mangement and issues during anesthesia, Set expectations for post-procedure period and Allowed opportunity for questions and acknowledgement of understanding      Vitals:  Vitals Value Taken Time   BP     Temp     Pulse 71 02/01/24 0715   Resp     SpO2         Electronically Signed By: DEISI Vigil CRNA  February 1, 2024  7:20 AM

## 2024-02-05 ENCOUNTER — OFFICE VISIT (OUTPATIENT)
Dept: FAMILY MEDICINE | Facility: CLINIC | Age: 44
End: 2024-02-05
Payer: COMMERCIAL

## 2024-02-05 VITALS
DIASTOLIC BLOOD PRESSURE: 82 MMHG | WEIGHT: 186 LBS | RESPIRATION RATE: 16 BRPM | HEIGHT: 65 IN | TEMPERATURE: 98.6 F | OXYGEN SATURATION: 100 % | BODY MASS INDEX: 30.99 KG/M2 | SYSTOLIC BLOOD PRESSURE: 132 MMHG | HEART RATE: 67 BPM

## 2024-02-05 DIAGNOSIS — Z01.818 PREOP GENERAL PHYSICAL EXAM: Primary | ICD-10-CM

## 2024-02-05 DIAGNOSIS — N93.9 ABNORMAL UTERINE BLEEDING: ICD-10-CM

## 2024-02-05 DIAGNOSIS — C50.912 INVASIVE DUCTAL CARCINOMA OF BREAST, FEMALE, LEFT (H): ICD-10-CM

## 2024-02-05 LAB
ERYTHROCYTE [DISTWIDTH] IN BLOOD BY AUTOMATED COUNT: 11.7 % (ref 10–15)
HCT VFR BLD AUTO: 40.4 % (ref 35–47)
HGB BLD-MCNC: 13.4 G/DL (ref 11.7–15.7)
MCH RBC QN AUTO: 29.3 PG (ref 26.5–33)
MCHC RBC AUTO-ENTMCNC: 33.2 G/DL (ref 31.5–36.5)
MCV RBC AUTO: 88 FL (ref 78–100)
PLATELET # BLD AUTO: 263 10E3/UL (ref 150–450)
RBC # BLD AUTO: 4.57 10E6/UL (ref 3.8–5.2)
WBC # BLD AUTO: 6 10E3/UL (ref 4–11)

## 2024-02-05 PROCEDURE — 36415 COLL VENOUS BLD VENIPUNCTURE: CPT | Performed by: NURSE PRACTITIONER

## 2024-02-05 PROCEDURE — 80053 COMPREHEN METABOLIC PANEL: CPT | Performed by: NURSE PRACTITIONER

## 2024-02-05 PROCEDURE — 99214 OFFICE O/P EST MOD 30 MIN: CPT | Performed by: NURSE PRACTITIONER

## 2024-02-05 PROCEDURE — 85027 COMPLETE CBC AUTOMATED: CPT | Performed by: NURSE PRACTITIONER

## 2024-02-05 NOTE — PROGRESS NOTES
Preoperative Evaluation  [facility]  [address] [place], [place] [address]  [address]  [facility] [address]  Phone: [phone]  Fax: [phone]  Primary Provider: Maddi Sneed  Pre-op Performing Provider: MADDI SNEED  Feb 5, 2024       Lisandra is a 43 year old, presenting for the following:  Pre-Op Exam (2/21 bilateral mastectomy with implant removal and tissue expanders [facility] Dr Aba Phillips)        2/5/2024     2:13 PM   Additional Questions   Roomed by Nveloped     Surgical Information  Surgery/Procedure: Bilateral Mastectomy  Surgery Location: [facility]  Surgeon: Dr Phillips  Surgery Date: 02/21/2024  Time of Surgery: 1:45  Where patient plans to recover: At home with family after overnight stay  Fax number for surgical facility: Note does not need to be faxed, will be available electronically in Epic.      Subjective       HPI related to upcoming procedure: Plans for Bilateral Mastectomy due to grade 2 invasive ductal carcinoma (3 mm) with associated grade 3 DCIS, no LVI; ER positive at 20%, KY positive at 20%, HER-2 negative with IHC = 1+.   \  History of abnormal uterine bleeding, for which she uses TXA, typically for three days at a time with her periods. She is currently on her period, and her periods have tended to be monthly. She does not anticipate having a period at the time of surgery.        2/1/2024     2:48 PM   Preop Questions   1. Have you ever had a heart attack or stroke? No   2. Have you ever had surgery on your heart or blood vessels, such as a stent placement, a coronary artery bypass, or surgery on an artery in your head, neck, heart, or legs? No   3. Do you have chest pain with activity? No   4. Do you have a history of  heart failure? No   5. Do you currently have a cold, bronchitis or symptoms of other infection? No   6. Do you have a cough, shortness of breath, or wheezing? No   7. Do you or anyone in your family have previous history of blood  clots? No   8. Do you or does anyone in your family have a serious bleeding problem such as prolonged bleeding following surgeries or cuts? No   9. Have you ever had problems with anemia or been told to take iron pills? No   10. Have you had any abnormal blood loss such as black, tarry or bloody stools, or abnormal vaginal bleeding? YES - vaginal bleeding   11. Have you ever had a blood transfusion? No   12. Are you willing to have a blood transfusion if it is medically needed before, during, or after your surgery? Yes   13. Have you or any of your relatives ever had problems with anesthesia? No   14. Do you have sleep apnea, excessive snoring or daytime drowsiness? No   15. Do you have any artifical heart valves or other implanted medical devices like a pacemaker, defibrillator, or continuous glucose monitor? No   16. Do you have artificial joints? No   17. Are you allergic to latex? No   18. Is there any chance that you may be pregnant? No       Health Care Directive  Patient does not have a Health Care Directive or Living Will: Patient states has Advance Directive and will bring in a copy to clinic.    Preoperative Review of    reviewed - no record of controlled substances prescribed.        Patient Active Problem List    Diagnosis Date Noted    Invasive ductal carcinoma of breast, female, left (H) 12/26/2023     Priority: Medium    Headache 09/10/2012     Priority: Medium     Problem list name updated by automated process. Provider to review and confirm  Problem list name updated by automated process. Provider to review      Dysesthesia 09/10/2012     Priority: Medium     Both feet during exercise but otherwise not present  (Problem list name updated by automated process. Provider to review and confirm.)      Fatigue 09/10/2012     Priority: Medium    Leg pain 09/10/2012     Priority: Medium     Pain in right mid-tibial level and adjacent calf      CARDIOVASCULAR SCREENING; LDL GOAL LESS THAN 160  10/31/2010     Priority: Medium    Ovarian cyst 2009     Priority: Medium    Galactorrhea 10/27/2009     Priority: Medium    Dyspareunia 10/27/2009     Priority: Medium      Past Medical History:   Diagnosis Date    NO ACTIVE PROBLEMS      Past Surgical History:   Procedure Laterality Date    TUBAL LIGATION      [facility]  DELIVERY ONLY      superficial wound dehiscence     Current Outpatient Medications   Medication Sig Dispense Refill    aspirin-acetaminophen-caffeine (EXCEDRIN MIGRAINE) 250-250-65 MG tablet Take 1 tablet by mouth every 6 hours as needed.      butalbital-aspirin-caffeine (FIORINAL) -40 MG capsule Take 1 capsule by mouth every 4 hours as needed for headaches 30 capsule 1    Calcium Carbonate-Vitamin D (CALCIUM 500 + D PO)       Cetirizine HCl (ZYRTEC ALLERGY PO) Take 10 mg by mouth daily      COMPRESSION STOCKINGS Please measure and distribute 2 pair of 20mmHg - 30mmHg thigh high open or closed toe compression stockings with extra refills as indicated. 2 each 4    Fluticasone Propionate (FLONASE NA) Spray 1 spray in nostril daily      Multiple Vitamins-Minerals (MULTIVITAMIN & MINERAL PO) Take 1 each by mouth daily. [facility] women's active supplement      Probiotic Product (PROBIOTIC PO) Reported on 3/1/2017      tranexamic acid (LYSTEDA) 650 MG tablet Take 2 tablets (1,300 mg) by mouth 3 times daily 30 tablet 3       No Known Allergies     Social History     Tobacco Use    Smoking status: Former     Packs/day: 0.25     Years: 1.00     Additional pack years: 0.00     Total pack years: 0.25     Types: Cigarettes    Smokeless tobacco: Never   Substance Use Topics    Alcohol use: Yes     Comment: 0-1     Family History   Problem Relation Age of Onset    Thyroid Disease Mother     Breast Cancer Mother 56        breast, 3 years later    Heart Disease Father         2x heart attacks    Circulatory Father         blood clots    Cardiovascular Father         patent foramen ovale, repaired  "x 2, afib    Cerebrovascular Disease Father         x2    C.A.D. Father         x2    Genitourinary Problems Father         kidney disease    Asthma Brother     Food Allergy Brother     Eczema Brother     No Known Problems Maternal Grandmother     Myocardial Infarction Maternal Grandfather     Hypertension Paternal Grandmother     Alcohol/Drug Paternal Grandfather     No Known Problems Daughter     No Known Problems Son     Cancer Paternal Aunt         cervical cancer(another sisiter)    Breast Cancer Paternal Aunt         breast cancer at 70's    Breast Cancer Paternal Aunt     Ovarian Cancer Paternal Aunt     Cancer - colorectal No family hx of     Diabetes No family hx of      History   Drug Use No         Review of Systems    Review of Systems  CONSTITUTIONAL: NEGATIVE for fever, chills, change in weight  INTEGUMENTARY/SKIN: NEGATIVE for worrisome rashes, moles or lesions  EYES: NEGATIVE for vision changes or irritation  ENT/MOUTH: NEGATIVE for ear, mouth and throat problems  RESP: NEGATIVE for significant cough or SOB  BREAST: NEGATIVE for masses, tenderness or discharge  CV: NEGATIVE for chest pain, palpitations or peripheral edema  GI: NEGATIVE for nausea, abdominal pain, heartburn, or change in bowel habits  : NEGATIVE for frequency, dysuria, or hematuria  MUSCULOSKELETAL: NEGATIVE for significant arthralgias or myalgia  NEURO: NEGATIVE for weakness, dizziness or paresthesias  ENDOCRINE: NEGATIVE for temperature intolerance, skin/hair changes  HEME: NEGATIVE for bleeding problems  PSYCHIATRIC: NEGATIVE for changes in mood or affect  Objective    BP (!) 144/78 (BP Location: Left arm, Patient Position: Sitting, Cuff Size: Adult Regular)   Pulse 67   Temp 98.6  F (37  C) (Temporal)   Resp 16   Ht 1.646 m (5' 4.8\")   Wt 84.4 kg (186 lb)   LMP 02/03/2024 (Exact Date)   SpO2 100%   BMI 31.14 kg/m     Estimated body mass index is 31.14 kg/m  as calculated from the following:    Height as of this " "encounter: 1.646 m (5' 4.8\").    Weight as of this encounter: 84.4 kg (186 lb).  Physical Exam  GENERAL: alert and no distress  EYES: Eyes grossly normal to inspection, PERRL and conjunctivae and sclerae normal  HENT: ear canals and TM's normal, nose and mouth without ulcers or lesions  NECK: no adenopathy, no asymmetry, masses, or scars  RESP: lungs clear to auscultation - no rales, rhonchi or wheezes  CV: regular rate and rhythm, normal S1 S2, no S3 or S4, no murmur, click or rub, no peripheral edema  ABDOMEN: soft, nontender, no hepatosplenomegaly, no masses and bowel sounds normal  MS: no gross musculoskeletal defects noted, no edema  SKIN: no suspicious lesions or rashes  PSYCH: mentation appears normal, affect normal/bright    Recent Labs   Lab Test 10/16/23  0856   HGB 14.1           Diagnostics  Labs pending at this time.  Results will be reviewed when available.   No EKG required for low risk surgery (cataract, skin procedure, breast biopsy, etc).    Revised Cardiac Risk Index (RCRI)  The patient has the following serious cardiovascular risks for perioperative complications:   - No serious cardiac risks = 0 points   -Patient is approved for surgery without further studies indicated at this time.   RCRI Interpretation: 0 points: Class I (very low risk - 0.4% complication rate)         Signed Electronically by: DEISI Hein CNP  Copy of this evaluation report is provided to requesting physician.         "

## 2024-02-06 LAB
ALBUMIN SERPL BCG-MCNC: 4.4 G/DL (ref 3.5–5.2)
ALP SERPL-CCNC: 52 U/L (ref 40–150)
ALT SERPL W P-5'-P-CCNC: 16 U/L (ref 0–50)
ANION GAP SERPL CALCULATED.3IONS-SCNC: 9 MMOL/L (ref 7–15)
AST SERPL W P-5'-P-CCNC: 24 U/L (ref 0–45)
BILIRUB SERPL-MCNC: 0.3 MG/DL
BUN SERPL-MCNC: 11.6 MG/DL (ref 6–20)
CALCIUM SERPL-MCNC: 9.2 MG/DL (ref 8.6–10)
CHLORIDE SERPL-SCNC: 107 MMOL/L (ref 98–107)
CREAT SERPL-MCNC: 0.93 MG/DL (ref 0.51–0.95)
DEPRECATED HCO3 PLAS-SCNC: 25 MMOL/L (ref 22–29)
EGFRCR SERPLBLD CKD-EPI 2021: 78 ML/MIN/1.73M2
GLUCOSE SERPL-MCNC: 100 MG/DL (ref 70–99)
POTASSIUM SERPL-SCNC: 4.2 MMOL/L (ref 3.4–5.3)
PROT SERPL-MCNC: 6.7 G/DL (ref 6.4–8.3)
SODIUM SERPL-SCNC: 141 MMOL/L (ref 135–145)

## 2024-02-07 ENCOUNTER — TELEPHONE (OUTPATIENT)
Dept: SURGERY | Facility: CLINIC | Age: 44
End: 2024-02-07
Payer: COMMERCIAL

## 2024-02-07 NOTE — TELEPHONE ENCOUNTER
Breast Care Nurse Coordination:      Preoperative call placed to Lisandra today to assess her needs, and check in on whether she has any questions or concerns regarding her upcoming breast surgery.    Patient was recently diagnosed with left breast cancer and is scheduled to have bilateral mastectomies with left sentinel lymph node biopsy and reconstruction by Dr. Phillips and Dr. Venegas on 2/21/24 at the [facility].    Lisandra states she has had a preop physical exam with her primary provider Dr. Sneed on 2/5/24 and has an appointment at [facility] next week to  postmastectomy garments.  I informed patient to bring one of the post mastectomy garments with her to the hospital the day of surgery.  We discussed the day of surgery and what to expect the days and weeks following.  I informed patient to wear loose, comfortable fitted clothing.     She is scheduled for a post op appointment with Dr. Phillips on 3/12/24 @ 9:30 am. Address/directions provided.     I answered all of patient's questions completely and thoroughly to her satisfaction.  I informed patient that I will reach out to her next week to check in with her, or she can always call me back with any questions or concerns.  Patient verbalized understanding and agrees with the plan of care.      Yahaira Mckeon, RN, BSN, PHN  Breast Care Nurse Coordinator  [facility] [place] [place]  [phone]

## 2024-02-12 ENCOUNTER — VIRTUAL VISIT (OUTPATIENT)
Dept: PSYCHOLOGY | Facility: CLINIC | Age: 44
End: 2024-02-12
Attending: NURSE PRACTITIONER
Payer: COMMERCIAL

## 2024-02-12 DIAGNOSIS — F43.23 ADJUSTMENT DISORDER WITH MIXED ANXIETY AND DEPRESSED MOOD: Primary | ICD-10-CM

## 2024-02-12 PROCEDURE — 90834 PSYTX W PT 45 MINUTES: CPT | Mod: 95

## 2024-02-12 ASSESSMENT — MIGRAINE DISABILITY ASSESSMENT (MIDAS)
HOW MANY DAYS DID YOU MISS WORK OR SCHOOL BECAUSE OF HEADACHES: 1
HOW MANY DAYS WAS YOUR PRODUCTIVITY CUT IN HALF BECAUSE OF HEADACHES: 4
HOW MANY DAYS DID YOU NOT DO HOUSEWORK BECAUSE OF HEADACHES: 4
HOW MANY DAYS IN THE PAST 3 MONTHS HAVE YOU HAD A HEADACHE: 16
HOW OFTEN WERE SOCIAL ACTIVITIES MISSED DUE TO HEADACHES: 0
TOTAL SCORE: 11
ON A SCALE FROM 0-10 ON AVERAGE HOW PAINFUL WERE HEADACHES: 5
HOW MANY DAYS WAS HOUSEWORK PRODUCTIVITY CUT IN HALF DUE TO HEADACHES: 2

## 2024-02-12 ASSESSMENT — HEADACHE IMPACT TEST (HIT 6)
HIT6 TOTAL SCORE: 58
HOW OFTEN DO HEADACHES LIMIT YOUR DAILY ACTIVITIES: SOMETIMES
HOW OFTEN DID HEADACHS LIMIT CONCENTRATION ON WORK OR DAILY ACTIVITY: SOMETIMES
HOW OFTEN HAVE YOU FELT TOO TIRED TO WORK BECAUSE OF YOUR HEADACHES: RARELY
WHEN YOU HAVE A HEADACHE HOW OFTEN DO YOU WISH YOU COULD LIE DOWN: SOMETIMES
WHEN YOU HAVE HEADACHES HOW OFTEN IS THE PAIN SEVERE: SOMETIMES
HOW OFTEN HAVE YOU FELT FED UP OR IRRITATED BECAUSE OF YOUR HEADACHES: SOMETIMES

## 2024-02-12 ASSESSMENT — PATIENT HEALTH QUESTIONNAIRE - PHQ9
SUM OF ALL RESPONSES TO PHQ QUESTIONS 1-9: 3
10. IF YOU CHECKED OFF ANY PROBLEMS, HOW DIFFICULT HAVE THESE PROBLEMS MADE IT FOR YOU TO DO YOUR WORK, TAKE CARE OF THINGS AT HOME, OR GET ALONG WITH OTHER PEOPLE: NOT DIFFICULT AT ALL
SUM OF ALL RESPONSES TO PHQ QUESTIONS 1-9: 3

## 2024-02-12 NOTE — PROGRESS NOTES
__________________________________  ESTABLISHED PATIENT NEUROLOGY NOTE    DATE OF VISIT: 2/13/2024  MRN: [id number]  PATIENT NAME: Angelica Gomez  YOB: 1980    Chief Complaint   Patient presents with    Headache     Patient reports to having headaches in the back of her head and when she moves around the headache gets worse.     SUBJECTIVE:                                                      HISTORY OF PRESENT ILLNESS:  Angelica is here for follow up regarding headaches    Angelica Gomez is a 43 year old female who presents today to establish care for migraines.  Per chart review she saw her OB/GYN July 2023 and discussed menstrual migraines. Per chart review, she gets migraines before periods, puffy, insomnia.  Periods themselves not terrible.  Didn't notice these symptoms when she had Mirena.  Pos hx of migraines with aura, but aura very infrequent.  Discussed that menstrual migraines are generally associated with changes in estrogen level prior to cycle.  However, estrogen doesn't come without risks for her, especially when keeping in mind her associated auras and strong family history of breast cancer.  Estrogen in migraines with aura is associated with increased risk of storke.  In thinking of estrogen with breast cancer, I wouldn't expect it to start a cancer that wasn't previously there, but if she were to have a hormone responsive cancer, estrogen could make it worse.  After that discussion, they decided to trial Fioricet.     02/13/24: Angelica presents to the clinic today to establish care for headache.  She tells me that she was on birth control in the past either pill or IUD due to heavy menstrual cycle.  IUD fell out of place about 2 years ago when her symptoms initially started.  She describes her headache as 3-4 out of 10 bilateral frontal pressure-like pain that is dull and constant and on occasion throbbing pain.  Associated symptom is photophobia and fatigue.  She denies  any nausea, phonophobia, vision changes or speech changes.  Excedrin and Fioricet help with pain.  This headache is monthly and starts a few days before her menstrual cycle and continues for the first few days of her menstrual cycle before resolving.    Angelica tells me that she was diagnosed with breast cancer 2 months ago.  Since then she has a new headache that starts at the back of her head.  She describes it as sharp electric sensation that radiates up her head and down into her neck.  Severity is 5-6 out of 10 for 5 minutes before reducing to a dull pain ranked 2 out of 10 in severity.  She tells me this happens once or twice a week over the last few months.  She feels it is triggered by mood.  She feels it might be closely correlated to difficult doctor appointments.  She is scheduled for double mastectomy next Wednesday.     Current Medications:   aspirin-acetaminophen-caffeine (EXCEDRIN MIGRAINE) 250-250-65 MG tablet, Take 1 tablet by mouth every 6 hours as needed.  butalbital-aspirin-caffeine (FIORINAL) -40 MG capsule, Take 1 capsule by mouth every 4 hours as needed for headaches  Calcium Carbonate-Vitamin D (CALCIUM 500 + D PO),   Cetirizine HCl (ZYRTEC ALLERGY PO), Take 10 mg by mouth daily  COMPRESSION STOCKINGS, Please measure and distribute 2 pair of 20mmHg - 30mmHg thigh high open or closed toe compression stockings with extra refills as indicated.  Fluticasone Propionate (FLONASE NA), Spray 1 spray in nostril daily  Multiple Vitamins-Minerals (MULTIVITAMIN & MINERAL PO), Take 1 each by mouth daily. [facility] women's active supplement  Probiotic Product (PROBIOTIC PO), Reported on 3/1/2017  tranexamic acid (LYSTEDA) 650 MG tablet, Take 2 tablets (1,300 mg) by mouth 3 times daily    No current facility-administered medications on file prior to visit.    Past Medical History:   Patient  has a past medical history of NO ACTIVE PROBLEMS.  Surgical History:  She  has a past surgical history that includes  " DELIVERY ONLY (); tubal ligation (); and Colonoscopy with CO2 insufflation (N/A, 2024).  Family and Social History:  Reviewed, and she  reports that she has quit smoking. Her smoking use included cigarettes. She has a 0.3 pack-year smoking history. She has never used smokeless tobacco. She reports current alcohol use. She reports that she does not use drugs.  Reviewed, and family history includes Alcohol/Drug in her paternal grandfather; Asthma in her brother; Breast Cancer in her paternal aunt and paternal aunt; Breast Cancer (age of onset: 56) in her mother; C.A.D. in her father; Cancer in her paternal aunt; Cardiovascular in her father; Cerebrovascular Disease in her father; Circulatory in her father; Eczema in her brother; Food Allergy in her brother; Genitourinary Problems in her father; Heart Disease in her father; Hypertension in her paternal grandmother; Myocardial Infarction in her maternal grandfather; No Known Problems in her daughter, maternal grandmother, and son; Ovarian Cancer in her paternal aunt; Thyroid Disease in her mother.      RECENT DIAGNOSTIC STUDIES:     Imaging: no recent     REVIEW OF SYSTEMS:                                                      10-point review of systems is negative except as mentioned above in HPI.    EXAM:                                                      Physical Exam:   Vitals: /83   Pulse 65   Ht 1.651 m (5' 5\")   Wt 82.6 kg (182 lb)   LMP 2024 (Exact Date)   BMI 30.29 kg/m    BMI= Body mass index is 30.29 kg/m .  GENERAL: NAD.  HEENT: NC/AT.  PULM: Non-labored breathing.   Neurologic:  MENTAL STATUS: Alert, attentive. Speech is fluent. Normal comprehension. Normal concentration. Adequate fund of knowledge.   CRANIAL NERVES: Visual fields intact to confrontation. Pupils equally, round and reactive to light. Facial sensation and movement normal. EOM full. Hearing intact to conversation. Trapezius strength intact. Palate moves " symmetrically. Tongue midline.  MOTOR: 5/5 in proximal and distal muscle groups of upper and lower extremities. Tone and bulk normal.   SENSATION: Normal light touch throughout.   COORDINATION: Normal finger nose finger. Finger tapping normal. Knee heel shin normal.  STATION AND GAIT: Romberg Negative. Good postural reflexes. Casual gait and tandem Normal  right hand-dominant.      ASSESSMENT and PLAN:                                                      Assessment:    ICD-10-CM    1. Menstrual migraine without status migrainosus, not intractable  G43.829 Adult Neurology  Referral        Angelica Gomez is a 43 year old female who presents today to establish care for migraines.  Per chart review she saw her OB/GYN July 2023 and discussed menstrual migraines.  Patient reports that after having her birth control removed she has a monthly headache that starts a few days prior to her menstrual cycle starting.  We decided to trial sumatriptan as abortive therapy.  She will take this a few days prior to her menstrual cycle and continue for 5 days.  We also ordered massage therapy to help with stress and headache.  Patient has a double mastectomy scheduled for next week.  She may need chemotherapy in the future.  She can contact me via Note with any questions and concerns regarding her headache plan.  We discussed interventions outlined below and a plan of the patient seen in 6 months.  Angelica understands and agrees with this plan    Plan:  --- Referral sent for craniosacral massage  --- Start abortive therapy: Sumatriptan 25 mg tablet.   --Take daily for 5 days.  Start a few days prior to your menstrual cycle.  --- If migraines are not fully treated with your abortive therapy alone you can add an over the counter medication like Aleve,Tylenol or Ibuprofen. Take at the same time as the first dose of your abortive therapy. Do not take over-the-counter medications more than 14 days a month to avoid rebound  headaches.   --- Try Nonpharmacological interventions like heat or cold, massage, or rest  --- Practice good headache hygiene: Avoid known triggers, get adequate sleep, manage stress levels, stay hydrated and eat nutritious meals  --- Plan on follow up in the Neurology Clinic in 6 months.  --- Please feel free to reach out if you have any further questions or concerns.  --- Seek immediate medical attention if an emergency arises or if your health becomes progressively worse.     It was a pleasure meeting you today!     Total Time: Total time spent for face to face visit, reviewing labs/imaging studies, counseling and coordination of care was: 45 Minutes spent on the date of the encounter doing chart review, review of outside records, review of test results, patient visit, and documentation     This note was dictated using voice recognition software.  Any grammatical or context distortions are unintentional and inherent to the software.    Ambar San, DNP, APRN, CNP  [facility]

## 2024-02-12 NOTE — PROGRESS NOTES
[facility]   Mental Health & Addiction Services     Progress Note - Initial Visit    Patient  Name:  Angelica Gomez Date: 24           Service Type: Individual     Visit Start Time: 9:00am  Visit End Time: 9:50am    Visit #: 1    Attendees: Client attended alone    Service Modality:  Video Visit:      Provider verified identity through the following two step process.  Patient provided:  Patient  and Patient address    Telemedicine Visit: The patient's condition can be safely assessed and treated via synchronous audio and visual telemedicine encounter.      Reason for Telemedicine Visit: Patient has requested telehealth visit    Originating Site (Patient Location): Patient's home    Distant Site (Provider Location): Provider Remote Setting- Home Office    Consent:  The patient/guardian has verbally consented to: the potential risks and benefits of telemedicine (video visit) versus in person care; bill my insurance or make self-payment for services provided; and responsibility for payment of non-covered services.     Patient would like the video invitation sent by:  My Chart    Mode of Communication:  Video Conference via Amwell    Distant Location (Provider):  On-site    As the provider I attest to compliance with applicable laws and regulations related to telemedicine.       DATA:   Interactive Complexity: No   Crisis: No     Presenting Concerns/  Current Stressors:   Pt and writer began DA but were unable to complete due to time constraints. Pt denied any history of HI or SI. Pt shared that she was diagnosed with breast cancer in 2023. She will be getting a bilateral mastectomy in upcoming weeks. Noted that she has been experiencing an increase in anxiety about this procedure, her lack of independence after surgery and the uncertainty of what type of treatment she will require after surgery. She reported that her mother  of metostatic breast cancer when pt was 26.  Pt was tearful as she reflected on the lack of openness about her mother's cancer and not getting more time to process this and say goodbye before her mother passed. Pt endorsed having a strong support system and good use of self-care practices including acupuncture, cupping, reading, and talking with close friends.      ASSESSMENT:  Mental Status Assessment:  Appearance:   Appropriate   Eye Contact:   Good   Psychomotor Behavior: Normal   Attitude:   Cooperative   Orientation:   All  Speech   Rate / Production: Talkative   Volume:  Normal   Mood:    Anxious  Sad   Affect:    Tearful  Thought Content:  Clear   Thought Form:  Coherent   Insight:    Good     Assessments completed prior to this visit:  The following assessments were completed by patient for this visit:  PHQ9:       2/12/2024     7:40 AM   PHQ-9 SCORE   PHQ-9 Total Score MyChart 3 (Minimal depression)   PHQ-9 Total Score 3     GAD7:        No data to display              CAGE-AID:       2/12/2024     8:09 AM   CAGE-AID Total Score   Total Score 0   Total Score MyChart 0 (A total score of 2 or greater is considered clinically significant)     PROMIS 10-Global Health (only subscores and total score):       2/12/2024     8:09 AM   PROMIS-10 Scores Only   Global Mental Health Score 15   Global Physical Health Score 16   PROMIS TOTAL - SUBSCORES 31     [place] Suicide Severity Rating Scale (Lifetime/Recent)      2/12/2024     9:59 AM   [place] Suicide Severity Rating (Lifetime/Recent)   Q1 Wish to be Dead (Lifetime) N   Q2 Non-Specific Active Suicidal Thoughts (Lifetime) N   Actual Attempt (Lifetime) N   Has subject engaged in non-suicidal self-injurious behavior? (Lifetime) N   Interrupted Attempts (Lifetime) N   Aborted or Self-Interrupted Attempt (Lifetime) N   Preparatory Acts or Behavior (Lifetime) N   Calculated C-SSRS Risk Score (Lifetime/Recent) No Risk Indicated         Safety Issues and Plan for Safety and Risk Management:   [place] Suicide  Severity Rating Scale (Lifetime/Recent)      2/12/2024     9:59 AM   [place] Suicide Severity Rating (Lifetime/Recent)   Q1 Wish to be Dead (Lifetime) N   Q2 Non-Specific Active Suicidal Thoughts (Lifetime) N   Actual Attempt (Lifetime) N   Has subject engaged in non-suicidal self-injurious behavior? (Lifetime) N   Interrupted Attempts (Lifetime) N   Aborted or Self-Interrupted Attempt (Lifetime) N   Preparatory Acts or Behavior (Lifetime) N   Calculated C-SSRS Risk Score (Lifetime/Recent) No Risk Indicated     Patient denies current fears or concerns for personal safety.  Patient denies current or recent suicidal ideation or behaviors.  Patient denies current or recent homicidal ideation or behaviors.  Patient denies current or recent self injurious behavior or ideation.  Patient denies other safety concerns.  Recommended that patient call 911 or go to the local ED should there be a change in any of these risk factors.  Patient reports there are no firearms in the house.     Diagnostic Criteria:  Adjustment Disorder  A. The development of emotional or behavioral symptoms in response to an identifiable stressor(s) occurring within 3 months of the onset of the stressor(s)  B. These symptoms or behaviors are clinically significant, as evidenced by one or both of the following:       - Marked distress that is out of proportion to the severity/intensity of the stressor (with consideration for external context & culture)       - Significant impairment in social, occupational, or other important areas of functioning  C. The stress-related disturbance does not meet criteria for another disorder & is not not an exacerbation of another mental disorder  D. The symptoms do not represent normal bereavement  E. Once the stressor or its consequences have terminated, the symptoms do not persist for more than an additional 6 months       * Adjustment Disorder with Mixed Anxiety and Depressed Mood: The predominant manifestation is  a combination of depression and anxiety      DSM5 Diagnoses: (Sustained by DSM5 Criteria Listed Above)  Diagnoses: Adjustment Disorders  309.28 (F43.23) With mixed anxiety and depressed mood  Psychosocial & Contextual Factors: breast cancer diagnosis December 2023, DNP  Intervention:   Psychodynamic- Patient processed internal experiences  and Completed through review of safety issues and safety interventions  Collateral Reports Completed:  Not Applicable      PLAN: (Homework, other):  1. Provider will continue Diagnostic Assessment.  Patient was given the following to do until next session:  Continue use of natural coping skills- reading, movies, talking to friends, letting friends support after surgery.    2. Provider recommended the following referrals: NA.      3.  Suicide Risk and Safety Concerns were assessed for Angelica Gomez.    Patient meets the following risk assessment and triage: Patient denied any current/recent/lifetime history of suicidal ideation and/or behaviors.  No safety plan indicated at this time.       GT Palencia  February 12, 2024      This note has been reviewed and I agree with the plan of care. This note is co-signed by JESÚS Meyers, [facility], Supervisor, on: February 12, 2024     Answers submitted by the patient for this visit:  Patient Health Questionnaire (Submitted on 2/12/2024)  If you checked off any problems, how difficult have these problems made it for you to do your work, take care of things at home, or get along with other people?: Not difficult at all  PHQ9 TOTAL SCORE: 3

## 2024-02-13 ENCOUNTER — OFFICE VISIT (OUTPATIENT)
Dept: NEUROLOGY | Facility: CLINIC | Age: 44
End: 2024-02-13
Attending: OBSTETRICS & GYNECOLOGY
Payer: COMMERCIAL

## 2024-02-13 VITALS
SYSTOLIC BLOOD PRESSURE: 132 MMHG | HEART RATE: 65 BPM | HEIGHT: 65 IN | BODY MASS INDEX: 30.32 KG/M2 | DIASTOLIC BLOOD PRESSURE: 83 MMHG | WEIGHT: 182 LBS

## 2024-02-13 DIAGNOSIS — R51.9 HEADACHE, OCCIPITAL: Primary | ICD-10-CM

## 2024-02-13 DIAGNOSIS — G43.829 MENSTRUAL MIGRAINE WITHOUT STATUS MIGRAINOSUS, NOT INTRACTABLE: ICD-10-CM

## 2024-02-13 PROCEDURE — 99204 OFFICE O/P NEW MOD 45 MIN: CPT

## 2024-02-13 RX ORDER — SUMATRIPTAN 25 MG/1
25 TABLET, FILM COATED ORAL
Qty: 18 TABLET | Refills: 4 | Status: SHIPPED | OUTPATIENT
Start: 2024-02-13

## 2024-02-13 NOTE — NURSING NOTE
Chief Complaint   Patient presents with    Headache     Patient reports to having headaches in the back of her head and when she moves around the headache gets worse.     Yesi Jacob on 2/13/2024 at 2:14 PM

## 2024-02-13 NOTE — LETTER
2/13/2024         RE: Angelica Gomez  [address]  [facility] [address]        Dear Colleague,    Thank you for referring your patient, Angelica Gomez, to the [facility]UR[facility] [place]. Please see a copy of my visit note below.      __________________________________  ESTABLISHED PATIENT NEUROLOGY NOTE    DATE OF VISIT: 2/13/2024  MRN: [id number]  PATIENT NAME: Angelica Gomez  YOB: 1980    Chief Complaint   Patient presents with     Headache     Patient reports to having headaches in the back of her head and when she moves around the headache gets worse.     SUBJECTIVE:                                                      HISTORY OF PRESENT ILLNESS:  Angelica is here for follow up regarding headaches    Angelica Gomez is a 43 year old female who presents today to establish care for migraines.  Per chart review she saw her OB/GYN July 2023 and discussed menstrual migraines. Per chart review, she gets migraines before periods, puffy, insomnia.  Periods themselves not terrible.  Didn't notice these symptoms when she had Mirena.  Pos hx of migraines with aura, but aura very infrequent.  Discussed that menstrual migraines are generally associated with changes in estrogen level prior to cycle.  However, estrogen doesn't come without risks for her, especially when keeping in mind her associated auras and strong family history of breast cancer.  Estrogen in migraines with aura is associated with increased risk of storke.  In thinking of estrogen with breast cancer, I wouldn't expect it to start a cancer that wasn't previously there, but if she were to have a hormone responsive cancer, estrogen could make it worse.  After that discussion, they decided to trial Fioricet.     02/13/24: Angelica presents to the clinic today to establish care for headache.  She tells me that she was on birth control in the past either pill or IUD due to heavy menstrual cycle.  IUD  fell out of place about 2 years ago when her symptoms initially started.  She describes her headache as 3-4 out of 10 bilateral frontal pressure-like pain that is dull and constant and on occasion throbbing pain.  Associated symptom is photophobia and fatigue.  She denies any nausea, phonophobia, vision changes or speech changes.  Excedrin and Fioricet help with pain.  This headache is monthly and starts a few days before her menstrual cycle and continues for the first few days of her menstrual cycle before resolving.    Angelica tells me that she was diagnosed with breast cancer 2 months ago.  Since then she has a new headache that starts at the back of her head.  She describes it as sharp electric sensation that radiates up her head and down into her neck.  Severity is 5-6 out of 10 for 5 minutes before reducing to a dull pain ranked 2 out of 10 in severity.  She tells me this happens once or twice a week over the last few months.  She feels it is triggered by mood.  She feels it might be closely correlated to difficult doctor appointments.  She is scheduled for double mastectomy next Wednesday.     Current Medications:   aspirin-acetaminophen-caffeine (EXCEDRIN MIGRAINE) 250-250-65 MG tablet, Take 1 tablet by mouth every 6 hours as needed.  butalbital-aspirin-caffeine (FIORINAL) -40 MG capsule, Take 1 capsule by mouth every 4 hours as needed for headaches  Calcium Carbonate-Vitamin D (CALCIUM 500 + D PO),   Cetirizine HCl (ZYRTEC ALLERGY PO), Take 10 mg by mouth daily  COMPRESSION STOCKINGS, Please measure and distribute 2 pair of 20mmHg - 30mmHg thigh high open or closed toe compression stockings with extra refills as indicated.  Fluticasone Propionate (FLONASE NA), Spray 1 spray in nostril daily  Multiple Vitamins-Minerals (MULTIVITAMIN & MINERAL PO), Take 1 each by mouth daily. [facility] women's active supplement  Probiotic Product (PROBIOTIC PO), Reported on 3/1/2017  tranexamic acid (LYSTEDA) 650 MG tablet,  "Take 2 tablets (1,300 mg) by mouth 3 times daily    No current facility-administered medications on file prior to visit.    Past Medical History:   Patient  has a past medical history of NO ACTIVE PROBLEMS.  Surgical History:  She  has a past surgical history that includes  DELIVERY ONLY (); tubal ligation (); and Colonoscopy with CO2 insufflation (N/A, 2024).  Family and Social History:  Reviewed, and she  reports that she has quit smoking. Her smoking use included cigarettes. She has a 0.3 pack-year smoking history. She has never used smokeless tobacco. She reports current alcohol use. She reports that she does not use drugs.  Reviewed, and family history includes Alcohol/Drug in her paternal grandfather; Asthma in her brother; Breast Cancer in her paternal aunt and paternal aunt; Breast Cancer (age of onset: 56) in her mother; C.A.D. in her father; Cancer in her paternal aunt; Cardiovascular in her father; Cerebrovascular Disease in her father; Circulatory in her father; Eczema in her brother; Food Allergy in her brother; Genitourinary Problems in her father; Heart Disease in her father; Hypertension in her paternal grandmother; Myocardial Infarction in her maternal grandfather; No Known Problems in her daughter, maternal grandmother, and son; Ovarian Cancer in her paternal aunt; Thyroid Disease in her mother.      RECENT DIAGNOSTIC STUDIES:     Imaging: no recent     REVIEW OF SYSTEMS:                                                      10-point review of systems is negative except as mentioned above in HPI.    EXAM:                                                      Physical Exam:   Vitals: /83   Pulse 65   Ht 1.651 m (5' 5\")   Wt 82.6 kg (182 lb)   LMP 2024 (Exact Date)   BMI 30.29 kg/m    BMI= Body mass index is 30.29 kg/m .  GENERAL: NAD.  HEENT: NC/AT.  PULM: Non-labored breathing.   Neurologic:  MENTAL STATUS: Alert, attentive. Speech is fluent. Normal comprehension. " Normal concentration. Adequate fund of knowledge.   CRANIAL NERVES: Visual fields intact to confrontation. Pupils equally, round and reactive to light. Facial sensation and movement normal. EOM full. Hearing intact to conversation. Trapezius strength intact. Palate moves symmetrically. Tongue midline.  MOTOR: 5/5 in proximal and distal muscle groups of upper and lower extremities. Tone and bulk normal.   SENSATION: Normal light touch throughout.   COORDINATION: Normal finger nose finger. Finger tapping normal. Knee heel shin normal.  STATION AND GAIT: Romberg Negative. Good postural reflexes. Casual gait and tandem Normal  right hand-dominant.      ASSESSMENT and PLAN:                                                      Assessment:    ICD-10-CM    1. Menstrual migraine without status migrainosus, not intractable  G43.829 Adult Neurology  Referral        Angelica Gomez is a 43 year old female who presents today to establish care for migraines.  Per chart review she saw her OB/GYN July 2023 and discussed menstrual migraines.  Patient reports that after having her birth control removed she has a monthly headache that starts a few days prior to her menstrual cycle starting.  We decided to trial sumatriptan as abortive therapy.  She will take this a few days prior to her menstrual cycle and continue for 5 days.  We also ordered massage therapy to help with stress and headache.  Patient has a double mastectomy scheduled for next week.  She may need chemotherapy in the future.  She can contact me via [employer] with any questions and concerns regarding her headache plan.  We discussed interventions outlined below and a plan of the patient seen in 6 months.  Angelica understands and agrees with this plan    Plan:  --- Referral sent for craniosacral massage  --- Start abortive therapy: Sumatriptan 25 mg tablet.   --Take daily for 5 days.  Start a few days prior to your menstrual cycle.  --- If migraines are not  fully treated with your abortive therapy alone you can add an over the counter medication like Aleve,Tylenol or Ibuprofen. Take at the same time as the first dose of your abortive therapy. Do not take over-the-counter medications more than 14 days a month to avoid rebound headaches.   --- Try Nonpharmacological interventions like heat or cold, massage, or rest  --- Practice good headache hygiene: Avoid known triggers, get adequate sleep, manage stress levels, stay hydrated and eat nutritious meals  --- Plan on follow up in the Neurology Clinic in 6 months.  --- Please feel free to reach out if you have any further questions or concerns.  --- Seek immediate medical attention if an emergency arises or if your health becomes progressively worse.     It was a pleasure meeting you today!     Total Time: Total time spent for face to face visit, reviewing labs/imaging studies, counseling and coordination of care was: 45 Minutes spent on the date of the encounter doing chart review, review of outside records, review of test results, patient visit, and documentation     This note was dictated using voice recognition software.  Any grammatical or context distortions are unintentional and inherent to the software.    Ambar San, PENELOPE, APRN, CNP  [facility]           Again, thank you for allowing me to participate in the care of your patient.        Sincerely,        DEISI Gomez CNP

## 2024-02-13 NOTE — PATIENT INSTRUCTIONS
Plan:  --- Referral sent for craniosacral massage  --- Start abortive therapy: Sumatriptan 25 mg tablet.   --Take daily for 5 days.  Start a few days prior to your menstrual cycle.  --- If migraines are not fully treated with your abortive therapy alone you can add an over the counter medication like Aleve,Tylenol or Ibuprofen. Take at the same time as the first dose of your abortive therapy. Do not take over-the-counter medications more than 14 days a month to avoid rebound headaches.   --- Try Nonpharmacological interventions like heat or cold, massage, or rest  --- Practice good headache hygiene: Avoid known triggers, get adequate sleep, manage stress levels, stay hydrated and eat nutritious meals  --- Plan on follow up in the Neurology Clinic in 6 months.  --- Please feel free to reach out if you have any further questions or concerns.  --- Seek immediate medical attention if an emergency arises or if your health becomes progressively worse.     It was a pleasure meeting you today!   
no

## 2024-02-21 ENCOUNTER — APPOINTMENT (OUTPATIENT)
Dept: SURGERY | Facility: PHYSICIAN GROUP | Age: 44
End: 2024-02-21
Payer: COMMERCIAL

## 2024-02-21 ENCOUNTER — ANESTHESIA EVENT (OUTPATIENT)
Dept: SURGERY | Facility: CLINIC | Age: 44
End: 2024-02-21
Payer: COMMERCIAL

## 2024-02-21 ENCOUNTER — TRANSFERRED RECORDS (OUTPATIENT)
Dept: ONCOLOGY | Facility: CLINIC | Age: 44
End: 2024-02-21

## 2024-02-21 ENCOUNTER — HOSPITAL ENCOUNTER (OUTPATIENT)
Facility: CLINIC | Age: 44
Discharge: HOME | End: 2024-02-22
Attending: SURGERY | Admitting: SURGERY
Payer: COMMERCIAL

## 2024-02-21 ENCOUNTER — HOSPITAL ENCOUNTER (OUTPATIENT)
Dept: NUCLEAR MEDICINE | Facility: CLINIC | Age: 44
Setting detail: NUCLEAR MEDICINE
Discharge: HOME | End: 2024-02-21
Attending: SURGERY | Admitting: SURGERY
Payer: COMMERCIAL

## 2024-02-21 ENCOUNTER — ANESTHESIA (OUTPATIENT)
Dept: SURGERY | Facility: CLINIC | Age: 44
End: 2024-02-21
Payer: COMMERCIAL

## 2024-02-21 DIAGNOSIS — G89.18 ACUTE POST-OPERATIVE PAIN: Primary | ICD-10-CM

## 2024-02-21 DIAGNOSIS — C50.912 INVASIVE DUCTAL CARCINOMA OF BREAST, FEMALE, LEFT (H): ICD-10-CM

## 2024-02-21 LAB — HCG UR QL: NEGATIVE

## 2024-02-21 PROCEDURE — 250N000009 HC RX 250: Performed by: ANESTHESIOLOGY

## 2024-02-21 PROCEDURE — 343N000001 HC RX 343: Performed by: SURGERY

## 2024-02-21 PROCEDURE — 250N000013 HC RX MED GY IP 250 OP 250 PS 637: Performed by: PHYSICIAN ASSISTANT

## 2024-02-21 PROCEDURE — 38525 BIOPSY/REMOVAL LYMPH NODES: CPT | Mod: 51 | Performed by: SURGERY

## 2024-02-21 PROCEDURE — 88307 TISSUE EXAM BY PATHOLOGIST: CPT | Mod: 26 | Performed by: STUDENT IN AN ORGANIZED HEALTH CARE EDUCATION/TRAINING PROGRAM

## 2024-02-21 PROCEDURE — 250N000013 HC RX MED GY IP 250 OP 250 PS 637: Performed by: PLASTIC SURGERY

## 2024-02-21 PROCEDURE — 19303 MAST SIMPLE COMPLETE: CPT | Mod: 50 | Performed by: PHYSICIAN ASSISTANT

## 2024-02-21 PROCEDURE — 250N000011 HC RX IP 250 OP 636: Performed by: PLASTIC SURGERY

## 2024-02-21 PROCEDURE — 999N000141 HC STATISTIC PRE-PROCEDURE NURSING ASSESSMENT: Performed by: SURGERY

## 2024-02-21 PROCEDURE — 38792 RA TRACER ID OF SENTINL NODE: CPT

## 2024-02-21 PROCEDURE — 19303 MAST SIMPLE COMPLETE: CPT | Performed by: ANESTHESIOLOGY

## 2024-02-21 PROCEDURE — 250N000025 HC SEVOFLURANE, PER MIN: Performed by: SURGERY

## 2024-02-21 PROCEDURE — A9520 TC99 TILMANOCEPT DIAG 0.5MCI: HCPCS | Performed by: SURGERY

## 2024-02-21 PROCEDURE — 88360 TUMOR IMMUNOHISTOCHEM/MANUAL: CPT | Mod: 26 | Performed by: STUDENT IN AN ORGANIZED HEALTH CARE EDUCATION/TRAINING PROGRAM

## 2024-02-21 PROCEDURE — 250N000009 HC RX 250: Performed by: SURGERY

## 2024-02-21 PROCEDURE — 710N000009 HC RECOVERY PHASE 1, LEVEL 1, PER MIN: Performed by: SURGERY

## 2024-02-21 PROCEDURE — 258N000003 HC RX IP 258 OP 636: Performed by: NURSE ANESTHETIST, CERTIFIED REGISTERED

## 2024-02-21 PROCEDURE — 250N000009 HC RX 250: Performed by: NURSE ANESTHETIST, CERTIFIED REGISTERED

## 2024-02-21 PROCEDURE — 250N000012 HC RX MED GY IP 250 OP 636 PS 637: Performed by: ANESTHESIOLOGY

## 2024-02-21 PROCEDURE — 81025 URINE PREGNANCY TEST: CPT | Performed by: ANESTHESIOLOGY

## 2024-02-21 PROCEDURE — 250N000011 HC RX IP 250 OP 636: Performed by: ANESTHESIOLOGY

## 2024-02-21 PROCEDURE — 360N000076 HC SURGERY LEVEL 3, PER MIN: Performed by: SURGERY

## 2024-02-21 PROCEDURE — 250N000013 HC RX MED GY IP 250 OP 250 PS 637: Performed by: ANESTHESIOLOGY

## 2024-02-21 PROCEDURE — L8699 PROSTHETIC IMPLANT NOS: HCPCS | Performed by: SURGERY

## 2024-02-21 PROCEDURE — 19303 MAST SIMPLE COMPLETE: CPT | Performed by: NURSE ANESTHETIST, CERTIFIED REGISTERED

## 2024-02-21 PROCEDURE — 370N000017 HC ANESTHESIA TECHNICAL FEE, PER MIN: Performed by: SURGERY

## 2024-02-21 PROCEDURE — 38525 BIOPSY/REMOVAL LYMPH NODES: CPT | Mod: 51 | Performed by: PHYSICIAN ASSISTANT

## 2024-02-21 PROCEDURE — 258N000003 HC RX IP 258 OP 636: Performed by: PHYSICIAN ASSISTANT

## 2024-02-21 PROCEDURE — 272N000001 HC OR GENERAL SUPPLY STERILE: Performed by: SURGERY

## 2024-02-21 PROCEDURE — 250N000009 HC RX 250: Performed by: PLASTIC SURGERY

## 2024-02-21 PROCEDURE — 19303 MAST SIMPLE COMPLETE: CPT | Mod: 50 | Performed by: SURGERY

## 2024-02-21 PROCEDURE — 88300 SURGICAL PATH GROSS: CPT | Mod: TC | Performed by: SURGERY

## 2024-02-21 PROCEDURE — 250N000011 HC RX IP 250 OP 636: Performed by: NURSE ANESTHETIST, CERTIFIED REGISTERED

## 2024-02-21 PROCEDURE — 88300 SURGICAL PATH GROSS: CPT | Mod: 26 | Performed by: STUDENT IN AN ORGANIZED HEALTH CARE EDUCATION/TRAINING PROGRAM

## 2024-02-21 DEVICE — NATRELLE TE SMOOTH 133S-MV-14-T (US)
Type: IMPLANTABLE DEVICE | Site: BREAST | Status: NON-FUNCTIONAL
Brand: NATRELLE 133S TISSUE EXPANDERS
Removed: 2024-07-30

## 2024-02-21 RX ORDER — HYDROMORPHONE HYDROCHLORIDE 1 MG/ML
INJECTION, SOLUTION INTRAMUSCULAR; INTRAVENOUS; SUBCUTANEOUS PRN
Status: DISCONTINUED | OUTPATIENT
Start: 2024-02-21 | End: 2024-02-21

## 2024-02-21 RX ORDER — ONDANSETRON 2 MG/ML
4 INJECTION INTRAMUSCULAR; INTRAVENOUS EVERY 6 HOURS PRN
Status: DISCONTINUED | OUTPATIENT
Start: 2024-02-21 | End: 2024-02-22 | Stop reason: HOSPADM

## 2024-02-21 RX ORDER — SODIUM CHLORIDE, SODIUM LACTATE, POTASSIUM CHLORIDE, CALCIUM CHLORIDE 600; 310; 30; 20 MG/100ML; MG/100ML; MG/100ML; MG/100ML
INJECTION, SOLUTION INTRAVENOUS CONTINUOUS
Status: DISCONTINUED | OUTPATIENT
Start: 2024-02-21 | End: 2024-02-22 | Stop reason: HOSPADM

## 2024-02-21 RX ORDER — CEFAZOLIN SODIUM/WATER 2 G/20 ML
2 SYRINGE (ML) INTRAVENOUS
Status: COMPLETED | OUTPATIENT
Start: 2024-02-21 | End: 2024-02-21

## 2024-02-21 RX ORDER — COVID-19 ANTIGEN TEST
KIT MISCELLANEOUS
Status: ON HOLD | COMMUNITY
Start: 2021-12-01 | End: 2024-02-21

## 2024-02-21 RX ORDER — SODIUM CHLORIDE, SODIUM LACTATE, POTASSIUM CHLORIDE, CALCIUM CHLORIDE 600; 310; 30; 20 MG/100ML; MG/100ML; MG/100ML; MG/100ML
INJECTION, SOLUTION INTRAVENOUS CONTINUOUS
Status: DISCONTINUED | OUTPATIENT
Start: 2024-02-21 | End: 2024-02-21 | Stop reason: HOSPADM

## 2024-02-21 RX ORDER — LIDOCAINE 40 MG/G
CREAM TOPICAL
Status: DISCONTINUED | OUTPATIENT
Start: 2024-02-21 | End: 2024-02-21 | Stop reason: HOSPADM

## 2024-02-21 RX ORDER — CEFAZOLIN SODIUM/WATER 2 G/20 ML
2 SYRINGE (ML) INTRAVENOUS
Status: DISCONTINUED | OUTPATIENT
Start: 2024-02-21 | End: 2024-02-21 | Stop reason: HOSPADM

## 2024-02-21 RX ORDER — FENTANYL CITRATE 50 UG/ML
INJECTION, SOLUTION INTRAMUSCULAR; INTRAVENOUS PRN
Status: DISCONTINUED | OUTPATIENT
Start: 2024-02-21 | End: 2024-02-21

## 2024-02-21 RX ORDER — FENTANYL CITRATE 0.05 MG/ML
25 INJECTION, SOLUTION INTRAMUSCULAR; INTRAVENOUS EVERY 5 MIN PRN
Status: DISCONTINUED | OUTPATIENT
Start: 2024-02-21 | End: 2024-02-21 | Stop reason: HOSPADM

## 2024-02-21 RX ORDER — OXYCODONE HYDROCHLORIDE 5 MG/1
5 TABLET ORAL EVERY 4 HOURS PRN
Status: DISCONTINUED | OUTPATIENT
Start: 2024-02-21 | End: 2024-02-22 | Stop reason: HOSPADM

## 2024-02-21 RX ORDER — FENTANYL CITRATE 0.05 MG/ML
50 INJECTION, SOLUTION INTRAMUSCULAR; INTRAVENOUS EVERY 5 MIN PRN
Status: DISCONTINUED | OUTPATIENT
Start: 2024-02-21 | End: 2024-02-21 | Stop reason: HOSPADM

## 2024-02-21 RX ORDER — LIDOCAINE HYDROCHLORIDE 20 MG/ML
INJECTION, SOLUTION INFILTRATION; PERINEURAL PRN
Status: DISCONTINUED | OUTPATIENT
Start: 2024-02-21 | End: 2024-02-21

## 2024-02-21 RX ORDER — HYDROMORPHONE HCL IN WATER/PF 6 MG/30 ML
0.2 PATIENT CONTROLLED ANALGESIA SYRINGE INTRAVENOUS
Status: DISCONTINUED | OUTPATIENT
Start: 2024-02-21 | End: 2024-02-22 | Stop reason: HOSPADM

## 2024-02-21 RX ORDER — ACETAMINOPHEN 500 MG
1000 TABLET ORAL ONCE
Status: COMPLETED | OUTPATIENT
Start: 2024-02-21 | End: 2024-02-21

## 2024-02-21 RX ORDER — CETIRIZINE HYDROCHLORIDE 10 MG/1
10 TABLET ORAL DAILY
Status: DISCONTINUED | OUTPATIENT
Start: 2024-02-22 | End: 2024-02-22 | Stop reason: HOSPADM

## 2024-02-21 RX ORDER — HYDROMORPHONE HCL IN WATER/PF 6 MG/30 ML
0.4 PATIENT CONTROLLED ANALGESIA SYRINGE INTRAVENOUS EVERY 5 MIN PRN
Status: DISCONTINUED | OUTPATIENT
Start: 2024-02-21 | End: 2024-02-21 | Stop reason: HOSPADM

## 2024-02-21 RX ORDER — AMOXICILLIN 250 MG
1 CAPSULE ORAL 2 TIMES DAILY
Status: DISCONTINUED | OUTPATIENT
Start: 2024-02-21 | End: 2024-02-22 | Stop reason: HOSPADM

## 2024-02-21 RX ORDER — CEFAZOLIN SODIUM/WATER 2 G/20 ML
2 SYRINGE (ML) INTRAVENOUS SEE ADMIN INSTRUCTIONS
Status: DISCONTINUED | OUTPATIENT
Start: 2024-02-21 | End: 2024-02-21 | Stop reason: HOSPADM

## 2024-02-21 RX ORDER — HYDROXYZINE HYDROCHLORIDE 25 MG/1
25 TABLET, FILM COATED ORAL ONCE
Status: COMPLETED | OUTPATIENT
Start: 2024-02-21 | End: 2024-02-21

## 2024-02-21 RX ORDER — NALOXONE HYDROCHLORIDE 0.4 MG/ML
0.2 INJECTION, SOLUTION INTRAMUSCULAR; INTRAVENOUS; SUBCUTANEOUS
Status: DISCONTINUED | OUTPATIENT
Start: 2024-02-21 | End: 2024-02-22 | Stop reason: HOSPADM

## 2024-02-21 RX ORDER — ACETAMINOPHEN 325 MG/1
975 TABLET ORAL EVERY 6 HOURS
Status: DISCONTINUED | OUTPATIENT
Start: 2024-02-21 | End: 2024-02-22 | Stop reason: HOSPADM

## 2024-02-21 RX ORDER — ONDANSETRON 4 MG/1
4 TABLET, ORALLY DISINTEGRATING ORAL EVERY 30 MIN PRN
Status: DISCONTINUED | OUTPATIENT
Start: 2024-02-21 | End: 2024-02-21 | Stop reason: HOSPADM

## 2024-02-21 RX ORDER — METHOCARBAMOL 750 MG/1
750 TABLET, FILM COATED ORAL 3 TIMES DAILY
Status: DISCONTINUED | OUTPATIENT
Start: 2024-02-21 | End: 2024-02-22 | Stop reason: HOSPADM

## 2024-02-21 RX ORDER — FAMOTIDINE 20 MG/1
20 TABLET, FILM COATED ORAL 2 TIMES DAILY
Status: DISCONTINUED | OUTPATIENT
Start: 2024-02-21 | End: 2024-02-22 | Stop reason: HOSPADM

## 2024-02-21 RX ORDER — NALOXONE HYDROCHLORIDE 0.4 MG/ML
0.1 INJECTION, SOLUTION INTRAMUSCULAR; INTRAVENOUS; SUBCUTANEOUS
Status: DISCONTINUED | OUTPATIENT
Start: 2024-02-21 | End: 2024-02-21 | Stop reason: HOSPADM

## 2024-02-21 RX ORDER — HYDROMORPHONE HCL IN WATER/PF 6 MG/30 ML
0.2 PATIENT CONTROLLED ANALGESIA SYRINGE INTRAVENOUS EVERY 5 MIN PRN
Status: DISCONTINUED | OUTPATIENT
Start: 2024-02-21 | End: 2024-02-21 | Stop reason: HOSPADM

## 2024-02-21 RX ORDER — APREPITANT 40 MG/1
40 CAPSULE ORAL ONCE
Status: COMPLETED | OUTPATIENT
Start: 2024-02-21 | End: 2024-02-21

## 2024-02-21 RX ORDER — ONDANSETRON 2 MG/ML
INJECTION INTRAMUSCULAR; INTRAVENOUS PRN
Status: DISCONTINUED | OUTPATIENT
Start: 2024-02-21 | End: 2024-02-21

## 2024-02-21 RX ORDER — LIDOCAINE 40 MG/G
CREAM TOPICAL
Status: DISCONTINUED | OUTPATIENT
Start: 2024-02-21 | End: 2024-02-22 | Stop reason: HOSPADM

## 2024-02-21 RX ORDER — NALOXONE HYDROCHLORIDE 0.4 MG/ML
0.4 INJECTION, SOLUTION INTRAMUSCULAR; INTRAVENOUS; SUBCUTANEOUS
Status: DISCONTINUED | OUTPATIENT
Start: 2024-02-21 | End: 2024-02-22 | Stop reason: HOSPADM

## 2024-02-21 RX ORDER — PROPOFOL 10 MG/ML
INJECTION, EMULSION INTRAVENOUS PRN
Status: DISCONTINUED | OUTPATIENT
Start: 2024-02-21 | End: 2024-02-21

## 2024-02-21 RX ORDER — DEXAMETHASONE SODIUM PHOSPHATE 4 MG/ML
INJECTION, SOLUTION INTRA-ARTICULAR; INTRALESIONAL; INTRAMUSCULAR; INTRAVENOUS; SOFT TISSUE PRN
Status: DISCONTINUED | OUTPATIENT
Start: 2024-02-21 | End: 2024-02-21

## 2024-02-21 RX ORDER — PROPOFOL 10 MG/ML
INJECTION, EMULSION INTRAVENOUS CONTINUOUS PRN
Status: DISCONTINUED | OUTPATIENT
Start: 2024-02-21 | End: 2024-02-21

## 2024-02-21 RX ORDER — SUMATRIPTAN 25 MG/1
25 TABLET, FILM COATED ORAL
Status: DISCONTINUED | OUTPATIENT
Start: 2024-02-21 | End: 2024-02-22 | Stop reason: HOSPADM

## 2024-02-21 RX ORDER — ONDANSETRON 2 MG/ML
4 INJECTION INTRAMUSCULAR; INTRAVENOUS EVERY 30 MIN PRN
Status: DISCONTINUED | OUTPATIENT
Start: 2024-02-21 | End: 2024-02-21 | Stop reason: HOSPADM

## 2024-02-21 RX ORDER — ONDANSETRON 4 MG/1
4 TABLET, ORALLY DISINTEGRATING ORAL EVERY 6 HOURS PRN
Status: DISCONTINUED | OUTPATIENT
Start: 2024-02-21 | End: 2024-02-22 | Stop reason: HOSPADM

## 2024-02-21 RX ORDER — PROCHLORPERAZINE MALEATE 10 MG
10 TABLET ORAL EVERY 6 HOURS PRN
Status: DISCONTINUED | OUTPATIENT
Start: 2024-02-21 | End: 2024-02-22 | Stop reason: HOSPADM

## 2024-02-21 RX ORDER — OXYCODONE HYDROCHLORIDE 5 MG/1
10 TABLET ORAL EVERY 4 HOURS PRN
Status: DISCONTINUED | OUTPATIENT
Start: 2024-02-21 | End: 2024-02-22 | Stop reason: HOSPADM

## 2024-02-21 RX ORDER — HYDROMORPHONE HCL IN WATER/PF 6 MG/30 ML
0.4 PATIENT CONTROLLED ANALGESIA SYRINGE INTRAVENOUS
Status: DISCONTINUED | OUTPATIENT
Start: 2024-02-21 | End: 2024-02-22 | Stop reason: HOSPADM

## 2024-02-21 RX ORDER — SODIUM CHLORIDE, SODIUM LACTATE, POTASSIUM CHLORIDE, CALCIUM CHLORIDE 600; 310; 30; 20 MG/100ML; MG/100ML; MG/100ML; MG/100ML
INJECTION, SOLUTION INTRAVENOUS CONTINUOUS PRN
Status: DISCONTINUED | OUTPATIENT
Start: 2024-02-21 | End: 2024-02-21

## 2024-02-21 RX ORDER — MAGNESIUM HYDROXIDE 1200 MG/15ML
LIQUID ORAL PRN
Status: DISCONTINUED | OUTPATIENT
Start: 2024-02-21 | End: 2024-02-21 | Stop reason: HOSPADM

## 2024-02-21 RX ADMIN — ONDANSETRON 4 MG: 2 INJECTION INTRAMUSCULAR; INTRAVENOUS at 18:10

## 2024-02-21 RX ADMIN — PROPOFOL 100 MCG/KG/MIN: 10 INJECTION, EMULSION INTRAVENOUS at 13:58

## 2024-02-21 RX ADMIN — PROPOFOL 180 MG: 10 INJECTION, EMULSION INTRAVENOUS at 13:56

## 2024-02-21 RX ADMIN — HYDROXYZINE HYDROCHLORIDE 25 MG: 25 TABLET, FILM COATED ORAL at 12:29

## 2024-02-21 RX ADMIN — MIDAZOLAM 2 MG: 1 INJECTION INTRAMUSCULAR; INTRAVENOUS at 13:51

## 2024-02-21 RX ADMIN — FAMOTIDINE 20 MG: 20 TABLET ORAL at 21:44

## 2024-02-21 RX ADMIN — DEXAMETHASONE SODIUM PHOSPHATE 4 MG: 4 INJECTION, SOLUTION INTRA-ARTICULAR; INTRALESIONAL; INTRAMUSCULAR; INTRAVENOUS; SOFT TISSUE at 14:07

## 2024-02-21 RX ADMIN — SODIUM CHLORIDE, POTASSIUM CHLORIDE, SODIUM LACTATE AND CALCIUM CHLORIDE: 600; 310; 30; 20 INJECTION, SOLUTION INTRAVENOUS at 21:39

## 2024-02-21 RX ADMIN — SODIUM CHLORIDE, POTASSIUM CHLORIDE, SODIUM LACTATE AND CALCIUM CHLORIDE: 600; 310; 30; 20 INJECTION, SOLUTION INTRAVENOUS at 13:50

## 2024-02-21 RX ADMIN — PROPOFOL 20 MG: 10 INJECTION, EMULSION INTRAVENOUS at 13:55

## 2024-02-21 RX ADMIN — METHOCARBAMOL 750 MG: 750 TABLET ORAL at 21:44

## 2024-02-21 RX ADMIN — APREPITANT 40 MG: 40 CAPSULE ORAL at 12:29

## 2024-02-21 RX ADMIN — LIDOCAINE HYDROCHLORIDE 100 MG: 20 INJECTION, SOLUTION INFILTRATION; PERINEURAL at 13:55

## 2024-02-21 RX ADMIN — BUPIVACAINE HYDROCHLORIDE 60 ML: 2.5 INJECTION, SOLUTION EPIDURAL; INFILTRATION; INTRACAUDAL at 14:10

## 2024-02-21 RX ADMIN — ACETAMINOPHEN 1000 MG: 500 TABLET, FILM COATED ORAL at 12:29

## 2024-02-21 RX ADMIN — FENTANYL CITRATE 25 MCG: 50 INJECTION INTRAMUSCULAR; INTRAVENOUS at 14:27

## 2024-02-21 RX ADMIN — HYDROMORPHONE HYDROCHLORIDE 0.4 MG: 0.2 INJECTION, SOLUTION INTRAMUSCULAR; INTRAVENOUS; SUBCUTANEOUS at 20:45

## 2024-02-21 RX ADMIN — ACETAMINOPHEN 975 MG: 325 TABLET, FILM COATED ORAL at 23:29

## 2024-02-21 RX ADMIN — SODIUM CHLORIDE, POTASSIUM CHLORIDE, SODIUM LACTATE AND CALCIUM CHLORIDE: 600; 310; 30; 20 INJECTION, SOLUTION INTRAVENOUS at 15:21

## 2024-02-21 RX ADMIN — DOCUSATE SODIUM 50 MG AND SENNOSIDES 8.6 MG 1 TABLET: 8.6; 5 TABLET, FILM COATED ORAL at 21:44

## 2024-02-21 RX ADMIN — FENTANYL CITRATE 25 MCG: 50 INJECTION INTRAMUSCULAR; INTRAVENOUS at 14:46

## 2024-02-21 RX ADMIN — TILMANOCEPT 0.55 MILLICURIE: KIT at 13:18

## 2024-02-21 RX ADMIN — Medication 2 G: at 17:59

## 2024-02-21 RX ADMIN — FENTANYL CITRATE 50 MCG: 50 INJECTION INTRAMUSCULAR; INTRAVENOUS at 13:55

## 2024-02-21 RX ADMIN — HYDROMORPHONE HYDROCHLORIDE 0.5 MG: 1 INJECTION, SOLUTION INTRAMUSCULAR; INTRAVENOUS; SUBCUTANEOUS at 18:05

## 2024-02-21 RX ADMIN — HYDROMORPHONE HYDROCHLORIDE 0.5 MG: 1 INJECTION, SOLUTION INTRAMUSCULAR; INTRAVENOUS; SUBCUTANEOUS at 15:07

## 2024-02-21 RX ADMIN — Medication 2 G: at 13:59

## 2024-02-21 ASSESSMENT — ACTIVITIES OF DAILY LIVING (ADL)
ADLS_ACUITY_SCORE: 20
ADLS_ACUITY_SCORE: 21
ADLS_ACUITY_SCORE: 22
ADLS_ACUITY_SCORE: 20

## 2024-02-21 ASSESSMENT — ENCOUNTER SYMPTOMS
DYSRHYTHMIAS: 0
SEIZURES: 0

## 2024-02-21 ASSESSMENT — LIFESTYLE VARIABLES: TOBACCO_USE: 1

## 2024-02-21 NOTE — ANESTHESIA PROCEDURE NOTES
Other (PECs Blocks) Procedure Note    Pre-Procedure   Staff -        Anesthesiologist:  Doris Fountain MD       Performed By: anesthesiologist       Location: pre-op       Pre-Anesthestic Checklist: patient identified, IV checked, site marked, risks and benefits discussed, informed consent, monitors and equipment checked, pre-op evaluation, at physician/surgeon's request and post-op pain management  Timeout:       Correct Patient: Yes        Correct Procedure: Yes        Correct Site: Yes        Correct Position: Yes        Correct Laterality: Yes        Site Marked: Yes  Procedure Documentation  Procedure: Other (PECs Blocks)       Laterality: bilateral       Patient Position: sitting       Skin prep: Betadine       Local skin infiltrated with 1 mL of 1% lidocaine.        Needle Type: insulated       Needle Gauge: 21.        Needle Length (Inches): 3.13        Ultrasound guided       1. Ultrasound was used to identify targeted nerve, plexus, vascular marker, or fascial plane and place a needle adjacent to it in real-time.       2. Ultrasound was used to visualize the spread of anesthetic in close proximity to the above referenced structure.       3. A permanent image is entered into the patient's record.       4. The visualized anatomic structures appeared normal.       5. There were no apparent abnormal pathologic findings.    Assessment/Narrative         The placement was negative for: blood aspirated, painful injection and site bleeding       Paresthesias: No.       Bolus given via needle. no blood aspirated via catheter.        Secured via.        Insertion/Infusion Method: Single Shot    Medication(s) Administered   Bupivacaine 0.25% w/ 1:400K Epi (Injection) - Injection   60 mL - 2/21/2024 2:10:00 PM   Comments:  Bolus via needle, 30 ml of 0.25% bupivacaine with 1:400,000 epinephrine was injected on the right side (20 ml's PECs 2, 10 ml's PECs 1), in the same fashion 30 ml of 0.25% bupivacaine with  "1:400,000 epinephrine was injected on the left side (20 ml's PECs 2, 10 ml's PECs 1).  Patient tolerated well, was mildly sedated but communicative throughout the procedure.   The surgeon has given a verbal order transferring care of this patient to me for the performance of regional analgesia block for post op pain control. It is requested of me because I am uniquely trained and qualified to perform this block and the surgeon is neither trained nor qualified to perform this procedure.         FOR [facility] ([facility]) ONLY:   Pain Team Contact information: please page the Pain Team Via [employer]. Search \"Pain\". During daytime hours, please page the attending first. At night please page the resident first.      "

## 2024-02-21 NOTE — ANESTHESIA PREPROCEDURE EVALUATION
Anesthesia Pre-Procedure Evaluation    Patient: Angelica Gomez   MRN: [id number] : 1980        Procedure : Procedure(s):  Bilateral nipple sparing mastectomy, bilateral implant removal, left sentinel lymph node biopsy  Insertion tissue expander, breasts, bilateral          Past Medical History:   Diagnosis Date    NO ACTIVE PROBLEMS       Past Surgical History:   Procedure Laterality Date    COLONOSCOPY WITH CO2 INSUFFLATION N/A 2024    Procedure: Colonoscopy with CO2 insufflation;  Surgeon: Cherise Lima DO;  Location: [facility] OR    TUBAL LIGATION      [facility]  DELIVERY ONLY      superficial wound dehiscence      No Known Allergies   Social History     Tobacco Use    Smoking status: Former     Packs/day: 0.25     Years: 1.00     Additional pack years: 0.00     Total pack years: 0.25     Types: Cigarettes    Smokeless tobacco: Never   Substance Use Topics    Alcohol use: Yes     Comment: 0-1      Wt Readings from Last 1 Encounters:   24 82.6 kg (182 lb)        Prior to Admission medications    Medication Sig Start Date End Date Taking? Authorizing Provider   aspirin-acetaminophen-caffeine (EXCEDRIN MIGRAINE) 250-250-65 MG tablet Take 1 tablet by mouth every 6 hours as needed.    Reported, Patient   butalbital-aspirin-caffeine (FIORINAL) -40 MG capsule Take 1 capsule by mouth every 4 hours as needed for headaches 23   Isabel Matson MD   Calcium Carbonate-Vitamin D (CALCIUM 500 + D PO)     Reported, Patient   Cetirizine HCl (ZYRTEC ALLERGY PO) Take 10 mg by mouth daily    Reported, Patient   COMPRESSION STOCKINGS Please measure and distribute 2 pair of 20mmHg - 30mmHg thigh high open or closed toe compression stockings with extra refills as indicated. 23   Hardy Torres PA-C   Fluticasone Propionate (FLONASE NA) Spray 1 spray in nostril daily    Reported, Patient   Multiple Vitamins-Minerals (MULTIVITAMIN & MINERAL PO) Take 1 each by mouth daily. [facility]  women's active supplement    Reported, Patient   Probiotic Product (PROBIOTIC PO) Reported on 3/1/2017    Reported, Patient   SUMAtriptan (IMITREX) 25 MG tablet Take 1 tablet (25 mg) by mouth at onset of headache for migraine May repeat in 2 hours. Max 8 tablets/24 hours. 2/13/24   Ambar San APRN CNP   tranexamic acid (LYSTEDA) 650 MG tablet Take 2 tablets (1,300 mg) by mouth 3 times daily 12/21/23   Kori Javier MD     Anesthesia Evaluation   Pt has had prior anesthetic. Type: General.    No history of anesthetic complications       ROS/MED HX  ENT/Pulmonary:     (+)                tobacco use, Past use,                    (-) asthma and sleep apnea   Neurologic:    (-) no seizures, no CVA and migraines   Cardiovascular:    (-) hypertension, CAD, GOODE, arrhythmias, valvular problems/murmurs, dyslipidemia and murmur   METS/Exercise Tolerance:     Hematologic:    (-) history of blood clots and anemia   Musculoskeletal:    (-) arthritis   GI/Hepatic:    (-) GERD and liver disease   Renal/Genitourinary:    (-) renal disease and nephrolithiasis   Endo:    (-) Type I DM, Type II DM, thyroid disease and obesity   Psychiatric/Substance Use:    (-) psychiatric history   Infectious Disease:    (-) Recent Fever   Malignancy:   (+) Malignancy, History of Breast.    Other:            Physical Exam    Airway        Mallampati: II   TM distance: > 3 FB   Neck ROM: full   Mouth opening: > 3 cm    Respiratory Devices and Support         Dental       (+) Completely normal teeth      Cardiovascular   cardiovascular exam normal       Rhythm and rate: regular and normal (-) no murmur    Pulmonary   pulmonary exam normal        breath sounds clear to auscultation           OUTSIDE LABS:  CBC:   Lab Results   Component Value Date    WBC 6.0 02/05/2024    WBC 6.5 10/16/2023    HGB 13.4 02/05/2024    HGB 14.1 10/16/2023    HCT 40.4 02/05/2024    HCT 41.6 10/16/2023     02/05/2024     10/16/2023     BMP:   Lab  Results   Component Value Date     02/05/2024     12/30/2021    POTASSIUM 4.2 02/05/2024    POTASSIUM 3.9 12/30/2021    CHLORIDE 107 02/05/2024    CHLORIDE 110 (H) 12/30/2021    CO2 25 02/05/2024    CO2 27 12/30/2021    BUN 11.6 02/05/2024    BUN 16 12/30/2021    CR 0.93 02/05/2024    CR 0.82 12/30/2021     (H) 02/05/2024    GLC 73 12/30/2021     COAGS:   Lab Results   Component Value Date    PTT 27 08/07/2009    INR 1.00 08/07/2009     POC:   Lab Results   Component Value Date    HCG  10/27/2009     Negative   This test provides a presumptive diagnosis of pregnancy or non-pregnancy. A   confirmed pregnancy diagnosis should only be made by a physician after all   clinical and laboratory findings have been evaluated.     HEPATIC:   Lab Results   Component Value Date    ALBUMIN 4.4 02/05/2024    PROTTOTAL 6.7 02/05/2024    ALT 16 02/05/2024    AST 24 02/05/2024    ALKPHOS 52 02/05/2024    BILITOTAL 0.3 02/05/2024     OTHER:   Lab Results   Component Value Date    CYRUS 9.2 02/05/2024    TSH 1.63 01/19/2022    CRP <2.9 08/04/2021    SED 5 08/04/2021       Anesthesia Plan    ASA Status:  2    NPO Status:  NPO Appropriate    Anesthesia Type: General.     - Airway: LMA   Induction: Propofol.   Maintenance: Balanced.        Consents    Anesthesia Plan(s) and associated risks, benefits, and realistic alternatives discussed. Questions answered and patient/representative(s) expressed understanding.     - Discussed:     - Discussed with:  Patient            Postoperative Care    Pain management: Multi-modal analgesia.   PONV prophylaxis: Ondansetron (or other 5HT-3), Dexamethasone or Solumedrol, Background Propofol Infusion     Comments:               Doris Fountain MD    I have reviewed the pertinent notes and labs in the chart from the past 30 days and (re)examined the patient.  Any updates or changes from those notes are reflected in this note.              # Obesity: Estimated body mass index is 30.29  "kg/m  as calculated from the following:    Height as of 2/13/24: 1.651 m (5' 5\").    Weight as of 2/13/24: 82.6 kg (182 lb).      "

## 2024-02-21 NOTE — BRIEF OP NOTE
[facility]  General Surgery Brief Operative Note    Pre-operative diagnosis: Invasive ductal carcinoma of breast, female, left (H) [C50.912]   Post-operative diagnosis Same   Procedure: Procedure(s):  Bilateral nipple sparing mastectomy, bilateral implant removal, left sentinel lymph node biopsy  Insertion tissue expander, breasts, bilateral    Surgeon(s), Assistant(s): Surgeon(s) and Role:  Panel 1:     * Aba Phillips MD - Primary     * Annmarie Zaman PA-C - Assisting  Panel 2:     * Elisabet Venegas MD - Primary   Estimated blood loss: 100 mL panel 1   Drains: Jesus-Faria x 2 per plastics   Specimens: ID Type Source Tests Collected by Time Destination   1 : Left Breast Implant Implant Breast, Left SURGICAL PATHOLOGY EXAM Aba Phillips MD 2/21/2024  3:05 PM    2 : Left Breast Tissue Tissue Breast, Left SURGICAL PATHOLOGY EXAM Aba Phillips MD 2/21/2024  3:48 PM    3 : Left Axillary [place] Lymph Node Tissue Lymph Node(s), Axillary, Left SURGICAL PATHOLOGY EXAM Aba Phillips MD 2/21/2024  3:58 PM    4 : Right Breast Implant Implant Breast, Right SURGICAL PATHOLOGY EXAM Aba Phillips MD 2/21/2024  4:53 PM    5 : Right Breast Tissue Tissue Breast, Right SURGICAL PATHOLOGY EXAM Aba Phillips MD 2/21/2024  5:12 PM       Complications:  Condition: None  Stable   Comments:      Annmarie Zaman PA-C  [facility]         See dictated operative report for full details

## 2024-02-21 NOTE — ANESTHESIA PROCEDURE NOTES
Airway       Patient location during procedure: OR  Staff -        Anesthesiologist:  Doris Fountain MD       CRNA: Zehra Cherry APRN CRNA       Performed By: CRNA  Consent for Airway        Urgency: elective  Indications and Patient Condition       Indications for airway management: tiago-procedural       Induction type:intravenous       Mask difficulty assessment: 1 - vent by mask    Final Airway Details       Final airway type: supraglottic airway    Supraglottic Airway Details        Type: LMA       Brand: I-Gel       LMA size: 4    Post intubation assessment        Placement verified by: capnometry and chest rise        Number of attempts at approach: 1       Secured with: tape and commercial tube wheatley       Ease of procedure: easy       Dentition: Intact and Unchanged

## 2024-02-22 VITALS
SYSTOLIC BLOOD PRESSURE: 118 MMHG | OXYGEN SATURATION: 98 % | RESPIRATION RATE: 16 BRPM | WEIGHT: 188.5 LBS | DIASTOLIC BLOOD PRESSURE: 74 MMHG | TEMPERATURE: 98 F | HEIGHT: 65 IN | BODY MASS INDEX: 31.4 KG/M2 | HEART RATE: 63 BPM

## 2024-02-22 LAB — GLUCOSE BLDC GLUCOMTR-MCNC: 106 MG/DL (ref 70–99)

## 2024-02-22 PROCEDURE — 258N000003 HC RX IP 258 OP 636: Performed by: PHYSICIAN ASSISTANT

## 2024-02-22 PROCEDURE — 250N000013 HC RX MED GY IP 250 OP 250 PS 637: Performed by: PHYSICIAN ASSISTANT

## 2024-02-22 PROCEDURE — 82962 GLUCOSE BLOOD TEST: CPT

## 2024-02-22 RX ORDER — METHOCARBAMOL 750 MG/1
750 TABLET, FILM COATED ORAL 3 TIMES DAILY
Qty: 30 TABLET | Refills: 0 | Status: SHIPPED | OUTPATIENT
Start: 2024-02-22 | End: 2024-09-17

## 2024-02-22 RX ORDER — OXYCODONE HYDROCHLORIDE 5 MG/1
5 TABLET ORAL EVERY 4 HOURS PRN
Qty: 20 TABLET | Refills: 0 | Status: SHIPPED | OUTPATIENT
Start: 2024-02-22 | End: 2024-07-11

## 2024-02-22 RX ORDER — AMOXICILLIN 250 MG
1 CAPSULE ORAL 2 TIMES DAILY
Qty: 30 TABLET | Refills: 0 | Status: SHIPPED | OUTPATIENT
Start: 2024-02-22 | End: 2024-09-17

## 2024-02-22 RX ADMIN — SODIUM CHLORIDE, POTASSIUM CHLORIDE, SODIUM LACTATE AND CALCIUM CHLORIDE: 600; 310; 30; 20 INJECTION, SOLUTION INTRAVENOUS at 02:23

## 2024-02-22 RX ADMIN — CETIRIZINE HYDROCHLORIDE 10 MG: 10 TABLET, FILM COATED ORAL at 09:44

## 2024-02-22 RX ADMIN — FAMOTIDINE 20 MG: 20 TABLET ORAL at 09:43

## 2024-02-22 RX ADMIN — ACETAMINOPHEN 975 MG: 325 TABLET, FILM COATED ORAL at 06:00

## 2024-02-22 RX ADMIN — OXYCODONE HYDROCHLORIDE 5 MG: 5 TABLET ORAL at 09:44

## 2024-02-22 RX ADMIN — DOCUSATE SODIUM 50 MG AND SENNOSIDES 8.6 MG 1 TABLET: 8.6; 5 TABLET, FILM COATED ORAL at 09:43

## 2024-02-22 RX ADMIN — OXYCODONE HYDROCHLORIDE 5 MG: 5 TABLET ORAL at 14:01

## 2024-02-22 RX ADMIN — ACETAMINOPHEN 975 MG: 325 TABLET, FILM COATED ORAL at 11:36

## 2024-02-22 RX ADMIN — OXYCODONE HYDROCHLORIDE 5 MG: 5 TABLET ORAL at 02:22

## 2024-02-22 RX ADMIN — METHOCARBAMOL 750 MG: 750 TABLET ORAL at 09:44

## 2024-02-22 ASSESSMENT — ACTIVITIES OF DAILY LIVING (ADL)
ADLS_ACUITY_SCORE: 22
ADLS_ACUITY_SCORE: 21
ADLS_ACUITY_SCORE: 22
ADLS_ACUITY_SCORE: 22
ADLS_ACUITY_SCORE: 21
ADLS_ACUITY_SCORE: 21
ADLS_ACUITY_SCORE: 22
ADLS_ACUITY_SCORE: 21

## 2024-02-22 NOTE — PROGRESS NOTES
General surgery  Patient is doing well, feels tired and sore.  Moderate bloody output from both drains, left more than right.  Mastectomy flaps ecchymotic but no underlying hematoma.  If patient tolerates her diet, is able to ambulate, and is able to manage her pain with oral agents, she can discharge to home this afternoon.  She is scheduled to follow-up with plastic surgery next week and I am seeing her again in March.  I will call her next week with the pathology.  Bean Phillips MD  General Surgery, Office [phone]

## 2024-02-22 NOTE — ANESTHESIA POSTPROCEDURE EVALUATION
Patient: Angelica Gomez    Procedure: Procedure(s):  Bilateral nipple sparing mastectomy, bilateral implant removal, left sentinel lymph node biopsy  Insertion tissue expander, breasts, bilateral       Anesthesia Type:  General    Note:  Disposition: Admission   Postop Pain Control: Uneventful            Sign Out: Well controlled pain   PONV: No   Neuro/Psych: Uneventful            Sign Out: Acceptable/Baseline neuro status   Airway/Respiratory: Uneventful            Sign Out: Acceptable/Baseline resp. status   CV/Hemodynamics: Uneventful            Sign Out: Acceptable CV status; No obvious hypovolemia; No obvious fluid overload   Other NRE: NONE   DID A NON-ROUTINE EVENT OCCUR? No           Last vitals:  Vitals Value Taken Time   /97 02/21/24 2000   Temp 36.2  C (97.2  F) 02/21/24 1845   Pulse 83 02/21/24 2002   Resp 10 02/21/24 2002   SpO2 99 % 02/21/24 2002   Vitals shown include unfiled device data.    Electronically Signed By: WILFRED ALVARADO MD  February 21, 2024  8:03 PM

## 2024-02-22 NOTE — PLAN OF CARE
Date & Time: 2130-0700  Surgery/POD#: POD 1 from bilateral mastectomy, bilateral implant removal, L sentinel lymph node biopsy  Behavior & Aggression: Green   Fall Risk: Yes   Orientation:A&Ox4  ABNL VS/O2:VSS on RA   Pain Management: Scheduled pain meds and PRN oxy x1   Bowel/Bladder: Continent. Voiding adequately. Passing gas, no BM   Drains: PIV infusing LR @ 100ml/hr. R/L CHARU drain with red output, stripped per orders    Diet: Advanced diet to low fiber this AM. Tolerated clears/fulls overnight   Activity Level: IND   Anticipated  DC Date: Later today   Significant Information: Denies N/V.

## 2024-02-22 NOTE — ANESTHESIA CARE TRANSFER NOTE
Patient: Angelica Gomez    Procedure: Procedure(s):  Bilateral nipple sparing mastectomy, bilateral implant removal, left sentinel lymph node biopsy  Insertion tissue expander, breasts, bilateral       Diagnosis: Invasive ductal carcinoma of breast, female, left (H) [C50.912]  Diagnosis Additional Information: No value filed.    Anesthesia Type:   General     Note:    Oropharynx: oropharynx clear of all foreign objects and spontaneously breathing  Level of Consciousness: drowsy  Oxygen Supplementation: face mask  Level of Supplemental Oxygen (L/min / FiO2): 6  Independent Airway: airway patency satisfactory and stable  Dentition: dentition unchanged  Vital Signs Stable: post-procedure vital signs reviewed and stable  Report to RN Given: handoff report given  Patient transferred to: PACU  Comments: Pt exhibits spontaneous respirations, follows commands, suctioned, LMA removed, exchanging well, transferred to pacu with O2 @ 6L via mask, all monitors and alarms on, VSS, patent IV, report and transfer of care to RN.    Handoff Report: Identifed the Patient, Identified the Reponsible Provider, Reviewed the pertinent medical history, Discussed the surgical course, Reviewed Intra-OP anesthesia mangement and issues during anesthesia, Set expectations for post-procedure period and Allowed opportunity for questions and acknowledgement of understanding      Vitals:  Vitals Value Taken Time   /77 02/21/24 1847   Temp 36.2  C (97.2  F) 02/21/24 1845   Pulse 80 02/21/24 1848   Resp 13 02/21/24 1848   SpO2 98 % 02/21/24 1848   Vitals shown include unfiled device data.    Electronically Signed By: DEISI Parks CRNA  February 21, 2024  6:49 PM

## 2024-02-22 NOTE — PROGRESS NOTES
Plastic Surgery    Patient doing very well. Has been ambulating. Good pain control with oxycodone. Feels well enough to discharge later today.    PE: Temp: 97.8  F (36.6  C) Temp src: Oral BP: 113/79 Pulse: 76   Resp: 16 SpO2: 97 % O2 Device: None (Room air) Oxygen Delivery: 2 LPM    Mastectomy skin ecchymotic but not hematoma.     A/P:  Plan discharge later today. Postop orders entered;questions answered. Followup with me next week.    MD Theo Sanchez Plastic Surgery   [phone] (cell)

## 2024-02-22 NOTE — OP NOTE
General Surgery Operative Note    PREOPERATIVE DIAGNOSIS:  Invasive ductal carcinoma of breast, female, left (H) [C50.912]    POSTOPERATIVE DIAGNOSIS:  acquired absence bilateral breasts    PROCEDURE:   Procedure(s):  Bilateral nipple sparing mastectomy, bilateral implant removal, left sentinel lymph node biopsy  Insertion tissue expander, breasts, bilateral    ANESTHESIA:  General.    PREOPERATIVE MEDICATIONS: Ancef    SURGEON:  Aba Phillips MD    ASSISTANT:  Annmarie Zaman PA-C.  Assistant was required owing to challenging exposure and need for retraction.    INDICATIONS: Patient presented with a newly diagnosed left breast cancer.  She had a history of breast augmentation and had subpectoral implants in place.  She now presents for bilateral nipple sparing mastectomy, left axillary sentinel lymph node biopsy.      PROCEDURE:  The patient was taken to the operating suite and uneventfully endotracheally intubated.  The upper chest and axillary areas were prepped and draped in a sterile fashion. Plastic surgery had previously marked the breasts for recommended incision sites.  We began on the left side.  An inframammary incision was made and this was taken down through skin and subcutaneous tissue. We began raising flaps circumferentially taking this inferiorly and superiorly.  We dissected very closely behind the nipple areolar complex, leaving no significant residual tissue, and constructed the medial flap.  These planes were then taken down circumferentially to the implant and overlying pec muscle. I made every attempt to save the medial perforators. We began removing the breast from the chest wall, taking with it the pectoralis fascia.  When we got to the level of the nipple, we incised the capsule and removed the implant.  The dissection was taken up toward the left axillary tail and amputated. The left breast was marked for orientation and sent to pathology. We then set about performing our sentinel lymph node  biopsy.  A small incision was made below the left axillary hairline.  We followed the elevated radiotracer counts into the left axilla and found a prominent hot node.  The cluster around the node was removed and the gamma probe was used. This showed counts of 400.  No other significantly elevated counts were encountered. We inspected the mastectomy site and ensured hemostasis. We took some time to maintain hemostasis and a moist lap was placed in the mastectomy site. We then turned our attention toward the opposite breast.  We again made an inframammary incision and took this down through skin and subcutaneous tissue. We began making circumferential flaps and dissected behind the nipple using cut cautery. This was taken down to the level of the chest wall medially, again trying to spare the perforators.  When we got to the level of the nipple, we again incised the capsule and removed the subpectoral implant.  We continued this dissection circumferentially and removed the breast from the chest wall, again taking with it the pectoralis fascia. This was taken up to the level of the axilla and the breast was amputated.  The breast was sent to pathology with a suture at the retroareolar tissue. Plastic surgery entered the case at this point to place tissue expanders bilaterally. At the conclusion of our portion of the case, all lap and needle counts were correct.        ESTIMATED BLOOD LOSS: 100 mL    INTRAOPERATIVE FINDINGS: Viable mastectomy flaps.  Subpectoral implants removed and sent to pathology.    Aba Phillips MD, MD

## 2024-02-22 NOTE — OP NOTE
PLASTIC SURGERY OPERATIVE NOTE  Patient Name:  Angelica Gomez     Date of Service:  2/21/2024     PREOPERATIVE DIAGNOSES:   1.  Invasive ductal carcinoma of breast, female, left (H) [C50.912]     POSTOPERATIVE DIAGNOSES:   1.  Invasive ductal carcinoma of breast, female, left (H) [C50.912]       SURGEON:  Elisabet Venegas MD   OR STAFF:  Circulator: Bull Barrios RN; Suzi Sofia RN; Joyce Macias RN; Joyce June RN  Relief Circulator: Brooklyn Flynn RN  Relief Scrub: Gwen Ellis  Scrub Person: Leora Jackson; Saida Palencia     ANESTHESIA:  General with Block     ESTIMATED BLOOD LOSS:  105 mL    SPECIMEN(S):    ID Type Source Tests Collected by Time Destination   1 : Left Breast Implant Implant Breast, Left SURGICAL PATHOLOGY EXAM Aba Phillips MD 2/21/2024  3:05 PM    2 : Left Breast Tissue Tissue Breast, Left SURGICAL PATHOLOGY EXAM Aba Phillips MD 2/21/2024  3:48 PM    3 : Left Axillary [place] Lymph Node Tissue Lymph Node(s), Axillary, Left SURGICAL PATHOLOGY EXAM Aba Phillips MD 2/21/2024  3:58 PM    4 : Right Breast Implant Implant Breast, Right SURGICAL PATHOLOGY EXAM Aba Phillips MD 2/21/2024  4:53 PM    5 : Right Breast Tissue Tissue Breast, Right SURGICAL PATHOLOGY EXAM Aba Phillips MD 2/21/2024  5:12 PM        INDICATIONS: Patient is a 46-year-old female with a history of left breast cancer.  She presents today for bilateral mastectomies and reconstruction with tissue expanders.      PROCEDURES:   1.  Bilateral tissue expander placement      DESCRIPTION OF PROCEDURE:  Patient was marked for incisions and taken to the operating room.  General anesthesia was administered.  Dr. Lamb completed his portion of the procedure.  The mastectomy flaps were inspected and found to be adequate for prepectoral breast reconstruction.    On the left side, the capsule that had been associated with the implant was dissected until the  inframammary fold was reached and laterally to allow for placement of the tissue expander.  The capsule was released from the pectoralis muscle and the pectoralis muscle was tacked back to the chest wall using erupted 2-0 PDS suture.  The pocket was irrigated Betadine followed by Vashe solution.  A tissue expanders made ready for insertion and placed within the pocket.  Suture tabs were secured with 2-0 PDS suture.  A 15 Pashto CHARU drain was placed and secured with a 3-0 nylon suture.  The inferior capsule was then secured to the mastectomy flap using interrupted 3-0 PDS suture.  Additional fluid was placed in the tissue expander.  The incision was reapproximated erupted 3-0 Monocryl in the subcutaneous tissue followed by running 4-0 subcuticular Monocryl suture.    The same procedure was completed on the other side.  The capsule that had been associated with the implant was dissected until the inframammary fold was reached and laterally to allow for placement of the tissue expander.  The capsule was released from the pectoralis muscle and the pectoralis muscle was tacked back to the chest wall using erupted 2-0 PDS suture.  The pocket was irrigated Betadine followed by Vashe solution.  A tissue expanders made ready for insertion and placed within the pocket.  Suture tabs were secured with 2-0 PDS suture.  A 15 Pashto CHARU drain was placed and secured with a 3-0 nylon suture.  The inferior capsule was then secured to the mastectomy flap using interrupted 3-0 PDS suture.  Additional fluid was placed in the tissue expander.  The incision was reapproximated erupted 3-0 Monocryl in the subcutaneous tissue followed by running 4-0 subcuticular Monocryl suture.    Xeroform was placed over the incisions and the nipples were repositioned and secured in place with Tegaderm dressing.  Kerlix roll followed by an Ace wrap was then applied.    IMPLANTS:    Implant Name Type Inv. Item Serial No.  Lot No. LRB No. Used  Action   EXPANDER TISSUE STYLE 133S 500ML [id number] - [id number] Breast Implant/Tissue Expander EXPANDER TISSUE STYLE 133S [id number] [employer]  Left 1 Implanted   EXPANDER TISSUE STYLE 133S 500ML [id number] - [id number] Breast Implant/Tissue Expander EXPANDER TISSUE STYLE 133S 500ML [id number] [employer]  Right 1 Implanted       FINDINGS: Both tissue expanders are 500 cc and have been filled with 175 cc of saline.    MD Theo Sanchez Plastic Surgery   [phone]

## 2024-02-26 ENCOUNTER — TELEPHONE (OUTPATIENT)
Dept: SURGERY | Facility: CLINIC | Age: 44
End: 2024-02-26
Payer: COMMERCIAL

## 2024-02-26 NOTE — TELEPHONE ENCOUNTER
Breast Nurse Care Coordination Post Op Call.     Lisandra is s/p bilateral nipple sparing mastectomy, left sentinel lymph node biopsy and reconstruction on 2/21/2024 with Dr. Phillips and Dr. Venegas. Pathology results are still pending.     Lisandra is scheduled on 3/12/2024 @ 9:30 am with Dr. Phillips at [facility] for a post op appointment.     I left a message asking Lisandra to return my call with any questions or concerns.     Yahaira Mckeon, RN, BSN, PHN  Breast Care Nurse Coordinator  [facility]  [phone]

## 2024-02-28 ENCOUNTER — DOCUMENTATION ONLY (OUTPATIENT)
Dept: ONCOLOGY | Facility: CLINIC | Age: 44
End: 2024-02-28
Payer: COMMERCIAL

## 2024-02-28 DIAGNOSIS — C50.912 INVASIVE DUCTAL CARCINOMA OF BREAST, FEMALE, LEFT (H): Primary | ICD-10-CM

## 2024-02-28 NOTE — NURSING NOTE
M[employer] and [employer]Print test request faxed as well as path report, face sheet and insurance card.     Follow-up appt with Dr Roland on 3/6/24. Will confirm if that should be postponed until results back    Sophie Neal RN

## 2024-02-29 NOTE — RESULT ENCOUNTER NOTE
I have already discussed the results with the patient.  Could we have Oncology order Oncotype (if desired)?

## 2024-03-04 ENCOUNTER — DOCUMENTATION ONLY (OUTPATIENT)
Dept: OTHER | Facility: CLINIC | Age: 44
End: 2024-03-04
Payer: COMMERCIAL

## 2024-03-04 LAB
BKR LAB AP ADD'L TEST STATUS: NORMAL
BKR PATH ADDL TEST FINAL COMMENTS: NORMAL
PATH REPORT.ADDENDUM SPEC: ABNORMAL
PATH REPORT.COMMENTS IMP SPEC: ABNORMAL
PATH REPORT.COMMENTS IMP SPEC: YES
PATH REPORT.FINAL DX SPEC: ABNORMAL
PATH REPORT.GROSS SPEC: ABNORMAL
PATH REPORT.MICROSCOPIC SPEC OTHER STN: ABNORMAL
PATH REPORT.RELEVANT HX SPEC: ABNORMAL
PATHOLOGY SYNOPTIC REPORT: ABNORMAL
PHOTO IMAGE: ABNORMAL

## 2024-03-07 NOTE — PROGRESS NOTES
Virtual Visit Details    Type of service:  Video Visit     Originating Location (pt. Location): Home    Distant Location (provider location):  On-site  Platform used for Video Visit: AppCast  Video visit duration: 25 minutes    [facility]    Hematology/Oncology Established Patient Note      Today's Date: 3/6/2024    Reason for visit: Left breast cancer.    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  12/7/2023: Due to worsening left upper outer breast pain, bilateral diagnostic mammogram performed. No mammographic abnormality in left upper outer breast site of symptoms but at 1:00, there is oval asymmetry measuring 0.7 cm. Remaining left breast and left axillary U/S showed morphologically normal lymph node and breast tissue.  At 1:30, 5 cm from nipple is irregular hypoechoic mass measuring 0.5 x 0.5 cm.  2. 12/15/2023: U/S-guided biopsy of left breast at 1:30 showed grade 2 invasive ductal carcinoma (3 mm) with associated grade 3 DCIS, no LVI; ER positive at 20%, ND positive at 20%, HER-2 negative with IHC = 1+.  3. 2/21/2024: Bilateral mastectomies with left axillary sentinel lymph node excision with Dr. Bean Phillips; bilateral implant removal. Pathology showed 1.2 cm grade 2 invasive ductal carcinoma with 1.4 cm grade 3 DCIS; margins negative; one left axillary SLN negative. Right breast benign. Repeat ER weakly positive at 61-70%, ND moderate positive at 71-80%. MammaPrint + BluePrint showed high one risk, Luminal B.    INTERVAL HISTORY:  Lisandra reports recovering well from her mastectomies with slightly more soreness on her left side.  Drains are all out.  No infection issues.    REVIEW OF SYSTEMS:   14 point ROS was reviewed and is negative other than as noted above in HPI.       HOME MEDICATIONS:  Current Outpatient Medications   Medication Sig Dispense Refill    Calcium Carbonate-Vitamin D  (CALCIUM 500 + D PO)       Cetirizine HCl (ZYRTEC ALLERGY PO) Take 10 mg by mouth daily      COMPRESSION STOCKINGS Please measure and distribute 2 pair of 20mmHg - 30mmHg thigh high open or closed toe compression stockings with extra refills as indicated. 2 each 4    Fluticasone Propionate (FLONASE NA) Spray 1 spray in nostril daily      methocarbamol (ROBAXIN) 750 MG tablet Take 1 tablet (750 mg) by mouth 3 times daily 30 tablet 0    Multiple Vitamins-Minerals (MULTIVITAMIN & MINERAL PO) Take 1 each by mouth daily. [facility] women's active supplement      oxyCODONE (ROXICODONE) 5 MG tablet Take 1 tablet (5 mg) by mouth every 4 hours as needed for pain 20 tablet 0    Probiotic Product (PROBIOTIC PO) Reported on 3/1/2017      senna-docusate (SENOKOT-S/PERICOLACE) 8.6-50 MG tablet Take 1 tablet by mouth 2 times daily 30 tablet 0    SUMAtriptan (IMITREX) 25 MG tablet Take 1 tablet (25 mg) by mouth at onset of headache for migraine May repeat in 2 hours. Max 8 tablets/24 hours. 18 tablet 4    tranexamic acid (LYSTEDA) 650 MG tablet Take 2 tablets (1,300 mg) by mouth 3 times daily 30 tablet 3         ALLERGIES:  No Known Allergies      PAST MEDICAL HISTORY:  Past Medical History:   Diagnosis Date    NO ACTIVE PROBLEMS    Menometrorrhagia.    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 Used IUD for 20 years.      PAST SURGICAL HISTORY:  Past Surgical History:   Procedure Laterality Date    COLONOSCOPY WITH CO2 INSUFFLATION N/A 2024    Procedure: Colonoscopy with CO2 insufflation;  Surgeon: Cherise Lima DO;  Location: [facility] OR    INSERT TISSUE EXPANDER BREAST BILATERAL Bilateral 2024    Procedure: Insertion tissue expander, breasts, bilateral;  Surgeon: Elisabet Venegas MD;  Location: [facility] OR    MASTECTOMY, NIPPLE SPARING Bilateral 2024    Procedure: Bilateral nipple sparing mastectomy, bilateral implant removal, left sentinel lymph node  biopsy;  Surgeon: Aba Phillips MD;  Location: [facility] OR    TUBAL LIGATION      [facility]  DELIVERY ONLY      superficial wound dehiscence   Bilateral breast augmentation in 2013.      SOCIAL HISTORY:  Social History     Socioeconomic History    Marital status:      Spouse name: Not on file    Number of children: Not on file    Years of education: Not on file    Highest education level: Not on file   Occupational History    Not on file   Tobacco Use    Smoking status: Former     Packs/day: 0.25     Years: 1.00     Additional pack years: 0.00     Total pack years: 0.25     Types: Cigarettes    Smokeless tobacco: Never   Vaping Use    Vaping Use: Never used   Substance and Sexual Activity    Alcohol use: Yes     Comment: 0-1    Drug use: No    Sexual activity: Yes     Partners: Male     Birth control/protection: Surgical, I.U.D., Female Surgical     Comment: Tubal ligation, 2007   Other Topics Concern    Parent/sibling w/ CABG, MI or angioplasty before 65F 55M? Yes     Comment: father 2 MIs   Social History Narrative    Caffeine intake/servings daily - 2-3 pop    Calcium intake/servings daily - 2 yogurts and 1 glass of milk    Exercise 6 times weekly - describe strength training and cardio    Sunscreen used - Yes    Seatbelts used - Yes    Guns stored in the home - No    Self Breast Exam - Yes    Pap test up to date -  Yes, as of today    Eye exam up to date -  Yes    Dental exam up to date -  Yes    DEXA scan up to date -  Not Applicable    Flex Sig/Colonoscopy up to date -  Not Applicable    Mammography up to date -  Not Applicable    Immunizations reviewed and up to date - Yes, 2008    Abuse: Current or Past (Physical, Sexual or Emotional) - No    Do you feel safe in your environment - Yes    Do you cope well with stress - Yes    Do you suffer from insomnia - No    Last updated by: Lit Licona  10/27/2009                 Social Determinants of Health     Financial Resource Strain: Low  Risk  (2/1/2024)    Financial Resource Strain     Within the past 12 months, have you or your family members you live with been unable to get utilities (heat, electricity) when it was really needed?: No   Food Insecurity: Low Risk  (2/1/2024)    Food Insecurity     Within the past 12 months, did you worry that your food would run out before you got money to buy more?: No     Within the past 12 months, did the food you bought just not last and you didn t have money to get more?: No   Transportation Needs: Low Risk  (2/1/2024)    Transportation Needs     Within the past 12 months, has lack of transportation kept you from medical appointments, getting your medicines, non-medical meetings or appointments, work, or from getting things that you need?: No   Physical Activity: Not on file   Stress: Not on file   Social Connections: Not on file   Interpersonal Safety: Low Risk  (2/5/2024)    Interpersonal Safety     Do you feel physically and emotionally safe where you currently live?: Yes     Within the past 12 months, have you been hit, slapped, kicked or otherwise physically hurt by someone?: No     Within the past 12 months, have you been humiliated or emotionally abused in other ways by your partner or ex-partner?: No   Housing Stability: Low Risk  (2/1/2024)    Housing Stability     Do you have housing? : Yes     Are you worried about losing your housing?: No   Has 2 grown children.  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      FAMILY HISTORY:  Family History   Problem Relation Age of Onset    Thyroid Disease Mother     Breast Cancer Mother 56        breast, 3 years later    Heart Disease Father         2x heart attacks    Circulatory Father         blood clots    Cardiovascular Father         patent foramen ovale, repaired x 2, afib    Cerebrovascular Disease Father         x2    C.A.D. Father         x2    Genitourinary Problems Father      "    kidney disease    Asthma Brother     Food Allergy Brother     Eczema Brother     No Known Problems Maternal Grandmother     Myocardial Infarction Maternal Grandfather     Hypertension Paternal Grandmother     Alcohol/Drug Paternal Grandfather     No Known Problems Daughter     No Known Problems Son     Cancer Paternal Aunt         cervical cancer(another sisiter)    Breast Cancer Paternal Aunt         breast cancer at 70's    Breast Cancer Paternal Aunt     Ovarian Cancer Paternal Aunt     Cancer - colorectal No family hx of     Diabetes No family hx of          PHYSICAL EXAM:  Vital signs:  Ht 1.651 m (5' 5\")   Wt 82.6 kg (182 lb)   LMP 2024 (Exact Date)   BMI 30.29 kg/m     ECO  GENERAL: No acute distress.  EYES: No scleral icterus. No overt erythema.  RESPIRATORY: No audible cough, wheezing, or labored breathing.  MUSCULOSKELETAL: Range of motion in the neck, shoulders, and arms appear normal.  SKIN: No overt rashes, discolorations, or lesions over the face and neck.  NEUROLOGIC: Alert.  No overt tremors.  PSYCHIATRIC: Normal affect and mood.  Does not appear anxious.       LABS:  CBC RESULTS:   Recent Labs   Lab Test 24  1513   WBC 6.0   RBC 4.57   HGB 13.4   HCT 40.4   MCV 88   MCH 29.3   MCHC 33.2   RDW 11.7         Last Comprehensive Metabolic Panel:  Sodium   Date Value Ref Range Status   2024 141 135 - 145 mmol/L Final     Comment:     Reference intervals for this test were updated on 2023 to more accurately reflect our healthy population. There may be differences in the flagging of prior results with similar values performed with this method. Interpretation of those prior results can be made in the context of the updated reference intervals.    09/10/2012 140 133 - 144 mmol/L Final     Potassium   Date Value Ref Range Status   2024 4.2 3.4 - 5.3 mmol/L Final   2021 3.9 3.4 - 5.3 mmol/L Final   09/10/2012 4.2 3.4 - 5.3 mmol/L Final     Chloride   Date " Value Ref Range Status   02/05/2024 107 98 - 107 mmol/L Final   12/30/2021 110 (H) 94 - 109 mmol/L Final   09/10/2012 104 94 - 109 mmol/L Final     Carbon Dioxide   Date Value Ref Range Status   09/10/2012 25 20 - 32 mmol/L Final     Carbon Dioxide (CO2)   Date Value Ref Range Status   02/05/2024 25 22 - 29 mmol/L Final   12/30/2021 27 20 - 32 mmol/L Final     Anion Gap   Date Value Ref Range Status   02/05/2024 9 7 - 15 mmol/L Final   12/30/2021 5 3 - 14 mmol/L Final   09/10/2012 11 6 - 17 mmol/L Final     Glucose   Date Value Ref Range Status   12/30/2021 73 70 - 99 mg/dL Final   02/21/2017 94 70 - 99 mg/dL Final     Comment:     Fasting specimen     GLUCOSE BY METER POCT   Date Value Ref Range Status   02/22/2024 106 (H) 70 - 99 mg/dL Final     Urea Nitrogen   Date Value Ref Range Status   02/05/2024 11.6 6.0 - 20.0 mg/dL Final   12/30/2021 16 7 - 30 mg/dL Final   09/10/2012 12 5 - 24 mg/dL Final     Creatinine   Date Value Ref Range Status   02/05/2024 0.93 0.51 - 0.95 mg/dL Final   09/10/2012 0.81 0.52 - 1.04 mg/dL Final     GFR Estimate   Date Value Ref Range Status   02/05/2024 78 >60 mL/min/1.73m2 Final   09/10/2012 82 >60 mL/min/1.7m2 Final     Calcium   Date Value Ref Range Status   02/05/2024 9.2 8.6 - 10.0 mg/dL Final   09/10/2012 9.3 8.5 - 10.4 mg/dL Final     Bilirubin Total   Date Value Ref Range Status   02/05/2024 0.3 <=1.2 mg/dL Final   09/10/2012 0.6 0.2 - 1.3 mg/dL Final     Alkaline Phosphatase   Date Value Ref Range Status   02/05/2024 52 40 - 150 U/L Final     Comment:     Reference intervals for this test were updated on 11/14/2023 to more accurately reflect our healthy population. There may be differences in the flagging of prior results with similar values performed with this method. Interpretation of those prior results can be made in the context of the updated reference intervals.   09/10/2012 53 40 - 150 U/L Final     ALT   Date Value Ref Range Status   02/05/2024 16 0 - 50 U/L Final      Comment:     Reference intervals for this test were updated on 6/12/2023 to more accurately reflect our healthy population. There may be differences in the flagging of prior results with similar values performed with this method. Interpretation of those prior results can be made in the context of the updated reference intervals.     09/10/2012 27 0 - 50 U/L Final     AST   Date Value Ref Range Status   02/05/2024 24 0 - 45 U/L Final     Comment:     Reference intervals for this test were updated on 6/12/2023 to more accurately reflect our healthy population. There may be differences in the flagging of prior results with similar values performed with this method. Interpretation of those prior results can be made in the context of the updated reference intervals.   09/10/2012 81 (H) 0 - 45 U/L Final       PATHOLOGY:  Reviewed as per HPI.    IMAGING:  Reviewed as per HPI.    ASSESSMENT/PLAN:  Angelica Gomez is a 43 year old female with the following issues:  1. Stage IA, [id number], grade 2 invasive ductal carcinoma of the left upper outer breast, ER positive 20%, ID positive 20%, HER-2 negative, MammaPrint high risk, Luminal B  2. CHEK2 mutation  3. Menometrorrhagia  --Lisandra is s/p bilateral mastectomies with final pathology showed a left breast 1.2 cm tumor with repeat ER positive at 61-70%, ID positive at 71-80%, HER-2 negative (IHC = 1+)  --MammaPrint + BluePrint showed high risk, Luminal B subtype.  --Given high risk for recurrence, I advises adjuvant chemotherapy with 4 cycles of Taxotere and Cytoxan.  Discussed potential adverse effects of this regimen, including but not limited to: cytopenias, alopecia, infusion reaction, nausea, fatigue, peripheral neuropathy, secondary malignancies, rash. She agrees to proceed.  --Following adjuvant chemo, I would also strongly recommend either tamoxifen or ovarian suppression therapy with Zoladex and aromatase inhibitor such as anastrozole to further reduce her risk of  breast cancer recurrence for at least 5 years.  However, she has had menometrorrhagia and might favor Zoladex + AI in order to induce cessation of menstruation if she is able to tolerate possible menopause effects.  Will discuss again after chemo.  --She will continue with colonoscopy screening on high-risk basis.    3. Fertility discussion  --She is s/p tubal ligation and does no wish to have any more children.    Magali Roland MD  Hematology/Oncology  [facility]    Total time spent today: 40 minutes in chart review, patient evaluation, counseling, documentation, test and/or medication/prescription orders, and coordination of care.

## 2024-03-08 ENCOUNTER — TELEPHONE (OUTPATIENT)
Dept: ONCOLOGY | Facility: CLINIC | Age: 44
End: 2024-03-08
Payer: COMMERCIAL

## 2024-03-08 NOTE — TELEPHONE ENCOUNTER
"Vanna from [facility] is calling.  States that she wanted to notify care team that Mammaprint results are final for pt.   States that results are \"high one\" on mammaprint index. Actual number negative 0.267. Luminal B.    Vanna states that results should be available in portal or may have already been faxed to clinic.    Will route update to care team.  Pt does have video visit with Dr Roland on Monday 3/11/24 to review results.    Coco Hui RN on 3/8/2024 at 8:58 AM   "

## 2024-03-11 ENCOUNTER — VIRTUAL VISIT (OUTPATIENT)
Dept: ONCOLOGY | Facility: CLINIC | Age: 44
End: 2024-03-11
Attending: INTERNAL MEDICINE
Payer: COMMERCIAL

## 2024-03-11 ENCOUNTER — VIRTUAL VISIT (OUTPATIENT)
Dept: PSYCHOLOGY | Facility: CLINIC | Age: 44
End: 2024-03-11
Payer: COMMERCIAL

## 2024-03-11 VITALS — WEIGHT: 182 LBS | BODY MASS INDEX: 30.32 KG/M2 | HEIGHT: 65 IN

## 2024-03-11 DIAGNOSIS — Z17.0 MALIGNANT NEOPLASM OF UPPER-OUTER QUADRANT OF LEFT BREAST IN FEMALE, ESTROGEN RECEPTOR POSITIVE (H): Primary | ICD-10-CM

## 2024-03-11 DIAGNOSIS — C50.412 MALIGNANT NEOPLASM OF UPPER-OUTER QUADRANT OF LEFT BREAST IN FEMALE, ESTROGEN RECEPTOR POSITIVE (H): Primary | ICD-10-CM

## 2024-03-11 DIAGNOSIS — F43.22 ADJUSTMENT DISORDER WITH ANXIETY: Primary | ICD-10-CM

## 2024-03-11 PROCEDURE — 90791 PSYCH DIAGNOSTIC EVALUATION: CPT | Mod: 95

## 2024-03-11 PROCEDURE — 99215 OFFICE O/P EST HI 40 MIN: CPT | Mod: 95 | Performed by: INTERNAL MEDICINE

## 2024-03-11 RX ORDER — HEPARIN SODIUM,PORCINE 10 UNIT/ML
5-20 VIAL (ML) INTRAVENOUS DAILY PRN
Status: CANCELLED | OUTPATIENT
Start: 2024-03-25

## 2024-03-11 RX ORDER — MEPERIDINE HYDROCHLORIDE 25 MG/ML
25 INJECTION INTRAMUSCULAR; INTRAVENOUS; SUBCUTANEOUS EVERY 30 MIN PRN
Status: CANCELLED | OUTPATIENT
Start: 2024-03-25

## 2024-03-11 RX ORDER — DIPHENHYDRAMINE HYDROCHLORIDE 50 MG/ML
50 INJECTION INTRAMUSCULAR; INTRAVENOUS
Status: CANCELLED
Start: 2024-03-25

## 2024-03-11 RX ORDER — ALBUTEROL SULFATE 0.83 MG/ML
2.5 SOLUTION RESPIRATORY (INHALATION)
Status: CANCELLED | OUTPATIENT
Start: 2024-03-25

## 2024-03-11 RX ORDER — HEPARIN SODIUM (PORCINE) LOCK FLUSH IV SOLN 100 UNIT/ML 100 UNIT/ML
5 SOLUTION INTRAVENOUS
Status: CANCELLED | OUTPATIENT
Start: 2024-03-25

## 2024-03-11 RX ORDER — LORAZEPAM 2 MG/ML
0.5 INJECTION INTRAMUSCULAR EVERY 4 HOURS PRN
Status: CANCELLED | OUTPATIENT
Start: 2024-03-25

## 2024-03-11 RX ORDER — EPINEPHRINE 1 MG/ML
0.3 INJECTION, SOLUTION INTRAMUSCULAR; SUBCUTANEOUS EVERY 5 MIN PRN
Status: CANCELLED | OUTPATIENT
Start: 2024-03-25

## 2024-03-11 RX ORDER — ONDANSETRON 2 MG/ML
8 INJECTION INTRAMUSCULAR; INTRAVENOUS ONCE
Status: CANCELLED | OUTPATIENT
Start: 2024-03-25

## 2024-03-11 RX ORDER — ALBUTEROL SULFATE 90 UG/1
1-2 AEROSOL, METERED RESPIRATORY (INHALATION)
Status: CANCELLED
Start: 2024-03-25

## 2024-03-11 RX ORDER — METHYLPREDNISOLONE SODIUM SUCCINATE 125 MG/2ML
125 INJECTION, POWDER, LYOPHILIZED, FOR SOLUTION INTRAMUSCULAR; INTRAVENOUS
Status: CANCELLED
Start: 2024-03-25

## 2024-03-11 ASSESSMENT — PAIN SCALES - GENERAL: PAINLEVEL: MILD PAIN (3)

## 2024-03-11 NOTE — NURSING NOTE
Is the patient currently in the state of [place]? YES    Visit mode:VIDEO    If the visit is dropped, the patient can be reconnected by: VIDEO VISIT: Send to e-mail at: [email or web address]    Will anyone else be joining the visit? NO  (If patient encounters technical issues they should call [phone]    How would you like to obtain your AVS? MyChart    Are changes needed to the allergy or medication list? No    Reason for visit: [place]

## 2024-03-11 NOTE — PATIENT INSTRUCTIONS
Arrange for lab draw and Taxotere and Cytoxan + Neulasta Onpro every 3 weeks x 4 cycles.  RTC NP alternating with MD each chemo cycle.

## 2024-03-11 NOTE — LETTER
3/11/2024         RE: Angelica Gomez  [address]  [facility] [address]        Dear Colleague,    Thank you for referring your patient, Angelica Gomez, to the [facility] [place]. Please see a copy of my visit note below.    Virtual Visit Details    Type of service:  Video Visit     Originating Location (pt. Location): Home    Distant Location (provider location):  On-site  Platform used for Video Visit: [employer]  Video visit duration: 25 minutes    [facility]    Hematology/Oncology Established Patient Note      Today's Date: 3/6/2024    Reason for visit: Left breast cancer.    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  12/7/2023: Due to worsening left upper outer breast pain, bilateral diagnostic mammogram performed. No mammographic abnormality in left upper outer breast site of symptoms but at 1:00, there is oval asymmetry measuring 0.7 cm. Remaining left breast and left axillary U/S showed morphologically normal lymph node and breast tissue.  At 1:30, 5 cm from nipple is irregular hypoechoic mass measuring 0.5 x 0.5 cm.  2. 12/15/2023: U/S-guided biopsy of left breast at 1:30 showed grade 2 invasive ductal carcinoma (3 mm) with associated grade 3 DCIS, no LVI; ER positive at 20%, GA positive at 20%, HER-2 negative with IHC = 1+.  3. 2/21/2024: Bilateral mastectomies with left axillary sentinel lymph node excision with Dr. Bean Phillips; bilateral implant removal. Pathology showed 1.2 cm grade 2 invasive ductal carcinoma with 1.4 cm grade 3 DCIS; margins negative; one left axillary SLN negative. Right breast benign. Repeat ER weakly positive at 61-70%, GA moderate positive at 71-80%. MammaPrint + BluePrint showed high one risk, Luminal B.    INTERVAL HISTORY:  Lisandra reports recovering well from her mastectomies with slightly more soreness on her left side.   Drains are all out.  No infection issues.    REVIEW OF SYSTEMS:   14 point ROS was reviewed and is negative other than as noted above in HPI.       HOME MEDICATIONS:  Current Outpatient Medications   Medication Sig Dispense Refill     Calcium Carbonate-Vitamin D (CALCIUM 500 + D PO)        Cetirizine HCl (ZYRTEC ALLERGY PO) Take 10 mg by mouth daily       COMPRESSION STOCKINGS Please measure and distribute 2 pair of 20mmHg - 30mmHg thigh high open or closed toe compression stockings with extra refills as indicated. 2 each 4     Fluticasone Propionate (FLONASE NA) Spray 1 spray in nostril daily       methocarbamol (ROBAXIN) 750 MG tablet Take 1 tablet (750 mg) by mouth 3 times daily 30 tablet 0     Multiple Vitamins-Minerals (MULTIVITAMIN & MINERAL PO) Take 1 each by mouth daily. [facility] women's active supplement       oxyCODONE (ROXICODONE) 5 MG tablet Take 1 tablet (5 mg) by mouth every 4 hours as needed for pain 20 tablet 0     Probiotic Product (PROBIOTIC PO) Reported on 3/1/2017       senna-docusate (SENOKOT-S/PERICOLACE) 8.6-50 MG tablet Take 1 tablet by mouth 2 times daily 30 tablet 0     SUMAtriptan (IMITREX) 25 MG tablet Take 1 tablet (25 mg) by mouth at onset of headache for migraine May repeat in 2 hours. Max 8 tablets/24 hours. 18 tablet 4     tranexamic acid (LYSTEDA) 650 MG tablet Take 2 tablets (1,300 mg) by mouth 3 times daily 30 tablet 3         ALLERGIES:  No Known Allergies      PAST MEDICAL HISTORY:  Past Medical History:   Diagnosis Date     NO ACTIVE PROBLEMS    Menometrorrhagia.    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 Used IUD for 20 years.      PAST SURGICAL HISTORY:  Past Surgical History:   Procedure Laterality Date     COLONOSCOPY WITH CO2 INSUFFLATION N/A 2024    Procedure: Colonoscopy with CO2 insufflation;  Surgeon: Cherise Lima DO;  Location: [facility] OR     INSERT TISSUE EXPANDER BREAST BILATERAL Bilateral  2024    Procedure: Insertion tissue expander, breasts, bilateral;  Surgeon: Elisabet Venegas MD;  Location: [facility] OR     MASTECTOMY, NIPPLE SPARING Bilateral 2024    Procedure: Bilateral nipple sparing mastectomy, bilateral implant removal, left sentinel lymph node biopsy;  Surgeon: Aba Phillips MD;  Location: [facility] OR     TUBAL LIGATION       [facility]  DELIVERY ONLY      superficial wound dehiscence   Bilateral breast augmentation in 2013.      SOCIAL HISTORY:  Social History     Socioeconomic History     Marital status:      Spouse name: Not on file     Number of children: Not on file     Years of education: Not on file     Highest education level: Not on file   Occupational History     Not on file   Tobacco Use     Smoking status: Former     Packs/day: 0.25     Years: 1.00     Additional pack years: 0.00     Total pack years: 0.25     Types: Cigarettes     Smokeless tobacco: Never   Vaping Use     Vaping Use: Never used   Substance and Sexual Activity     Alcohol use: Yes     Comment: 0-1     Drug use: No     Sexual activity: Yes     Partners: Male     Birth control/protection: Surgical, I.U.D., Female Surgical     Comment: Tubal ligation, 2007   Other Topics Concern     Parent/sibling w/ CABG, MI or angioplasty before 65F 55M? Yes     Comment: father 2 MIs   Social History Narrative    Caffeine intake/servings daily - 2-3 pop    Calcium intake/servings daily - 2 yogurts and 1 glass of milk    Exercise 6 times weekly - describe strength training and cardio    Sunscreen used - Yes    Seatbelts used - Yes    Guns stored in the home - No    Self Breast Exam - Yes    Pap test up to date -  Yes, as of today    Eye exam up to date -  Yes    Dental exam up to date -  Yes    DEXA scan up to date -  Not Applicable    Flex Sig/Colonoscopy up to date -  Not Applicable    Mammography up to date -  Not Applicable    Immunizations reviewed and up to date - Yes, 2008    Abuse:  Current or Past (Physical, Sexual or Emotional) - No    Do you feel safe in your environment - Yes    Do you cope well with stress - Yes    Do you suffer from insomnia - No    Last updated by: Lit Licona  10/27/2009                 Social Determinants of Health     Financial Resource Strain: Low Risk  (2/1/2024)    Financial Resource Strain      Within the past 12 months, have you or your family members you live with been unable to get utilities (heat, electricity) when it was really needed?: No   Food Insecurity: Low Risk  (2/1/2024)    Food Insecurity      Within the past 12 months, did you worry that your food would run out before you got money to buy more?: No      Within the past 12 months, did the food you bought just not last and you didn t have money to get more?: No   Transportation Needs: Low Risk  (2/1/2024)    Transportation Needs      Within the past 12 months, has lack of transportation kept you from medical appointments, getting your medicines, non-medical meetings or appointments, work, or from getting things that you need?: No   Physical Activity: Not on file   Stress: Not on file   Social Connections: Not on file   Interpersonal Safety: Low Risk  (2/5/2024)    Interpersonal Safety      Do you feel physically and emotionally safe where you currently live?: Yes      Within the past 12 months, have you been hit, slapped, kicked or otherwise physically hurt by someone?: No      Within the past 12 months, have you been humiliated or emotionally abused in other ways by your partner or ex-partner?: No   Housing Stability: Low Risk  (2/1/2024)    Housing Stability      Do you have housing? : Yes      Are you worried about losing your housing?: No   Has 2 grown children.  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      FAMILY HISTORY:  Family History   Problem Relation Age of Onset     Thyroid Disease Mother      Breast Cancer Mother  "56        breast, 3 years later     Heart Disease Father         2x heart attacks     Circulatory Father         blood clots     Cardiovascular Father         patent foramen ovale, repaired x 2, afib     Cerebrovascular Disease Father         x2     C.A.D. Father         x2     Genitourinary Problems Father         kidney disease     Asthma Brother      Food Allergy Brother      Eczema Brother      No Known Problems Maternal Grandmother      Myocardial Infarction Maternal Grandfather      Hypertension Paternal Grandmother      Alcohol/Drug Paternal Grandfather      No Known Problems Daughter      No Known Problems Son      Cancer Paternal Aunt         cervical cancer(another sisiter)     Breast Cancer Paternal Aunt         breast cancer at 70's     Breast Cancer Paternal Aunt      Ovarian Cancer Paternal Aunt      Cancer - colorectal No family hx of      Diabetes No family hx of          PHYSICAL EXAM:  Vital signs:  Ht 1.651 m (5' 5\")   Wt 82.6 kg (182 lb)   LMP 2024 (Exact Date)   BMI 30.29 kg/m     ECO  GENERAL: No acute distress.  EYES: No scleral icterus. No overt erythema.  RESPIRATORY: No audible cough, wheezing, or labored breathing.  MUSCULOSKELETAL: Range of motion in the neck, shoulders, and arms appear normal.  SKIN: No overt rashes, discolorations, or lesions over the face and neck.  NEUROLOGIC: Alert.  No overt tremors.  PSYCHIATRIC: Normal affect and mood.  Does not appear anxious.       LABS:  CBC RESULTS:   Recent Labs   Lab Test 24  1513   WBC 6.0   RBC 4.57   HGB 13.4   HCT 40.4   MCV 88   MCH 29.3   MCHC 33.2   RDW 11.7         Last Comprehensive Metabolic Panel:  Sodium   Date Value Ref Range Status   2024 141 135 - 145 mmol/L Final     Comment:     Reference intervals for this test were updated on 2023 to more accurately reflect our healthy population. There may be differences in the flagging of prior results with similar values performed with this method. " Interpretation of those prior results can be made in the context of the updated reference intervals.    09/10/2012 140 133 - 144 mmol/L Final     Potassium   Date Value Ref Range Status   02/05/2024 4.2 3.4 - 5.3 mmol/L Final   12/30/2021 3.9 3.4 - 5.3 mmol/L Final   09/10/2012 4.2 3.4 - 5.3 mmol/L Final     Chloride   Date Value Ref Range Status   02/05/2024 107 98 - 107 mmol/L Final   12/30/2021 110 (H) 94 - 109 mmol/L Final   09/10/2012 104 94 - 109 mmol/L Final     Carbon Dioxide   Date Value Ref Range Status   09/10/2012 25 20 - 32 mmol/L Final     Carbon Dioxide (CO2)   Date Value Ref Range Status   02/05/2024 25 22 - 29 mmol/L Final   12/30/2021 27 20 - 32 mmol/L Final     Anion Gap   Date Value Ref Range Status   02/05/2024 9 7 - 15 mmol/L Final   12/30/2021 5 3 - 14 mmol/L Final   09/10/2012 11 6 - 17 mmol/L Final     Glucose   Date Value Ref Range Status   12/30/2021 73 70 - 99 mg/dL Final   02/21/2017 94 70 - 99 mg/dL Final     Comment:     Fasting specimen     GLUCOSE BY METER POCT   Date Value Ref Range Status   02/22/2024 106 (H) 70 - 99 mg/dL Final     Urea Nitrogen   Date Value Ref Range Status   02/05/2024 11.6 6.0 - 20.0 mg/dL Final   12/30/2021 16 7 - 30 mg/dL Final   09/10/2012 12 5 - 24 mg/dL Final     Creatinine   Date Value Ref Range Status   02/05/2024 0.93 0.51 - 0.95 mg/dL Final   09/10/2012 0.81 0.52 - 1.04 mg/dL Final     GFR Estimate   Date Value Ref Range Status   02/05/2024 78 >60 mL/min/1.73m2 Final   09/10/2012 82 >60 mL/min/1.7m2 Final     Calcium   Date Value Ref Range Status   02/05/2024 9.2 8.6 - 10.0 mg/dL Final   09/10/2012 9.3 8.5 - 10.4 mg/dL Final     Bilirubin Total   Date Value Ref Range Status   02/05/2024 0.3 <=1.2 mg/dL Final   09/10/2012 0.6 0.2 - 1.3 mg/dL Final     Alkaline Phosphatase   Date Value Ref Range Status   02/05/2024 52 40 - 150 U/L Final     Comment:     Reference intervals for this test were updated on 11/14/2023 to more accurately reflect our healthy  population. There may be differences in the flagging of prior results with similar values performed with this method. Interpretation of those prior results can be made in the context of the updated reference intervals.   09/10/2012 53 40 - 150 U/L Final     ALT   Date Value Ref Range Status   02/05/2024 16 0 - 50 U/L Final     Comment:     Reference intervals for this test were updated on 6/12/2023 to more accurately reflect our healthy population. There may be differences in the flagging of prior results with similar values performed with this method. Interpretation of those prior results can be made in the context of the updated reference intervals.     09/10/2012 27 0 - 50 U/L Final     AST   Date Value Ref Range Status   02/05/2024 24 0 - 45 U/L Final     Comment:     Reference intervals for this test were updated on 6/12/2023 to more accurately reflect our healthy population. There may be differences in the flagging of prior results with similar values performed with this method. Interpretation of those prior results can be made in the context of the updated reference intervals.   09/10/2012 81 (H) 0 - 45 U/L Final       PATHOLOGY:  Reviewed as per HPI.    IMAGING:  Reviewed as per HPI.    ASSESSMENT/PLAN:  Angelica Gomez is a 43 year old female with the following issues:  1. Stage IA, [id number], grade 2 invasive ductal carcinoma of the left upper outer breast, ER positive 20%, DE positive 20%, HER-2 negative, MammaPrint high risk, Luminal B  2. CHEK2 mutation  3. Menometrorrhagia  --Lisandra is s/p bilateral mastectomies with final pathology showed a left breast 1.2 cm tumor with repeat ER positive at 61-70%, DE positive at 71-80%, HER-2 negative (IHC = 1+)  --MammaPrint + BluePrint showed high risk, Luminal B subtype.  --Given high risk for recurrence, I advises adjuvant chemotherapy with 4 cycles of Taxotere and Cytoxan.  Discussed potential adverse effects of this regimen, including but not limited to:  cytopenias, alopecia, infusion reaction, nausea, fatigue, peripheral neuropathy, secondary malignancies, rash. She agrees to proceed.  --Following adjuvant chemo, I would also strongly recommend either tamoxifen or ovarian suppression therapy with Zoladex and aromatase inhibitor such as anastrozole to further reduce her risk of breast cancer recurrence for at least 5 years.  However, she has had menometrorrhagia and might favor Zoladex + AI in order to induce cessation of menstruation if she is able to tolerate possible menopause effects.  Will discuss again after chemo.  --She will continue with colonoscopy screening on high-risk basis.    3. Fertility discussion  --She is s/p tubal ligation and does no wish to have any more children.    Magali Roland MD  Hematology/Oncology  [facility]    Total time spent today: 40 minutes in chart review, patient evaluation, counseling, documentation, test and/or medication/prescription orders, and coordination of care.        Again, thank you for allowing me to participate in the care of your patient.        Sincerely,        Magali Roland MD

## 2024-03-11 NOTE — LETTER
3/11/2024         RE: Angelica Gomez  [address]  [facility] [address]        Dear Colleague,    Thank you for referring your patient, Angelica Gomez, to the [facility] [place]. Please see a copy of my visit note below.    Virtual Visit Details    Type of service:  Video Visit     Originating Location (pt. Location): Home    Distant Location (provider location):  On-site  Platform used for Video Visit: Zipalong  Video visit duration: 25 minutes    [facility]    Hematology/Oncology Established Patient Note      Today's Date: 3/6/2024    Reason for visit: Left breast cancer.    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  12/7/2023: Due to worsening left upper outer breast pain, bilateral diagnostic mammogram performed. No mammographic abnormality in left upper outer breast site of symptoms but at 1:00, there is oval asymmetry measuring 0.7 cm. Remaining left breast and left axillary U/S showed morphologically normal lymph node and breast tissue.  At 1:30, 5 cm from nipple is irregular hypoechoic mass measuring 0.5 x 0.5 cm.  2. 12/15/2023: U/S-guided biopsy of left breast at 1:30 showed grade 2 invasive ductal carcinoma (3 mm) with associated grade 3 DCIS, no LVI; ER positive at 20%, FL positive at 20%, HER-2 negative with IHC = 1+.  3. 2/21/2024: Bilateral mastectomies with left axillary sentinel lymph node excision with Dr. Bean Phillips; bilateral implant removal. Pathology showed 1.2 cm grade 2 invasive ductal carcinoma with 1.4 cm grade 3 DCIS; margins negative; one left axillary SLN negative. Right breast benign. Repeat ER weakly positive at 61-70%, FL moderate positive at 71-80%. MammaPrint + BluePrint showed high one risk, Luminal B.    INTERVAL HISTORY:  Lisandra reports recovering well from her mastectomies with slightly more soreness on her left side.   Drains are all out.  No infection issues.    REVIEW OF SYSTEMS:   14 point ROS was reviewed and is negative other than as noted above in HPI.       HOME MEDICATIONS:  Current Outpatient Medications   Medication Sig Dispense Refill     Calcium Carbonate-Vitamin D (CALCIUM 500 + D PO)        Cetirizine HCl (ZYRTEC ALLERGY PO) Take 10 mg by mouth daily       COMPRESSION STOCKINGS Please measure and distribute 2 pair of 20mmHg - 30mmHg thigh high open or closed toe compression stockings with extra refills as indicated. 2 each 4     Fluticasone Propionate (FLONASE NA) Spray 1 spray in nostril daily       methocarbamol (ROBAXIN) 750 MG tablet Take 1 tablet (750 mg) by mouth 3 times daily 30 tablet 0     Multiple Vitamins-Minerals (MULTIVITAMIN & MINERAL PO) Take 1 each by mouth daily. [facility] women's active supplement       oxyCODONE (ROXICODONE) 5 MG tablet Take 1 tablet (5 mg) by mouth every 4 hours as needed for pain 20 tablet 0     Probiotic Product (PROBIOTIC PO) Reported on 3/1/2017       senna-docusate (SENOKOT-S/PERICOLACE) 8.6-50 MG tablet Take 1 tablet by mouth 2 times daily 30 tablet 0     SUMAtriptan (IMITREX) 25 MG tablet Take 1 tablet (25 mg) by mouth at onset of headache for migraine May repeat in 2 hours. Max 8 tablets/24 hours. 18 tablet 4     tranexamic acid (LYSTEDA) 650 MG tablet Take 2 tablets (1,300 mg) by mouth 3 times daily 30 tablet 3         ALLERGIES:  No Known Allergies      PAST MEDICAL HISTORY:  Past Medical History:   Diagnosis Date     NO ACTIVE PROBLEMS    Menometrorrhagia.    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 Used IUD for 20 years.      PAST SURGICAL HISTORY:  Past Surgical History:   Procedure Laterality Date     COLONOSCOPY WITH CO2 INSUFFLATION N/A 2024    Procedure: Colonoscopy with CO2 insufflation;  Surgeon: Cherise Lima DO;  Location: [facility] OR     INSERT TISSUE EXPANDER BREAST BILATERAL Bilateral  2024    Procedure: Insertion tissue expander, breasts, bilateral;  Surgeon: Elisabet Venegas MD;  Location: [facility] OR     MASTECTOMY, NIPPLE SPARING Bilateral 2024    Procedure: Bilateral nipple sparing mastectomy, bilateral implant removal, left sentinel lymph node biopsy;  Surgeon: Aba Phillips MD;  Location: [facility] OR     TUBAL LIGATION       [facility]  DELIVERY ONLY      superficial wound dehiscence   Bilateral breast augmentation in 2013.      SOCIAL HISTORY:  Social History     Socioeconomic History     Marital status:      Spouse name: Not on file     Number of children: Not on file     Years of education: Not on file     Highest education level: Not on file   Occupational History     Not on file   Tobacco Use     Smoking status: Former     Packs/day: 0.25     Years: 1.00     Additional pack years: 0.00     Total pack years: 0.25     Types: Cigarettes     Smokeless tobacco: Never   Vaping Use     Vaping Use: Never used   Substance and Sexual Activity     Alcohol use: Yes     Comment: 0-1     Drug use: No     Sexual activity: Yes     Partners: Male     Birth control/protection: Surgical, I.U.D., Female Surgical     Comment: Tubal ligation, 2007   Other Topics Concern     Parent/sibling w/ CABG, MI or angioplasty before 65F 55M? Yes     Comment: father 2 MIs   Social History Narrative    Caffeine intake/servings daily - 2-3 pop    Calcium intake/servings daily - 2 yogurts and 1 glass of milk    Exercise 6 times weekly - describe strength training and cardio    Sunscreen used - Yes    Seatbelts used - Yes    Guns stored in the home - No    Self Breast Exam - Yes    Pap test up to date -  Yes, as of today    Eye exam up to date -  Yes    Dental exam up to date -  Yes    DEXA scan up to date -  Not Applicable    Flex Sig/Colonoscopy up to date -  Not Applicable    Mammography up to date -  Not Applicable    Immunizations reviewed and up to date - Yes, 2008    Abuse:  Current or Past (Physical, Sexual or Emotional) - No    Do you feel safe in your environment - Yes    Do you cope well with stress - Yes    Do you suffer from insomnia - No    Last updated by: Lit Licona  10/27/2009                 Social Determinants of Health     Financial Resource Strain: Low Risk  (2/1/2024)    Financial Resource Strain      Within the past 12 months, have you or your family members you live with been unable to get utilities (heat, electricity) when it was really needed?: No   Food Insecurity: Low Risk  (2/1/2024)    Food Insecurity      Within the past 12 months, did you worry that your food would run out before you got money to buy more?: No      Within the past 12 months, did the food you bought just not last and you didn t have money to get more?: No   Transportation Needs: Low Risk  (2/1/2024)    Transportation Needs      Within the past 12 months, has lack of transportation kept you from medical appointments, getting your medicines, non-medical meetings or appointments, work, or from getting things that you need?: No   Physical Activity: Not on file   Stress: Not on file   Social Connections: Not on file   Interpersonal Safety: Low Risk  (2/5/2024)    Interpersonal Safety      Do you feel physically and emotionally safe where you currently live?: Yes      Within the past 12 months, have you been hit, slapped, kicked or otherwise physically hurt by someone?: No      Within the past 12 months, have you been humiliated or emotionally abused in other ways by your partner or ex-partner?: No   Housing Stability: Low Risk  (2/1/2024)    Housing Stability      Do you have housing? : Yes      Are you worried about losing your housing?: No   Has 2 grown children.  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      FAMILY HISTORY:  Family History   Problem Relation Age of Onset     Thyroid Disease Mother      Breast Cancer Mother  "56        breast, 3 years later     Heart Disease Father         2x heart attacks     Circulatory Father         blood clots     Cardiovascular Father         patent foramen ovale, repaired x 2, afib     Cerebrovascular Disease Father         x2     C.A.D. Father         x2     Genitourinary Problems Father         kidney disease     Asthma Brother      Food Allergy Brother      Eczema Brother      No Known Problems Maternal Grandmother      Myocardial Infarction Maternal Grandfather      Hypertension Paternal Grandmother      Alcohol/Drug Paternal Grandfather      No Known Problems Daughter      No Known Problems Son      Cancer Paternal Aunt         cervical cancer(another sisiter)     Breast Cancer Paternal Aunt         breast cancer at 70's     Breast Cancer Paternal Aunt      Ovarian Cancer Paternal Aunt      Cancer - colorectal No family hx of      Diabetes No family hx of          PHYSICAL EXAM:  Vital signs:  Ht 1.651 m (5' 5\")   Wt 82.6 kg (182 lb)   LMP 2024 (Exact Date)   BMI 30.29 kg/m     ECO  GENERAL: No acute distress.  EYES: No scleral icterus. No overt erythema.  RESPIRATORY: No audible cough, wheezing, or labored breathing.  MUSCULOSKELETAL: Range of motion in the neck, shoulders, and arms appear normal.  SKIN: No overt rashes, discolorations, or lesions over the face and neck.  NEUROLOGIC: Alert.  No overt tremors.  PSYCHIATRIC: Normal affect and mood.  Does not appear anxious.       LABS:  CBC RESULTS:   Recent Labs   Lab Test 24  1513   WBC 6.0   RBC 4.57   HGB 13.4   HCT 40.4   MCV 88   MCH 29.3   MCHC 33.2   RDW 11.7         Last Comprehensive Metabolic Panel:  Sodium   Date Value Ref Range Status   2024 141 135 - 145 mmol/L Final     Comment:     Reference intervals for this test were updated on 2023 to more accurately reflect our healthy population. There may be differences in the flagging of prior results with similar values performed with this method. " Interpretation of those prior results can be made in the context of the updated reference intervals.    09/10/2012 140 133 - 144 mmol/L Final     Potassium   Date Value Ref Range Status   02/05/2024 4.2 3.4 - 5.3 mmol/L Final   12/30/2021 3.9 3.4 - 5.3 mmol/L Final   09/10/2012 4.2 3.4 - 5.3 mmol/L Final     Chloride   Date Value Ref Range Status   02/05/2024 107 98 - 107 mmol/L Final   12/30/2021 110 (H) 94 - 109 mmol/L Final   09/10/2012 104 94 - 109 mmol/L Final     Carbon Dioxide   Date Value Ref Range Status   09/10/2012 25 20 - 32 mmol/L Final     Carbon Dioxide (CO2)   Date Value Ref Range Status   02/05/2024 25 22 - 29 mmol/L Final   12/30/2021 27 20 - 32 mmol/L Final     Anion Gap   Date Value Ref Range Status   02/05/2024 9 7 - 15 mmol/L Final   12/30/2021 5 3 - 14 mmol/L Final   09/10/2012 11 6 - 17 mmol/L Final     Glucose   Date Value Ref Range Status   12/30/2021 73 70 - 99 mg/dL Final   02/21/2017 94 70 - 99 mg/dL Final     Comment:     Fasting specimen     GLUCOSE BY METER POCT   Date Value Ref Range Status   02/22/2024 106 (H) 70 - 99 mg/dL Final     Urea Nitrogen   Date Value Ref Range Status   02/05/2024 11.6 6.0 - 20.0 mg/dL Final   12/30/2021 16 7 - 30 mg/dL Final   09/10/2012 12 5 - 24 mg/dL Final     Creatinine   Date Value Ref Range Status   02/05/2024 0.93 0.51 - 0.95 mg/dL Final   09/10/2012 0.81 0.52 - 1.04 mg/dL Final     GFR Estimate   Date Value Ref Range Status   02/05/2024 78 >60 mL/min/1.73m2 Final   09/10/2012 82 >60 mL/min/1.7m2 Final     Calcium   Date Value Ref Range Status   02/05/2024 9.2 8.6 - 10.0 mg/dL Final   09/10/2012 9.3 8.5 - 10.4 mg/dL Final     Bilirubin Total   Date Value Ref Range Status   02/05/2024 0.3 <=1.2 mg/dL Final   09/10/2012 0.6 0.2 - 1.3 mg/dL Final     Alkaline Phosphatase   Date Value Ref Range Status   02/05/2024 52 40 - 150 U/L Final     Comment:     Reference intervals for this test were updated on 11/14/2023 to more accurately reflect our healthy  population. There may be differences in the flagging of prior results with similar values performed with this method. Interpretation of those prior results can be made in the context of the updated reference intervals.   09/10/2012 53 40 - 150 U/L Final     ALT   Date Value Ref Range Status   02/05/2024 16 0 - 50 U/L Final     Comment:     Reference intervals for this test were updated on 6/12/2023 to more accurately reflect our healthy population. There may be differences in the flagging of prior results with similar values performed with this method. Interpretation of those prior results can be made in the context of the updated reference intervals.     09/10/2012 27 0 - 50 U/L Final     AST   Date Value Ref Range Status   02/05/2024 24 0 - 45 U/L Final     Comment:     Reference intervals for this test were updated on 6/12/2023 to more accurately reflect our healthy population. There may be differences in the flagging of prior results with similar values performed with this method. Interpretation of those prior results can be made in the context of the updated reference intervals.   09/10/2012 81 (H) 0 - 45 U/L Final       PATHOLOGY:  Reviewed as per HPI.    IMAGING:  Reviewed as per HPI.    ASSESSMENT/PLAN:  Angelica Gomez is a 43 year old female with the following issues:  1. Stage IA, [id number], grade 2 invasive ductal carcinoma of the left upper outer breast, ER positive 20%, MA positive 20%, HER-2 negative, MammaPrint high risk, Luminal B  2. CHEK2 mutation  3. Menometrorrhagia  --Lisandra is s/p bilateral mastectomies with final pathology showed a left breast 1.2 cm tumor with repeat ER positive at 61-70%, MA positive at 71-80%, HER-2 negative (IHC = 1+)  --MammaPrint + BluePrint showed high risk, Luminal B subtype.  --Given high risk for recurrence, I advises adjuvant chemotherapy with 4 cycles of Taxotere and Cytoxan.  Discussed potential adverse effects of this regimen, including but not limited to:  cytopenias, alopecia, infusion reaction, nausea, fatigue, peripheral neuropathy, secondary malignancies, rash. She agrees to proceed.  --Following adjuvant chemo, I would also strongly recommend either tamoxifen or ovarian suppression therapy with Zoladex and aromatase inhibitor such as anastrozole to further reduce her risk of breast cancer recurrence for at least 5 years.  However, she has had menometrorrhagia and might favor Zoladex + AI in order to induce cessation of menstruation if she is able to tolerate possible menopause effects.  Will discuss again after chemo.  --She will continue with colonoscopy screening on high-risk basis.    3. Fertility discussion  --She is s/p tubal ligation and does no wish to have any more children.    Magali Roland MD  Hematology/Oncology  [facility]    Total time spent today: 40 minutes in chart review, patient evaluation, counseling, documentation, test and/or medication/prescription orders, and coordination of care.        Again, thank you for allowing me to participate in the care of your patient.        Sincerely,        Magali Roland MD
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
## 2024-03-12 ENCOUNTER — OFFICE VISIT (OUTPATIENT)
Dept: SURGERY | Facility: CLINIC | Age: 44
End: 2024-03-12
Payer: COMMERCIAL

## 2024-03-12 DIAGNOSIS — Z09 SURGERY FOLLOW-UP EXAMINATION: Primary | ICD-10-CM

## 2024-03-12 DIAGNOSIS — C50.912 INVASIVE DUCTAL CARCINOMA OF BREAST, FEMALE, LEFT (H): ICD-10-CM

## 2024-03-12 DIAGNOSIS — Z90.12 STATUS POST LEFT MASTECTOMY: ICD-10-CM

## 2024-03-12 PROCEDURE — 99024 POSTOP FOLLOW-UP VISIT: CPT | Performed by: SURGERY

## 2024-03-12 NOTE — LETTER
March 13, 2024          Isabel Matson MD  [address] [facility] [address]  [place] [address]      RE:   Angelica Gomez 1980      Dear Colleague,    Thank you for referring your patient, Angelica Gomez, to [facility] at [facility]. Please see a copy of my visit note below.    Surgery Postop Note     Angelica Gomez presents today for surgical followup.  she is doing well following bilateral nipple sparing mastectomy with left sentinel node biopsy.  Incisions look fine with no signs of wound infection, although there is some superficial skin loss at the nipple.  Appears to be healing well, no evidence for infection.  Final pathology revealed multifocal disease within the left breast and a negative sentinel node.  I do not see any indication for radiation at this point, but Oncotype has been sent and is currently pending.  I expect her to make a complete recovery.     Again, thank you for allowing me to participate in the care of your patient.      Sincerely,      Aba Phillips MD

## 2024-03-13 ENCOUNTER — PATIENT OUTREACH (OUTPATIENT)
Dept: ONCOLOGY | Facility: CLINIC | Age: 44
End: 2024-03-13
Payer: COMMERCIAL

## 2024-03-13 LAB — SPECIMEN STATUS: NORMAL

## 2024-03-13 NOTE — PROGRESS NOTES
Surgery Postop Note    Angelica Gomez presents today for surgical followup.  she is doing well following bilateral nipple sparing mastectomy with left sentinel node biopsy.  Incisions look fine with no signs of wound infection, although there is some superficial skin loss at the nipple.  Appears to be healing well, no evidence for infection.  Final pathology revealed multifocal disease within the left breast and a negative sentinel node.  I do not see any indication for radiation at this point, but Oncotype has been sent and is currently pending.  I expect her to make a complete recovery.  Thank you for the opportunity to help in her care.    Bean Phillips M.D.  [facility]  [phone]    Please route or send letter to:  Primary Care Provider (PCP) and Referring Provider

## 2024-03-19 DIAGNOSIS — Z17.0 MALIGNANT NEOPLASM OF UPPER-OUTER QUADRANT OF LEFT BREAST IN FEMALE, ESTROGEN RECEPTOR POSITIVE (H): Primary | ICD-10-CM

## 2024-03-19 DIAGNOSIS — C50.412 MALIGNANT NEOPLASM OF UPPER-OUTER QUADRANT OF LEFT BREAST IN FEMALE, ESTROGEN RECEPTOR POSITIVE (H): Primary | ICD-10-CM

## 2024-03-20 RX ORDER — PROCHLORPERAZINE MALEATE 10 MG
10 TABLET ORAL EVERY 6 HOURS PRN
Qty: 30 TABLET | Refills: 2 | Status: SHIPPED | OUTPATIENT
Start: 2024-03-20 | End: 2024-06-25

## 2024-03-20 RX ORDER — DEXAMETHASONE 4 MG/1
8 TABLET ORAL 2 TIMES DAILY WITH MEALS
Qty: 6 TABLET | Refills: 3 | Status: ON HOLD | OUTPATIENT
Start: 2024-03-20 | End: 2024-06-14

## 2024-03-20 RX ORDER — ONDANSETRON 8 MG/1
8 TABLET, FILM COATED ORAL EVERY 8 HOURS PRN
Qty: 30 TABLET | Refills: 2 | Status: SHIPPED | OUTPATIENT
Start: 2024-03-20 | End: 2024-06-25

## 2024-03-20 NOTE — TELEPHONE ENCOUNTER
[facility]: Cancer Care Plan of Care Education Note                                    Discussion with Patient:                                                         Review chemo treatment plan        Assessment:                                                      Assessment completed with:: Patient    Plan of Care Education   Does patient understand diagnosis?: Yes  Tx plan/regimen:: Taxotere Cytoxan every 3 weeks x 4 + Onpro  Does patient understand treatment plan/regimen?: Yes  Preparing for treatment:: Reviewed treatment preparation information with patient (vascular access, day of chemo, visitor policy, what to bring, etc.)  Side effect education:: Diarrhea/Constipation;Fatigue;Hair loss;Infection;Lab value monitoring (anemia, neutropenia, thrombocytopenia);Nausea/Vomiting;Neuropathy  Safety/self care at home reviewed with patient:: Yes  Plan of Care:: STEPHANIE follow-up appointment;Lab appointment;MD follow-up appointment;Treatment schedule  When to call provider:: Bleeding;Increased shortness of breath;New/worsening pain;Shaking chills;Temperature >100.4F;Uncontrolled diarrhea/constipation;Uncontrolled nausea/vomiting  Reasons for deferring treatment reviewed with patient:: Yes  Additional education provided for: : Neutropenic precautions    Evaluation of Learning  Patient Education Provided: Yes  Readiness:: Acceptance  Method:: Booklet/Handout  Response:: Verbalizes understanding    No assessment indicated    Intervention/Education provided during outreach:                                                           Patient to follow up as scheduled at next appt  Patient to call/[employer] message with updates  Confirmed patient has clinic and triage numbers    Signature:  Sophie Neal RN

## 2024-03-20 NOTE — TELEPHONE ENCOUNTER
[facility]: Cancer Care Initial Note                                    Discussion with Patient:                                                      Discussed chemo plan and released take home medications             Assessment:                                                      Initial  Current living arrangement:: I live in a private home with spouse  Informal Support system:: Children;Spouse  Equipment Currently Used at Home: none  Bed or wheelchair confined:: No  Mobility Status: Independent  Transportation means:: Regular car  Medication adherence problem (GOAL):: No  Knowledgeable about how to use meds:: Yes  Referrals Placed: None  Patient Reported Pain?: No      Intervention/Education provided during outreach:                                                         Patient to follow up as scheduled at next appt  Patient to call/[employer] message with updates  Confirmed patient has clinic and triage numbers    Signature:  Sophie Neal RN

## 2024-03-21 ENCOUNTER — THERAPY VISIT (OUTPATIENT)
Dept: OCCUPATIONAL THERAPY | Facility: CLINIC | Age: 44
End: 2024-03-21
Attending: SURGERY
Payer: COMMERCIAL

## 2024-03-21 DIAGNOSIS — C50.912 INVASIVE DUCTAL CARCINOMA OF BREAST, FEMALE, LEFT (H): ICD-10-CM

## 2024-03-21 DIAGNOSIS — Z90.12 STATUS POST LEFT MASTECTOMY: ICD-10-CM

## 2024-03-21 PROCEDURE — 97165 OT EVAL LOW COMPLEX 30 MIN: CPT | Mod: GO

## 2024-03-21 PROCEDURE — 97110 THERAPEUTIC EXERCISES: CPT | Mod: GO

## 2024-03-21 PROCEDURE — 97140 MANUAL THERAPY 1/> REGIONS: CPT | Mod: GO

## 2024-03-21 NOTE — PROGRESS NOTES
OCCUPATIONAL THERAPY EVALUATION  Type of Visit: Evaluation    See electronic medical record for Abuse and Falls Screening details.    Subjective      Presenting condition or subjective complaint: Cording in left upper arm and axilla following lymph node removal  Date of onset: 02/21/24    Relevant medical history: Cancer; Migraines or headaches; Overweight; Pain at night or rest   Dates & types of surgery: 2/21/2024 bilateral nipple sparing mastectomy, sentinal node removal (left axilla), insertion of spacers in preparation for reconstruction    Prior diagnostic imaging/testing results: Other Physical exam by surgeons (breast & plastic surgeon)   Prior therapy history for the same diagnosis, illness or injury: No      Prior Level of Function  Transfers: Independent  Ambulation: Independent  ADL: Independent  IADL: Driving, Finances, Housekeeping, Laundry, Meal preparation, Medication management, Work    Living Environment  Social support: With a significant other or spouse   Type of home: House; Multi-level   Stairs to enter the home: Yes 20 Is there a railing: Yes   Ramp: No   Stairs inside the home: Yes 14 Is there a railing: Yes   Help at home: Self Cares (home health aide/personal care attendant, family, etc); Home management tasks (cooking, cleaning); Medication and/or finances; Home and Yard maintenance tasks  Equipment owned:       Employment: Yes Nurse Practitioner on [employer]  Hobbies/Interests: Walking, running, swimming, water sports    Patient goals for therapy: Return to baseline for range of motion and use of left arm    Pain assessment:  L anterior and posterior axilla , worse at the end of the day, muscle spasms     Objective       EDEMA EVALUATION  Additional history:  Body part affected by edema:  L lateral breast  If cancer related, treatment:  B mastectomy with L SLNB  If not cancer related, problems with veins or cause of swelling:    Distance able to walk:  no restrictions  Time able to stand:  no  restrictions  Sensation problems in hands/feet:    occasional in hands  Edema etiology: Cancer with lymph node dissection, Surgery    FUNCTIONAL SCALES  Shoulder Pain and Disability Index (score out of 100).  A higher score indicates greater impairment: 50.77     Cognitive Status Examination  Orientation: Oriented to person, place and time   Level of Consciousness: Alert  Follows Commands and Answers Questions: 100% of the time  Personal Safety and Judgement: Intact  Memory: Intact    EDEMA  Skin Condition:  L axilla with band of very tight tissue, appears to be more scar tissue/pseudo cording vs ANA with band of tight tissue starting on L lateral breast and ending in the axilla. Tissue adhesions noted as well as scar tissue adhesion in L axilla from previous breast implants. B breasts still with some surgical swelling, scars healed but tender and covered with gauze. Pocketing of edema on lateral trunk below axilla, L>R.  Scar: Yes    GIRTH MEASUREMENTS: Refer to separate girth measurement flowsheet.     VOLUME UE  Right UE (mL) 2137   Left UE (mL) 2062   UE Volume Comparison    % Difference        RANGE OF MOTION:  LUE shoulder impaired due to tissue tightness and pain  STRENGTH: UE Strength WNL  ACTIVITIES OF DAILY LIVING: unable to wash hair with LUE, putting deodorant on L arm  BED MOBILITY: Independent  TRANSFERS: Independent  GAIT/LOCOMOTION: indep with no device  BALANCE:  one slip/fall on the ice  SENSATION:  some increased tingling in L hand    Assessment & Plan   CLINICAL IMPRESSIONS  Medical Diagnosis: C50.912 (ICD-10-CM) - Invasive ductal carcinoma of breast, female, left (H)  Z90.10 (ICD-10-CM) - Status post mastectomy    Treatment Diagnosis: scar tissue, lymphedema, affecting ROM    Impression/Assessment: Pt is a 43 year old female presenting to Occupational Therapy due to impaired L shoulder ROM following treatment for breast cancer.  The following significant findings have been identified: Impaired  ROM and Pain.  These identified deficits interfere with their ability to perform self care tasks and household chores as compared to previous level of function.     Clinical Decision Making (Complexity):  Assessment of Occupational Performance: 1-3 Performance Deficits  Occupational Performance Limitations: bathing/showering, dressing, and home establishment and management  Clinical Decision Making (Complexity): Low complexity    PLAN OF CARE  Treatment Interventions:  Interventions: Self-Care/Home Management, Therapeutic Activity, Therapeutic Exercise, Manual Therapy    Long Term Goals   OT Goal 1  Goal Identifier: SPADI  Goal Description: Pt will demonstrate improvement in LUE ROM to complete ADLs as evidenced by a decrease of at least 10 points in the SPADI score (50.77 at eval)  Rationale: In order to maximize safety and independence with performance of self-care activities  Target Date: 06/13/24  OT Goal 2  Goal Identifier: ROM HEP  Goal Description: Pt demonstrate understanding of HEP reporting completion 5/7 days by discharge  Rationale: In order to maximize safety and independence with performance of self-care activities  Target Date: 06/13/24  OT Goal 3  Goal Identifier: lympedema management/prevention  Goal Description: Pt will verbalize understanding of long term management/prevention of lymphedema, including wear schedule for recommended compression garments, manual techniques, skin care, elevation and exercise, reporting completion 5/7 days by discharge.  Rationale: In order to maximize safety and independence with performance of self-care activities  Target Date: 06/13/24      Frequency of Treatment: 1x a week  Duration of Treatment: 12 weeks     Recommended Referrals to Other Professionals: none  Education Assessment: Learner/Method: Patient;Listening;Demonstration;Reading     Risks and benefits of evaluation/treatment have been explained.   Patient/Family/caregiver agrees with Plan of Care.      Evaluation Time:    OT Esteban, Low Complexity Minutes (08698): 15  Signing Clinician: Jesusita Roth OT

## 2024-03-22 ENCOUNTER — VIRTUAL VISIT (OUTPATIENT)
Dept: ONCOLOGY | Facility: CLINIC | Age: 44
End: 2024-03-22
Attending: NURSE PRACTITIONER
Payer: COMMERCIAL

## 2024-03-22 VITALS — WEIGHT: 182 LBS | BODY MASS INDEX: 30.32 KG/M2 | HEIGHT: 65 IN

## 2024-03-22 DIAGNOSIS — Z17.0 MALIGNANT NEOPLASM OF UPPER-OUTER QUADRANT OF LEFT BREAST IN FEMALE, ESTROGEN RECEPTOR POSITIVE (H): ICD-10-CM

## 2024-03-22 DIAGNOSIS — C50.412 MALIGNANT NEOPLASM OF UPPER-OUTER QUADRANT OF LEFT BREAST IN FEMALE, ESTROGEN RECEPTOR POSITIVE (H): ICD-10-CM

## 2024-03-22 PROCEDURE — 99215 OFFICE O/P EST HI 40 MIN: CPT | Mod: 95 | Performed by: NURSE PRACTITIONER

## 2024-03-22 ASSESSMENT — PAIN SCALES - GENERAL: PAINLEVEL: MILD PAIN (2)

## 2024-03-22 NOTE — LETTER
3/22/2024         RE: Angelica Gomez  [address]  [facility] [address]        Dear Colleague,    Thank you for referring your patient, Angelica Gomez, to the [facility] [place]. Please see a copy of my visit note below.    [facility]    Hematology/Oncology Established Patient Note    Today's Date: 3/22/2024    Reason for visit: Left breast cancer.    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  12/7/2023: Due to worsening left upper outer breast pain, bilateral diagnostic mammogram performed. No mammographic abnormality in left upper outer breast site of symptoms but at 1:00, there is oval asymmetry measuring 0.7 cm. Remaining left breast and left axillary U/S showed morphologically normal lymph node and breast tissue.  At 1:30, 5 cm from nipple is irregular hypoechoic mass measuring 0.5 x 0.5 cm.  2. 12/15/2023: U/S-guided biopsy of left breast at 1:30 showed grade 2 invasive ductal carcinoma (3 mm) with associated grade 3 DCIS, no LVI; ER positive at 20%, ID positive at 20%, HER-2 negative with IHC = 1+.  3. 2/21/2024: Bilateral mastectomies with left axillary sentinel lymph node excision with Dr. Bean Phillips; bilateral implant removal. Pathology showed 1.2 cm grade 2 invasive ductal carcinoma with 1.4 cm grade 3 DCIS; margins negative; one left axillary SLN negative. Right breast benign. Repeat ER weakly positive at 61-70%, ID moderate positive at 71-80%. MammaPrint + BluePrint showed high one risk, Luminal B.    INTERVAL HISTORY:  She is seen today virtually for evaluation prior to initiating chemotherapy with adjuvant docetaxel plus cyclophosphamide.  -Started breast rehab yesterday.  Plastic surgeon worried about cording but OT thinks maybe she has tissue adhesions in axilla.  She has a few more visits scheduled.  -History of menstrual related  migraines, and she has a new prescription for Imitrex since she can't be on estrogen anymore.  -No new or unusual headaches, dizziness or vision changes.  -Right nipple has a bit more healing per plastic surgery visit on Wednesday, and she denies any signs or symptoms of infection.  -No nasuea at baseline.  No mouth sores at baseline.  No neuropathy at baseline.  -No rashes or skin changes.  Her hands are dry but she is washing them a lot from the dressing changes, etc.  -No lower extremity swelling.  At baseline she gets puffiness and does wear compression stockings at work.    REVIEW OF SYSTEMS:   14 point ROS was reviewed and is negative other than as noted above in HPI.       HOME MEDICATIONS:  Current Outpatient Medications   Medication Sig Dispense Refill     Calcium Carbonate-Vitamin D (CALCIUM 500 + D PO)        Cetirizine HCl (ZYRTEC ALLERGY PO) Take 10 mg by mouth daily       COMPRESSION STOCKINGS Please measure and distribute 2 pair of 20mmHg - 30mmHg thigh high open or closed toe compression stockings with extra refills as indicated. 2 each 4     dexAMETHasone (DECADRON) 4 MG tablet Take 2 tablets (8 mg) by mouth 2 times daily (with meals) for 3 doses Start evening of Docetaxel infusion and continue for a total of 3 doses. 6 tablet 3     Fluticasone Propionate (FLONASE NA) Spray 1 spray in nostril daily       methocarbamol (ROBAXIN) 750 MG tablet Take 1 tablet (750 mg) by mouth 3 times daily 30 tablet 0     Multiple Vitamins-Minerals (MULTIVITAMIN & MINERAL PO) Take 1 each by mouth daily. [facility] women's active supplement       ondansetron (ZOFRAN) 8 MG tablet Take 1 tablet (8 mg) by mouth every 8 hours as needed for nausea (vomiting) 30 tablet 2     oxyCODONE (ROXICODONE) 5 MG tablet Take 1 tablet (5 mg) by mouth every 4 hours as needed for pain 20 tablet 0     Probiotic Product (PROBIOTIC PO) Reported on 3/1/2017       prochlorperazine (COMPAZINE) 10 MG tablet Take 1 tablet (10 mg) by mouth every 6 hours  as needed for nausea or vomiting 30 tablet 2     senna-docusate (SENOKOT-S/PERICOLACE) 8.6-50 MG tablet Take 1 tablet by mouth 2 times daily 30 tablet 0     SUMAtriptan (IMITREX) 25 MG tablet Take 1 tablet (25 mg) by mouth at onset of headache for migraine May repeat in 2 hours. Max 8 tablets/24 hours. 18 tablet 4     tranexamic acid (LYSTEDA) 650 MG tablet Take 2 tablets (1,300 mg) by mouth 3 times daily 30 tablet 3         ALLERGIES:  No Known Allergies      PAST MEDICAL HISTORY:  Past Medical History:   Diagnosis Date     Malignant neoplasm of upper-outer quadrant of left breast in female, estrogen receptor positive (H) 2024     NO ACTIVE PROBLEMS    Menometrorrhagia.    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 Used IUD for 20 years.      PAST SURGICAL HISTORY:  Past Surgical History:   Procedure Laterality Date     COLONOSCOPY WITH CO2 INSUFFLATION N/A 2024    Procedure: Colonoscopy with CO2 insufflation;  Surgeon: Cherise Lima DO;  Location: [facility] OR     INSERT TISSUE EXPANDER BREAST BILATERAL Bilateral 2024    Procedure: Insertion tissue expander, breasts, bilateral;  Surgeon: Elisabet Venegas MD;  Location: [facility] OR     MASTECTOMY, NIPPLE SPARING Bilateral 2024    Procedure: Bilateral nipple sparing mastectomy, bilateral implant removal, left sentinel lymph node biopsy;  Surgeon: Aba Phillips MD;  Location: [facility] OR     TUBAL LIGATION       [facility]  DELIVERY ONLY      superficial wound dehiscence   Bilateral breast augmentation in .      SOCIAL HISTORY:  Social History     Socioeconomic History     Marital status:      Spouse name: Not on file     Number of children: Not on file     Years of education: Not on file     Highest education level: Not on file   Occupational History     Not on file   Tobacco Use     Smoking status: Former     Packs/day: 0.25     Years: 1.00     Additional  pack years: 0.00     Total pack years: 0.25     Types: Cigarettes     Smokeless tobacco: Never   Vaping Use     Vaping Use: Never used   Substance and Sexual Activity     Alcohol use: Yes     Comment: 0-1     Drug use: No     Sexual activity: Yes     Partners: Male     Birth control/protection: Surgical, I.U.D., Female Surgical     Comment: Tubal ligation, 12/13/2007   Other Topics Concern     Parent/sibling w/ CABG, MI or angioplasty before 65F 55M? Yes     Comment: father 2 MIs   Social History Narrative    Caffeine intake/servings daily - 2-3 pop    Calcium intake/servings daily - 2 yogurts and 1 glass of milk    Exercise 6 times weekly - describe strength training and cardio    Sunscreen used - Yes    Seatbelts used - Yes    Guns stored in the home - No    Self Breast Exam - Yes    Pap test up to date -  Yes, as of today    Eye exam up to date -  Yes    Dental exam up to date -  Yes    DEXA scan up to date -  Not Applicable    Flex Sig/Colonoscopy up to date -  Not Applicable    Mammography up to date -  Not Applicable    Immunizations reviewed and up to date - Yes, 03/2008    Abuse: Current or Past (Physical, Sexual or Emotional) - No    Do you feel safe in your environment - Yes    Do you cope well with stress - Yes    Do you suffer from insomnia - No    Last updated by: Lit Licona  10/27/2009                 Social Determinants of Health     Financial Resource Strain: Low Risk  (2/1/2024)    Financial Resource Strain      Within the past 12 months, have you or your family members you live with been unable to get utilities (heat, electricity) when it was really needed?: No   Food Insecurity: Low Risk  (2/1/2024)    Food Insecurity      Within the past 12 months, did you worry that your food would run out before you got money to buy more?: No      Within the past 12 months, did the food you bought just not last and you didn t have money to get more?: No   Transportation Needs: Low Risk  (2/1/2024)     Transportation Needs      Within the past 12 months, has lack of transportation kept you from medical appointments, getting your medicines, non-medical meetings or appointments, work, or from getting things that you need?: No   Physical Activity: Not on file   Stress: Not on file   Social Connections: Not on file   Interpersonal Safety: Low Risk  (2/5/2024)    Interpersonal Safety      Do you feel physically and emotionally safe where you currently live?: Yes      Within the past 12 months, have you been hit, slapped, kicked or otherwise physically hurt by someone?: No      Within the past 12 months, have you been humiliated or emotionally abused in other ways by your partner or ex-partner?: No   Housing Stability: Low Risk  (2/1/2024)    Housing Stability      Do you have housing? : Yes      Are you worried about losing your housing?: No   Has 2 grown children.  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      FAMILY HISTORY:  Family History   Problem Relation Age of Onset     Thyroid Disease Mother      Breast Cancer Mother 56        breast, 3 years later     Heart Disease Father         2x heart attacks     Circulatory Father         blood clots     Cardiovascular Father         patent foramen ovale, repaired x 2, afib     Cerebrovascular Disease Father         x2     C.A.D. Father         x2     Genitourinary Problems Father         kidney disease     Asthma Brother      Food Allergy Brother      Eczema Brother      No Known Problems Maternal Grandmother      Myocardial Infarction Maternal Grandfather      Hypertension Paternal Grandmother      Alcohol/Drug Paternal Grandfather      No Known Problems Daughter      No Known Problems Son      Cancer Paternal Aunt         cervical cancer(another sisiter)     Breast Cancer Paternal Aunt         breast cancer at 70's     Breast Cancer Paternal Aunt      Ovarian Cancer Paternal Aunt      Cancer  - colorectal No family hx of      Diabetes No family hx of          PHYSICAL EXAM:  Vital signs: Virtual visit, vital signs not done today.  ECO  GENERAL: Very pleasant 43-year-old female who is alert, oriented, and in no acute distress.  EYES: No scleral icterus.   RESPIRATORY: No audible cough, wheezing, or labored breathing.  MUSCULOSKELETAL: Range of motion in the neck, shoulders, and arms appear normal.  SKIN: No overt rashes, discolorations, or lesions over the face and neck.  NEUROLOGIC: Alert.  No overt tremors.  PSYCHIATRIC: Normal affect and mood.  Does not appear anxious.     LABS:  No new labs today; will have labs 3/25 prior to initiation of therapy.      PATHOLOGY:  Reviewed as per HPI.    IMAGING:  Reviewed as per HPI.  No new imaging to review today.    ASSESSMENT/PLAN:  Angelica Gomez is a 43 year old female with the following issues:  1. Stage IA, [id number], grade 2 invasive ductal carcinoma of the left upper outer breast, ER positive 20%, OK positive 20%, HER-2 negative, MammaPrint high risk, Luminal B  --Lisandra is s/p bilateral mastectomies with final pathology showed a left breast 1.2 cm tumor with repeat ER positive at 61-70%, OK positive at 71-80%, HER-2 negative (IHC = 1+)  --MammaPrint + BluePrint showed high risk, Luminal B subtype.  --Given high risk for recurrence, Dr. Roland advised adjuvant chemotherapy with 4 cycles of Taxotere and Cytoxan.    --Following adjuvant chemo, she also strongly recommended either tamoxifen or ovarian suppression therapy with Zoladex and aromatase inhibitor such as anastrozole to further reduce her risk of breast cancer recurrence for at least 5 years.  Per prior note, she has had menometrorrhagia and might favor Zoladex + AI in order to induce cessation of menstruation if she is able to tolerate possible menopause effects.      2. CHEK2 mutation  --She will continue with colonoscopy screening on high-risk basis    3. Evaluation prior to initiation of  cancer-directed therapy  --She has picked up descriptions for dexamethasone, Zofran and Compazine.  These are reviewed with her today.  -- Some common side effects reviewed with her today.  -- She is clinically stable to begin chemotherapy on Monday.  -- She will have labs on Monday.  -- She wonders about Port-A-Cath placement.  She does not have particular concerns about her veins, but has friends and colleagues that have had ports.  We discussed that typically with this treatment, as these therapies are not best seconds, and she has 4 cycles, we do not always recommend ports at baseline.  She is in agreement to trying peripherally with her first treatment on Monday.      DEISI Crooks  [facility]   [phone]    42 minutes spent on the date of the encounter doing chart review, review of test results, interpretation of tests, patient visit, and documentation       Virtual Visit Details    Type of service:  Video Visit     Originating Location (pt. Location): Home  Distant Location (provider location):  On-site  Platform used for Video Visit: Well      Again, thank you for allowing me to participate in the care of your patient.        Sincerely,        DEISI Crooks CNP

## 2024-03-22 NOTE — PROGRESS NOTES
Virtual Visit Details    Type of service:  Video Visit     Originating Location (pt. Location): Home  Distant Location (provider location):  On-site  Platform used for Video Visit: Bebe

## 2024-03-22 NOTE — LETTER
3/22/2024         RE: Angelica Gomez  [address]  [facility] [address]        Dear Colleague,    Thank you for referring your patient, Angelica Gomez, to the [facility] [place]. Please see a copy of my visit note below.    [facility]    Hematology/Oncology Established Patient Note    Today's Date: 3/22/2024    Reason for visit: Left breast cancer.    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  12/7/2023: Due to worsening left upper outer breast pain, bilateral diagnostic mammogram performed. No mammographic abnormality in left upper outer breast site of symptoms but at 1:00, there is oval asymmetry measuring 0.7 cm. Remaining left breast and left axillary U/S showed morphologically normal lymph node and breast tissue.  At 1:30, 5 cm from nipple is irregular hypoechoic mass measuring 0.5 x 0.5 cm.  2. 12/15/2023: U/S-guided biopsy of left breast at 1:30 showed grade 2 invasive ductal carcinoma (3 mm) with associated grade 3 DCIS, no LVI; ER positive at 20%, SC positive at 20%, HER-2 negative with IHC = 1+.  3. 2/21/2024: Bilateral mastectomies with left axillary sentinel lymph node excision with Dr. Bean Phillips; bilateral implant removal. Pathology showed 1.2 cm grade 2 invasive ductal carcinoma with 1.4 cm grade 3 DCIS; margins negative; one left axillary SLN negative. Right breast benign. Repeat ER weakly positive at 61-70%, SC moderate positive at 71-80%. MammaPrint + BluePrint showed high one risk, Luminal B.    INTERVAL HISTORY:  She is seen today virtually for evaluation prior to initiating chemotherapy with adjuvant docetaxel plus cyclophosphamide.  -Started breast rehab yesterday.  Plastic surgeon worried about cording but OT thinks maybe she has tissue adhesions in axilla.  She has a few more visits scheduled.  -History of menstrual related  migraines, and she has a new prescription for Imitrex since she can't be on estrogen anymore.  -No new or unusual headaches, dizziness or vision changes.  -Right nipple has a bit more healing per plastic surgery visit on Wednesday, and she denies any signs or symptoms of infection.  -No nasuea at baseline.  No mouth sores at baseline.  No neuropathy at baseline.  -No rashes or skin changes.  Her hands are dry but she is washing them a lot from the dressing changes, etc.  -No lower extremity swelling.  At baseline she gets puffiness and does wear compression stockings at work.    REVIEW OF SYSTEMS:   14 point ROS was reviewed and is negative other than as noted above in HPI.       HOME MEDICATIONS:  Current Outpatient Medications   Medication Sig Dispense Refill     Calcium Carbonate-Vitamin D (CALCIUM 500 + D PO)        Cetirizine HCl (ZYRTEC ALLERGY PO) Take 10 mg by mouth daily       COMPRESSION STOCKINGS Please measure and distribute 2 pair of 20mmHg - 30mmHg thigh high open or closed toe compression stockings with extra refills as indicated. 2 each 4     dexAMETHasone (DECADRON) 4 MG tablet Take 2 tablets (8 mg) by mouth 2 times daily (with meals) for 3 doses Start evening of Docetaxel infusion and continue for a total of 3 doses. 6 tablet 3     Fluticasone Propionate (FLONASE NA) Spray 1 spray in nostril daily       methocarbamol (ROBAXIN) 750 MG tablet Take 1 tablet (750 mg) by mouth 3 times daily 30 tablet 0     Multiple Vitamins-Minerals (MULTIVITAMIN & MINERAL PO) Take 1 each by mouth daily. [facility] women's active supplement       ondansetron (ZOFRAN) 8 MG tablet Take 1 tablet (8 mg) by mouth every 8 hours as needed for nausea (vomiting) 30 tablet 2     oxyCODONE (ROXICODONE) 5 MG tablet Take 1 tablet (5 mg) by mouth every 4 hours as needed for pain 20 tablet 0     Probiotic Product (PROBIOTIC PO) Reported on 3/1/2017       prochlorperazine (COMPAZINE) 10 MG tablet Take 1 tablet (10 mg) by mouth every 6 hours  as needed for nausea or vomiting 30 tablet 2     senna-docusate (SENOKOT-S/PERICOLACE) 8.6-50 MG tablet Take 1 tablet by mouth 2 times daily 30 tablet 0     SUMAtriptan (IMITREX) 25 MG tablet Take 1 tablet (25 mg) by mouth at onset of headache for migraine May repeat in 2 hours. Max 8 tablets/24 hours. 18 tablet 4     tranexamic acid (LYSTEDA) 650 MG tablet Take 2 tablets (1,300 mg) by mouth 3 times daily 30 tablet 3         ALLERGIES:  No Known Allergies      PAST MEDICAL HISTORY:  Past Medical History:   Diagnosis Date     Malignant neoplasm of upper-outer quadrant of left breast in female, estrogen receptor positive (H) 2024     NO ACTIVE PROBLEMS    Menometrorrhagia.    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 Used IUD for 20 years.      PAST SURGICAL HISTORY:  Past Surgical History:   Procedure Laterality Date     COLONOSCOPY WITH CO2 INSUFFLATION N/A 2024    Procedure: Colonoscopy with CO2 insufflation;  Surgeon: Cherise Lima DO;  Location: [facility] OR     INSERT TISSUE EXPANDER BREAST BILATERAL Bilateral 2024    Procedure: Insertion tissue expander, breasts, bilateral;  Surgeon: Elisabet Venegas MD;  Location: [facility] OR     MASTECTOMY, NIPPLE SPARING Bilateral 2024    Procedure: Bilateral nipple sparing mastectomy, bilateral implant removal, left sentinel lymph node biopsy;  Surgeon: Aba Phillips MD;  Location: [facility] OR     TUBAL LIGATION       [facility]  DELIVERY ONLY      superficial wound dehiscence   Bilateral breast augmentation in .      SOCIAL HISTORY:  Social History     Socioeconomic History     Marital status:      Spouse name: Not on file     Number of children: Not on file     Years of education: Not on file     Highest education level: Not on file   Occupational History     Not on file   Tobacco Use     Smoking status: Former     Packs/day: 0.25     Years: 1.00     Additional  pack years: 0.00     Total pack years: 0.25     Types: Cigarettes     Smokeless tobacco: Never   Vaping Use     Vaping Use: Never used   Substance and Sexual Activity     Alcohol use: Yes     Comment: 0-1     Drug use: No     Sexual activity: Yes     Partners: Male     Birth control/protection: Surgical, I.U.D., Female Surgical     Comment: Tubal ligation, 12/13/2007   Other Topics Concern     Parent/sibling w/ CABG, MI or angioplasty before 65F 55M? Yes     Comment: father 2 MIs   Social History Narrative    Caffeine intake/servings daily - 2-3 pop    Calcium intake/servings daily - 2 yogurts and 1 glass of milk    Exercise 6 times weekly - describe strength training and cardio    Sunscreen used - Yes    Seatbelts used - Yes    Guns stored in the home - No    Self Breast Exam - Yes    Pap test up to date -  Yes, as of today    Eye exam up to date -  Yes    Dental exam up to date -  Yes    DEXA scan up to date -  Not Applicable    Flex Sig/Colonoscopy up to date -  Not Applicable    Mammography up to date -  Not Applicable    Immunizations reviewed and up to date - Yes, 03/2008    Abuse: Current or Past (Physical, Sexual or Emotional) - No    Do you feel safe in your environment - Yes    Do you cope well with stress - Yes    Do you suffer from insomnia - No    Last updated by: Lit Licona  10/27/2009                 Social Determinants of Health     Financial Resource Strain: Low Risk  (2/1/2024)    Financial Resource Strain      Within the past 12 months, have you or your family members you live with been unable to get utilities (heat, electricity) when it was really needed?: No   Food Insecurity: Low Risk  (2/1/2024)    Food Insecurity      Within the past 12 months, did you worry that your food would run out before you got money to buy more?: No      Within the past 12 months, did the food you bought just not last and you didn t have money to get more?: No   Transportation Needs: Low Risk  (2/1/2024)     Transportation Needs      Within the past 12 months, has lack of transportation kept you from medical appointments, getting your medicines, non-medical meetings or appointments, work, or from getting things that you need?: No   Physical Activity: Not on file   Stress: Not on file   Social Connections: Not on file   Interpersonal Safety: Low Risk  (2/5/2024)    Interpersonal Safety      Do you feel physically and emotionally safe where you currently live?: Yes      Within the past 12 months, have you been hit, slapped, kicked or otherwise physically hurt by someone?: No      Within the past 12 months, have you been humiliated or emotionally abused in other ways by your partner or ex-partner?: No   Housing Stability: Low Risk  (2/1/2024)    Housing Stability      Do you have housing? : Yes      Are you worried about losing your housing?: No   Has 2 grown children.  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      FAMILY HISTORY:  Family History   Problem Relation Age of Onset     Thyroid Disease Mother      Breast Cancer Mother 56        breast, 3 years later     Heart Disease Father         2x heart attacks     Circulatory Father         blood clots     Cardiovascular Father         patent foramen ovale, repaired x 2, afib     Cerebrovascular Disease Father         x2     C.A.D. Father         x2     Genitourinary Problems Father         kidney disease     Asthma Brother      Food Allergy Brother      Eczema Brother      No Known Problems Maternal Grandmother      Myocardial Infarction Maternal Grandfather      Hypertension Paternal Grandmother      Alcohol/Drug Paternal Grandfather      No Known Problems Daughter      No Known Problems Son      Cancer Paternal Aunt         cervical cancer(another sisiter)     Breast Cancer Paternal Aunt         breast cancer at 70's     Breast Cancer Paternal Aunt      Ovarian Cancer Paternal Aunt      Cancer  - colorectal No family hx of      Diabetes No family hx of          PHYSICAL EXAM:  Vital signs: Virtual visit, vital signs not done today.  ECO  GENERAL: Very pleasant 43-year-old female who is alert, oriented, and in no acute distress.  EYES: No scleral icterus.   RESPIRATORY: No audible cough, wheezing, or labored breathing.  MUSCULOSKELETAL: Range of motion in the neck, shoulders, and arms appear normal.  SKIN: No overt rashes, discolorations, or lesions over the face and neck.  NEUROLOGIC: Alert.  No overt tremors.  PSYCHIATRIC: Normal affect and mood.  Does not appear anxious.     LABS:  No new labs today; will have labs 3/25 prior to initiation of therapy.      PATHOLOGY:  Reviewed as per HPI.    IMAGING:  Reviewed as per HPI.  No new imaging to review today.    ASSESSMENT/PLAN:  Angelica Gomez is a 43 year old female with the following issues:  1. Stage IA, [id number], grade 2 invasive ductal carcinoma of the left upper outer breast, ER positive 20%, NY positive 20%, HER-2 negative, MammaPrint high risk, Luminal B  --Lisandra is s/p bilateral mastectomies with final pathology showed a left breast 1.2 cm tumor with repeat ER positive at 61-70%, NY positive at 71-80%, HER-2 negative (IHC = 1+)  --MammaPrint + BluePrint showed high risk, Luminal B subtype.  --Given high risk for recurrence, Dr. Roland advised adjuvant chemotherapy with 4 cycles of Taxotere and Cytoxan.    --Following adjuvant chemo, she also strongly recommended either tamoxifen or ovarian suppression therapy with Zoladex and aromatase inhibitor such as anastrozole to further reduce her risk of breast cancer recurrence for at least 5 years.  Per prior note, she has had menometrorrhagia and might favor Zoladex + AI in order to induce cessation of menstruation if she is able to tolerate possible menopause effects.      2. CHEK2 mutation  --She will continue with colonoscopy screening on high-risk basis    3. Evaluation prior to initiation of  cancer-directed therapy  --She has picked up descriptions for dexamethasone, Zofran and Compazine.  These are reviewed with her today.  -- Some common side effects reviewed with her today.  -- She is clinically stable to begin chemotherapy on Monday.  -- She will have labs on Monday.  -- She wonders about Port-A-Cath placement.  She does not have particular concerns about her veins, but has friends and colleagues that have had ports.  We discussed that typically with this treatment, as these therapies are not best seconds, and she has 4 cycles, we do not always recommend ports at baseline.  She is in agreement to trying peripherally with her first treatment on Monday.      DEISI Crooks  [facility]   [phone]    42 minutes spent on the date of the encounter doing chart review, review of test results, interpretation of tests, patient visit, and documentation       Virtual Visit Details    Type of service:  Video Visit     Originating Location (pt. Location): Home  Distant Location (provider location):  On-site  Platform used for Video Visit: Well      Again, thank you for allowing me to participate in the care of your patient.        Sincerely,        DEISI Crooks CNP

## 2024-03-22 NOTE — NURSING NOTE
No other vitals to report today      Is the patient currently in the state of [place]? YES    Visit mode:VIDEO    If the visit is dropped, the patient can be reconnected by: VIDEO VISIT: Text to cell phone:   Telephone Information:   Mobile [phone]    and VIDEO VISIT: Send to e-mail at: [email or web address]    Will anyone else be joining the visit? Pts    (If patient encounters technical issues they should call [phone]    How would you like to obtain your AVS? MyChart    Are changes needed to the allergy or medication list? No    Patient denies any changes since echeck-in completion and states all information entered during echeck-in remains accurate.    Reason for visit: SANTA Willis MA VVF

## 2024-03-22 NOTE — PROGRESS NOTES
[facility]    Hematology/Oncology Established Patient Note    Today's Date: 3/22/2024    Reason for visit: Left breast cancer.    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  12/7/2023: Due to worsening left upper outer breast pain, bilateral diagnostic mammogram performed. No mammographic abnormality in left upper outer breast site of symptoms but at 1:00, there is oval asymmetry measuring 0.7 cm. Remaining left breast and left axillary U/S showed morphologically normal lymph node and breast tissue.  At 1:30, 5 cm from nipple is irregular hypoechoic mass measuring 0.5 x 0.5 cm.  2. 12/15/2023: U/S-guided biopsy of left breast at 1:30 showed grade 2 invasive ductal carcinoma (3 mm) with associated grade 3 DCIS, no LVI; ER positive at 20%, DE positive at 20%, HER-2 negative with IHC = 1+.  3. 2/21/2024: Bilateral mastectomies with left axillary sentinel lymph node excision with Dr. Bean Phillips; bilateral implant removal. Pathology showed 1.2 cm grade 2 invasive ductal carcinoma with 1.4 cm grade 3 DCIS; margins negative; one left axillary SLN negative. Right breast benign. Repeat ER weakly positive at 61-70%, DE moderate positive at 71-80%. MammaPrint + BluePrint showed high one risk, Luminal B.    INTERVAL HISTORY:  She is seen today virtually for evaluation prior to initiating chemotherapy with adjuvant docetaxel plus cyclophosphamide.  -Started breast rehab yesterday.  Plastic surgeon worried about cording but OT thinks maybe she has tissue adhesions in axilla.  She has a few more visits scheduled.  -History of menstrual related migraines, and she has a new prescription for Imitrex since she can't be on estrogen anymore.  -No new or unusual headaches, dizziness or vision changes.  -Right nipple has a bit more healing per plastic surgery visit on Wednesday, and she denies any signs or  symptoms of infection.  -No nasuea at baseline.  No mouth sores at baseline.  No neuropathy at baseline.  -No rashes or skin changes.  Her hands are dry but she is washing them a lot from the dressing changes, etc.  -No lower extremity swelling.  At baseline she gets puffiness and does wear compression stockings at work.    REVIEW OF SYSTEMS:   14 point ROS was reviewed and is negative other than as noted above in HPI.       HOME MEDICATIONS:  Current Outpatient Medications   Medication Sig Dispense Refill    Calcium Carbonate-Vitamin D (CALCIUM 500 + D PO)       Cetirizine HCl (ZYRTEC ALLERGY PO) Take 10 mg by mouth daily      COMPRESSION STOCKINGS Please measure and distribute 2 pair of 20mmHg - 30mmHg thigh high open or closed toe compression stockings with extra refills as indicated. 2 each 4    dexAMETHasone (DECADRON) 4 MG tablet Take 2 tablets (8 mg) by mouth 2 times daily (with meals) for 3 doses Start evening of Docetaxel infusion and continue for a total of 3 doses. 6 tablet 3    Fluticasone Propionate (FLONASE NA) Spray 1 spray in nostril daily      methocarbamol (ROBAXIN) 750 MG tablet Take 1 tablet (750 mg) by mouth 3 times daily 30 tablet 0    Multiple Vitamins-Minerals (MULTIVITAMIN & MINERAL PO) Take 1 each by mouth daily. [facility] women's active supplement      ondansetron (ZOFRAN) 8 MG tablet Take 1 tablet (8 mg) by mouth every 8 hours as needed for nausea (vomiting) 30 tablet 2    oxyCODONE (ROXICODONE) 5 MG tablet Take 1 tablet (5 mg) by mouth every 4 hours as needed for pain 20 tablet 0    Probiotic Product (PROBIOTIC PO) Reported on 3/1/2017      prochlorperazine (COMPAZINE) 10 MG tablet Take 1 tablet (10 mg) by mouth every 6 hours as needed for nausea or vomiting 30 tablet 2    senna-docusate (SENOKOT-S/PERICOLACE) 8.6-50 MG tablet Take 1 tablet by mouth 2 times daily 30 tablet 0    SUMAtriptan (IMITREX) 25 MG tablet Take 1 tablet (25 mg) by mouth at onset of headache for migraine May repeat in 2  hours. Max 8 tablets/24 hours. 18 tablet 4    tranexamic acid (LYSTEDA) 650 MG tablet Take 2 tablets (1,300 mg) by mouth 3 times daily 30 tablet 3         ALLERGIES:  No Known Allergies      PAST MEDICAL HISTORY:  Past Medical History:   Diagnosis Date    Malignant neoplasm of upper-outer quadrant of left breast in female, estrogen receptor positive (H) 2024    NO ACTIVE PROBLEMS    Menometrorrhagia.    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 Used IUD for 20 years.      PAST SURGICAL HISTORY:  Past Surgical History:   Procedure Laterality Date    COLONOSCOPY WITH CO2 INSUFFLATION N/A 2024    Procedure: Colonoscopy with CO2 insufflation;  Surgeon: Cherise Lima DO;  Location: [facility] OR    INSERT TISSUE EXPANDER BREAST BILATERAL Bilateral 2024    Procedure: Insertion tissue expander, breasts, bilateral;  Surgeon: Elisabet Venegas MD;  Location: [facility] OR    MASTECTOMY, NIPPLE SPARING Bilateral 2024    Procedure: Bilateral nipple sparing mastectomy, bilateral implant removal, left sentinel lymph node biopsy;  Surgeon: Aba Phillips MD;  Location: [facility] OR    TUBAL LIGATION      [facility]  DELIVERY ONLY      superficial wound dehiscence   Bilateral breast augmentation in .      SOCIAL HISTORY:  Social History     Socioeconomic History    Marital status:      Spouse name: Not on file    Number of children: Not on file    Years of education: Not on file    Highest education level: Not on file   Occupational History    Not on file   Tobacco Use    Smoking status: Former     Packs/day: 0.25     Years: 1.00     Additional pack years: 0.00     Total pack years: 0.25     Types: Cigarettes    Smokeless tobacco: Never   Vaping Use    Vaping Use: Never used   Substance and Sexual Activity    Alcohol use: Yes     Comment: 0-1    Drug use: No    Sexual activity: Yes     Partners: Male     Birth  control/protection: Surgical, I.U.D., Female Surgical     Comment: Tubal ligation, 12/13/2007   Other Topics Concern    Parent/sibling w/ CABG, MI or angioplasty before 65F 55M? Yes     Comment: father 2 MIs   Social History Narrative    Caffeine intake/servings daily - 2-3 pop    Calcium intake/servings daily - 2 yogurts and 1 glass of milk    Exercise 6 times weekly - describe strength training and cardio    Sunscreen used - Yes    Seatbelts used - Yes    Guns stored in the home - No    Self Breast Exam - Yes    Pap test up to date -  Yes, as of today    Eye exam up to date -  Yes    Dental exam up to date -  Yes    DEXA scan up to date -  Not Applicable    Flex Sig/Colonoscopy up to date -  Not Applicable    Mammography up to date -  Not Applicable    Immunizations reviewed and up to date - Yes, 03/2008    Abuse: Current or Past (Physical, Sexual or Emotional) - No    Do you feel safe in your environment - Yes    Do you cope well with stress - Yes    Do you suffer from insomnia - No    Last updated by: Lit Licona  10/27/2009                 Social Determinants of Health     Financial Resource Strain: Low Risk  (2/1/2024)    Financial Resource Strain     Within the past 12 months, have you or your family members you live with been unable to get utilities (heat, electricity) when it was really needed?: No   Food Insecurity: Low Risk  (2/1/2024)    Food Insecurity     Within the past 12 months, did you worry that your food would run out before you got money to buy more?: No     Within the past 12 months, did the food you bought just not last and you didn t have money to get more?: No   Transportation Needs: Low Risk  (2/1/2024)    Transportation Needs     Within the past 12 months, has lack of transportation kept you from medical appointments, getting your medicines, non-medical meetings or appointments, work, or from getting things that you need?: No   Physical Activity: Not on file   Stress: Not on file   Social  Connections: Not on file   Interpersonal Safety: Low Risk  (2024)    Interpersonal Safety     Do you feel physically and emotionally safe where you currently live?: Yes     Within the past 12 months, have you been hit, slapped, kicked or otherwise physically hurt by someone?: No     Within the past 12 months, have you been humiliated or emotionally abused in other ways by your partner or ex-partner?: No   Housing Stability: Low Risk  (2024)    Housing Stability     Do you have housing? : Yes     Are you worried about losing your housing?: No   Has 2 grown children.  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      FAMILY HISTORY:  Family History   Problem Relation Age of Onset    Thyroid Disease Mother     Breast Cancer Mother 56        breast, 3 years later    Heart Disease Father         2x heart attacks    Circulatory Father         blood clots    Cardiovascular Father         patent foramen ovale, repaired x 2, afib    Cerebrovascular Disease Father         x2    C.A.D. Father         x2    Genitourinary Problems Father         kidney disease    Asthma Brother     Food Allergy Brother     Eczema Brother     No Known Problems Maternal Grandmother     Myocardial Infarction Maternal Grandfather     Hypertension Paternal Grandmother     Alcohol/Drug Paternal Grandfather     No Known Problems Daughter     No Known Problems Son     Cancer Paternal Aunt         cervical cancer(another sisiter)    Breast Cancer Paternal Aunt         breast cancer at 70's    Breast Cancer Paternal Aunt     Ovarian Cancer Paternal Aunt     Cancer - colorectal No family hx of     Diabetes No family hx of          PHYSICAL EXAM:  Vital signs: Virtual visit, vital signs not done today.  ECO  GENERAL: Very pleasant 43-year-old female who is alert, oriented, and in no acute distress.  EYES: No scleral icterus.   RESPIRATORY: No audible cough, wheezing, or  labored breathing.  MUSCULOSKELETAL: Range of motion in the neck, shoulders, and arms appear normal.  SKIN: No overt rashes, discolorations, or lesions over the face and neck.  NEUROLOGIC: Alert.  No overt tremors.  PSYCHIATRIC: Normal affect and mood.  Does not appear anxious.     LABS:  No new labs today; will have labs 3/25 prior to initiation of therapy.      PATHOLOGY:  Reviewed as per HPI.    IMAGING:  Reviewed as per HPI.  No new imaging to review today.    ASSESSMENT/PLAN:  Angelica Gomez is a 43 year old female with the following issues:  1. Stage IA, [id number], grade 2 invasive ductal carcinoma of the left upper outer breast, ER positive 20%, GA positive 20%, HER-2 negative, MammaPrint high risk, Luminal B  --Lisandra is s/p bilateral mastectomies with final pathology showed a left breast 1.2 cm tumor with repeat ER positive at 61-70%, GA positive at 71-80%, HER-2 negative (IHC = 1+)  --MammaPrint + BluePrint showed high risk, Luminal B subtype.  --Given high risk for recurrence, Dr. Roland advised adjuvant chemotherapy with 4 cycles of Taxotere and Cytoxan.    --Following adjuvant chemo, she also strongly recommended either tamoxifen or ovarian suppression therapy with Zoladex and aromatase inhibitor such as anastrozole to further reduce her risk of breast cancer recurrence for at least 5 years.  Per prior note, she has had menometrorrhagia and might favor Zoladex + AI in order to induce cessation of menstruation if she is able to tolerate possible menopause effects.      2. CHEK2 mutation  --She will continue with colonoscopy screening on high-risk basis    3. Evaluation prior to initiation of cancer-directed therapy  --She has picked up descriptions for dexamethasone, Zofran and Compazine.  These are reviewed with her today.  -- Some common side effects reviewed with her today.  -- She is clinically stable to begin chemotherapy on Monday.  -- She will have labs on Monday.  -- She wonders about  Port-A-Cath placement.  She does not have particular concerns about her veins, but has friends and colleagues that have had ports.  We discussed that typically with this treatment, as these therapies are not best seconds, and she has 4 cycles, we do not always recommend ports at baseline.  She is in agreement to trying peripherally with her first treatment on Monday.      Suzanne Hoang, APRLEN  [facility]   [phone]    42 minutes spent on the date of the encounter doing chart review, review of test results, interpretation of tests, patient visit, and documentation

## 2024-03-25 ENCOUNTER — INFUSION THERAPY VISIT (OUTPATIENT)
Dept: INFUSION THERAPY | Facility: CLINIC | Age: 44
End: 2024-03-25
Attending: INTERNAL MEDICINE
Payer: COMMERCIAL

## 2024-03-25 VITALS
TEMPERATURE: 97.4 F | WEIGHT: 179.6 LBS | HEART RATE: 72 BPM | SYSTOLIC BLOOD PRESSURE: 125 MMHG | RESPIRATION RATE: 18 BRPM | BODY MASS INDEX: 29.89 KG/M2 | OXYGEN SATURATION: 98 % | DIASTOLIC BLOOD PRESSURE: 81 MMHG

## 2024-03-25 DIAGNOSIS — Z17.0 MALIGNANT NEOPLASM OF UPPER-OUTER QUADRANT OF LEFT BREAST IN FEMALE, ESTROGEN RECEPTOR POSITIVE (H): Primary | ICD-10-CM

## 2024-03-25 DIAGNOSIS — C50.412 MALIGNANT NEOPLASM OF UPPER-OUTER QUADRANT OF LEFT BREAST IN FEMALE, ESTROGEN RECEPTOR POSITIVE (H): Primary | ICD-10-CM

## 2024-03-25 LAB
ALBUMIN SERPL BCG-MCNC: 4.2 G/DL (ref 3.5–5.2)
ALP SERPL-CCNC: 72 U/L (ref 40–150)
ALT SERPL W P-5'-P-CCNC: 12 U/L (ref 0–50)
ANION GAP SERPL CALCULATED.3IONS-SCNC: 10 MMOL/L (ref 7–15)
AST SERPL W P-5'-P-CCNC: 20 U/L (ref 0–45)
BASOPHILS # BLD AUTO: 0 10E3/UL (ref 0–0.2)
BASOPHILS NFR BLD AUTO: 1 %
BILIRUB SERPL-MCNC: 0.3 MG/DL
BUN SERPL-MCNC: 14.7 MG/DL (ref 6–20)
CALCIUM SERPL-MCNC: 9.1 MG/DL (ref 8.6–10)
CHLORIDE SERPL-SCNC: 107 MMOL/L (ref 98–107)
CREAT SERPL-MCNC: 0.8 MG/DL (ref 0.51–0.95)
DEPRECATED HCO3 PLAS-SCNC: 25 MMOL/L (ref 22–29)
EGFRCR SERPLBLD CKD-EPI 2021: >90 ML/MIN/1.73M2
EOSINOPHIL # BLD AUTO: 0.2 10E3/UL (ref 0–0.7)
EOSINOPHIL NFR BLD AUTO: 3 %
ERYTHROCYTE [DISTWIDTH] IN BLOOD BY AUTOMATED COUNT: 11.8 % (ref 10–15)
GLUCOSE SERPL-MCNC: 94 MG/DL (ref 70–99)
HCT VFR BLD AUTO: 37.1 % (ref 35–47)
HGB BLD-MCNC: 12.5 G/DL (ref 11.7–15.7)
IMM GRANULOCYTES # BLD: 0 10E3/UL
IMM GRANULOCYTES NFR BLD: 0 %
LYMPHOCYTES # BLD AUTO: 1.4 10E3/UL (ref 0.8–5.3)
LYMPHOCYTES NFR BLD AUTO: 23 %
MCH RBC QN AUTO: 29.3 PG (ref 26.5–33)
MCHC RBC AUTO-ENTMCNC: 33.7 G/DL (ref 31.5–36.5)
MCV RBC AUTO: 87 FL (ref 78–100)
MONOCYTES # BLD AUTO: 0.5 10E3/UL (ref 0–1.3)
MONOCYTES NFR BLD AUTO: 8 %
NEUTROPHILS # BLD AUTO: 4 10E3/UL (ref 1.6–8.3)
NEUTROPHILS NFR BLD AUTO: 65 %
NRBC # BLD AUTO: 0 10E3/UL
NRBC BLD AUTO-RTO: 0 /100
PLATELET # BLD AUTO: 219 10E3/UL (ref 150–450)
POTASSIUM SERPL-SCNC: 4.1 MMOL/L (ref 3.4–5.3)
PROT SERPL-MCNC: 6.5 G/DL (ref 6.4–8.3)
RBC # BLD AUTO: 4.27 10E6/UL (ref 3.8–5.2)
SODIUM SERPL-SCNC: 142 MMOL/L (ref 135–145)
WBC # BLD AUTO: 6 10E3/UL (ref 4–11)

## 2024-03-25 PROCEDURE — 258N000003 HC RX IP 258 OP 636: Performed by: INTERNAL MEDICINE

## 2024-03-25 PROCEDURE — 96372 THER/PROPH/DIAG INJ SC/IM: CPT | Performed by: INTERNAL MEDICINE

## 2024-03-25 PROCEDURE — 96367 TX/PROPH/DG ADDL SEQ IV INF: CPT

## 2024-03-25 PROCEDURE — 80053 COMPREHEN METABOLIC PANEL: CPT | Performed by: INTERNAL MEDICINE

## 2024-03-25 PROCEDURE — 36415 COLL VENOUS BLD VENIPUNCTURE: CPT | Performed by: INTERNAL MEDICINE

## 2024-03-25 PROCEDURE — 96417 CHEMO IV INFUS EACH ADDL SEQ: CPT

## 2024-03-25 PROCEDURE — 250N000011 HC RX IP 250 OP 636: Mod: JW | Performed by: INTERNAL MEDICINE

## 2024-03-25 PROCEDURE — 85025 COMPLETE CBC W/AUTO DIFF WBC: CPT | Performed by: INTERNAL MEDICINE

## 2024-03-25 PROCEDURE — 96375 TX/PRO/DX INJ NEW DRUG ADDON: CPT

## 2024-03-25 PROCEDURE — 96413 CHEMO IV INFUSION 1 HR: CPT

## 2024-03-25 PROCEDURE — 96377 APPLICATON ON-BODY INJECTOR: CPT

## 2024-03-25 RX ORDER — ONDANSETRON 2 MG/ML
8 INJECTION INTRAMUSCULAR; INTRAVENOUS ONCE
Status: COMPLETED | OUTPATIENT
Start: 2024-03-25 | End: 2024-03-25

## 2024-03-25 RX ADMIN — SODIUM CHLORIDE 250 ML: 9 INJECTION, SOLUTION INTRAVENOUS at 08:42

## 2024-03-25 RX ADMIN — ONDANSETRON 8 MG: 2 INJECTION INTRAMUSCULAR; INTRAVENOUS at 08:42

## 2024-03-25 RX ADMIN — FOSAPREPITANT: 150 INJECTION, POWDER, LYOPHILIZED, FOR SOLUTION INTRAVENOUS at 08:48

## 2024-03-25 RX ADMIN — DOCETAXEL 146 MG: 20 INJECTION, SOLUTION, CONCENTRATE INTRAVENOUS at 09:38

## 2024-03-25 RX ADMIN — PEGFILGRASTIM 6 MG: KIT SUBCUTANEOUS at 10:51

## 2024-03-25 RX ADMIN — CYCLOPHOSPHAMIDE 1170 MG: 1 INJECTION, POWDER, FOR SOLUTION INTRAVENOUS; ORAL at 10:44

## 2024-03-25 ASSESSMENT — PAIN SCALES - GENERAL: PAINLEVEL: NO PAIN (0)

## 2024-03-25 NOTE — PROGRESS NOTES
[facility]                                     Progress Note    Patient Name: Angelica Gomez  Date: 3/26/24           Service Type: Individual      Session Start Time: 9:00am  Session End Time: 9:47am     Session Length: 47 minutes    Session #: 3    Attendees: Client attended alone    Service Modality:  Video Visit:      Provider verified identity through the following two step process.  Patient provided:  Patient  and Patient address    Telemedicine Visit: The patient's condition can be safely assessed and treated via synchronous audio and visual telemedicine encounter.      Reason for Telemedicine Visit: Patient has requested telehealth visit    Originating Site (Patient Location): Patient's home    Distant Site (Provider Location): Provider Remote Setting- Home Office    Consent:  The patient/guardian has verbally consented to: the potential risks and benefits of telemedicine (video visit) versus in person care; bill my insurance or make self-payment for services provided; and responsibility for payment of non-covered services.     Patient would like the video invitation sent by:  My Chart    Mode of Communication:  Video Conference via [employer]    Distant Location (Provider):  Off-site    As the provider I attest to compliance with applicable laws and regulations related to telemedicine.    DATA  Interactive Complexity: No  Crisis: No        Progress Since Last Session (Related to Symptoms / Goals / Homework):   Symptoms: No change continued stress/anxiety related to cancer diagnosis    Homework: Achieved / completed to satisfaction      Episode of Care Goals: Satisfactory progress - PREPARATION (Decided to change - considering how); Intervened by negotiating a change plan and determining options / strategies for behavior change, identifying triggers, exploring social supports, and working towards setting a date to begin behavior change     Current / Ongoing Stressors and  "Concerns:   Pt shared that she began chemo yesterday. Reported that it went okay and that she is not feeling sick yet because she is taking steroids for the first few days which often delays symptoms. She shared that she decided not to do a \"cold cap\" to try to prevent her hair from falling out. Reported that she is prioritizing \"comfort over vanity\". Reported that she would like to get a port to have chemo administered and is going to follow up with her oncologist about this. Reported that family and friends have been supportive. Endorsed some frustration with family member who has been comparing her breast reduction to pt's experience and a friend who continues to ask her why she has not been calling her as much.     Treatment Objective(s) Addressed in This Session:   use cognitive strategies identified in therapy to challenge anxious thoughts  Increase interest, engagement, and pleasure in doing things  Process grief related to diagnosis and loss of some abilities and hair        Intervention:   CBT: focusing on the present  Emotion Focused Therapy: identify and process emotinos  Interpersonal Therapy: practice vulnerability  Solution Focused: setting goals    Assessments completed prior to visit:  The following assessments were completed by patient for this visit:  PHQ9:       2/12/2024     7:40 AM   PHQ-9 SCORE   PHQ-9 Total Score MyChart 3 (Minimal depression)   PHQ-9 Total Score 3     GAD7:        No data to display              CAGE-AID:       2/12/2024     8:09 AM   CAGE-AID Total Score   Total Score 0   Total Score MyChart 0 (A total score of 2 or greater is considered clinically significant)     PROMIS 10-Global Health (only subscores and total score):       2/12/2024     8:09 AM   PROMIS-10 Scores Only   Global Mental Health Score 15   Global Physical Health Score 16   PROMIS TOTAL - SUBSCORES 31     [place] Suicide Severity Rating Scale (Lifetime/Recent)      2/12/2024     9:59 AM 2/21/2024    11:37 AM "   [place] Suicide Severity Rating (Lifetime/Recent)   Q1 Wished to be Dead (Past Month)  0-->no   Q2 Suicidal Thoughts (Past Month)  0-->no   Q6 Suicide Behavior (Lifetime)  0-->no   Level of Risk per Screen  no risks indicated   Q1 Wish to be Dead (Lifetime) N    Q2 Non-Specific Active Suicidal Thoughts (Lifetime) N    Actual Attempt (Lifetime) N    Has subject engaged in non-suicidal self-injurious behavior? (Lifetime) N    Interrupted Attempts (Lifetime) N    Aborted or Self-Interrupted Attempt (Lifetime) N    Preparatory Acts or Behavior (Lifetime) N    Calculated C-SSRS Risk Score (Lifetime/Recent) No Risk Indicated          ASSESSMENT: Current Emotional / Mental Status (status of significant symptoms):   Risk status (Self / Other harm or suicidal ideation)   Patient denies current fears or concerns for personal safety.   Patient denies current or recent suicidal ideation or behaviors.   Patient denies current or recent homicidal ideation or behaviors.   Patient denies current or recent self injurious behavior or ideation.   Patient denies other safety concerns.   Patient reports there has been no change in risk factors since their last session.     Patient reports there has been no change in protective factors since their last session.     Recommended that patient call 911 or go to the local ED should there be a change in any of these risk factors.     Appearance:   Appropriate    Eye Contact:   Good    Psychomotor Behavior: Normal    Attitude:   Cooperative    Orientation:   All   Speech    Rate / Production: Talkative    Volume:  Normal    Mood:    Anxious    Affect:    Appropriate    Thought Content:  Clear    Thought Form:  Coherent  Logical    Insight:    Good      Medication Review:   No current psychiatric medications prescribed     Medication Compliance:   NA     Changes in Health Issues:   None reported     Chemical Use Review:   Substance Use: Chemical use reviewed, no active concerns identified       Tobacco Use: No current tobacco use.      Diagnosis:  Adjustment Disorder with Anxiety and Depression    Collateral Reports Completed:   Not Applicable    PLAN: (Patient Tasks / Therapist Tasks / Other)  Pt and writer will continue to meet on a bi-weekly basis. Next appointment 4/15  Pt will continue to lean on support system to get through treatment.  Pt will continue to advocate for herself with her healthcare team.        Joyce Cristobal MATTHEW       This note has been reviewed and I agree with the plan of care. This note is co-signed by JESÚS Meyers, [facility], Supervisor, on: March 26, 2024                                                  ______________________________________________________________________    Individual Treatment Plan    Patient's Name: Angelica Gomez  YOB: 1980    Date of Creation: 3/26/24  Date Treatment Plan Last Reviewed/Revised: 3/26/24    DSM5 Diagnoses: Adjustment Disorders  309.28 (F43.23) With mixed anxiety and depressed mood  Psychosocial / Contextual Factors: going through breast cancer treatment   PROMIS (reviewed every 90 days):   Global Mental Health Score (P) 15   Global Physical Health Score (P) 16   PROMIS TOTAL - SUBSCORES (P) 31     Referral / Collaboration:  Referral to another professional/service is not indicated at this time..    Anticipated number of session for this episode of care: 6-9 sessions  Anticipation frequency of session: Biweekly  Anticipated Duration of each session: 38-52 minutes  Treatment plan will be reviewed in 90 days or when goals have been changed.       MeasurableTreatment Goal(s) related to diagnosis / functional impairment(s)  Goal 1: Patient will experience a reduction in anxiety as evidenced by reduction in ENEDINA 2 score from 1 to 0    I will know I've met my goal when I am able to process anxiety related to diagnosis.      Objective #A (Patient Action)    Patient will use at least 3 coping skills for anxiety  management in the next 12 weeks.  Status: New - Date: 3/26/24      Intervention(s)  Therapist will teach  grounding, mindfulness, emotion regulation .    Objective #B  Patient will use cognitive strategies identified in therapy to challenge anxious thoughts.  Status: New - Date: 3/26/24      Intervention(s)  Therapist will teach staying in the present moment, challenging catastrophic thinking .    Objective #C  Patient will process fears related to cancer diagnosis   Status: New - Date: 3/26/24      Intervention(s)  Therapist will provide supportive and nonjudgemental space for processing      Goal 2: Patient will experience reduction in depressive symptoms as evidenced by stability in or reduction of PhQ9 score from 3 to less than 3 and self report of improved mood.    I will know I've met my goal when I am able to process sadness related to the diagnosis.      Objective #A (Patient Action)    Status: New - Date: 3/26/24      Patient will Increase interest, engagement, and pleasure in doing things.    Intervention(s)  Therapist will  discuss behavioral activation, ways patient can remain active and social .    Objective #B  Patient will  process grief related to diagnosis  .    Status: New - Date: 3/26/24      Intervention(s)  Therapist will  teach stages of grief, loss/changes of identity .        Patient has reviewed and agreed to the above plan.      GT Palencia  March 26, 2024    This note has been reviewed and I agree with the plan of care. This note is co-signed by JESÚS Meyers, [facility], Supervisor, on: March 26, 2024

## 2024-03-25 NOTE — PROGRESS NOTES
Infusion Nursing Note:  Angelica Gomez presents today for C1D1 taxotere, cytoxan, and neulasta onpro.    Patient seen by provider today: No   present during visit today: Not Applicable.    Note: Pt oriented to infusion clinic. Future appointment schedule reviewed with patient as well as chemo education and side effects including nausea, diarrhea/constipation, fatigue, alopecia, thrombocytopenia, neutropenia/immunosuppression, and neuropathy. Pt instructed to call clinic immediately if experiencing fevers of 100.4 or higher. Pt expressed understanding of teaching and all questions were answered.      Intravenous Access:  Lab draw site L AC, Needle type butterfly, Gauge 23.  Labs drawn without difficulty.  Peripheral IV placed.    Treatment Conditions:  Lab Results   Component Value Date    HGB 12.5 03/25/2024    WBC 6.0 03/25/2024    ANEU 5.3 06/07/2019    ANEUTAUTO 4.0 03/25/2024     03/25/2024        Lab Results   Component Value Date     03/25/2024    POTASSIUM 4.1 03/25/2024    CR 0.80 03/25/2024    CYRUS 9.1 03/25/2024    BILITOTAL 0.3 03/25/2024    ALBUMIN 4.2 03/25/2024    ALT 12 03/25/2024    AST 20 03/25/2024       Results reviewed, labs MET treatment parameters, ok to proceed with treatment.      Post Infusion Assessment:  Patient tolerated infusion without incident.  Site patent and intact, free from redness, edema or discomfort.  No evidence of extravasations.  Access discontinued per protocol.     ONPRO  Was placed on patient's: back of left arm.    Was placed at 10:50 AM    Podpal used: Yes    ONPRO injector device Lot number: [id number]    Patient education included: what patient can expect after application, what colored lights mean on the device, when to remove device, when and where to call with questions or issues, all patients questions answered, and that Neulasta administration will occur at 1:50 pm on 3/26/24.    Patient tolerated administration well.     Discharge Plan:    Discharge instructions reviewed with: Patient.  Patient and/or family verbalized understanding of discharge instructions and all questions answered.  AVS to patient via [employer].  Patient will return 4/15 for next appointment.   Patient discharged in stable condition accompanied by: self and .  Departure Mode: Ambulatory.      Marlene Gardner RN

## 2024-03-25 NOTE — PROGRESS NOTES
Take Home Medication Teaching    Patient was educated on the following oral medications: Dexamethasone scheduled, and Prochlorperazine and Ondansetron as needed on March 25, 2024. Spouse was present for education. Teaching provided to patient included indication, dose, administration, adverse effects and side effect management. Written materials were provided and patient was given the opportunity to ask questions. Patient verbalized understanding of the information presented.     Bryon Hernandez PharmD  [facility]  [phone]  March 25, 2024

## 2024-03-26 ENCOUNTER — VIRTUAL VISIT (OUTPATIENT)
Dept: PSYCHOLOGY | Facility: CLINIC | Age: 44
End: 2024-03-26
Payer: COMMERCIAL

## 2024-03-26 ENCOUNTER — INFUSION THERAPY VISIT (OUTPATIENT)
Dept: INFUSION THERAPY | Facility: CLINIC | Age: 44
End: 2024-03-26
Attending: NURSE PRACTITIONER
Payer: COMMERCIAL

## 2024-03-26 VITALS
HEART RATE: 71 BPM | OXYGEN SATURATION: 100 % | SYSTOLIC BLOOD PRESSURE: 121 MMHG | DIASTOLIC BLOOD PRESSURE: 80 MMHG | TEMPERATURE: 97.8 F | RESPIRATION RATE: 16 BRPM

## 2024-03-26 DIAGNOSIS — C50.412 MALIGNANT NEOPLASM OF UPPER-OUTER QUADRANT OF LEFT BREAST IN FEMALE, ESTROGEN RECEPTOR POSITIVE (H): Primary | ICD-10-CM

## 2024-03-26 DIAGNOSIS — Z17.0 MALIGNANT NEOPLASM OF UPPER-OUTER QUADRANT OF LEFT BREAST IN FEMALE, ESTROGEN RECEPTOR POSITIVE (H): Primary | ICD-10-CM

## 2024-03-26 DIAGNOSIS — F43.23 ADJUSTMENT DISORDER WITH MIXED ANXIETY AND DEPRESSED MOOD: Primary | ICD-10-CM

## 2024-03-26 PROCEDURE — 96372 THER/PROPH/DIAG INJ SC/IM: CPT | Performed by: INTERNAL MEDICINE

## 2024-03-26 PROCEDURE — 90834 PSYTX W PT 45 MINUTES: CPT | Mod: 95

## 2024-03-26 PROCEDURE — 250N000011 HC RX IP 250 OP 636: Mod: JZ | Performed by: INTERNAL MEDICINE

## 2024-03-26 RX ADMIN — PEGFILGRASTIM 6 MG: 6 INJECTION SUBCUTANEOUS at 15:39

## 2024-03-26 ASSESSMENT — PAIN SCALES - GENERAL: PAINLEVEL: NO PAIN (0)

## 2024-03-26 NOTE — PROGRESS NOTES
Infusion Nursing Note:  Angelica GUPTA Jason presents today for neulasta.    Patient seen by provider today: No   present during visit today: Not Applicable.    Note: Patient added on to schedule for neulasta today due to onpro malfunction that was applied yesterday. Patient arrived with onpro still in place on left arm, with solid red light noted. Onpro patch removed and returned to pharmacy per request. Tolerated neulasta injection without incident. Patient reports no new concerns since infusion appointment yesterday. Encouraged use of claritin felicity;y to help minimize side effects of neulasta.      Intravenous Access:  No Intravenous access/labs at this visit.    Treatment Conditions:  Not Applicable.      Post Infusion Assessment:  Patient tolerated injection without incident.  Site patent and intact, free from redness, edema or discomfort.       Discharge Plan:   Discharge instructions reviewed with: Patient.  Patient and/or family verbalized understanding of discharge instructions and all questions answered.  AVS to patient via [employer].  Patient will return 4/15/24 for next appointment.   Patient discharged in stable condition accompanied by: .  Departure Mode: Ambulatory.      Angy Mina RN

## 2024-03-28 ENCOUNTER — THERAPY VISIT (OUTPATIENT)
Dept: OCCUPATIONAL THERAPY | Facility: CLINIC | Age: 44
End: 2024-03-28
Attending: SURGERY
Payer: COMMERCIAL

## 2024-03-28 DIAGNOSIS — Z90.12 STATUS POST LEFT MASTECTOMY: ICD-10-CM

## 2024-03-28 DIAGNOSIS — C50.912 INVASIVE DUCTAL CARCINOMA OF BREAST, FEMALE, LEFT (H): Primary | ICD-10-CM

## 2024-03-28 PROCEDURE — 97140 MANUAL THERAPY 1/> REGIONS: CPT | Mod: GO

## 2024-04-02 ENCOUNTER — TELEPHONE (OUTPATIENT)
Dept: ONCOLOGY | Facility: CLINIC | Age: 44
End: 2024-04-02
Payer: COMMERCIAL

## 2024-04-02 NOTE — TELEPHONE ENCOUNTER
Patient responded to MyChart Care Companion questionnaire noting pain, watery stools, fatigue, shivering, dizziness, and sadness. Writer called patient for follow up, unable to leave message. Will re-attempt later today.     Ellie Vega RN, BSN, PHN, OCN  [facility]

## 2024-04-02 NOTE — TELEPHONE ENCOUNTER
Called patient, states she's feeling much better today. She developed a migraine. Imitrex and aleve helped. Migraine has resolved. No fever, chills, n/v/c/d, or rash noted. She's no longer having pain. No questions or concerns at this time. Encourage patient to call triage with any needs. Patient verbalized understanding.    Ellie Vega, RN, BSN, PHN, OCN  [facility]

## 2024-04-05 ENCOUNTER — THERAPY VISIT (OUTPATIENT)
Dept: OCCUPATIONAL THERAPY | Facility: CLINIC | Age: 44
End: 2024-04-05
Attending: SURGERY
Payer: COMMERCIAL

## 2024-04-05 DIAGNOSIS — C50.912 INVASIVE DUCTAL CARCINOMA OF BREAST, FEMALE, LEFT (H): Primary | ICD-10-CM

## 2024-04-05 DIAGNOSIS — Z90.12 STATUS POST LEFT MASTECTOMY: ICD-10-CM

## 2024-04-05 PROCEDURE — 97140 MANUAL THERAPY 1/> REGIONS: CPT | Mod: GO

## 2024-04-08 ENCOUNTER — VIRTUAL VISIT (OUTPATIENT)
Dept: PSYCHOLOGY | Facility: CLINIC | Age: 44
End: 2024-04-08
Payer: COMMERCIAL

## 2024-04-08 ENCOUNTER — TELEPHONE (OUTPATIENT)
Dept: ONCOLOGY | Facility: CLINIC | Age: 44
End: 2024-04-08
Payer: COMMERCIAL

## 2024-04-08 DIAGNOSIS — F43.23 ADJUSTMENT DISORDER WITH MIXED ANXIETY AND DEPRESSED MOOD: Primary | ICD-10-CM

## 2024-04-08 PROCEDURE — 90834 PSYTX W PT 45 MINUTES: CPT | Mod: 95

## 2024-04-08 NOTE — PROGRESS NOTES
[facility]                                     Progress Note    Patient Name: Angelica Gomez  Date: 24           Service Type: Individual      Session Start Time: 9:00am  Session End Time: 9:48am     Session Length: 48 minutes    Session #: 5    Attendees: Client attended alone    Service Modality:  Video Visit:      Provider verified identity through the following two step process.  Patient provided:  Patient  and Patient address    Telemedicine Visit: The patient's condition can be safely assessed and treated via synchronous audio and visual telemedicine encounter.      Reason for Telemedicine Visit: Patient has requested telehealth visit    Originating Site (Patient Location): Patient's home    Distant Site (Provider Location): [facility] [place]    Consent:  The patient/guardian has verbally consented to: the potential risks and benefits of telemedicine (video visit) versus in person care; bill my insurance or make self-payment for services provided; and responsibility for payment of non-covered services.     Patient would like the video invitation sent by:  My Chart    Mode of Communication:  Video Conference via [employer]    Distant Location (Provider):  On-site    As the provider I attest to compliance with applicable laws and regulations related to telemedicine.    DATA  Interactive Complexity: No  Crisis: No        Progress Since Last Session (Related to Symptoms / Goals / Homework):   Symptoms: No change continued mild anxiety    Homework: Achieved / completed to satisfaction      Episode of Care Goals: Satisfactory progress - PREPARATION (Decided to change - considering how); Intervened by negotiating a change plan and determining options / strategies for behavior change, identifying triggers, exploring social supports, and working towards setting a date to begin behavior change     Current / Ongoing Stressors and Concerns:   Pt  shared that she felt a lot of bone pain for about 5 days after her first round of chemotherapy. She shared that she experienced some anxiety about how much worse it would get and how long it would last. Noted that she has been experiencing some brain fog and noticed some issues with short term memory. Reported that she has not noticed hair loss yet but is considering buying some hats or scarves in advance. She endorsed some anxiety about having her period at the same time as chemotherapy.      Treatment Objective(s) Addressed in This Session:   use cognitive strategies identified in therapy to challenge anxious thoughts  Increase interest, engagement, and pleasure in doing things  Process grief related to diagnosis and loss of some abilities and hair        Intervention:   CBT: focusing on the present, check the facts  Emotion Focused Therapy: identify and process emotinos  Interpersonal Therapy: practice vulnerability  Solution Focused: brainstorming     Assessments completed prior to visit:  The following assessments were completed by patient for this visit:  PHQ9:       2/12/2024     7:40 AM   PHQ-9 SCORE   PHQ-9 Total Score MyChart 3 (Minimal depression)   PHQ-9 Total Score 3     GAD7:        No data to display              CAGE-AID:       2/12/2024     8:09 AM   CAGE-AID Total Score   Total Score 0   Total Score MyChart 0 (A total score of 2 or greater is considered clinically significant)     PROMIS 10-Global Health (only subscores and total score):       2/12/2024     8:09 AM   PROMIS-10 Scores Only   Global Mental Health Score 15   Global Physical Health Score 16   PROMIS TOTAL - SUBSCORES 31     Blackwell Suicide Severity Rating Scale (Lifetime/Recent)      2/12/2024     9:59 AM 2/21/2024    11:37 AM   Blackwell Suicide Severity Rating (Lifetime/Recent)   Q1 Wished to be Dead (Past Month)  0-->no   Q2 Suicidal Thoughts (Past Month)  0-->no   Q6 Suicide Behavior (Lifetime)  0-->no   Level of Risk per Screen   no risks indicated   Q1 Wish to be Dead (Lifetime) N    Q2 Non-Specific Active Suicidal Thoughts (Lifetime) N    Actual Attempt (Lifetime) N    Has subject engaged in non-suicidal self-injurious behavior? (Lifetime) N    Interrupted Attempts (Lifetime) N    Aborted or Self-Interrupted Attempt (Lifetime) N    Preparatory Acts or Behavior (Lifetime) N    Calculated C-SSRS Risk Score (Lifetime/Recent) No Risk Indicated          ASSESSMENT: Current Emotional / Mental Status (status of significant symptoms):   Risk status (Self / Other harm or suicidal ideation)   Patient denies current fears or concerns for personal safety.   Patient denies current or recent suicidal ideation or behaviors.   Patient denies current or recent homicidal ideation or behaviors.   Patient denies current or recent self injurious behavior or ideation.   Patient denies other safety concerns.   Patient reports there has been no change in risk factors since their last session.     Patient reports there has been no change in protective factors since their last session.     Recommended that patient call 911 or go to the local ED should there be a change in any of these risk factors.     Appearance:   Appropriate    Eye Contact:   Good    Psychomotor Behavior: Normal    Attitude:   Cooperative    Orientation:   All   Speech    Rate / Production: Normal/ Responsive    Volume:  Normal    Mood:    Anxious    Affect:    Appropriate    Thought Content:  Clear    Thought Form:  Coherent  Logical    Insight:    Good      Medication Review:   No current psychiatric medications prescribed     Medication Compliance:   NA     Changes in Health Issues:   None reported     Chemical Use Review:   Substance Use: Chemical use reviewed, no active concerns identified      Tobacco Use: No current tobacco use.      Diagnosis:  Adjustment Disorder with Anxiety and Depression    Collateral Reports Completed:   Not Applicable    PLAN: (Patient Tasks / Therapist Tasks /  Other)  Pt and writer will continue to meet on a bi-weekly basis. Next appointment 4/15  Pt will continue to document her symptoms to help her feel more prepared for next round of chemotherapy.   Pt will continue to lean on her healthcare professionals when she is feeling uncertainty about medical issues.         Joyce Cristobal MATTHEW    This note has been reviewed and I agree with the plan of care. This note is co-signed by JESÚS Meyers, [facility], Supervisor, on: April 8, 2024   ______________________________________________________________________    Individual Treatment Plan    Patient's Name: Angelica Gomez  YOB: 1980    Date of Creation: 3/26/24  Date Treatment Plan Last Reviewed/Revised: 3/26/24    DSM5 Diagnoses: Adjustment Disorders  309.28 (F43.23) With mixed anxiety and depressed mood  Psychosocial / Contextual Factors: going through breast cancer treatment   PROMIS (reviewed every 90 days):   Global Mental Health Score (P) 15   Global Physical Health Score (P) 16   PROMIS TOTAL - SUBSCORES (P) 31     Referral / Collaboration:  Referral to another professional/service is not indicated at this time..    Anticipated number of session for this episode of care: 6-9 sessions  Anticipation frequency of session: Biweekly  Anticipated Duration of each session: 38-52 minutes  Treatment plan will be reviewed in 90 days or when goals have been changed.       MeasurableTreatment Goal(s) related to diagnosis / functional impairment(s)  Goal 1: Patient will experience a reduction in anxiety as evidenced by reduction in ENEDINA 2 score from 1 to 0    I will know I've met my goal when I am able to process anxiety related to diagnosis.      Objective #A (Patient Action)    Patient will use at least 3 coping skills for anxiety management in the next 12 weeks.  Status: New - Date: 3/26/24      Intervention(s)  Therapist will teach  grounding, mindfulness, emotion regulation .    Objective #B  Patient  will use cognitive strategies identified in therapy to challenge anxious thoughts.  Status: New - Date: 3/26/24      Intervention(s)  Therapist will teach staying in the present moment, challenging catastrophic thinking .    Objective #C  Patient will process fears related to cancer diagnosis   Status: New - Date: 3/26/24      Intervention(s)  Therapist will provide supportive and nonjudgemental space for processing      Goal 2: Patient will experience reduction in depressive symptoms as evidenced by stability in or reduction of PhQ9 score from 3 to less than 3 and self report of improved mood.    I will know I've met my goal when I am able to process sadness related to the diagnosis.      Objective #A (Patient Action)    Status: New - Date: 3/26/24      Patient will Increase interest, engagement, and pleasure in doing things.    Intervention(s)  Therapist will  discuss behavioral activation, ways patient can remain active and social .    Objective #B  Patient will  process grief related to diagnosis  .    Status: New - Date: 3/26/24      Intervention(s)  Therapist will  teach stages of grief, loss/changes of identity .        Patient has reviewed and agreed to the above plan.      GT Palencia  March 26, 2024    This note has been reviewed and I agree with the plan of care. This note is co-signed by JESÚS Meyers, [facility], Supervisor, on: March 26, 2024

## 2024-04-08 NOTE — TELEPHONE ENCOUNTER
Pt completed questionnaire and reported the following symptoms: mild bleeding and moderate pain.    Pt reports that she has had occasional mild nose bleeds over the past week. Usually occurs after she blows her nose. Mainly in the left nostril. States that sometimes she will feel like she has a runny nose, and when she wipes her nose there is some blood. She applies pressure and the bleeding stops within 30-60 seconds. States that she has been increasing humidity and applying aquaphor.  Writer advised pt to continue to monitor nose bleeds. Can use humidifier at home to help. Can also try over the counter nasal saline spray or vaseline. If pt develops nose bleed, she was advised to tilt head slightly forward and pinch nostrils shut and breathe through her mouth. If bleeding does not stop within 5-10 minutes, pt was advised to call the clinic. Pt was also advised to try not to blow her nose, especially after a recent nose bleed, as this will dislodge any clots.    Pt states that moderate pain relates to her left breast surgical site. States that she has been following up with plastic surgeon and breast rehab and they feel that symptoms are related to stagnant lymph flow and some adhesions. Pt has follow up appt with breast rehab on 4/18/2024.    Patient verbalized understanding and agreement with plan.  Patient was instructed to call the clinic with any questions, concerns, or worsening symptoms.    Coco Hui RN on 4/8/2024 at 2:49 PM

## 2024-04-09 ENCOUNTER — TELEPHONE (OUTPATIENT)
Dept: INTERVENTIONAL RADIOLOGY/VASCULAR | Facility: CLINIC | Age: 44
End: 2024-04-09
Payer: COMMERCIAL

## 2024-04-10 ENCOUNTER — THERAPY VISIT (OUTPATIENT)
Dept: PHYSICAL THERAPY | Facility: CLINIC | Age: 44
End: 2024-04-10
Payer: COMMERCIAL

## 2024-04-10 DIAGNOSIS — R51.9 HEADACHE: Primary | ICD-10-CM

## 2024-04-10 DIAGNOSIS — G43.711 INTRACTABLE CHRONIC MIGRAINE WITHOUT AURA AND WITH STATUS MIGRAINOSUS: ICD-10-CM

## 2024-04-10 DIAGNOSIS — R51.9 HEADACHE, OCCIPITAL: ICD-10-CM

## 2024-04-10 PROCEDURE — 97161 PT EVAL LOW COMPLEX 20 MIN: CPT | Mod: GP | Performed by: PHYSICAL THERAPIST

## 2024-04-10 PROCEDURE — 97112 NEUROMUSCULAR REEDUCATION: CPT | Mod: GP | Performed by: PHYSICAL THERAPIST

## 2024-04-10 PROCEDURE — 97110 THERAPEUTIC EXERCISES: CPT | Mod: GP | Performed by: PHYSICAL THERAPIST

## 2024-04-10 RX ORDER — SODIUM CHLORIDE 9 MG/ML
INJECTION, SOLUTION INTRAVENOUS CONTINUOUS
Status: CANCELLED | OUTPATIENT
Start: 2024-04-10

## 2024-04-10 RX ORDER — ACETAMINOPHEN 325 MG/1
325 TABLET ORAL
Status: CANCELLED | OUTPATIENT
Start: 2024-04-10

## 2024-04-10 RX ORDER — HEPARIN SODIUM (PORCINE) LOCK FLUSH IV SOLN 100 UNIT/ML 100 UNIT/ML
5-10 SOLUTION INTRAVENOUS
Status: CANCELLED | OUTPATIENT
Start: 2024-04-10

## 2024-04-10 RX ORDER — HEPARIN SODIUM,PORCINE 10 UNIT/ML
5-10 VIAL (ML) INTRAVENOUS EVERY 24 HOURS
Status: CANCELLED | OUTPATIENT
Start: 2024-04-10

## 2024-04-10 RX ORDER — HEPARIN SODIUM,PORCINE 10 UNIT/ML
5-10 VIAL (ML) INTRAVENOUS
Status: CANCELLED | OUTPATIENT
Start: 2024-04-10

## 2024-04-10 NOTE — PROGRESS NOTES
PHYSICAL THERAPY EVALUATION  Type of Visit: Evaluation    See electronic medical record for Abuse and Falls Screening details.    Subjective       Presenting condition or subjective complaint: Cording in left upper arm and axilla following lymph node removal  Date of onset:      Relevant medical history: Cancer; Migraines or headaches; Overweight; Pain at night or rest   Dates & types of surgery: 2/21/2024 bilateral nipple sparing mastectomy, sentinal node removal (left axilla), insertion of spacers in preparation for reconstruction    Prior diagnostic imaging/testing results: Other Physical exam by surgeons (breast & plastic surgeon)   Prior therapy history for the same diagnosis, illness or injury: No      Prior Level of Function  Transfers: Independent  Ambulation: Independent  ADL: Independent  IADL: Driving, Finances, Housekeeping, Laundry, Meal preparation, Medication management, Work    Living Environment  Social support: With a significant other or spouse   Type of home: House; Multi-level   Stairs to enter the home: Yes 20 Is there a railing: Yes   Ramp: No   Stairs inside the home: Yes 14 Is there a railing: Yes   Help at home: Self Cares (home health aide/personal care attendant, family, etc); Home management tasks (cooking, cleaning); Medication and/or finances; Home and Yard maintenance tasks  Equipment owned:       Employment: Yes Nurse Practitioner on [employer]  Hobbies/Interests: Walking, running, swimming, water sports    Patient goals for therapy: Return to baseline for range of motion and use of left arm    Pain assessment: Location: migraines /Rating: 10/10     \  Objective   CERVICAL SPINE EVALUATION  PAIN: Pain is Exacerbated By: stress,   INTEGUMENTARY (edema, incisions):   POSTURE:   GAIT:   Weightbearing Status:   Assistive Device(s):   Gait Deviations:   BALANCE/PROPRIOCEPTION:   WEIGHTBEARING ALIGNMENT:   ROM: min loss of CS to flexn and retxn     MYOTOMES:   DTR S:   CORD SIGNS:   DERMATOMES:    NEURAL TENSION:   FLEXIBILITY:    SPECIAL TESTS:  ++dysf, shallow, rapid, F/E lesions Sbjt  PALPATION: +++TTP jian, traps, SO   SPINAL SEGMENTAL CONCLUSIONS:       Assessment & Plan   CLINICAL IMPRESSIONS  Medical Diagnosis:      Treatment Diagnosis:     Impression/Assessment: Patient is a 44 year old female with headache/migraine/neck pain  complaints.  The following significant findings have been identified: Pain, Decreased ROM/flexibility, Impaired muscle performance, and Decreased activity tolerance. These impairments interfere with their ability to perform self care tasks, work tasks, recreational activities, household chores, driving , and household mobility as compared to previous level of function.   Pt presents w/ chronic migraines of a frequent nature. She will benefit from a course of PT to improve her functional levels.     Clinical Decision Making (Complexity):  Clinical Presentation: Stable/Uncomplicated  Clinical Presentation Rationale: based on medical and personal factors listed in PT evaluation  Clinical Decision Making (Complexity): Low complexity    PLAN OF CARE  Treatment Interventions:  Interventions: Manual Therapy, Neuromuscular Re-education, Therapeutic Exercise, Self-Care/Home Management    Long Term Goals            Frequency of Treatment:    Duration of Treatment:      Recommended Referrals to Other Professionals: Physical Therapy  Education Assessment:        Risks and benefits of evaluation/treatment have been explained.   Patient/Family/caregiver agrees with Plan of Care.     Evaluation Time:             Signing Clinician: Luciano Perera, PT

## 2024-04-11 ENCOUNTER — APPOINTMENT (OUTPATIENT)
Dept: INTERVENTIONAL RADIOLOGY/VASCULAR | Facility: CLINIC | Age: 44
End: 2024-04-11
Attending: NURSE PRACTITIONER
Payer: COMMERCIAL

## 2024-04-11 ENCOUNTER — HOSPITAL ENCOUNTER (OUTPATIENT)
Facility: CLINIC | Age: 44
Discharge: HOME | End: 2024-04-11
Admitting: RADIOLOGY
Payer: COMMERCIAL

## 2024-04-11 VITALS
RESPIRATION RATE: 16 BRPM | TEMPERATURE: 98.8 F | DIASTOLIC BLOOD PRESSURE: 71 MMHG | HEART RATE: 77 BPM | OXYGEN SATURATION: 97 % | SYSTOLIC BLOOD PRESSURE: 121 MMHG

## 2024-04-11 DIAGNOSIS — C50.412 MALIGNANT NEOPLASM OF UPPER-OUTER QUADRANT OF LEFT BREAST IN FEMALE, ESTROGEN RECEPTOR POSITIVE (H): ICD-10-CM

## 2024-04-11 DIAGNOSIS — Z17.0 MALIGNANT NEOPLASM OF UPPER-OUTER QUADRANT OF LEFT BREAST IN FEMALE, ESTROGEN RECEPTOR POSITIVE (H): ICD-10-CM

## 2024-04-11 PROCEDURE — 250N000011 HC RX IP 250 OP 636: Performed by: PHYSICIAN ASSISTANT

## 2024-04-11 PROCEDURE — 258N000003 HC RX IP 258 OP 636: Performed by: PHYSICIAN ASSISTANT

## 2024-04-11 PROCEDURE — 272N000196 HC ACCESSORY CR5

## 2024-04-11 PROCEDURE — 250N000009 HC RX 250: Performed by: RADIOLOGY

## 2024-04-11 PROCEDURE — 999N000163 HC STATISTIC SIMPLE TUBE INSERTION/CHARGE, PORT, CATH, FISTULOGRAM

## 2024-04-11 PROCEDURE — 99152 MOD SED SAME PHYS/QHP 5/>YRS: CPT

## 2024-04-11 PROCEDURE — 250N000011 HC RX IP 250 OP 636: Performed by: RADIOLOGY

## 2024-04-11 PROCEDURE — C1788 PORT, INDWELLING, IMP: HCPCS

## 2024-04-11 PROCEDURE — 250N000009 HC RX 250: Performed by: PHYSICIAN ASSISTANT

## 2024-04-11 PROCEDURE — 36561 INSERT TUNNELED CV CATH: CPT

## 2024-04-11 RX ORDER — HEPARIN SODIUM,PORCINE 10 UNIT/ML
5-20 VIAL (ML) INTRAVENOUS DAILY PRN
Status: CANCELLED | OUTPATIENT
Start: 2024-04-15

## 2024-04-11 RX ORDER — DIPHENHYDRAMINE HYDROCHLORIDE 50 MG/ML
50 INJECTION INTRAMUSCULAR; INTRAVENOUS
Status: CANCELLED
Start: 2024-04-15

## 2024-04-11 RX ORDER — FLUMAZENIL 0.1 MG/ML
0.2 INJECTION, SOLUTION INTRAVENOUS
Status: DISCONTINUED | OUTPATIENT
Start: 2024-04-11 | End: 2024-04-11 | Stop reason: HOSPADM

## 2024-04-11 RX ORDER — NALOXONE HYDROCHLORIDE 0.4 MG/ML
0.4 INJECTION, SOLUTION INTRAMUSCULAR; INTRAVENOUS; SUBCUTANEOUS
Status: DISCONTINUED | OUTPATIENT
Start: 2024-04-11 | End: 2024-04-11 | Stop reason: HOSPADM

## 2024-04-11 RX ORDER — HEPARIN SODIUM (PORCINE) LOCK FLUSH IV SOLN 100 UNIT/ML 100 UNIT/ML
500 SOLUTION INTRAVENOUS
Status: COMPLETED | OUTPATIENT
Start: 2024-04-11 | End: 2024-04-11

## 2024-04-11 RX ORDER — MEPERIDINE HYDROCHLORIDE 25 MG/ML
25 INJECTION INTRAMUSCULAR; INTRAVENOUS; SUBCUTANEOUS EVERY 30 MIN PRN
Status: CANCELLED | OUTPATIENT
Start: 2024-04-15

## 2024-04-11 RX ORDER — HEPARIN SODIUM (PORCINE) LOCK FLUSH IV SOLN 100 UNIT/ML 100 UNIT/ML
5 SOLUTION INTRAVENOUS
Status: CANCELLED | OUTPATIENT
Start: 2024-04-15

## 2024-04-11 RX ORDER — ALBUTEROL SULFATE 90 UG/1
1-2 AEROSOL, METERED RESPIRATORY (INHALATION)
Status: CANCELLED
Start: 2024-04-15

## 2024-04-11 RX ORDER — EPINEPHRINE 1 MG/ML
0.3 INJECTION, SOLUTION, CONCENTRATE INTRAVENOUS EVERY 5 MIN PRN
Status: CANCELLED | OUTPATIENT
Start: 2024-04-15

## 2024-04-11 RX ORDER — SODIUM CHLORIDE 9 MG/ML
INJECTION, SOLUTION INTRAVENOUS CONTINUOUS
Status: DISCONTINUED | OUTPATIENT
Start: 2024-04-11 | End: 2024-04-11 | Stop reason: HOSPADM

## 2024-04-11 RX ORDER — LORAZEPAM 2 MG/ML
0.5 INJECTION INTRAMUSCULAR EVERY 4 HOURS PRN
Status: CANCELLED | OUTPATIENT
Start: 2024-04-15

## 2024-04-11 RX ORDER — ALBUTEROL SULFATE 0.83 MG/ML
2.5 SOLUTION RESPIRATORY (INHALATION)
Status: CANCELLED | OUTPATIENT
Start: 2024-04-15

## 2024-04-11 RX ORDER — ONDANSETRON 2 MG/ML
8 INJECTION INTRAMUSCULAR; INTRAVENOUS ONCE
Status: CANCELLED | OUTPATIENT
Start: 2024-04-15

## 2024-04-11 RX ORDER — CEFAZOLIN SODIUM 2 G/100ML
2 INJECTION, SOLUTION INTRAVENOUS
Status: COMPLETED | OUTPATIENT
Start: 2024-04-11 | End: 2024-04-11

## 2024-04-11 RX ORDER — FENTANYL CITRATE 50 UG/ML
25-50 INJECTION, SOLUTION INTRAMUSCULAR; INTRAVENOUS EVERY 5 MIN PRN
Status: DISCONTINUED | OUTPATIENT
Start: 2024-04-11 | End: 2024-04-11 | Stop reason: HOSPADM

## 2024-04-11 RX ORDER — LIDOCAINE 40 MG/G
CREAM TOPICAL
Status: DISCONTINUED | OUTPATIENT
Start: 2024-04-11 | End: 2024-04-11 | Stop reason: HOSPADM

## 2024-04-11 RX ORDER — NALOXONE HYDROCHLORIDE 0.4 MG/ML
0.2 INJECTION, SOLUTION INTRAMUSCULAR; INTRAVENOUS; SUBCUTANEOUS
Status: DISCONTINUED | OUTPATIENT
Start: 2024-04-11 | End: 2024-04-11 | Stop reason: HOSPADM

## 2024-04-11 RX ADMIN — LIDOCAINE HYDROCHLORIDE 13 ML: 10; .005 INJECTION, SOLUTION EPIDURAL; INFILTRATION; INTRACAUDAL; PERINEURAL at 14:44

## 2024-04-11 RX ADMIN — SODIUM CHLORIDE: 9 INJECTION, SOLUTION INTRAVENOUS at 14:12

## 2024-04-11 RX ADMIN — MIDAZOLAM 1 MG: 1 INJECTION INTRAMUSCULAR; INTRAVENOUS at 14:47

## 2024-04-11 RX ADMIN — CEFAZOLIN SODIUM 2 G: 2 INJECTION, SOLUTION INTRAVENOUS at 14:12

## 2024-04-11 RX ADMIN — MIDAZOLAM 1 MG: 1 INJECTION INTRAMUSCULAR; INTRAVENOUS at 14:34

## 2024-04-11 RX ADMIN — FENTANYL CITRATE 25 MCG: 50 INJECTION, SOLUTION INTRAMUSCULAR; INTRAVENOUS at 14:47

## 2024-04-11 RX ADMIN — LIDOCAINE HYDROCHLORIDE 6 ML: 10 INJECTION, SOLUTION INFILTRATION; PERINEURAL at 14:44

## 2024-04-11 RX ADMIN — FENTANYL CITRATE 50 MCG: 50 INJECTION, SOLUTION INTRAMUSCULAR; INTRAVENOUS at 14:34

## 2024-04-11 RX ADMIN — HEPARIN SODIUM (PORCINE) LOCK FLUSH IV SOLN 100 UNIT/ML 500 UNITS: 100 SOLUTION at 14:53

## 2024-04-11 RX ADMIN — FENTANYL CITRATE 25 MCG: 50 INJECTION, SOLUTION INTRAMUSCULAR; INTRAVENOUS at 14:41

## 2024-04-11 ASSESSMENT — ACTIVITIES OF DAILY LIVING (ADL)
ADLS_ACUITY_SCORE: 35

## 2024-04-11 NOTE — PROGRESS NOTES
Care Suites Admission Nursing Note    Patient Information  Name: Angelica Gomez  Age: 44 year old  Reason for admission: port placement  Care Suites arrival time: 1130    Visitor Information  Name: mendoza     Patient Admission/Assessment   Pre-procedure assessment complete: Yes  If abnormal assessment/labs, provider notified: N/A  NPO: Yes  Medications held per instructions/orders: N/A  Consent: obtained  If applicable, pregnancy test status: deferred  Patient oriented to room: Yes  Education/questions answered: Yes  Plan/other:     Discharge Planning  Discharge name/phone number:  Mendoza  Overnight post sedation caregiver: mendoza  Discharge location: home    Erika Martínez RN

## 2024-04-11 NOTE — PROGRESS NOTES
Care Suites Admission Nursing Note    Reason for admission: Port Placement  CS arrival time: 1140    Accompanied by:  - Simone  Name/phone of DC : Simone ... [phone]    Education/questions answered: Procedure explained. All questions & concerns addressed.    Plan: Home with  post procedure.    A/O. Denies pain. Procedure explained. All questions & concerns addressed. Pt resting on cart, denies additional needs at this time, call light within reach.  at bedside.      [facility] & . Consent signed at this time.   1210 Report given to Erika Martínez RN.

## 2024-04-11 NOTE — DISCHARGE INSTRUCTIONS
Port Insertion Discharge Instructions     After you go home:    Have an adult stay with you for the first 6 hours  You may resume your normal diet       For 24 hours - due to the sedation you received:  Relax and take it easy  Do NOT make any important or legal decisions  Do NOT drive or operate machines at home or at work  Do NOT drink alcohol    Care of Puncture Sites:    Keep the dressings on your sites clean & dry for 24-48 hours. Change it only if it gets wet or dirty.  You may take a shower 24-48 hours after your procedure. Do not scrub site.  No submerging site for 2 weeks or until the incision is completely healed.  Do not remove the small white strips of tape, if present. Allow them to fall off on their own.   You may cover the wound with a bandaid if needed for comfort.      Activity     Avoid heavy lifting (greater than 10 pounds) or the overuse of your shoulder for 48-72 hours.    Bleeding:    If you start bleeding from the incision sites in your chest or neck - or have swelling in your neck, sit down and press on the site for 5-10 minutes.   If bleeding has not stopped after 10 minutes, call your provider.        Call 911 right away if you have heavy bleeding or bleeding that does not stop.      Medicines:    You may resume all medications  Resume your Warfarin/Coumadin at your regular dose today. Follow up with your provider to have your INR rechecked  Resume your Platelet Inhibitors and Aspirin tomorrow at your regular dose  For minor pain, you may take Acetaminophen (Tylenol) or Ibuprofen (Advil)    Call the provider who ordered this procedure if:    You have swelling in your neck or over your port site  The incision area is red, swollen, hot or tender  You have chills or a fever greater than 101 F (38 C)  Any questions or concerns    Call  911 or go to the Emergency Room if you have:    Severe chest pain or trouble breathing  Bleeding that you cannot control    Additional Information:    Your port  may be accessed right away.   You will need to have your port flushed every 30 days or after each use.      If you have questions call:          [facility] Radiology Dept @ [phone]      The provider who performed your procedure was _________________.

## 2024-04-11 NOTE — DISCHARGE SUMMARY
Care Suites Discharge Nursing Note    Patient Information  Name: Angelica Gomez  Age: 44 year old    Discharge Education:  Discharge instructions reviewed: Yes  Additional education/resources provided: AVS  Patient/patient representative verbalizes understanding: Yes  Patient discharging on new medications: No  Medication education completed: N/A    Discharge Plans:   Discharge location: home  Discharge ride contacted: Yes  Approximate discharge time: 1600    Discharge Criteria:  Discharge criteria met and vital signs stable: Yes    Patient Belongs:  Patient belongings returned to patient: Yes    Rodrigo Collier RN

## 2024-04-11 NOTE — PRE-PROCEDURE
GENERAL PRE-PROCEDURE:   Procedure:  Port a catheter placement with moderate sedation  Date/Time:  4/11/2024 11:50 PM    Written consent obtained?: Yes    Risks and benefits: Risks, benefits and alternatives were discussed    Consent given by:  Patient  Patient states understanding of procedure being performed: Yes    Patient's understanding of procedure matches consent: Yes    Procedure consent matches procedure scheduled: Yes    Expected level of sedation:  Moderate  Appropriately NPO:  Yes  ASA Class:  3  Mallampati  :  Grade 2- soft palate, base of uvula, tonsillar pillars, and portion of posterior pharyngeal wall visible  Lungs:  Lungs clear with good breath sounds bilaterally  Heart:  Normal heart sounds and rate  History & Physical reviewed:  History and physical reviewed and no updates needed  Statement of review:  I have reviewed the lab findings, diagnostic data, medications, and the plan for sedation    Total time: 25 minutes    Thanks [facility] Interventional Radiology CNP ([phone]) (phone [phone])

## 2024-04-11 NOTE — IR NOTE
Interventional Radiology Intra-procedural Nursing Note    Patient Name: Angelica Gomez  Medical Record Number: [id number]  Today's Date: April 11, 2024    Procedure: port placement with moderate sedation  Start time: 1441  End time: 1456  Report provided to: care suites RN  Patient depart time and location: 1500 to cs09    Note: Patient entered Interventional Radiology Suite number 2 via cart. Patient awake, alert and oriented. Assisted onto procedural table in supine position. Prepped and draped.  Dr. Jimenez in room. Time out and procedure started. Vital signs stable. Telemetry reading sinus rhythm.    Procedure well tolerated by patient without complications. Procedure end with debrief by Dr. Jimenez.  Manual pressure applied until hemostasis achieved. Gauze/Tegaderm dressing applied to right chest interventional procedure access site.      Administered medication totals:  Lidocaine 1% 6 mL Intradermal  Lidocaine with Epinephrine 13 mL Intradermal  Heparin 500 Units port  Versed 2 mg IVP  Fentanyl 100 mcg IVP    Last dose of sedation administered at 1447.

## 2024-04-11 NOTE — PROGRESS NOTES
Care Suites Post Procedure Note    Patient Information  Name: Angelica Gomez  Age: 44 year old    Post Procedure  Time patient returned to Care Suites: 1505  Concerns/abnormal assessment: none at present  If abnormal assessment, provider notified: N/A  Plan/Other: monitor    Rodrigo Clolier RN

## 2024-04-12 ENCOUNTER — VIRTUAL VISIT (OUTPATIENT)
Dept: ONCOLOGY | Facility: CLINIC | Age: 44
End: 2024-04-12
Attending: NURSE PRACTITIONER
Payer: COMMERCIAL

## 2024-04-12 VITALS — HEIGHT: 65 IN | BODY MASS INDEX: 30.32 KG/M2 | WEIGHT: 182 LBS

## 2024-04-12 DIAGNOSIS — C50.412 MALIGNANT NEOPLASM OF UPPER-OUTER QUADRANT OF LEFT BREAST IN FEMALE, ESTROGEN RECEPTOR POSITIVE (H): Primary | ICD-10-CM

## 2024-04-12 DIAGNOSIS — Z17.0 MALIGNANT NEOPLASM OF UPPER-OUTER QUADRANT OF LEFT BREAST IN FEMALE, ESTROGEN RECEPTOR POSITIVE (H): Primary | ICD-10-CM

## 2024-04-12 PROCEDURE — 99442 PR PHYSICIAN TELEPHONE EVALUATION 11-20 MIN: CPT | Performed by: NURSE PRACTITIONER

## 2024-04-12 RX ORDER — LIDOCAINE/PRILOCAINE 2.5 %-2.5%
CREAM (GRAM) TOPICAL
Qty: 30 G | Refills: 1 | Status: SHIPPED | OUTPATIENT
Start: 2024-04-12

## 2024-04-12 ASSESSMENT — PAIN SCALES - GENERAL: PAINLEVEL: MILD PAIN (3)

## 2024-04-12 NOTE — NURSING NOTE
Is the patient currently in the state of [place]? YES    Visit mode:TELEPHONE    If the visit is dropped, the patient can be reconnected by: TELEPHONE VISIT: Phone number:   Telephone Information:   Mobile [phone]       Will anyone else be joining the visit? NO  (If patient encounters technical issues they should call [phone]    How would you like to obtain your AVS? MyChart    Are changes needed to the allergy or medication list? Pt declined med review      Reason for visit: [place]

## 2024-04-12 NOTE — PROGRESS NOTES
Virtual Visit Details    Type of service:  Telephone Visit   Phone call duration: 15 minutes   Originating Location (pt. Location): Home  Distant Location (provider location):  On-site    [facility]    Hematology/Oncology Established Patient Note    Today's Date: 4/12/2024    Reason for visit: Left breast cancer.    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  12/7/2023: Due to worsening left upper outer breast pain, bilateral diagnostic mammogram performed. No mammographic abnormality in left upper outer breast site of symptoms but at 1:00, there is oval asymmetry measuring 0.7 cm. Remaining left breast and left axillary U/S showed morphologically normal lymph node and breast tissue.  At 1:30, 5 cm from nipple is irregular hypoechoic mass measuring 0.5 x 0.5 cm.  2. 12/15/2023: U/S-guided biopsy of left breast at 1:30 showed grade 2 invasive ductal carcinoma (3 mm) with associated grade 3 DCIS, no LVI; ER positive at 20%, OH positive at 20%, HER-2 negative with IHC = 1+.  3. 2/21/2024: Bilateral mastectomies with left axillary sentinel lymph node excision with Dr. Bean Phillips; bilateral implant removal. Pathology showed 1.2 cm grade 2 invasive ductal carcinoma with 1.4 cm grade 3 DCIS; margins negative; one left axillary SLN negative. Right breast benign. Repeat ER weakly positive at 61-70%, OH moderate positive at 71-80%. MammaPrint + BluePrint showed high one risk, Luminal B.    INTERVAL HISTORY:  Visit done today via telephone for evaluation and toxicity check prior to C2 chemotherapy with adjuvant docetaxel plus cyclophosphamide.  -She had a port placed yesterday and this went well.  -She began menstruating on Wednesday.  She has a history of heavy flow and periods lasting up to 7 days.  She did manage these with birth-control pills in the past, but since stopping this she has been using  tranexamic acid as prescribed by her gynecologist.  She wonders if this is okay to continue while she is on chemotherapy.  Message to MD.  -She continues breast rehab which she feels is very helpful.  -She did have bony pain after receiving Neulasta.  She did not find Claritin particularly helpful, and switching to Claritin from her typical antihistamine caused her allergies to worsen.  -She did have some nausea.  She did not have any constipation, but did have some looser stools and more frequent bowel movements.  -She developed facial flushing in the days following treatments, we discussed likely related to dexamethasone use.    REVIEW OF SYSTEMS:   14 point ROS was reviewed and is negative other than as noted above in HPI.       HOME MEDICATIONS:  Current Outpatient Medications   Medication Sig Dispense Refill    Calcium Carbonate-Vitamin D (CALCIUM 500 + D PO)       Cetirizine HCl (ZYRTEC ALLERGY PO) Take 10 mg by mouth daily      COMPRESSION STOCKINGS Please measure and distribute 2 pair of 20mmHg - 30mmHg thigh high open or closed toe compression stockings with extra refills as indicated. 2 each 4    dexAMETHasone (DECADRON) 4 MG tablet Take 2 tablets (8 mg) by mouth 2 times daily (with meals) for 3 doses Start evening of Docetaxel infusion and continue for a total of 3 doses. 6 tablet 3    Fluticasone Propionate (FLONASE NA) Spray 1 spray in nostril daily      methocarbamol (ROBAXIN) 750 MG tablet Take 1 tablet (750 mg) by mouth 3 times daily 30 tablet 0    Multiple Vitamins-Minerals (MULTIVITAMIN & MINERAL PO) Take 1 each by mouth daily. [facility] women's active supplement      ondansetron (ZOFRAN) 8 MG tablet Take 1 tablet (8 mg) by mouth every 8 hours as needed for nausea (vomiting) 30 tablet 2    oxyCODONE (ROXICODONE) 5 MG tablet Take 1 tablet (5 mg) by mouth every 4 hours as needed for pain 20 tablet 0    Probiotic Product (PROBIOTIC PO) Reported on 3/1/2017      prochlorperazine (COMPAZINE) 10 MG tablet Take  1 tablet (10 mg) by mouth every 6 hours as needed for nausea or vomiting 30 tablet 2    senna-docusate (SENOKOT-S/PERICOLACE) 8.6-50 MG tablet Take 1 tablet by mouth 2 times daily 30 tablet 0    SUMAtriptan (IMITREX) 25 MG tablet Take 1 tablet (25 mg) by mouth at onset of headache for migraine May repeat in 2 hours. Max 8 tablets/24 hours. 18 tablet 4    tranexamic acid (LYSTEDA) 650 MG tablet Take 2 tablets (1,300 mg) by mouth 3 times daily 30 tablet 3         ALLERGIES:  No Known Allergies      PAST MEDICAL HISTORY:  Past Medical History:   Diagnosis Date    Malignant neoplasm of upper-outer quadrant of left breast in female, estrogen receptor positive (H) 2024    NO ACTIVE PROBLEMS    Menometrorrhagia.    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 Used IUD for 20 years.      PAST SURGICAL HISTORY:  Past Surgical History:   Procedure Laterality Date    COLONOSCOPY WITH CO2 INSUFFLATION N/A 2024    Procedure: Colonoscopy with CO2 insufflation;  Surgeon: Cherise Lima DO;  Location: [facility] OR    INSERT TISSUE EXPANDER BREAST BILATERAL Bilateral 2024    Procedure: Insertion tissue expander, breasts, bilateral;  Surgeon: Elisabet Venegas MD;  Location: [facility] OR    IR CHEST PORT PLACEMENT > 5 YRS OF AGE  2024    MASTECTOMY, NIPPLE SPARING Bilateral 2024    Procedure: Bilateral nipple sparing mastectomy, bilateral implant removal, left sentinel lymph node biopsy;  Surgeon: Aba Phillips MD;  Location: [facility] OR    TUBAL LIGATION      [facility]  DELIVERY ONLY      superficial wound dehiscence   Bilateral breast augmentation in 2013.      SOCIAL HISTORY:  Social History     Socioeconomic History    Marital status:      Spouse name: Not on file    Number of children: Not on file    Years of education: Not on file    Highest education level: Not on file   Occupational History    Not on file   Tobacco Use     Smoking status: Former     Current packs/day: 0.25     Average packs/day: 0.3 packs/day for 1 year (0.3 ttl pk-yrs)     Types: Cigarettes    Smokeless tobacco: Never   Vaping Use    Vaping status: Never Used   Substance and Sexual Activity    Alcohol use: Yes     Comment: 0-1    Drug use: No    Sexual activity: Yes     Partners: Male     Birth control/protection: Surgical, I.U.D., Female Surgical     Comment: Tubal ligation, 12/13/2007   Other Topics Concern    Parent/sibling w/ CABG, MI or angioplasty before 65F 55M? Yes     Comment: father 2 MIs   Social History Narrative    Caffeine intake/servings daily - 2-3 pop    Calcium intake/servings daily - 2 yogurts and 1 glass of milk    Exercise 6 times weekly - describe strength training and cardio    Sunscreen used - Yes    Seatbelts used - Yes    Guns stored in the home - No    Self Breast Exam - Yes    Pap test up to date -  Yes, as of today    Eye exam up to date -  Yes    Dental exam up to date -  Yes    DEXA scan up to date -  Not Applicable    Flex Sig/Colonoscopy up to date -  Not Applicable    Mammography up to date -  Not Applicable    Immunizations reviewed and up to date - Yes, 03/2008    Abuse: Current or Past (Physical, Sexual or Emotional) - No    Do you feel safe in your environment - Yes    Do you cope well with stress - Yes    Do you suffer from insomnia - No    Last updated by: Lit Licona  10/27/2009                 Social Determinants of Health     Financial Resource Strain: Low Risk  (2/1/2024)    Financial Resource Strain     Within the past 12 months, have you or your family members you live with been unable to get utilities (heat, electricity) when it was really needed?: No   Food Insecurity: Low Risk  (2/1/2024)    Food Insecurity     Within the past 12 months, did you worry that your food would run out before you got money to buy more?: No     Within the past 12 months, did the food you bought just not last and you didn t have money to get  more?: No   Transportation Needs: Low Risk  (2/1/2024)    Transportation Needs     Within the past 12 months, has lack of transportation kept you from medical appointments, getting your medicines, non-medical meetings or appointments, work, or from getting things that you need?: No   Physical Activity: Not on file   Stress: Not on file   Social Connections: Not on file   Interpersonal Safety: Low Risk  (2/5/2024)    Interpersonal Safety     Do you feel physically and emotionally safe where you currently live?: Yes     Within the past 12 months, have you been hit, slapped, kicked or otherwise physically hurt by someone?: No     Within the past 12 months, have you been humiliated or emotionally abused in other ways by your partner or ex-partner?: No   Housing Stability: Low Risk  (2/1/2024)    Housing Stability     Do you have housing? : Yes     Are you worried about losing your housing?: No   Has 2 grown children.  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.      FAMILY HISTORY:  Family History   Problem Relation Age of Onset    Thyroid Disease Mother     Breast Cancer Mother 56        breast, 3 years later    Heart Disease Father         2x heart attacks    Circulatory Father         blood clots    Cardiovascular Father         patent foramen ovale, repaired x 2, afib    Cerebrovascular Disease Father         x2    C.A.D. Father         x2    Genitourinary Problems Father         kidney disease    Asthma Brother     Food Allergy Brother     Eczema Brother     No Known Problems Maternal Grandmother     Myocardial Infarction Maternal Grandfather     Hypertension Paternal Grandmother     Alcohol/Drug Paternal Grandfather     No Known Problems Daughter     No Known Problems Son     Cancer Paternal Aunt         cervical cancer(another sisiter)    Breast Cancer Paternal Aunt         breast cancer at 70's    Breast Cancer Paternal Aunt     Ovarian  Cancer Paternal Aunt     Cancer - colorectal No family hx of     Diabetes No family hx of          PHYSICAL EXAM:  Vital signs: Virtual visit, vital signs not done today.  ECO  GENERAL: Very pleasant 43-year-old female who is alert, oriented, and in no acute distress.  EYES: No scleral icterus.   RESPIRATORY: No audible cough, wheezing, or labored breathing.  MUSCULOSKELETAL: Range of motion in the neck, shoulders, and arms appear normal.  SKIN: No overt rashes, discolorations, or lesions over the face and neck.  NEUROLOGIC: Alert.  No overt tremors.  PSYCHIATRIC: Normal affect and mood.  Does not appear anxious.     LABS:  No new labs today; will have labs 4/15 prior to chemotherapy.     PATHOLOGY:  Reviewed as per HPI.    IMAGING:  Reviewed as per HPI.  No new imaging to review today.    ASSESSMENT/PLAN:  Angelica Gomez is a 43 year old female with the following issues:  1. Stage IA, [id number], grade 2 invasive ductal carcinoma of the left upper outer breast, ER positive 20%, KY positive 20%, HER-2 negative, MammaPrint high risk, Luminal B  --Lisandra is s/p bilateral mastectomies with final pathology showed a left breast 1.2 cm tumor with repeat ER positive at 61-70%, KY positive at 71-80%, HER-2 negative (IHC = 1+)  --MammaPrint + BluePrint showed high risk, Luminal B subtype.  --Given high risk for recurrence, Dr. Roland advised adjuvant chemotherapy with 4 cycles of Taxotere and Cytoxan.    --She is clinically stable today to proceed with C2.  If her labs are adequate on Monday, she can proceed with treatment.  --Following adjuvant chemo, she also strongly recommended either tamoxifen or ovarian suppression therapy with Zoladex and aromatase inhibitor such as anastrozole to further reduce her risk of breast cancer recurrence for at least 5 years.  Per prior note, she has had menometrorrhagia and might favor Zoladex + AI in order to induce cessation of menstruation if she is able to tolerate possible  menopause effects.      2. CHEK2 mutation  --She will continue with colonoscopy screening on high-risk basis.    3.  Nausea:  --Mild, likely related to treatment.  Continue to monitor.    4.  Loose stools:  --Likely related to treatment.  Not daily.  Continue to closely monitor.    5.  Bone pain:  --Related to Neulasta.  Claritin not helpful.  She will continue to use Tylenol/minimal ibuprofen and try moist heat.    6.  Facial flushing:  --We discussed that this is likely related to dexamethasone use.  She will closely monitor, and call if she develops any signs of allergic reaction such as hives, throat or lip swelling, etc.    7.  Heavy menstrual flow:  --This is a chronic issue for her.  As above, she was on birth control pills to control this, but more recently has been prescribed tranexamic acid.  This is reviewed with pharmacy and her physician today.  Dr. Roland is okay if she uses this sparingly.  Hopefully, her.  Will be later this month, and after this month, she may not have it.  Due to her chemotherapy.    DEISI Crooks  [facility]   [phone]    38 minutes spent on the date of the encounter doing chart review, review of test results, interpretation of tests, patient visit, and documentation

## 2024-04-12 NOTE — LETTER
4/12/2024         RE: Angelica Gomez  [address]  [facility] [address]        Dear Colleague,    Thank you for referring your patient, Angelica Gomez, to the [facility]. Please see a copy of my visit note below.    Virtual Visit Details    Type of service:  Telephone Visit   Phone call duration: 15 minutes   Originating Location (pt. Location): Home  Distant Location (provider location):  On-site    [facility]    Hematology/Oncology Established Patient Note    Today's Date: 4/12/2024    Reason for visit: Left breast cancer.    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  12/7/2023: Due to worsening left upper outer breast pain, bilateral diagnostic mammogram performed. No mammographic abnormality in left upper outer breast site of symptoms but at 1:00, there is oval asymmetry measuring 0.7 cm. Remaining left breast and left axillary U/S showed morphologically normal lymph node and breast tissue.  At 1:30, 5 cm from nipple is irregular hypoechoic mass measuring 0.5 x 0.5 cm.  2. 12/15/2023: U/S-guided biopsy of left breast at 1:30 showed grade 2 invasive ductal carcinoma (3 mm) with associated grade 3 DCIS, no LVI; ER positive at 20%, AL positive at 20%, HER-2 negative with IHC = 1+.  3. 2/21/2024: Bilateral mastectomies with left axillary sentinel lymph node excision with Dr. Bean Phillips; bilateral implant removal. Pathology showed 1.2 cm grade 2 invasive ductal carcinoma with 1.4 cm grade 3 DCIS; margins negative; one left axillary SLN negative. Right breast benign. Repeat ER weakly positive at 61-70%, AL moderate positive at 71-80%. MammaPrint + BluePrint showed high one risk, Luminal B.    INTERVAL HISTORY:  Visit done today via telephone for evaluation and toxicity check prior to C2 chemotherapy with adjuvant docetaxel plus  cyclophosphamide.  -She had a port placed yesterday and this went well.  -She began menstruating on Wednesday.  She has a history of heavy flow and periods lasting up to 7 days.  She did manage these with birth-control pills in the past, but since stopping this she has been using tranexamic acid as prescribed by her gynecologist.  She wonders if this is okay to continue while she is on chemotherapy.  Message to MD.  -She continues breast rehab which she feels is very helpful.  -She did have bony pain after receiving Neulasta.  She did not find Claritin particularly helpful, and switching to Claritin from her typical antihistamine caused her allergies to worsen.  -She did have some nausea.  She did not have any constipation, but did have some looser stools and more frequent bowel movements.  -She developed facial flushing in the days following treatments, we discussed likely related to dexamethasone use.    REVIEW OF SYSTEMS:   14 point ROS was reviewed and is negative other than as noted above in HPI.       HOME MEDICATIONS:  Current Outpatient Medications   Medication Sig Dispense Refill     Calcium Carbonate-Vitamin D (CALCIUM 500 + D PO)        Cetirizine HCl (ZYRTEC ALLERGY PO) Take 10 mg by mouth daily       COMPRESSION STOCKINGS Please measure and distribute 2 pair of 20mmHg - 30mmHg thigh high open or closed toe compression stockings with extra refills as indicated. 2 each 4     dexAMETHasone (DECADRON) 4 MG tablet Take 2 tablets (8 mg) by mouth 2 times daily (with meals) for 3 doses Start evening of Docetaxel infusion and continue for a total of 3 doses. 6 tablet 3     Fluticasone Propionate (FLONASE NA) Spray 1 spray in nostril daily       methocarbamol (ROBAXIN) 750 MG tablet Take 1 tablet (750 mg) by mouth 3 times daily 30 tablet 0     Multiple Vitamins-Minerals (MULTIVITAMIN & MINERAL PO) Take 1 each by mouth daily. [facility] women's active supplement       ondansetron (ZOFRAN) 8 MG tablet Take 1 tablet (8 mg)  by mouth every 8 hours as needed for nausea (vomiting) 30 tablet 2     oxyCODONE (ROXICODONE) 5 MG tablet Take 1 tablet (5 mg) by mouth every 4 hours as needed for pain 20 tablet 0     Probiotic Product (PROBIOTIC PO) Reported on 3/1/2017       prochlorperazine (COMPAZINE) 10 MG tablet Take 1 tablet (10 mg) by mouth every 6 hours as needed for nausea or vomiting 30 tablet 2     senna-docusate (SENOKOT-S/PERICOLACE) 8.6-50 MG tablet Take 1 tablet by mouth 2 times daily 30 tablet 0     SUMAtriptan (IMITREX) 25 MG tablet Take 1 tablet (25 mg) by mouth at onset of headache for migraine May repeat in 2 hours. Max 8 tablets/24 hours. 18 tablet 4     tranexamic acid (LYSTEDA) 650 MG tablet Take 2 tablets (1,300 mg) by mouth 3 times daily 30 tablet 3         ALLERGIES:  No Known Allergies      PAST MEDICAL HISTORY:  Past Medical History:   Diagnosis Date     Malignant neoplasm of upper-outer quadrant of left breast in female, estrogen receptor positive (H) 2024     NO ACTIVE PROBLEMS    Menometrorrhagia.    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 Used IUD for 20 years.      PAST SURGICAL HISTORY:  Past Surgical History:   Procedure Laterality Date     COLONOSCOPY WITH CO2 INSUFFLATION N/A 2024    Procedure: Colonoscopy with CO2 insufflation;  Surgeon: Cherise Lima DO;  Location: [facility] OR     INSERT TISSUE EXPANDER BREAST BILATERAL Bilateral 2024    Procedure: Insertion tissue expander, breasts, bilateral;  Surgeon: Elisabet Venegas MD;  Location:  OR     IR CHEST PORT PLACEMENT > 5 YRS OF AGE  2024     MASTECTOMY, NIPPLE SPARING Bilateral 2024    Procedure: Bilateral nipple sparing mastectomy, bilateral implant removal, left sentinel lymph node biopsy;  Surgeon: Aba Phillips MD;  Location:  OR     TUBAL LIGATION       [facility]  DELIVERY ONLY      superficial wound dehiscence   Bilateral breast  augmentation in 2013.      SOCIAL HISTORY:  Social History     Socioeconomic History     Marital status:      Spouse name: Not on file     Number of children: Not on file     Years of education: Not on file     Highest education level: Not on file   Occupational History     Not on file   Tobacco Use     Smoking status: Former     Current packs/day: 0.25     Average packs/day: 0.3 packs/day for 1 year (0.3 ttl pk-yrs)     Types: Cigarettes     Smokeless tobacco: Never   Vaping Use     Vaping status: Never Used   Substance and Sexual Activity     Alcohol use: Yes     Comment: 0-1     Drug use: No     Sexual activity: Yes     Partners: Male     Birth control/protection: Surgical, I.U.D., Female Surgical     Comment: Tubal ligation, 12/13/2007   Other Topics Concern     Parent/sibling w/ CABG, MI or angioplasty before 65F 55M? Yes     Comment: father 2 MIs   Social History Narrative    Caffeine intake/servings daily - 2-3 pop    Calcium intake/servings daily - 2 yogurts and 1 glass of milk    Exercise 6 times weekly - describe strength training and cardio    Sunscreen used - Yes    Seatbelts used - Yes    Guns stored in the home - No    Self Breast Exam - Yes    Pap test up to date -  Yes, as of today    Eye exam up to date -  Yes    Dental exam up to date -  Yes    DEXA scan up to date -  Not Applicable    Flex Sig/Colonoscopy up to date -  Not Applicable    Mammography up to date -  Not Applicable    Immunizations reviewed and up to date - Yes, 03/2008    Abuse: Current or Past (Physical, Sexual or Emotional) - No    Do you feel safe in your environment - Yes    Do you cope well with stress - Yes    Do you suffer from insomnia - No    Last updated by: Lit Licona  10/27/2009                 Social Determinants of Health     Financial Resource Strain: Low Risk  (2/1/2024)    Financial Resource Strain      Within the past 12 months, have you or your family members you live with been unable to get utilities (heat,  electricity) when it was really needed?: No   Food Insecurity: Low Risk  (2/1/2024)    Food Insecurity      Within the past 12 months, did you worry that your food would run out before you got money to buy more?: No      Within the past 12 months, did the food you bought just not last and you didn t have money to get more?: No   Transportation Needs: Low Risk  (2/1/2024)    Transportation Needs      Within the past 12 months, has lack of transportation kept you from medical appointments, getting your medicines, non-medical meetings or appointments, work, or from getting things that you need?: No   Physical Activity: Not on file   Stress: Not on file   Social Connections: Not on file   Interpersonal Safety: Low Risk  (2/5/2024)    Interpersonal Safety      Do you feel physically and emotionally safe where you currently live?: Yes      Within the past 12 months, have you been hit, slapped, kicked or otherwise physically hurt by someone?: No      Within the past 12 months, have you been humiliated or emotionally abused in other ways by your partner or ex-partner?: No   Housing Stability: Low Risk  (2/1/2024)    Housing Stability      Do you have housing? : Yes      Are you worried about losing your housing?: No   Has 2 grown children.  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      FAMILY HISTORY:  Family History   Problem Relation Age of Onset     Thyroid Disease Mother      Breast Cancer Mother 56        breast, 3 years later     Heart Disease Father         2x heart attacks     Circulatory Father         blood clots     Cardiovascular Father         patent foramen ovale, repaired x 2, afib     Cerebrovascular Disease Father         x2     C.A.D. Father         x2     Genitourinary Problems Father         kidney disease     Asthma Brother      Food Allergy Brother      Eczema Brother      No Known Problems Maternal Grandmother       Myocardial Infarction Maternal Grandfather      Hypertension Paternal Grandmother      Alcohol/Drug Paternal Grandfather      No Known Problems Daughter      No Known Problems Son      Cancer Paternal Aunt         cervical cancer(another sisiter)     Breast Cancer Paternal Aunt         breast cancer at 70's     Breast Cancer Paternal Aunt      Ovarian Cancer Paternal Aunt      Cancer - colorectal No family hx of      Diabetes No family hx of          PHYSICAL EXAM:  Vital signs: Virtual visit, vital signs not done today.  ECO  GENERAL: Very pleasant 43-year-old female who is alert, oriented, and in no acute distress.  EYES: No scleral icterus.   RESPIRATORY: No audible cough, wheezing, or labored breathing.  MUSCULOSKELETAL: Range of motion in the neck, shoulders, and arms appear normal.  SKIN: No overt rashes, discolorations, or lesions over the face and neck.  NEUROLOGIC: Alert.  No overt tremors.  PSYCHIATRIC: Normal affect and mood.  Does not appear anxious.     LABS:  No new labs today; will have labs 4/15 prior to chemotherapy.     PATHOLOGY:  Reviewed as per HPI.    IMAGING:  Reviewed as per HPI.  No new imaging to review today.    ASSESSMENT/PLAN:  Angelica Gomez is a 43 year old female with the following issues:  1. Stage IA, [id number], grade 2 invasive ductal carcinoma of the left upper outer breast, ER positive 20%, MS positive 20%, HER-2 negative, MammaPrint high risk, Luminal B  --Lisandra is s/p bilateral mastectomies with final pathology showed a left breast 1.2 cm tumor with repeat ER positive at 61-70%, MS positive at 71-80%, HER-2 negative (IHC = 1+)  --MammaPrint + BluePrint showed high risk, Luminal B subtype.  --Given high risk for recurrence, Dr. Roland advised adjuvant chemotherapy with 4 cycles of Taxotere and Cytoxan.    --She is clinically stable today to proceed with C2.  If her labs are adequate on Monday, she can proceed with treatment.  --Following adjuvant chemo, she also strongly  recommended either tamoxifen or ovarian suppression therapy with Zoladex and aromatase inhibitor such as anastrozole to further reduce her risk of breast cancer recurrence for at least 5 years.  Per prior note, she has had menometrorrhagia and might favor Zoladex + AI in order to induce cessation of menstruation if she is able to tolerate possible menopause effects.      2. CHEK2 mutation  --She will continue with colonoscopy screening on high-risk basis.    3.  Nausea:  --Mild, likely related to treatment.  Continue to monitor.    4.  Loose stools:  --Likely related to treatment.  Not daily.  Continue to closely monitor.    5.  Bone pain:  --Related to Neulasta.  Claritin not helpful.  She will continue to use Tylenol/minimal ibuprofen and try moist heat.    6.  Facial flushing:  --We discussed that this is likely related to dexamethasone use.  She will closely monitor, and call if she develops any signs of allergic reaction such as hives, throat or lip swelling, etc.    7.  Heavy menstrual flow:  --This is a chronic issue for her.  As above, she was on birth control pills to control this, but more recently has been prescribed tranexamic acid.  This is reviewed with pharmacy and her physician today.  Dr. Roland is okay if she uses this sparingly.  Hopefully, her.  Will be later this month, and after this month, she may not have it.  Due to her chemotherapy.    DEISI Crooks  [facility]   [phone]    38 minutes spent on the date of the encounter doing chart review, review of test results, interpretation of tests, patient visit, and documentation       Again, thank you for allowing me to participate in the care of your patient.        Sincerely,        DEISI Crooks CNP   none

## 2024-04-12 NOTE — LETTER
4/12/2024         RE: Angelica Gomez  [address]  [facility] [address]        Dear Colleague,    Thank you for referring your patient, Angelica Gomez, to the [facility]. Please see a copy of my visit note below.    Virtual Visit Details    Type of service:  Telephone Visit   Phone call duration: 15 minutes   Originating Location (pt. Location): Home  Distant Location (provider location):  On-site    [facility]    Hematology/Oncology Established Patient Note    Today's Date: 4/12/2024    Reason for visit: Left breast cancer.    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  12/7/2023: Due to worsening left upper outer breast pain, bilateral diagnostic mammogram performed. No mammographic abnormality in left upper outer breast site of symptoms but at 1:00, there is oval asymmetry measuring 0.7 cm. Remaining left breast and left axillary U/S showed morphologically normal lymph node and breast tissue.  At 1:30, 5 cm from nipple is irregular hypoechoic mass measuring 0.5 x 0.5 cm.  2. 12/15/2023: U/S-guided biopsy of left breast at 1:30 showed grade 2 invasive ductal carcinoma (3 mm) with associated grade 3 DCIS, no LVI; ER positive at 20%, DE positive at 20%, HER-2 negative with IHC = 1+.  3. 2/21/2024: Bilateral mastectomies with left axillary sentinel lymph node excision with Dr. Bean Phillips; bilateral implant removal. Pathology showed 1.2 cm grade 2 invasive ductal carcinoma with 1.4 cm grade 3 DCIS; margins negative; one left axillary SLN negative. Right breast benign. Repeat ER weakly positive at 61-70%, DE moderate positive at 71-80%. MammaPrint + BluePrint showed high one risk, Luminal B.    INTERVAL HISTORY:  Visit done today via telephone for evaluation and toxicity check prior to C2 chemotherapy with adjuvant docetaxel plus  cyclophosphamide.  -She had a port placed yesterday and this went well.  -She began menstruating on Wednesday.  She has a history of heavy flow and periods lasting up to 7 days.  She did manage these with birth-control pills in the past, but since stopping this she has been using tranexamic acid as prescribed by her gynecologist.  She wonders if this is okay to continue while she is on chemotherapy.  Message to MD.  -She continues breast rehab which she feels is very helpful.  -She did have bony pain after receiving Neulasta.  She did not find Claritin particularly helpful, and switching to Claritin from her typical antihistamine caused her allergies to worsen.  -She did have some nausea.  She did not have any constipation, but did have some looser stools and more frequent bowel movements.  -She developed facial flushing in the days following treatments, we discussed likely related to dexamethasone use.    REVIEW OF SYSTEMS:   14 point ROS was reviewed and is negative other than as noted above in HPI.       HOME MEDICATIONS:  Current Outpatient Medications   Medication Sig Dispense Refill     Calcium Carbonate-Vitamin D (CALCIUM 500 + D PO)        Cetirizine HCl (ZYRTEC ALLERGY PO) Take 10 mg by mouth daily       COMPRESSION STOCKINGS Please measure and distribute 2 pair of 20mmHg - 30mmHg thigh high open or closed toe compression stockings with extra refills as indicated. 2 each 4     dexAMETHasone (DECADRON) 4 MG tablet Take 2 tablets (8 mg) by mouth 2 times daily (with meals) for 3 doses Start evening of Docetaxel infusion and continue for a total of 3 doses. 6 tablet 3     Fluticasone Propionate (FLONASE NA) Spray 1 spray in nostril daily       methocarbamol (ROBAXIN) 750 MG tablet Take 1 tablet (750 mg) by mouth 3 times daily 30 tablet 0     Multiple Vitamins-Minerals (MULTIVITAMIN & MINERAL PO) Take 1 each by mouth daily. [facility] women's active supplement       ondansetron (ZOFRAN) 8 MG tablet Take 1 tablet (8 mg)  by mouth every 8 hours as needed for nausea (vomiting) 30 tablet 2     oxyCODONE (ROXICODONE) 5 MG tablet Take 1 tablet (5 mg) by mouth every 4 hours as needed for pain 20 tablet 0     Probiotic Product (PROBIOTIC PO) Reported on 3/1/2017       prochlorperazine (COMPAZINE) 10 MG tablet Take 1 tablet (10 mg) by mouth every 6 hours as needed for nausea or vomiting 30 tablet 2     senna-docusate (SENOKOT-S/PERICOLACE) 8.6-50 MG tablet Take 1 tablet by mouth 2 times daily 30 tablet 0     SUMAtriptan (IMITREX) 25 MG tablet Take 1 tablet (25 mg) by mouth at onset of headache for migraine May repeat in 2 hours. Max 8 tablets/24 hours. 18 tablet 4     tranexamic acid (LYSTEDA) 650 MG tablet Take 2 tablets (1,300 mg) by mouth 3 times daily 30 tablet 3         ALLERGIES:  No Known Allergies      PAST MEDICAL HISTORY:  Past Medical History:   Diagnosis Date     Malignant neoplasm of upper-outer quadrant of left breast in female, estrogen receptor positive (H) 2024     NO ACTIVE PROBLEMS    Menometrorrhagia.    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 Used IUD for 20 years.      PAST SURGICAL HISTORY:  Past Surgical History:   Procedure Laterality Date     COLONOSCOPY WITH CO2 INSUFFLATION N/A 2024    Procedure: Colonoscopy with CO2 insufflation;  Surgeon: Cherise Lima DO;  Location: [facility] OR     INSERT TISSUE EXPANDER BREAST BILATERAL Bilateral 2024    Procedure: Insertion tissue expander, breasts, bilateral;  Surgeon: Elisabet Venegas MD;  Location:  OR     IR CHEST PORT PLACEMENT > 5 YRS OF AGE  2024     MASTECTOMY, NIPPLE SPARING Bilateral 2024    Procedure: Bilateral nipple sparing mastectomy, bilateral implant removal, left sentinel lymph node biopsy;  Surgeon: Aba Phillips MD;  Location:  OR     TUBAL LIGATION       [facility]  DELIVERY ONLY      superficial wound dehiscence   Bilateral breast  augmentation in 2013.      SOCIAL HISTORY:  Social History     Socioeconomic History     Marital status:      Spouse name: Not on file     Number of children: Not on file     Years of education: Not on file     Highest education level: Not on file   Occupational History     Not on file   Tobacco Use     Smoking status: Former     Current packs/day: 0.25     Average packs/day: 0.3 packs/day for 1 year (0.3 ttl pk-yrs)     Types: Cigarettes     Smokeless tobacco: Never   Vaping Use     Vaping status: Never Used   Substance and Sexual Activity     Alcohol use: Yes     Comment: 0-1     Drug use: No     Sexual activity: Yes     Partners: Male     Birth control/protection: Surgical, I.U.D., Female Surgical     Comment: Tubal ligation, 12/13/2007   Other Topics Concern     Parent/sibling w/ CABG, MI or angioplasty before 65F 55M? Yes     Comment: father 2 MIs   Social History Narrative    Caffeine intake/servings daily - 2-3 pop    Calcium intake/servings daily - 2 yogurts and 1 glass of milk    Exercise 6 times weekly - describe strength training and cardio    Sunscreen used - Yes    Seatbelts used - Yes    Guns stored in the home - No    Self Breast Exam - Yes    Pap test up to date -  Yes, as of today    Eye exam up to date -  Yes    Dental exam up to date -  Yes    DEXA scan up to date -  Not Applicable    Flex Sig/Colonoscopy up to date -  Not Applicable    Mammography up to date -  Not Applicable    Immunizations reviewed and up to date - Yes, 03/2008    Abuse: Current or Past (Physical, Sexual or Emotional) - No    Do you feel safe in your environment - Yes    Do you cope well with stress - Yes    Do you suffer from insomnia - No    Last updated by: Lit Licona  10/27/2009                 Social Determinants of Health     Financial Resource Strain: Low Risk  (2/1/2024)    Financial Resource Strain      Within the past 12 months, have you or your family members you live with been unable to get utilities (heat,  electricity) when it was really needed?: No   Food Insecurity: Low Risk  (2/1/2024)    Food Insecurity      Within the past 12 months, did you worry that your food would run out before you got money to buy more?: No      Within the past 12 months, did the food you bought just not last and you didn t have money to get more?: No   Transportation Needs: Low Risk  (2/1/2024)    Transportation Needs      Within the past 12 months, has lack of transportation kept you from medical appointments, getting your medicines, non-medical meetings or appointments, work, or from getting things that you need?: No   Physical Activity: Not on file   Stress: Not on file   Social Connections: Not on file   Interpersonal Safety: Low Risk  (2/5/2024)    Interpersonal Safety      Do you feel physically and emotionally safe where you currently live?: Yes      Within the past 12 months, have you been hit, slapped, kicked or otherwise physically hurt by someone?: No      Within the past 12 months, have you been humiliated or emotionally abused in other ways by your partner or ex-partner?: No   Housing Stability: Low Risk  (2/1/2024)    Housing Stability      Do you have housing? : Yes      Are you worried about losing your housing?: No   Has 2 grown children.  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      FAMILY HISTORY:  Family History   Problem Relation Age of Onset     Thyroid Disease Mother      Breast Cancer Mother 56        breast, 3 years later     Heart Disease Father         2x heart attacks     Circulatory Father         blood clots     Cardiovascular Father         patent foramen ovale, repaired x 2, afib     Cerebrovascular Disease Father         x2     C.A.D. Father         x2     Genitourinary Problems Father         kidney disease     Asthma Brother      Food Allergy Brother      Eczema Brother      No Known Problems Maternal Grandmother       Myocardial Infarction Maternal Grandfather      Hypertension Paternal Grandmother      Alcohol/Drug Paternal Grandfather      No Known Problems Daughter      No Known Problems Son      Cancer Paternal Aunt         cervical cancer(another sisiter)     Breast Cancer Paternal Aunt         breast cancer at 70's     Breast Cancer Paternal Aunt      Ovarian Cancer Paternal Aunt      Cancer - colorectal No family hx of      Diabetes No family hx of          PHYSICAL EXAM:  Vital signs: Virtual visit, vital signs not done today.  ECO  GENERAL: Very pleasant 43-year-old female who is alert, oriented, and in no acute distress.  EYES: No scleral icterus.   RESPIRATORY: No audible cough, wheezing, or labored breathing.  MUSCULOSKELETAL: Range of motion in the neck, shoulders, and arms appear normal.  SKIN: No overt rashes, discolorations, or lesions over the face and neck.  NEUROLOGIC: Alert.  No overt tremors.  PSYCHIATRIC: Normal affect and mood.  Does not appear anxious.     LABS:  No new labs today; will have labs 4/15 prior to chemotherapy.     PATHOLOGY:  Reviewed as per HPI.    IMAGING:  Reviewed as per HPI.  No new imaging to review today.    ASSESSMENT/PLAN:  Angelica Gomez is a 43 year old female with the following issues:  1. Stage IA, [id number], grade 2 invasive ductal carcinoma of the left upper outer breast, ER positive 20%, NY positive 20%, HER-2 negative, MammaPrint high risk, Luminal B  --Lisandra is s/p bilateral mastectomies with final pathology showed a left breast 1.2 cm tumor with repeat ER positive at 61-70%, NY positive at 71-80%, HER-2 negative (IHC = 1+)  --MammaPrint + BluePrint showed high risk, Luminal B subtype.  --Given high risk for recurrence, Dr. Roland advised adjuvant chemotherapy with 4 cycles of Taxotere and Cytoxan.    --She is clinically stable today to proceed with C2.  If her labs are adequate on Monday, she can proceed with treatment.  --Following adjuvant chemo, she also strongly  recommended either tamoxifen or ovarian suppression therapy with Zoladex and aromatase inhibitor such as anastrozole to further reduce her risk of breast cancer recurrence for at least 5 years.  Per prior note, she has had menometrorrhagia and might favor Zoladex + AI in order to induce cessation of menstruation if she is able to tolerate possible menopause effects.      2. CHEK2 mutation  --She will continue with colonoscopy screening on high-risk basis.    3.  Nausea:  --Mild, likely related to treatment.  Continue to monitor.    4.  Loose stools:  --Likely related to treatment.  Not daily.  Continue to closely monitor.    5.  Bone pain:  --Related to Neulasta.  Claritin not helpful.  She will continue to use Tylenol/minimal ibuprofen and try moist heat.    6.  Facial flushing:  --We discussed that this is likely related to dexamethasone use.  She will closely monitor, and call if she develops any signs of allergic reaction such as hives, throat or lip swelling, etc.    7.  Heavy menstrual flow:  --This is a chronic issue for her.  As above, she was on birth control pills to control this, but more recently has been prescribed tranexamic acid.  This is reviewed with pharmacy and her physician today.  Dr. Roland is okay if she uses this sparingly.  Hopefully, her.  Will be later this month, and after this month, she may not have it.  Due to her chemotherapy.    DEISI Crooks  [facility]   [phone]    38 minutes spent on the date of the encounter doing chart review, review of test results, interpretation of tests, patient visit, and documentation       Again, thank you for allowing me to participate in the care of your patient.        Sincerely,        DEISI Crooks CNP

## 2024-04-15 ENCOUNTER — INFUSION THERAPY VISIT (OUTPATIENT)
Dept: INFUSION THERAPY | Facility: CLINIC | Age: 44
End: 2024-04-15
Attending: INTERNAL MEDICINE
Payer: COMMERCIAL

## 2024-04-15 VITALS
WEIGHT: 178 LBS | TEMPERATURE: 97.7 F | SYSTOLIC BLOOD PRESSURE: 130 MMHG | OXYGEN SATURATION: 100 % | BODY MASS INDEX: 29.62 KG/M2 | HEART RATE: 62 BPM | DIASTOLIC BLOOD PRESSURE: 88 MMHG

## 2024-04-15 DIAGNOSIS — C50.412 MALIGNANT NEOPLASM OF UPPER-OUTER QUADRANT OF LEFT BREAST IN FEMALE, ESTROGEN RECEPTOR POSITIVE (H): Primary | ICD-10-CM

## 2024-04-15 DIAGNOSIS — Z17.0 MALIGNANT NEOPLASM OF UPPER-OUTER QUADRANT OF LEFT BREAST IN FEMALE, ESTROGEN RECEPTOR POSITIVE (H): Primary | ICD-10-CM

## 2024-04-15 LAB
ALBUMIN SERPL BCG-MCNC: 4.2 G/DL (ref 3.5–5.2)
ALP SERPL-CCNC: 83 U/L (ref 40–150)
ALT SERPL W P-5'-P-CCNC: 15 U/L (ref 0–50)
ANION GAP SERPL CALCULATED.3IONS-SCNC: 13 MMOL/L (ref 7–15)
AST SERPL W P-5'-P-CCNC: 22 U/L (ref 0–45)
BASOPHILS # BLD AUTO: 0.1 10E3/UL (ref 0–0.2)
BASOPHILS NFR BLD AUTO: 1 %
BILIRUB SERPL-MCNC: 0.3 MG/DL
BUN SERPL-MCNC: 13.7 MG/DL (ref 6–20)
CALCIUM SERPL-MCNC: 8.8 MG/DL (ref 8.6–10)
CHLORIDE SERPL-SCNC: 109 MMOL/L (ref 98–107)
CREAT SERPL-MCNC: 0.75 MG/DL (ref 0.51–0.95)
DEPRECATED HCO3 PLAS-SCNC: 19 MMOL/L (ref 22–29)
EGFRCR SERPLBLD CKD-EPI 2021: >90 ML/MIN/1.73M2
EOSINOPHIL # BLD AUTO: 0.1 10E3/UL (ref 0–0.7)
EOSINOPHIL NFR BLD AUTO: 1 %
ERYTHROCYTE [DISTWIDTH] IN BLOOD BY AUTOMATED COUNT: 12.8 % (ref 10–15)
GLUCOSE SERPL-MCNC: 114 MG/DL (ref 70–99)
HCT VFR BLD AUTO: 34.9 % (ref 35–47)
HGB BLD-MCNC: 11.6 G/DL (ref 11.7–15.7)
IMM GRANULOCYTES # BLD: 0 10E3/UL
IMM GRANULOCYTES NFR BLD: 0 %
LYMPHOCYTES # BLD AUTO: 1 10E3/UL (ref 0.8–5.3)
LYMPHOCYTES NFR BLD AUTO: 19 %
MCH RBC QN AUTO: 28.6 PG (ref 26.5–33)
MCHC RBC AUTO-ENTMCNC: 33.2 G/DL (ref 31.5–36.5)
MCV RBC AUTO: 86 FL (ref 78–100)
MONOCYTES # BLD AUTO: 0.4 10E3/UL (ref 0–1.3)
MONOCYTES NFR BLD AUTO: 7 %
NEUTROPHILS # BLD AUTO: 4 10E3/UL (ref 1.6–8.3)
NEUTROPHILS NFR BLD AUTO: 72 %
NRBC # BLD AUTO: 0 10E3/UL
NRBC BLD AUTO-RTO: 0 /100
PLATELET # BLD AUTO: 306 10E3/UL (ref 150–450)
POTASSIUM SERPL-SCNC: 4.8 MMOL/L (ref 3.4–5.3)
PROT SERPL-MCNC: 6.3 G/DL (ref 6.4–8.3)
RBC # BLD AUTO: 4.06 10E6/UL (ref 3.8–5.2)
SODIUM SERPL-SCNC: 141 MMOL/L (ref 135–145)
WBC # BLD AUTO: 5.5 10E3/UL (ref 4–11)

## 2024-04-15 PROCEDURE — 84155 ASSAY OF PROTEIN SERUM: CPT | Performed by: INTERNAL MEDICINE

## 2024-04-15 PROCEDURE — 82374 ASSAY BLOOD CARBON DIOXIDE: CPT | Performed by: INTERNAL MEDICINE

## 2024-04-15 PROCEDURE — 96375 TX/PRO/DX INJ NEW DRUG ADDON: CPT

## 2024-04-15 PROCEDURE — 36591 DRAW BLOOD OFF VENOUS DEVICE: CPT | Performed by: INTERNAL MEDICINE

## 2024-04-15 PROCEDURE — 96372 THER/PROPH/DIAG INJ SC/IM: CPT | Performed by: INTERNAL MEDICINE

## 2024-04-15 PROCEDURE — 96367 TX/PROPH/DG ADDL SEQ IV INF: CPT

## 2024-04-15 PROCEDURE — 250N000011 HC RX IP 250 OP 636: Performed by: INTERNAL MEDICINE

## 2024-04-15 PROCEDURE — 258N000003 HC RX IP 258 OP 636: Performed by: INTERNAL MEDICINE

## 2024-04-15 PROCEDURE — 85025 COMPLETE CBC W/AUTO DIFF WBC: CPT | Performed by: INTERNAL MEDICINE

## 2024-04-15 PROCEDURE — 96413 CHEMO IV INFUSION 1 HR: CPT

## 2024-04-15 PROCEDURE — 96377 APPLICATON ON-BODY INJECTOR: CPT

## 2024-04-15 PROCEDURE — 96417 CHEMO IV INFUS EACH ADDL SEQ: CPT

## 2024-04-15 RX ORDER — HEPARIN SODIUM (PORCINE) LOCK FLUSH IV SOLN 100 UNIT/ML 100 UNIT/ML
5 SOLUTION INTRAVENOUS
Status: DISCONTINUED | OUTPATIENT
Start: 2024-04-15 | End: 2024-04-15 | Stop reason: HOSPADM

## 2024-04-15 RX ORDER — ONDANSETRON 2 MG/ML
8 INJECTION INTRAMUSCULAR; INTRAVENOUS ONCE
Status: COMPLETED | OUTPATIENT
Start: 2024-04-15 | End: 2024-04-15

## 2024-04-15 RX ADMIN — DOCETAXEL 146 MG: 20 INJECTION, SOLUTION, CONCENTRATE INTRAVENOUS at 13:53

## 2024-04-15 RX ADMIN — CYCLOPHOSPHAMIDE 1170 MG: 1 INJECTION, POWDER, FOR SOLUTION INTRAVENOUS; ORAL at 15:01

## 2024-04-15 RX ADMIN — PEGFILGRASTIM 6 MG: KIT SUBCUTANEOUS at 15:25

## 2024-04-15 RX ADMIN — Medication 5 ML: at 15:35

## 2024-04-15 RX ADMIN — ONDANSETRON 8 MG: 2 INJECTION INTRAMUSCULAR; INTRAVENOUS at 13:22

## 2024-04-15 RX ADMIN — FOSAPREPITANT: 150 INJECTION, POWDER, LYOPHILIZED, FOR SOLUTION INTRAVENOUS at 13:24

## 2024-04-15 RX ADMIN — SODIUM CHLORIDE 250 ML: 9 INJECTION, SOLUTION INTRAVENOUS at 13:12

## 2024-04-15 ASSESSMENT — PAIN SCALES - GENERAL: PAINLEVEL: MILD PAIN (3)

## 2024-04-15 NOTE — PROGRESS NOTES
Infusion Nursing Note:  Angelica Gomez presents today for C2D1: Taxotere/Cytoxan, OnPro Neulasta.    Patient seen by provider today: No.  Saw Suzanne Hoang NP on 4/12/24   present during visit today: Not Applicable.    Note: Denies any new concerns since seeing Suzanne Hoang NP.  Patient's biggest complain after last cycle was the bone pain.  Patient did discuss with with Suzanne Hoang NP.     New port site look WNL after placement last week. Ice applied to hands and feet during the Taxotere infusion.  Patient tolerated today's infusion well.      ONPRO  Was placed on patient's: back of left arm.    Was placed at 1530 PM    Podpal used: Yes    ONPRO injector device Lot number: [id number]    Patient education included: what patient can expect after application, what colored lights mean on the device, when to remove device, when and where to call with questions or issues, all patients questions answered, and that Neulasta administration will occur at 1830 on 4/16/24.    Patient tolerated administration well.       Intravenous Access:  Implanted Port.    Treatment Conditions:  Lab Results   Component Value Date    HGB 11.6 (L) 04/15/2024    WBC 5.5 04/15/2024    ANEU 5.3 06/07/2019    ANEUTAUTO 4.0 04/15/2024     04/15/2024        Lab Results   Component Value Date     04/15/2024    POTASSIUM 4.8 04/15/2024    CR 0.75 04/15/2024    CYRUS 8.8 04/15/2024    BILITOTAL 0.3 04/15/2024    ALBUMIN 4.2 04/15/2024    ALT 15 04/15/2024    AST 22 04/15/2024       Results reviewed, labs MET treatment parameters, ok to proceed with treatment.      Post Infusion Assessment:  Patient tolerated infusion without incident.  Blood return noted pre and post infusion.  Site patent and intact, free from redness, edema or discomfort.  No evidence of extravasations.  Access discontinued per protocol.       Discharge Plan:   Patient declined prescription refills.  Discharge instructions reviewed with:  Patient.  Patient and/or family verbalized understanding of discharge instructions and all questions answered.  AVS to patient via [employer].  Patient will return 5/6/24 for next appointment. As of now, this is only an infusion appointment.  Inbasket message sent to scheduling to add labs and provider visit to this appointment.  Patient discharged in stable condition accompanied by: .  Departure Mode: Ambulatory.      Sherri Ngo RN

## 2024-04-15 NOTE — PATIENT INSTRUCTIONS
Your On-body Neulasta Injector was applied to your left arm at 3:30.  At approximately 6:30 pm on 4/16/24, your On-body Injector will beep to let you know your dose delivery will begin in 2 minutes.  Your medication will be delivered over the next 45 minutes.  You can remove your Injector at 7:30 pm.  Please make sure your Injector has a solid green light or has turned off prior to removing the device.  Please contact your provider at [phone] with questions or concerns.

## 2024-04-16 ENCOUNTER — TELEPHONE (OUTPATIENT)
Dept: ONCOLOGY | Facility: CLINIC | Age: 44
End: 2024-04-16
Payer: COMMERCIAL

## 2024-04-16 NOTE — TELEPHONE ENCOUNTER
"Pt completed questionnaire and reported the following symptoms: mild bleeding and mild pain. For the bleeding, pt reported: \"In regards to the bleeding question, I started menstrating on 4/9 and the bleeding stopped on 4/15.\"    Pt received C2D1: Taxotere/Cytoxan, OnPro Neulasta yesterday.  States that she is doing OK today. Has noticed facial flushing from the oral steroid medication. Also states that she had some difficulty sleeping last night after taking the steroids. Pt states that her insomnia was short lived after her first cycle and seemed to have improved by Day 3 (tomorrow).   Also states that she noticed some dry mouth, and has been drinking fluids and sucking on hard candies and mints to help with these symptoms. She also had this after cycle 1.     Pt states that her menstrual period continues today but is light. It seems to be decreasing. She will continue to monitor and will contact the clinic if bleeding persists or increases. States that she does have the tranexamic acid at home also if needed. This was reviewed with Suzanne Hoang NP, at pt's last appt.     Pt reports mild tenderness at her port site. Port was placed on 4/11/2024. Denies any redness, warmth, pus, or fever or chills. States that they used port yesterday and it worked well. She has been using ice packs at time and the tenderness is improving.    Reviewed use of antinausea medications with pt. Pt states that she did have nausea with the first cycle that started on Day 4, but she never had any vomiting. Advised pt to use antinausea medications at the first sign of any nausea. Also discussed smaller, more frequent snacks throughout the day.     Patient verbalized understanding and agreement with plan.  Patient was instructed to call the clinic with any questions, concerns, or worsening symptoms.    Will route update to care team.     Coco Hui RN on 4/16/2024 at 11:08 AM       "

## 2024-04-17 ENCOUNTER — THERAPY VISIT (OUTPATIENT)
Dept: PHYSICAL THERAPY | Facility: CLINIC | Age: 44
End: 2024-04-17
Payer: COMMERCIAL

## 2024-04-17 DIAGNOSIS — G43.711 INTRACTABLE CHRONIC MIGRAINE WITHOUT AURA AND WITH STATUS MIGRAINOSUS: Primary | ICD-10-CM

## 2024-04-17 PROCEDURE — 97112 NEUROMUSCULAR REEDUCATION: CPT | Mod: GP | Performed by: PHYSICAL THERAPIST

## 2024-04-17 PROCEDURE — 97140 MANUAL THERAPY 1/> REGIONS: CPT | Mod: GP | Performed by: PHYSICAL THERAPIST

## 2024-04-18 ENCOUNTER — THERAPY VISIT (OUTPATIENT)
Dept: OCCUPATIONAL THERAPY | Facility: CLINIC | Age: 44
End: 2024-04-18
Attending: SURGERY
Payer: COMMERCIAL

## 2024-04-18 DIAGNOSIS — C50.912 INVASIVE DUCTAL CARCINOMA OF BREAST, FEMALE, LEFT (H): Primary | ICD-10-CM

## 2024-04-18 DIAGNOSIS — Z90.12 STATUS POST LEFT MASTECTOMY: ICD-10-CM

## 2024-04-18 PROCEDURE — 97140 MANUAL THERAPY 1/> REGIONS: CPT | Mod: GO

## 2024-04-24 ENCOUNTER — THERAPY VISIT (OUTPATIENT)
Dept: PHYSICAL THERAPY | Facility: CLINIC | Age: 44
End: 2024-04-24
Payer: COMMERCIAL

## 2024-04-24 DIAGNOSIS — G43.711 INTRACTABLE CHRONIC MIGRAINE WITHOUT AURA AND WITH STATUS MIGRAINOSUS: Primary | ICD-10-CM

## 2024-04-24 PROCEDURE — 97112 NEUROMUSCULAR REEDUCATION: CPT | Mod: GP | Performed by: PHYSICAL THERAPIST

## 2024-04-24 PROCEDURE — 97140 MANUAL THERAPY 1/> REGIONS: CPT | Mod: GP | Performed by: PHYSICAL THERAPIST

## 2024-04-25 ENCOUNTER — TELEPHONE (OUTPATIENT)
Dept: ONCOLOGY | Facility: CLINIC | Age: 44
End: 2024-04-25
Payer: COMMERCIAL

## 2024-04-25 NOTE — TELEPHONE ENCOUNTER
Magali Roland MD  You; Sophie Neal, RN34 minutes ago (3:13 PM)     JH  Has she tried the sumatriptan for her headaches?  Dizziness probably related to chemo. Would encourage very slow positional change.  Sleep might not improve until headaches improve. If sumatriptan does not help, could try low dose of topiramate and titrate up.  For chills -- likely menopause type symptoms -- acupuncture can help with this.    Magali Roland MD  [facility] Hematology/Oncology     Attempted to reach pt, but she did not answer.  Left message for pt to return call.    Coco Hui, RN on 4/25/2024 at 3:50 PM

## 2024-04-26 ENCOUNTER — MYC MEDICAL ADVICE (OUTPATIENT)
Dept: FAMILY MEDICINE | Facility: CLINIC | Age: 44
End: 2024-04-26
Payer: COMMERCIAL

## 2024-04-26 DIAGNOSIS — G47.00 INSOMNIA, UNSPECIFIED TYPE: Primary | ICD-10-CM

## 2024-04-26 DIAGNOSIS — F51.02 ADJUSTMENT INSOMNIA: Primary | ICD-10-CM

## 2024-04-26 RX ORDER — LORAZEPAM 0.5 MG/1
0.5 TABLET ORAL EVERY 6 HOURS PRN
Qty: 30 TABLET | Refills: 0 | Status: SHIPPED | OUTPATIENT
Start: 2024-04-26 | End: 2024-05-30

## 2024-04-26 NOTE — TELEPHONE ENCOUNTER
Magali Roland MD  You; Sophie Neal, MICK53 minutes ago (12:36 PM)       Can offer Ativan or short-term Ambien although will likely only give her about 4 hours of sleep.         Pt states that she would be interested in trying Rx for ativan.    Will route back to Dr Roland for Rx.  Pt uses [facility] in [place]. They will notify pt when Rx is ready to be picked up.     Patient verbalized understanding and agreement with plan.  Patient was instructed to call the clinic with any questions, concerns, or worsening symptoms.    Coco Hui RN on 4/26/2024 at 1:35 PM

## 2024-04-26 NOTE — TELEPHONE ENCOUNTER
Pt was notified with recommendations per  Dr Roland.  Pt states that she is already getting acupuncture. In fact, she had an appt yesterday and they worked on pt's recent symptoms of insomnia and shivering/chills/sweating. Pt will continue to monitor and will contact clinic if symptoms do improve from acupuncture.    Pt states that she feels like her lack of sleep is causing  her increasing headaches. She has been taking imitrex and this has been relieving her headaches. She just reports that she continues to have problems both falling asleep and staying asleep, even when she does not have any headaches. She is wondering if Dr Roland has any recommendations for sleep aids.    Will route to Dr Roland for review and recommendations.    Patient verbalized understanding and agreement with plan.  Patient was instructed to call the clinic with any questions, concerns, or worsening symptoms.    Coco Hui RN on 4/26/2024 at 11:58 AM

## 2024-04-26 NOTE — TELEPHONE ENCOUNTER
Dr Roland has sent in Rx for ativan.  No further action needed.    Coco Hui RN on 4/26/2024 at 1:51 PM

## 2024-04-29 ENCOUNTER — TELEPHONE (OUTPATIENT)
Dept: ONCOLOGY | Facility: CLINIC | Age: 44
End: 2024-04-29
Payer: COMMERCIAL

## 2024-04-29 RX ORDER — HYDROXYZINE HYDROCHLORIDE 25 MG/1
25-50 TABLET, FILM COATED ORAL 3 TIMES DAILY PRN
Qty: 90 TABLET | Refills: 1 | Status: SHIPPED | OUTPATIENT
Start: 2024-04-29

## 2024-04-29 NOTE — CONFIDENTIAL NOTE
Writer called pt to assess.    Pt states she does not have any current symptom concerns and that new medications prescribed by Dr. Roland and her PCP have been very effective in helping her get more sleep. Pt will plan to follow up on 5/6 at apt. Will call before then with any concerns.

## 2024-05-02 ENCOUNTER — THERAPY VISIT (OUTPATIENT)
Dept: PHYSICAL THERAPY | Facility: CLINIC | Age: 44
End: 2024-05-02
Payer: COMMERCIAL

## 2024-05-02 ENCOUNTER — MYC MEDICAL ADVICE (OUTPATIENT)
Dept: ONCOLOGY | Facility: CLINIC | Age: 44
End: 2024-05-02

## 2024-05-02 ENCOUNTER — TELEPHONE (OUTPATIENT)
Dept: ONCOLOGY | Facility: CLINIC | Age: 44
End: 2024-05-02

## 2024-05-02 DIAGNOSIS — R21 RASH AND NONSPECIFIC SKIN ERUPTION: Primary | ICD-10-CM

## 2024-05-02 DIAGNOSIS — G43.711 INTRACTABLE CHRONIC MIGRAINE WITHOUT AURA AND WITH STATUS MIGRAINOSUS: Primary | ICD-10-CM

## 2024-05-02 PROCEDURE — 97112 NEUROMUSCULAR REEDUCATION: CPT | Mod: GP | Performed by: PHYSICAL THERAPIST

## 2024-05-02 PROCEDURE — 97140 MANUAL THERAPY 1/> REGIONS: CPT | Mod: GP | Performed by: PHYSICAL THERAPIST

## 2024-05-02 NOTE — CONFIDENTIAL NOTE
S-(situation): pt calling to report a rash for 2-3 days    B-(background): pt being seen for left breast cancer, last infusion was 4/15 (C2D1: Taxotere/Cytoxan, OnPro Neulasta )    A-(assessment): Pt states that the rash started around her hairline and moved to her scalp, the back of her neck, and most recently her sternum and around her chest/port area on both sides of her chest. She reports that it can be occasionally itchy, but she does not feel that it is a constant itch. She notes that the rash is red, and has a variety of papular and pustular areas throughout. No pain, fever, discharge, or swelling noted.    R-(recommendations): Will defer to provider.

## 2024-05-02 NOTE — CONFIDENTIAL NOTE
Writer called pt with recommendations.     Suzanne Hoang, DEISI CNP  You; Magali Roland MD; Sophie Neal, RN8 minutes ago (3:12 PM)     CL  Hi Angel,    Any new meds, make-up, lotions, soaps, detergents, etc?  Any rash on her lips or face?      Her last chemo was 4/15 so I am not sure if we can attribute the rash to her chemo?    Is she able to send in a photo?    Suzanne Hill     No answer. Left detailed VM with recommendations. Will await phone call or response via [employer].

## 2024-05-03 RX ORDER — HEPARIN SODIUM (PORCINE) LOCK FLUSH IV SOLN 100 UNIT/ML 100 UNIT/ML
5 SOLUTION INTRAVENOUS
Status: CANCELLED | OUTPATIENT
Start: 2024-05-06

## 2024-05-03 RX ORDER — ONDANSETRON 2 MG/ML
8 INJECTION INTRAMUSCULAR; INTRAVENOUS ONCE
Status: CANCELLED | OUTPATIENT
Start: 2024-05-06

## 2024-05-03 RX ORDER — KETOCONAZOLE 20 MG/G
CREAM TOPICAL DAILY
Qty: 30 G | Refills: 1 | Status: SHIPPED | OUTPATIENT
Start: 2024-05-03

## 2024-05-03 RX ORDER — MEPERIDINE HYDROCHLORIDE 25 MG/ML
25 INJECTION INTRAMUSCULAR; INTRAVENOUS; SUBCUTANEOUS EVERY 30 MIN PRN
Status: CANCELLED | OUTPATIENT
Start: 2024-05-06

## 2024-05-03 RX ORDER — HEPARIN SODIUM,PORCINE 10 UNIT/ML
5-20 VIAL (ML) INTRAVENOUS DAILY PRN
Status: CANCELLED | OUTPATIENT
Start: 2024-05-06

## 2024-05-03 RX ORDER — ALBUTEROL SULFATE 0.83 MG/ML
2.5 SOLUTION RESPIRATORY (INHALATION)
Status: CANCELLED | OUTPATIENT
Start: 2024-05-06

## 2024-05-03 RX ORDER — LORAZEPAM 2 MG/ML
0.5 INJECTION INTRAMUSCULAR EVERY 4 HOURS PRN
Status: CANCELLED | OUTPATIENT
Start: 2024-05-06

## 2024-05-03 RX ORDER — ALBUTEROL SULFATE 90 UG/1
1-2 AEROSOL, METERED RESPIRATORY (INHALATION)
Status: CANCELLED
Start: 2024-05-06

## 2024-05-03 RX ORDER — TRIAMCINOLONE ACETONIDE 1 MG/G
CREAM TOPICAL 2 TIMES DAILY
Qty: 45 G | Refills: 1 | Status: SHIPPED | OUTPATIENT
Start: 2024-05-03

## 2024-05-03 RX ORDER — EPINEPHRINE 1 MG/ML
0.3 INJECTION, SOLUTION, CONCENTRATE INTRAVENOUS EVERY 5 MIN PRN
Status: CANCELLED | OUTPATIENT
Start: 2024-05-06

## 2024-05-03 RX ORDER — DIPHENHYDRAMINE HYDROCHLORIDE 50 MG/ML
50 INJECTION INTRAMUSCULAR; INTRAVENOUS
Status: CANCELLED
Start: 2024-05-06

## 2024-05-03 NOTE — TELEPHONE ENCOUNTER
Call placed to Lisandra after receiving pictures and triage messages.    In addition to the information in previous notes, the rash that she initially had at her hairline in the back of her neck is resolving.  She initially thought this might be a folliculitis, but today has spread to scattered papular rash on her upper chest and also in between and under her breasts bilaterally.  Please see photos that are part of this note.  Initially, she did not notice any itching, but today, it has been a bit itchy, especially under her breasts and on her skin.    She does have a history of shingles in the past but this is not painful and it is bilateral, so likely not shingles.  It does not have the appearance of cellulitis, and she is afebrile and denies rigors or any signs or symptoms of systemic illness.  She does have a history of seasonal allergies, but takes Zyrtec daily and typically does not have skin reactions from her allergies.  She denies any lesions around her lips or on her lips or in her mouth.    As noted in prior notes, she has not started any new medications, herbal medications or teas, soaps, lotions, detergents, etc.    She has not worn any new clothing without washing it first.    She is almost a full 21 days after her last chemotherapy infusion, so I am not sure that the timing is consistent with chemo.    She denies swelling of her tongue, throat or lips.  The lesions do not appear hive-like.    We discussed that this could be a contact dermatitis versus candidal intertrigo given the location between and under her breasts.    Plan:  1.  She will keep the area clean and dry.  2.  I did send in a topical steroid as well as a topical antifungal for her to apply as directed over the next several days.  3.  She has an infusion appointment, lab appointment an appointment to see an STEPHANIE here on Monday prior to her next dose of chemotherapy, and I will send this message to him today as well to alert him to the  above.  4.  We reviewed red flag signs and symptoms and when to seek urgent/emergent care.    She states understanding of and agreement with this plan.

## 2024-05-06 ENCOUNTER — ONCOLOGY VISIT (OUTPATIENT)
Dept: ONCOLOGY | Facility: CLINIC | Age: 44
End: 2024-05-06
Payer: COMMERCIAL

## 2024-05-06 ENCOUNTER — LAB (OUTPATIENT)
Dept: INFUSION THERAPY | Facility: CLINIC | Age: 44
End: 2024-05-06
Payer: COMMERCIAL

## 2024-05-06 ENCOUNTER — INFUSION THERAPY VISIT (OUTPATIENT)
Dept: INFUSION THERAPY | Facility: CLINIC | Age: 44
End: 2024-05-06
Attending: INTERNAL MEDICINE
Payer: COMMERCIAL

## 2024-05-06 VITALS
OXYGEN SATURATION: 100 % | BODY MASS INDEX: 30.96 KG/M2 | WEIGHT: 185.8 LBS | RESPIRATION RATE: 16 BRPM | HEIGHT: 65 IN | DIASTOLIC BLOOD PRESSURE: 84 MMHG | SYSTOLIC BLOOD PRESSURE: 145 MMHG | TEMPERATURE: 97.7 F | HEART RATE: 69 BPM

## 2024-05-06 DIAGNOSIS — Z17.0 MALIGNANT NEOPLASM OF UPPER-OUTER QUADRANT OF LEFT BREAST IN FEMALE, ESTROGEN RECEPTOR POSITIVE (H): Primary | ICD-10-CM

## 2024-05-06 DIAGNOSIS — C50.412 MALIGNANT NEOPLASM OF UPPER-OUTER QUADRANT OF LEFT BREAST IN FEMALE, ESTROGEN RECEPTOR POSITIVE (H): ICD-10-CM

## 2024-05-06 DIAGNOSIS — C50.412 MALIGNANT NEOPLASM OF UPPER-OUTER QUADRANT OF LEFT BREAST IN FEMALE, ESTROGEN RECEPTOR POSITIVE (H): Primary | ICD-10-CM

## 2024-05-06 DIAGNOSIS — Z17.0 MALIGNANT NEOPLASM OF UPPER-OUTER QUADRANT OF LEFT BREAST IN FEMALE, ESTROGEN RECEPTOR POSITIVE (H): ICD-10-CM

## 2024-05-06 LAB
ALBUMIN SERPL BCG-MCNC: 4.3 G/DL (ref 3.5–5.2)
ALP SERPL-CCNC: 80 U/L (ref 40–150)
ALT SERPL W P-5'-P-CCNC: 20 U/L (ref 0–50)
ANION GAP SERPL CALCULATED.3IONS-SCNC: 11 MMOL/L (ref 7–15)
AST SERPL W P-5'-P-CCNC: 24 U/L (ref 0–45)
BASOPHILS # BLD AUTO: 0.1 10E3/UL (ref 0–0.2)
BASOPHILS NFR BLD AUTO: 2 %
BILIRUB SERPL-MCNC: 0.4 MG/DL
BUN SERPL-MCNC: 13.6 MG/DL (ref 6–20)
CALCIUM SERPL-MCNC: 9.6 MG/DL (ref 8.6–10)
CHLORIDE SERPL-SCNC: 109 MMOL/L (ref 98–107)
CREAT SERPL-MCNC: 0.88 MG/DL (ref 0.51–0.95)
DEPRECATED HCO3 PLAS-SCNC: 24 MMOL/L (ref 22–29)
EGFRCR SERPLBLD CKD-EPI 2021: 83 ML/MIN/1.73M2
EOSINOPHIL # BLD AUTO: 0 10E3/UL (ref 0–0.7)
EOSINOPHIL NFR BLD AUTO: 0 %
ERYTHROCYTE [DISTWIDTH] IN BLOOD BY AUTOMATED COUNT: 14.6 % (ref 10–15)
GLUCOSE SERPL-MCNC: 93 MG/DL (ref 70–99)
HCT VFR BLD AUTO: 35.6 % (ref 35–47)
HGB BLD-MCNC: 11.4 G/DL (ref 11.7–15.7)
IMM GRANULOCYTES # BLD: 0 10E3/UL
IMM GRANULOCYTES NFR BLD: 0 %
LYMPHOCYTES # BLD AUTO: 0.8 10E3/UL (ref 0.8–5.3)
LYMPHOCYTES NFR BLD AUTO: 18 %
MCH RBC QN AUTO: 28.2 PG (ref 26.5–33)
MCHC RBC AUTO-ENTMCNC: 32 G/DL (ref 31.5–36.5)
MCV RBC AUTO: 88 FL (ref 78–100)
MONOCYTES # BLD AUTO: 0.4 10E3/UL (ref 0–1.3)
MONOCYTES NFR BLD AUTO: 9 %
NEUTROPHILS # BLD AUTO: 3.3 10E3/UL (ref 1.6–8.3)
NEUTROPHILS NFR BLD AUTO: 71 %
NRBC # BLD AUTO: 0 10E3/UL
NRBC BLD AUTO-RTO: 0 /100
PLATELET # BLD AUTO: 226 10E3/UL (ref 150–450)
POTASSIUM SERPL-SCNC: 4.1 MMOL/L (ref 3.4–5.3)
PROT SERPL-MCNC: 6.4 G/DL (ref 6.4–8.3)
RBC # BLD AUTO: 4.04 10E6/UL (ref 3.8–5.2)
SODIUM SERPL-SCNC: 144 MMOL/L (ref 135–145)
WBC # BLD AUTO: 4.6 10E3/UL (ref 4–11)

## 2024-05-06 PROCEDURE — 96417 CHEMO IV INFUS EACH ADDL SEQ: CPT

## 2024-05-06 PROCEDURE — 258N000003 HC RX IP 258 OP 636: Performed by: INTERNAL MEDICINE

## 2024-05-06 PROCEDURE — 96372 THER/PROPH/DIAG INJ SC/IM: CPT | Performed by: INTERNAL MEDICINE

## 2024-05-06 PROCEDURE — 80053 COMPREHEN METABOLIC PANEL: CPT | Performed by: NURSE PRACTITIONER

## 2024-05-06 PROCEDURE — 96413 CHEMO IV INFUSION 1 HR: CPT

## 2024-05-06 PROCEDURE — 250N000011 HC RX IP 250 OP 636: Performed by: INTERNAL MEDICINE

## 2024-05-06 PROCEDURE — 36591 DRAW BLOOD OFF VENOUS DEVICE: CPT

## 2024-05-06 PROCEDURE — 85004 AUTOMATED DIFF WBC COUNT: CPT | Performed by: NURSE PRACTITIONER

## 2024-05-06 PROCEDURE — 99214 OFFICE O/P EST MOD 30 MIN: CPT | Performed by: NURSE PRACTITIONER

## 2024-05-06 PROCEDURE — 96377 APPLICATON ON-BODY INJECTOR: CPT | Mod: XS

## 2024-05-06 PROCEDURE — 96367 TX/PROPH/DG ADDL SEQ IV INF: CPT

## 2024-05-06 PROCEDURE — 99213 OFFICE O/P EST LOW 20 MIN: CPT | Mod: 25 | Performed by: NURSE PRACTITIONER

## 2024-05-06 PROCEDURE — 96375 TX/PRO/DX INJ NEW DRUG ADDON: CPT

## 2024-05-06 RX ORDER — HEPARIN SODIUM (PORCINE) LOCK FLUSH IV SOLN 100 UNIT/ML 100 UNIT/ML
5 SOLUTION INTRAVENOUS
Status: DISCONTINUED | OUTPATIENT
Start: 2024-05-06 | End: 2024-05-06 | Stop reason: HOSPADM

## 2024-05-06 RX ORDER — ONDANSETRON 2 MG/ML
8 INJECTION INTRAMUSCULAR; INTRAVENOUS ONCE
Status: COMPLETED | OUTPATIENT
Start: 2024-05-06 | End: 2024-05-06

## 2024-05-06 RX ADMIN — DOCETAXEL 146 MG: 20 INJECTION, SOLUTION, CONCENTRATE INTRAVENOUS at 11:57

## 2024-05-06 RX ADMIN — SODIUM CHLORIDE 250 ML: 9 INJECTION, SOLUTION INTRAVENOUS at 11:35

## 2024-05-06 RX ADMIN — FOSAPREPITANT: 150 INJECTION, POWDER, LYOPHILIZED, FOR SOLUTION INTRAVENOUS at 11:29

## 2024-05-06 RX ADMIN — CYCLOPHOSPHAMIDE 1170 MG: 1 INJECTION, POWDER, FOR SOLUTION INTRAVENOUS; ORAL at 13:20

## 2024-05-06 RX ADMIN — ONDANSETRON 8 MG: 2 INJECTION INTRAMUSCULAR; INTRAVENOUS at 11:29

## 2024-05-06 RX ADMIN — PEGFILGRASTIM 6 MG: KIT SUBCUTANEOUS at 13:21

## 2024-05-06 RX ADMIN — Medication 5 ML: at 13:51

## 2024-05-06 ASSESSMENT — PAIN SCALES - GENERAL: PAINLEVEL: NO PAIN (0)

## 2024-05-06 NOTE — PATIENT INSTRUCTIONS
Your On-body Neulasta Injector was applied to your L arm at 1:25 pm on 5/6/24.  At approximately 4:25 pm on 5/7/24, your On-body Injector will beep to let you know your dose delivery will begin in 2 minutes.  Your medication will be delivered over the next 45 minutes.  You can remove your Injector at 5:25 pm.  Please make sure your Injector has a solid green light or has turned off prior to removing the device.  Please contact your provider at [phone] with questions or concerns.

## 2024-05-06 NOTE — PROGRESS NOTES
"Oncology Rooming Note    May 6, 2024 10:10 AM   Angelica Gomez is a 44 year old female who presents for:    Chief Complaint   Patient presents with    Oncology Clinic Visit     Initial Vitals: Resp 16   Ht 1.651 m (5' 5\")   Wt 84.3 kg (185 lb 12.8 oz)   BMI 30.92 kg/m   Estimated body mass index is 30.92 kg/m  as calculated from the following:    Height as of this encounter: 1.651 m (5' 5\").    Weight as of this encounter: 84.3 kg (185 lb 12.8 oz). Body surface area is 1.97 meters squared.  No Pain (0) Comment: Data Unavailable   No LMP recorded.  Allergies reviewed: Yes  Medications reviewed: Yes    Medications: Medication refills not needed today.  Pharmacy name entered into [employer].: [facility] [place] - [place], [place] - [address] [place] [address].    Frailty Screening:   Is the patient here for a new oncology consult visit in cancer care? 2. No        Neyda Hayden MA            "

## 2024-05-06 NOTE — PROGRESS NOTES
"Oncology/Hematology Visit Note  May 6, 2024    Reason for Visit: follow up of left breast cancer    Follows with Dr. Roland  ER/[place] positive HER2 negative  Status post bilateral mastectomies  With high risk for recurrence  Currently undergoing adjuvant chemotherapy Taxotere Cytoxan      Interval History:  Patient reports she developed skin rash on the chest and sternum last week she was prescribed Kenalog cream patient reports has helped a lot she tried Benadryl which she feels is not helping much.  Patient reports skin rash is painless  Denies fever chills sweats cough  Nausea is tolerated with Compazine and Zofran    Review of Systems:  14 point ROS of systems including Constitutional, Eyes, Respiratory, Cardiovascular, Gastroenterology, Genitourinary, Integumentary, Muscularskeletal, Psychiatric were all negative except for pertinent positives noted in my HPI.      Physical Examination:  General: The patient is a pleasant female in no acute distress.  BP (!) 145/84   Pulse 69   Temp 97.7  F (36.5  C)   Resp 16   Ht 1.651 m (5' 5\")   Wt 84.3 kg (185 lb 12.8 oz)   SpO2 100%   BMI 30.92 kg/m    HEENT: EOMI, PERRL. Sclerae are anicteric. Oral mucosa is pink and moist with no lesions or thrush.   Lymph: Neck is supple with no lymphadenopathy in the cervical or supraclavicular areas.   Heart: Regular rate and rhythm.   Lungs: Clear to auscultation bilaterally.   GI: Bowel sounds present, soft, nontender with no palpable hepatosplenomegaly or masses.   Extremities: No lower extremity edema noted bilaterally.   Skin: No rashes, petechiae, or bruising noted on exposed skin.    Laboratory Data:  Results for orders placed or performed in visit on 05/06/24 (from the past 24 hour(s))   CBC with platelets and differential    Narrative    The following orders were created for panel order CBC with platelets and differential.  Procedure                               Abnormality         Status                     ---------    "                            -----------         ------                     CBC with platelets and d...[[id number]]  Abnormal            Final result                 Please view results for these tests on the individual orders.   CBC with platelets and differential   Result Value Ref Range    WBC Count 4.6 4.0 - 11.0 10e3/uL    RBC Count 4.04 3.80 - 5.20 10e6/uL    Hemoglobin 11.4 (L) 11.7 - 15.7 g/dL    Hematocrit 35.6 35.0 - 47.0 %    MCV 88 78 - 100 fL    MCH 28.2 26.5 - 33.0 pg    MCHC 32.0 31.5 - 36.5 g/dL    RDW 14.6 10.0 - 15.0 %    Platelet Count 226 150 - 450 10e3/uL    % Neutrophils 71 %    % Lymphocytes 18 %    % Monocytes 9 %    % Eosinophils 0 %    % Basophils 2 %    % Immature Granulocytes 0 %    NRBCs per 100 WBC 0 <1 /100    Absolute Neutrophils 3.3 1.6 - 8.3 10e3/uL    Absolute Lymphocytes 0.8 0.8 - 5.3 10e3/uL    Absolute Monocytes 0.4 0.0 - 1.3 10e3/uL    Absolute Eosinophils 0.0 0.0 - 0.7 10e3/uL    Absolute Basophils 0.1 0.0 - 0.2 10e3/uL    Absolute Immature Granulocytes 0.0 <=0.4 10e3/uL    Absolute NRBCs 0.0 10e3/uL         Assessment and Plan:    left breast cancer  Follows with Dr. Roland  ER/[place] positive HER2 negative  Status post bilateral mastectomies  With high risk for recurrence  Currently undergoing adjuvant chemotherapy Taxotere Cytoxan  Labs reviewed okay to proceed with treatment  I was told by lab CMP machine is broken will take hours to get results  Patient scheduled with Dr. Roland with cycle 4  Continue per Dr. Roland recommendations    Skin rash  Chest and sternum area  Significant improvement with Kenalog  Continue with Kenalog as needed  Call clinic with any changes or worsening symptoms    Anemia  Hemoglobin is stable               DEISI Barron [facility]     Chart documentation with Dragon Voice recognition Software. Although reviewed after completion, some words and grammatical errors may remain.         Consult - Cardiology

## 2024-05-06 NOTE — PROGRESS NOTES
Infusion Nursing Note:  Angelica Gomez presents today for C3D1 Taxotere, Cytoxan and neulasta onpro.    Patient seen by provider today: Yes: Magdy Bragg NP   present during visit today: Not Applicable.    Note: N/A.      Intravenous Access:  Implanted Port.    Treatment Conditions:  Lab Results   Component Value Date    HGB 11.4 (L) 05/06/2024    WBC 4.6 05/06/2024    ANEU 5.3 06/07/2019    ANEUTAUTO 3.3 05/06/2024     05/06/2024        Lab Results   Component Value Date     05/06/2024    POTASSIUM 4.1 05/06/2024    CR 0.88 05/06/2024    CYRUS 9.6 05/06/2024    BILITOTAL 0.4 05/06/2024    ALBUMIN 4.3 05/06/2024    ALT 20 05/06/2024    AST 24 05/06/2024       Results reviewed, labs MET treatment parameters, ok to proceed with treatment.      Post Infusion Assessment:  Patient tolerated infusion without incident.  Blood return noted pre and post infusion.  Site patent and intact, free from redness, edema or discomfort.  No evidence of extravasations.  Access discontinued per protocol.     ONPRO  Was placed on patient's: back of left arm.    Was placed at 1:25 PM    Podpal used: Yes    ONPRO injector device Lot number: [id number]    Patient education included: what patient can expect after application, what colored lights mean on the device, when to remove device, when and where to call with questions or issues, all patients questions answered, and that Neulasta administration will occur at 4:25 on 5/7/24.    Patient tolerated administration well.     Discharge Plan:   Discharge instructions reviewed with: Patient.  Patient and/or family verbalized understanding of discharge instructions and all questions answered.  AVS to patient via [employer].  Patient will return 5/23 for next appointment.   Patient discharged in stable condition accompanied by: self and .  Departure Mode: Ambulatory.      Marlene Gardner RN

## 2024-05-06 NOTE — LETTER
"    5/6/2024         RE: Angelica Gomez  [address] [facility] [address]        Dear Colleague,    Thank you for referring your patient, Angelica Gomez, to the [facility]. Please see a copy of my visit note below.    Oncology Rooming Note    May 6, 2024 10:10 AM   Angelica Gomez is a 44 year old female who presents for:    Chief Complaint   Patient presents with     Oncology Clinic Visit     Initial Vitals: Resp 16   Ht 1.651 m (5' 5\")   Wt 84.3 kg (185 lb 12.8 oz)   BMI 30.92 kg/m   Estimated body mass index is 30.92 kg/m  as calculated from the following:    Height as of this encounter: 1.651 m (5' 5\").    Weight as of this encounter: 84.3 kg (185 lb 12.8 oz). Body surface area is 1.97 meters squared.  No Pain (0) Comment: Data Unavailable   No LMP recorded.  Allergies reviewed: Yes  Medications reviewed: Yes    Medications: Medication refills not needed today.  Pharmacy name entered into [employer]: [facility] [place] - [address]    Frailty Screening:   Is the patient here for a new oncology consult visit in cancer care? 2. No        Neyda Hayden MA              Oncology/Hematology Visit Note  May 6, 2024    Reason for Visit: follow up of left breast cancer    Follows with Dr. Roland  ER/[place] positive HER2 negative  Status post bilateral mastectomies  With high risk for recurrence  Currently undergoing adjuvant chemotherapy Taxotere Cytoxan      Interval History:  Patient reports she developed skin rash on the chest and sternum last week she was prescribed Kenalog cream patient reports has helped a lot she tried Benadryl which she feels is not helping much.  Patient reports skin rash is painless  Denies fever chills sweats cough  Nausea is tolerated with Compazine and Zofran    Review of Systems:  14 point ROS of systems including Constitutional, Eyes, Respiratory, Cardiovascular, Gastroenterology, Genitourinary, " "Integumentary, Muscularskeletal, Psychiatric were all negative except for pertinent positives noted in my HPI.      Physical Examination:  General: The patient is a pleasant female in no acute distress.  BP (!) 145/84   Pulse 69   Temp 97.7  F (36.5  C)   Resp 16   Ht 1.651 m (5' 5\")   Wt 84.3 kg (185 lb 12.8 oz)   SpO2 100%   BMI 30.92 kg/m    HEENT: EOMI, PERRL. Sclerae are anicteric. Oral mucosa is pink and moist with no lesions or thrush.   Lymph: Neck is supple with no lymphadenopathy in the cervical or supraclavicular areas.   Heart: Regular rate and rhythm.   Lungs: Clear to auscultation bilaterally.   GI: Bowel sounds present, soft, nontender with no palpable hepatosplenomegaly or masses.   Extremities: No lower extremity edema noted bilaterally.   Skin: No rashes, petechiae, or bruising noted on exposed skin.    Laboratory Data:  Results for orders placed or performed in visit on 05/06/24 (from the past 24 hour(s))   CBC with platelets and differential    Narrative    The following orders were created for panel order CBC with platelets and differential.  Procedure                               Abnormality         Status                     ---------                               -----------         ------                     CBC with platelets and d...[[id number]]  Abnormal            Final result                 Please view results for these tests on the individual orders.   CBC with platelets and differential   Result Value Ref Range    WBC Count 4.6 4.0 - 11.0 10e3/uL    RBC Count 4.04 3.80 - 5.20 10e6/uL    Hemoglobin 11.4 (L) 11.7 - 15.7 g/dL    Hematocrit 35.6 35.0 - 47.0 %    MCV 88 78 - 100 fL    MCH 28.2 26.5 - 33.0 pg    MCHC 32.0 31.5 - 36.5 g/dL    RDW 14.6 10.0 - 15.0 %    Platelet Count 226 150 - 450 10e3/uL    % Neutrophils 71 %    % Lymphocytes 18 %    % Monocytes 9 %    % Eosinophils 0 %    % Basophils 2 %    % Immature Granulocytes 0 %    NRBCs per 100 WBC 0 <1 /100    Absolute " Neutrophils 3.3 1.6 - 8.3 10e3/uL    Absolute Lymphocytes 0.8 0.8 - 5.3 10e3/uL    Absolute Monocytes 0.4 0.0 - 1.3 10e3/uL    Absolute Eosinophils 0.0 0.0 - 0.7 10e3/uL    Absolute Basophils 0.1 0.0 - 0.2 10e3/uL    Absolute Immature Granulocytes 0.0 <=0.4 10e3/uL    Absolute NRBCs 0.0 10e3/uL         Assessment and Plan:    left breast cancer  Follows with Dr. Roland  ER/[place] positive HER2 negative  Status post bilateral mastectomies  With high risk for recurrence  Currently undergoing adjuvant chemotherapy Taxotere Cytoxan  Labs reviewed okay to proceed with treatment  I was told by lab CMP machine is broken will take hours to get results  Patient scheduled with Dr. Roland with cycle 4  Continue per Dr. Roland recommendations    Skin rash  Chest and sternum area  Significant improvement with Kenalog  Continue with Kenalog as needed  Call clinic with any changes or worsening symptoms    Anemia  Hemoglobin is stable               DEISI Barron CNP  [facility]     Chart documentation with Dragon Voice recognition Software. Although reviewed after completion, some words and grammatical errors may remain.          Again, thank you for allowing me to participate in the care of your patient.        Sincerely,        DEISI Barron CNP

## 2024-05-06 NOTE — PROGRESS NOTES
Medical Assistant Note:  Angelica Gomez presents today for blood draw.    Patient seen by provider today: Yes: shannan.   present during visit today: Not Applicable.    Concerns: No Concerns.    Procedure:  Lab draw site: rac, Needle type: bf, Gauge: 23.    Post Assessment:  Labs drawn without difficulty: Yes.    Discharge Plan:  Departure Mode: Ambulatory.    Face to Face Time: 5 min.    Lulú Prabhakar, CMA

## 2024-05-07 ENCOUNTER — VIRTUAL VISIT (OUTPATIENT)
Dept: PSYCHOLOGY | Facility: CLINIC | Age: 44
End: 2024-05-07
Payer: COMMERCIAL

## 2024-05-07 DIAGNOSIS — F43.22 ADJUSTMENT DISORDER WITH ANXIETY: Primary | ICD-10-CM

## 2024-05-07 PROCEDURE — 90834 PSYTX W PT 45 MINUTES: CPT | Mod: 95

## 2024-05-07 NOTE — PROGRESS NOTES
[facility]                                     Progress Note    Patient Name: Angelica Gomez  Date: 24           Service Type: Individual      Session Start Time: 9:00am  Session End Time: 9:47am     Session Length: 47 minutes    Session #: 7    Attendees: Client attended alone    Service Modality:  Video Visit:      Provider verified identity through the following two step process.  Patient provided:  Patient  and Patient address    Telemedicine Visit: The patient's condition can be safely assessed and treated via synchronous audio and visual telemedicine encounter.      Reason for Telemedicine Visit: Patient has requested telehealth visit    Originating Site (Patient Location): Patient's home    Distant Site (Provider Location): Provider Remote Setting- Home Office    Consent:  The patient/guardian has verbally consented to: the potential risks and benefits of telemedicine (video visit) versus in person care; bill my insurance or make self-payment for services provided; and responsibility for payment of non-covered services.     Patient would like the video invitation sent by:  My Chart    Mode of Communication:  Video Conference via [employer]well    Distant Location (Provider):  Off-site    As the provider I attest to compliance with applicable laws and regulations related to telemedicine.    DATA  Interactive Complexity: No  Crisis: No        Progress Since Last Session (Related to Symptoms / Goals / Homework):   Symptoms: No change some fatigue, mild anxiety    Homework: Achieved / completed to satisfaction      Episode of Care Goals: Satisfactory progress - PREPARATION (Decided to change - considering how); Intervened by negotiating a change plan and determining options / strategies for behavior change, identifying triggers, exploring social supports, and working towards setting a date to begin behavior change     Current / Ongoing Stressors and Concerns:   Pt shared that she was  struggling a lot with insomnia but got on a medication that has been helping. This has allowed her to increase her activity level and spend more time socializing which has been helpful for her mood. She shared that she has been reflecting on what she wants to do when she goes back to work and she noted that she is unsure if she wants to keep the same job she was working before chemo. 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       Treatment Objective(s) Addressed in This Session:   use cognitive strategies identified in therapy to challenge anxious thoughts  Increase interest, engagement, and pleasure in doing things  Process grief related to diagnosis and loss of some abilities and hair        Intervention:   CBT: check the facts  Emotion Focused Therapy: identify and process emotinos  Interpersonal Therapy: practice vulnerability  Solution Focused: brainstorming, pros and cons    Assessments completed prior to visit:  The following assessments were completed by patient for this visit:  PHQ9:       2/12/2024     7:40 AM   PHQ-9 SCORE   PHQ-9 Total Score MyChart 3 (Minimal depression)   PHQ-9 Total Score 3     GAD7:        No data to display              CAGE-AID:       2/12/2024     8:09 AM   CAGE-AID Total Score   Total Score 0   Total Score MyChart 0 (A total score of 2 or greater is considered clinically significant)     PROMIS 10-Global Health (only subscores and total score):       2/12/2024     8:09 AM   PROMIS-10 Scores Only   Global Mental Health Score 15   Global Physical Health Score 16   PROMIS TOTAL - SUBSCORES 31     [place] Suicide Severity Rating Scale (Lifetime/Recent)      2/12/2024     9:59 AM 2/21/2024    11:37 AM   [place] Suicide Severity Rating (Lifetime/Recent)   Q1 Wished to be Dead (Past Month)   0-->no   Q2 Suicidal Thoughts (Past Month)  0-->no   Q6 Suicide Behavior (Lifetime)  0-->no   Level of Risk per Screen  no risks indicated   Q1 Wish to be Dead (Lifetime) N    Q2 Non-Specific Active Suicidal Thoughts (Lifetime) N    Actual Attempt (Lifetime) N    Has subject engaged in non-suicidal self-injurious behavior? (Lifetime) N    Interrupted Attempts (Lifetime) N    Aborted or Self-Interrupted Attempt (Lifetime) N    Preparatory Acts or Behavior (Lifetime) N    Calculated C-SSRS Risk Score (Lifetime/Recent) No Risk Indicated          ASSESSMENT: Current Emotional / Mental Status (status of significant symptoms):   Risk status (Self / Other harm or suicidal ideation)   Patient denies current fears or concerns for personal safety.   Patient denies current or recent suicidal ideation or behaviors.   Patient denies current or recent homicidal ideation or behaviors.   Patient denies current or recent self injurious behavior or ideation.   Patient denies other safety concerns.   Patient reports there has been no change in risk factors since their last session.     Patient reports there has been no change in protective factors since their last session.     Recommended that patient call 911 or go to the local ED should there be a change in any of these risk factors.     Appearance:   Appropriate    Eye Contact:   Good    Psychomotor Behavior: Restless    Attitude:   Cooperative    Orientation:   All   Speech    Rate / Production: Normal/ Responsive    Volume:  Normal    Mood:    Normal fatigue   Affect:    Appropriate    Thought Content:  Clear    Thought Form:  Coherent  Logical    Insight:    Good      Medication Review:   No current psychiatric medications prescribed     Medication Compliance:   NA     Changes in Health Issues:   None reported     Chemical Use Review:   Substance Use: Chemical use reviewed, no active concerns identified      Tobacco Use: No current tobacco use.      Diagnosis:  Adjustment  Disorder with Anxiety and Depression    Collateral Reports Completed:   Not Applicable    PLAN: (Patient Tasks / Therapist Tasks / Other)  Pt and writer will continue to meet on a bi-weekly basis. Next appointment 5/21  Pt will continue brainstorming what is the best fit for her career moving forward.   Pt will continue to set limits on socializing based on her energy level.        Joyce Cristobal MATTHEW    This note has been reviewed and I agree with the plan of care. This note is co-signed by JESÚS Meyers, [facility], Supervisor, on: May 7, 2024  ______________________________________________________________________    Individual Treatment Plan    Patient's Name: Angelica Gomez  YOB: 1980    Date of Creation: 3/26/24  Date Treatment Plan Last Reviewed/Revised: 3/26/24    DSM5 Diagnoses: Adjustment Disorders  309.28 (F43.23) With mixed anxiety and depressed mood  Psychosocial / Contextual Factors: going through breast cancer treatment   PROMIS (reviewed every 90 days):   Global Mental Health Score (P) 15   Global Physical Health Score (P) 16   PROMIS TOTAL - SUBSCORES (P) 31     Referral / Collaboration:  Referral to another professional/service is not indicated at this time..    Anticipated number of session for this episode of care: 6-9 sessions  Anticipation frequency of session: Biweekly  Anticipated Duration of each session: 38-52 minutes  Treatment plan will be reviewed in 90 days or when goals have been changed.       MeasurableTreatment Goal(s) related to diagnosis / functional impairment(s)  Goal 1: Patient will experience a reduction in anxiety as evidenced by reduction in ENEDINA 2 score from 1 to 0    I will know I've met my goal when I am able to process anxiety related to diagnosis.      Objective #A (Patient Action)    Patient will use at least 3 coping skills for anxiety management in the next 12 weeks.  Status: New - Date: 3/26/24      Intervention(s)  Therapist will teach   grounding, mindfulness, emotion regulation .    Objective #B  Patient will use cognitive strategies identified in therapy to challenge anxious thoughts.  Status: New - Date: 3/26/24      Intervention(s)  Therapist will teach staying in the present moment, challenging catastrophic thinking .    Objective #C  Patient will process fears related to cancer diagnosis   Status: New - Date: 3/26/24      Intervention(s)  Therapist will provide supportive and nonjudgemental space for processing      Goal 2: Patient will experience reduction in depressive symptoms as evidenced by stability in or reduction of PhQ9 score from 3 to less than 3 and self report of improved mood.    I will know I've met my goal when I am able to process sadness related to the diagnosis.      Objective #A (Patient Action)    Status: New - Date: 3/26/24      Patient will Increase interest, engagement, and pleasure in doing things.    Intervention(s)  Therapist will  discuss behavioral activation, ways patient can remain active and social .    Objective #B  Patient will  process grief related to diagnosis  .    Status: New - Date: 3/26/24      Intervention(s)  Therapist will  teach stages of grief, loss/changes of identity .        Patient has reviewed and agreed to the above plan.      GT Palencia  March 26, 2024    This note has been reviewed and I agree with the plan of care. This note is co-signed by JESÚS Meyers, [facility], Supervisor, on: March 26, 2024

## 2024-05-13 ENCOUNTER — TELEPHONE (OUTPATIENT)
Dept: ONCOLOGY | Facility: CLINIC | Age: 44
End: 2024-05-13
Payer: COMMERCIAL

## 2024-05-13 NOTE — TELEPHONE ENCOUNTER
Patient responded to MyChart Care Companion questionnaire reporting nausea, pain, diarrhea and SOB. Called patient for more information, left message to call back.     Ellie Vega, RN, BSN, PHN, OCN  [facility]

## 2024-05-14 NOTE — TELEPHONE ENCOUNTER
Writer called patient, left a message to call back.    Ellie Vega RN, BSN, PHN  [facility]

## 2024-05-15 ENCOUNTER — LAB (OUTPATIENT)
Dept: LAB | Facility: CLINIC | Age: 44
End: 2024-05-15
Payer: COMMERCIAL

## 2024-05-15 ENCOUNTER — THERAPY VISIT (OUTPATIENT)
Dept: OCCUPATIONAL THERAPY | Facility: CLINIC | Age: 44
End: 2024-05-15
Attending: SURGERY
Payer: COMMERCIAL

## 2024-05-15 ENCOUNTER — TELEPHONE (OUTPATIENT)
Dept: ONCOLOGY | Facility: CLINIC | Age: 44
End: 2024-05-15

## 2024-05-15 DIAGNOSIS — C50.912 INVASIVE DUCTAL CARCINOMA OF BREAST, FEMALE, LEFT (H): Primary | ICD-10-CM

## 2024-05-15 DIAGNOSIS — R30.0 DYSURIA: ICD-10-CM

## 2024-05-15 DIAGNOSIS — Z90.12 STATUS POST LEFT MASTECTOMY: ICD-10-CM

## 2024-05-15 DIAGNOSIS — C50.412 MALIGNANT NEOPLASM OF UPPER-OUTER QUADRANT OF LEFT BREAST IN FEMALE, ESTROGEN RECEPTOR POSITIVE (H): ICD-10-CM

## 2024-05-15 DIAGNOSIS — C50.412 MALIGNANT NEOPLASM OF UPPER-OUTER QUADRANT OF LEFT BREAST IN FEMALE, ESTROGEN RECEPTOR POSITIVE (H): Primary | ICD-10-CM

## 2024-05-15 DIAGNOSIS — Z17.0 MALIGNANT NEOPLASM OF UPPER-OUTER QUADRANT OF LEFT BREAST IN FEMALE, ESTROGEN RECEPTOR POSITIVE (H): Primary | ICD-10-CM

## 2024-05-15 DIAGNOSIS — Z17.0 MALIGNANT NEOPLASM OF UPPER-OUTER QUADRANT OF LEFT BREAST IN FEMALE, ESTROGEN RECEPTOR POSITIVE (H): ICD-10-CM

## 2024-05-15 LAB
ALBUMIN SERPL BCG-MCNC: 4.1 G/DL (ref 3.5–5.2)
ALBUMIN UR-MCNC: NEGATIVE MG/DL
ALP SERPL-CCNC: 138 U/L (ref 40–150)
ALT SERPL W P-5'-P-CCNC: 25 U/L (ref 0–50)
ANION GAP SERPL CALCULATED.3IONS-SCNC: 9 MMOL/L (ref 7–15)
APPEARANCE UR: CLEAR
AST SERPL W P-5'-P-CCNC: 29 U/L (ref 0–45)
BASOPHILS # BLD AUTO: ABNORMAL 10*3/UL
BASOPHILS # BLD MANUAL: 0 10E3/UL (ref 0–0.2)
BASOPHILS NFR BLD AUTO: ABNORMAL %
BASOPHILS NFR BLD MANUAL: 0 %
BILIRUB SERPL-MCNC: 0.2 MG/DL
BILIRUB UR QL STRIP: NEGATIVE
BUN SERPL-MCNC: 10.4 MG/DL (ref 6–20)
CALCIUM SERPL-MCNC: 8.7 MG/DL (ref 8.6–10)
CHLORIDE SERPL-SCNC: 108 MMOL/L (ref 98–107)
COLOR UR AUTO: ABNORMAL
CREAT SERPL-MCNC: 1.08 MG/DL (ref 0.51–0.95)
DEPRECATED HCO3 PLAS-SCNC: 27 MMOL/L (ref 22–29)
EGFRCR SERPLBLD CKD-EPI 2021: 65 ML/MIN/1.73M2
EOSINOPHIL # BLD AUTO: ABNORMAL 10*3/UL
EOSINOPHIL # BLD MANUAL: 0.4 10E3/UL (ref 0–0.7)
EOSINOPHIL NFR BLD AUTO: ABNORMAL %
EOSINOPHIL NFR BLD MANUAL: 1 %
ERYTHROCYTE [DISTWIDTH] IN BLOOD BY AUTOMATED COUNT: 15.8 % (ref 10–15)
GLUCOSE SERPL-MCNC: 94 MG/DL (ref 70–99)
GLUCOSE UR STRIP-MCNC: NEGATIVE MG/DL
HCT VFR BLD AUTO: 31.5 % (ref 35–47)
HGB BLD-MCNC: 10.3 G/DL (ref 11.7–15.7)
HGB UR QL STRIP: NEGATIVE
IMM GRANULOCYTES # BLD: ABNORMAL 10*3/UL
IMM GRANULOCYTES NFR BLD: ABNORMAL %
KETONES UR STRIP-MCNC: NEGATIVE MG/DL
LEUKOCYTE ESTERASE UR QL STRIP: NEGATIVE
LYMPHOCYTES # BLD AUTO: ABNORMAL 10*3/UL
LYMPHOCYTES # BLD MANUAL: 2.1 10E3/UL (ref 0.8–5.3)
LYMPHOCYTES NFR BLD AUTO: ABNORMAL %
LYMPHOCYTES NFR BLD MANUAL: 5 %
MCH RBC QN AUTO: 28.9 PG (ref 26.5–33)
MCHC RBC AUTO-ENTMCNC: 32.7 G/DL (ref 31.5–36.5)
MCV RBC AUTO: 89 FL (ref 78–100)
METAMYELOCYTES # BLD MANUAL: 1.6 10E3/UL
METAMYELOCYTES NFR BLD MANUAL: 4 %
MONOCYTES # BLD AUTO: ABNORMAL 10*3/UL
MONOCYTES # BLD MANUAL: 0.8 10E3/UL (ref 0–1.3)
MONOCYTES NFR BLD AUTO: ABNORMAL %
MONOCYTES NFR BLD MANUAL: 2 %
MUCOUS THREADS #/AREA URNS LPF: PRESENT /LPF
MYELOCYTES # BLD MANUAL: 2.5 10E3/UL
MYELOCYTES NFR BLD MANUAL: 6 %
NEUTROPHILS # BLD AUTO: ABNORMAL 10*3/UL
NEUTROPHILS # BLD MANUAL: 33.7 10E3/UL (ref 1.6–8.3)
NEUTROPHILS NFR BLD AUTO: ABNORMAL %
NEUTROPHILS NFR BLD MANUAL: 82 %
NITRATE UR QL: NEGATIVE
NRBC # BLD AUTO: 0.1 10E3/UL
NRBC # BLD AUTO: 0.4 10E3/UL
NRBC BLD AUTO-RTO: 0 /100
NRBC BLD MANUAL-RTO: 1 %
PH UR STRIP: 6.5 [PH] (ref 5–7)
PLAT MORPH BLD: ABNORMAL
PLATELET # BLD AUTO: 199 10E3/UL (ref 150–450)
POTASSIUM SERPL-SCNC: 3.6 MMOL/L (ref 3.4–5.3)
PROT SERPL-MCNC: 5.9 G/DL (ref 6.4–8.3)
RBC # BLD AUTO: 3.56 10E6/UL (ref 3.8–5.2)
RBC MORPH BLD: ABNORMAL
RBC URINE: <1 /HPF
SODIUM SERPL-SCNC: 144 MMOL/L (ref 135–145)
SP GR UR STRIP: 1.01 (ref 1–1.03)
SQUAMOUS EPITHELIAL: <1 /HPF
UROBILINOGEN UR STRIP-MCNC: NORMAL MG/DL
WBC # BLD AUTO: 41.1 10E3/UL (ref 4–11)
WBC URINE: 1 /HPF

## 2024-05-15 PROCEDURE — 36415 COLL VENOUS BLD VENIPUNCTURE: CPT

## 2024-05-15 PROCEDURE — 97140 MANUAL THERAPY 1/> REGIONS: CPT | Mod: GO

## 2024-05-15 PROCEDURE — 81001 URINALYSIS AUTO W/SCOPE: CPT

## 2024-05-15 PROCEDURE — 85007 BL SMEAR W/DIFF WBC COUNT: CPT

## 2024-05-15 PROCEDURE — 80053 COMPREHEN METABOLIC PANEL: CPT

## 2024-05-15 PROCEDURE — 85027 COMPLETE CBC AUTOMATED: CPT

## 2024-05-15 NOTE — TELEPHONE ENCOUNTER
Pt was notified with recommendations per Magdy Bragg NP.  She will continue to monitor her symptoms.  Writer also reviewed emergent symptoms that would warrant immediate evaluation in ER.    Patient verbalized understanding and agreement with plan.  Patient was instructed to call the clinic with any questions, concerns, or worsening symptoms.    Coco Hui RN on 5/15/2024 at 4:07 PM

## 2024-05-15 NOTE — TELEPHONE ENCOUNTER
Magdy Bragg, DEISI CNP  You; Sophie Neal, MICK7 minutes ago (1:17 PM)     GK  She should check her CBC CMP and urinalysis today     Pt was notified with recommendations per Magdy Bragg NP.  Pt states that she will either go to PCP clinic in [facility] to have labs done today.  Writer again reviewed emergent symptoms with pt that would warrant immediate evaluation in ER.  Will watch for lab results.  Pt denies any new symptoms since previous phone conversation today.     Patient verbalized understanding and agreement with plan.  Patient was instructed to call the clinic with any questions, concerns, or worsening symptoms.    Coco Hui RN on 5/15/2024 at 1:33 PM

## 2024-05-15 NOTE — TELEPHONE ENCOUNTER
Oncology Nurse Triage    Situation:   Angelica reporting the following symptoms: extreme thirst, dysuria, palpitations, and shortness of breath     Background:   Treating Provider:   Dr Roland     Date of last office visit: 5/6/2024 with Magdy Bragg NP     Recent Treatments:5/6/2024: C3D1 Taxotere, Cytoxan and neulasta onpro     Assessment:     Pt is calling. States that her main concern today is extreme thirst. States that she has felt thirsty all of the time for the past 2 days. She has been drinking  ounces of water each day. States that her urine is very dilute. She has been urinating frequently, but also states that this is likely due to the amount of water she has been drinking. When she urinates, she states that there is a large amount of urine output. Also states that she has developed some mild dysuria today. States that she feels like she doesn't completely empty her bladder. Denies urgency, fever/chills, low back pain, flank pain, or vaginal symptoms. Pt also states that her oral mucosa feels very dry and sticky all of the time. Her lips also feel dry and gritty.     Pt states that she did have some mild dizziness when getting out of bed this morning, but she allowed time for her body to adjust to the position change, and this has resolved. Denies dizziness at rest. Also states that she had a mild headache today when she woke up, but this has resolved.     Upon further questioning, pt also states that she had a short episode of palpitations and shortness of breath this morning at rest. Lasted for about 10-15 seconds  and then resolved. A short time later she noticed palpitations that quickly came and went, but no shortness of breath at that time. These symptoms are not present now. Denies chest pain.     Recommendations:     Advised pt to continue with fluids.  Will route to provider for review and recommendations.    Patient verbalized understanding and agreement with plan.  Patient was instructed  to call the clinic with any questions, concerns, or worsening symptoms.    Ok to leave message if unable to reach pt.    Coco Hui RN on 5/15/2024 at 12:02 PM

## 2024-05-15 NOTE — TELEPHONE ENCOUNTER
Can you please call patient let her know that the labs are stable her white blood cell count is 40 but this is most likely due to the Neulasta patch that she got last week     With any changes or worsening of symptoms go to ER     Thank you     Magdy

## 2024-05-17 NOTE — PROGRESS NOTES
[facility]    Hematology/Oncology Established Patient Note      Today's Date: 5/23/2024    Reason for visit: Left breast cancer.    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  12/7/2023: Due to worsening left upper outer breast pain, bilateral diagnostic mammogram performed. No mammographic abnormality in left upper outer breast site of symptoms but at 1:00, there is oval asymmetry measuring 0.7 cm. Remaining left breast and left axillary U/S showed morphologically normal lymph node and breast tissue.  At 1:30, 5 cm from nipple is irregular hypoechoic mass measuring 0.5 x 0.5 cm.  2. 12/15/2023: U/S-guided biopsy of left breast at 1:30 showed grade 2 invasive ductal carcinoma (3 mm) with associated grade 3 DCIS, no LVI; ER positive at 20%, NY positive at 20%, HER-2 negative with IHC = 1+.  3. 2/21/2024: Bilateral mastectomies with left axillary sentinel lymph node excision with Dr. Bean Phillips; bilateral implant removal. Pathology showed 1.2 cm grade 2 invasive ductal carcinoma with 1.4 cm grade 3 DCIS; margins negative; one left axillary SLN negative. Right breast benign. Repeat ER weakly positive at 61-70%, NY moderate positive at 71-80%. MammaPrint + BluePrint showed high  risk, Luminal B.  4. 3/25/2024: Started adjuvant chemotherapy with Taxotere and Cytoxan.    INTERVAL HISTORY:  Lisandra reports episode of extreme thirst, dysuria, palpitations, and shortness of breath on 5/15/2024. Her urinalysis was negative for infection or ketones. She was evaluated at [facility] with chest CT negative for PE, U/S negative for DVT, and head CT negative.  She reports dyspnea has resolved. Headache mild this AM for which she took hydroxyzine.    REVIEW OF SYSTEMS:   14 point ROS was reviewed and is negative other than as noted above in HPI.       HOME MEDICATIONS:  Current Outpatient Medications    Medication Sig Dispense Refill    Calcium Carbonate-Vitamin D (CALCIUM 500 + D PO)       Cetirizine HCl (ZYRTEC ALLERGY PO) Take 10 mg by mouth daily      COMPRESSION STOCKINGS Please measure and distribute 2 pair of 20mmHg - 30mmHg thigh high open or closed toe compression stockings with extra refills as indicated. 2 each 4    Fluticasone Propionate (FLONASE NA) Spray 1 spray in nostril daily      hydrOXYzine HCl (ATARAX) 25 MG tablet Take 1-2 tablets (25-50 mg) by mouth 3 times daily as needed for anxiety or other (sleep) 90 tablet 1    ketoconazole (NIZORAL) 2 % external cream Apply topically daily 30 g 1    lidocaine-prilocaine (EMLA) 2.5-2.5 % external cream Apply to port 30-60 minutes prior to accessing it for treatment/labs. 30 g 1    LORazepam (ATIVAN) 0.5 MG tablet Take 1 tablet (0.5 mg) by mouth every 6 hours as needed for anxiety 30 tablet 0    methocarbamol (ROBAXIN) 750 MG tablet Take 1 tablet (750 mg) by mouth 3 times daily 30 tablet 0    Multiple Vitamins-Minerals (MULTIVITAMIN & MINERAL PO) Take 1 each by mouth daily. [facility] women's active supplement      ondansetron (ZOFRAN) 8 MG tablet Take 1 tablet (8 mg) by mouth every 8 hours as needed for nausea (vomiting) 30 tablet 2    oxyCODONE (ROXICODONE) 5 MG tablet Take 1 tablet (5 mg) by mouth every 4 hours as needed for pain 20 tablet 0    Probiotic Product (PROBIOTIC PO) Reported on 3/1/2017      prochlorperazine (COMPAZINE) 10 MG tablet Take 1 tablet (10 mg) by mouth every 6 hours as needed for nausea or vomiting 30 tablet 2    senna-docusate (SENOKOT-S/PERICOLACE) 8.6-50 MG tablet Take 1 tablet by mouth 2 times daily 30 tablet 0    SUMAtriptan (IMITREX) 25 MG tablet Take 1 tablet (25 mg) by mouth at onset of headache for migraine May repeat in 2 hours. Max 8 tablets/24 hours. 18 tablet 4    tranexamic acid (LYSTEDA) 650 MG tablet Take 2 tablets (1,300 mg) by mouth 3 times daily 30 tablet 3    triamcinolone (KENALOG) 0.1 % external cream Apply  topically 2 times daily 45 g 1    dexAMETHasone (DECADRON) 4 MG tablet Take 2 tablets (8 mg) by mouth 2 times daily (with meals) for 3 doses Start evening of Docetaxel infusion and continue for a total of 3 doses. 6 tablet 3         ALLERGIES:  No Known Allergies      PAST MEDICAL HISTORY:  Past Medical History:   Diagnosis Date    Malignant neoplasm of upper-outer quadrant of left breast in female, estrogen receptor positive (H) 2024    NO ACTIVE PROBLEMS    Menometrorrhagia.    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 Used IUD for 20 years.      PAST SURGICAL HISTORY:  Past Surgical History:   Procedure Laterality Date    COLONOSCOPY WITH CO2 INSUFFLATION N/A 2024    Procedure: Colonoscopy with CO2 insufflation;  Surgeon: Cherise Lima DO;  Location: [facility] OR    INSERT TISSUE EXPANDER BREAST BILATERAL Bilateral 2024    Procedure: Insertion tissue expander, breasts, bilateral;  Surgeon: Elisabet Venegas MD;  Location: [facility] OR    IR CHEST PORT PLACEMENT > 5 YRS OF AGE  2024    MASTECTOMY, NIPPLE SPARING Bilateral 2024    Procedure: Bilateral nipple sparing mastectomy, bilateral implant removal, left sentinel lymph node biopsy;  Surgeon: Aba Phillips MD;  Location: [facility] OR    TUBAL LIGATION      [facility]  DELIVERY ONLY      superficial wound dehiscence   Bilateral breast augmentation in .      SOCIAL HISTORY:  Social History     Socioeconomic History    Marital status:      Spouse name: Not on file    Number of children: Not on file    Years of education: Not on file    Highest education level: Not on file   Occupational History    Not on file   Tobacco Use    Smoking status: Former     Current packs/day: 0.25     Average packs/day: 0.3 packs/day for 1 year (0.3 ttl pk-yrs)     Types: Cigarettes    Smokeless tobacco: Never   Vaping Use    Vaping status: Never Used   Substance and Sexual  Activity    Alcohol use: Yes     Comment: 0-1    Drug use: No    Sexual activity: Yes     Partners: Male     Birth control/protection: Surgical, I.U.D., Female Surgical     Comment: Tubal ligation, 12/13/2007   Other Topics Concern    Parent/sibling w/ CABG, MI or angioplasty before 65F 55M? Yes     Comment: father 2 MIs   Social History Narrative    Caffeine intake/servings daily - 2-3 pop    Calcium intake/servings daily - 2 yogurts and 1 glass of milk    Exercise 6 times weekly - describe strength training and cardio    Sunscreen used - Yes    Seatbelts used - Yes    Guns stored in the home - No    Self Breast Exam - Yes    Pap test up to date -  Yes, as of today    Eye exam up to date -  Yes    Dental exam up to date -  Yes    DEXA scan up to date -  Not Applicable    Flex Sig/Colonoscopy up to date -  Not Applicable    Mammography up to date -  Not Applicable    Immunizations reviewed and up to date - Yes, 03/2008    Abuse: Current or Past (Physical, Sexual or Emotional) - No    Do you feel safe in your environment - Yes    Do you cope well with stress - Yes    Do you suffer from insomnia - No    Last updated by: Lit Licona  10/27/2009                 Social Determinants of Health     Financial Resource Strain: Low Risk  (2/1/2024)    Financial Resource Strain     Within the past 12 months, have you or your family members you live with been unable to get utilities (heat, electricity) when it was really needed?: No   Food Insecurity: Low Risk  (2/1/2024)    Food Insecurity     Within the past 12 months, did you worry that your food would run out before you got money to buy more?: No     Within the past 12 months, did the food you bought just not last and you didn t have money to get more?: No   Transportation Needs: Low Risk  (2/1/2024)    Transportation Needs     Within the past 12 months, has lack of transportation kept you from medical appointments, getting your medicines, non-medical meetings or appointments,  work, or from getting things that you need?: No   Physical Activity: Not on file   Stress: Not on file   Social Connections: Not on file   Interpersonal Safety: Low Risk  (2/5/2024)    Interpersonal Safety     Do you feel physically and emotionally safe where you currently live?: Yes     Within the past 12 months, have you been hit, slapped, kicked or otherwise physically hurt by someone?: No     Within the past 12 months, have you been humiliated or emotionally abused in other ways by your partner or ex-partner?: No   Housing Stability: Low Risk  (2/1/2024)    Housing Stability     Do you have housing? : Yes     Are you worried about losing your housing?: No   Has 2 grown children.  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      FAMILY HISTORY:  Family History   Problem Relation Age of Onset    Thyroid Disease Mother     Breast Cancer Mother 56        breast, 3 years later    Heart Disease Father         2x heart attacks    Circulatory Father         blood clots    Cardiovascular Father         patent foramen ovale, repaired x 2, afib    Cerebrovascular Disease Father         x2    C.A.D. Father         x2    Genitourinary Problems Father         kidney disease    Asthma Brother     Food Allergy Brother     Eczema Brother     No Known Problems Maternal Grandmother     Myocardial Infarction Maternal Grandfather     Hypertension Paternal Grandmother     Alcohol/Drug Paternal Grandfather     No Known Problems Daughter     No Known Problems Son     Cancer Paternal Aunt         cervical cancer(another sisiter)    Breast Cancer Paternal Aunt         breast cancer at 70's    Breast Cancer Paternal Aunt     Ovarian Cancer Paternal Aunt     Cancer - colorectal No family hx of     Diabetes No family hx of          PHYSICAL EXAM:  Vital signs:  /83   Pulse 76   Temp 98.2  F (36.8  C) (Oral)   Resp 16   Wt 84.3 kg (185 lb 12.8 oz)   SpO2  100%   BMI 30.92 kg/m     ECO  GENERAL/CONSTITUTIONAL: No acute distress.  EYES: No erythema or scleral icterus.  ENT/MOUTH: Neck supple.  LYMPH: No cervical, supraclavicular, axillary adenopathy.   RESPIRATORY: Clear to auscultation bilaterally. No crackles or wheezing.   CARDIOVASCULAR: Regular rate and rhythm without murmurs.  GASTROINTESTINAL: No hepatosplenomegaly, masses, or tenderness. No guarding.  No distention.  MUSCULOSKELETAL: Warm and well-perfused, no cyanosis, clubbing, or edema.  NEUROLOGIC: No focal motor deficits. Alert, oriented, answers questions appropriately.  INTEGUMENTARY: No rashes or jaundice.  GAIT: Steady, does not use assistive device        LABS:  CBC RESULTS:   Recent Labs   Lab Test 24  1427   WBC 7.6   RBC 3.79*   HGB 10.8*   HCT 33.6*   MCV 89   MCH 28.5   MCHC 32.1   RDW 16.2*         Last Comprehensive Metabolic Panel:  Sodium   Date Value Ref Range Status   2024 140 135 - 145 mmol/L Final     Comment:     Reference intervals for this test were updated on 2023 to more accurately reflect our healthy population. There may be differences in the flagging of prior results with similar values performed with this method. Interpretation of those prior results can be made in the context of the updated reference intervals.    09/10/2012 140 133 - 144 mmol/L Final     Potassium   Date Value Ref Range Status   2024 4.2 3.4 - 5.3 mmol/L Final   2021 3.9 3.4 - 5.3 mmol/L Final   09/10/2012 4.2 3.4 - 5.3 mmol/L Final     Chloride   Date Value Ref Range Status   2024 108 (H) 98 - 107 mmol/L Final   2021 110 (H) 94 - 109 mmol/L Final   09/10/2012 104 94 - 109 mmol/L Final     Carbon Dioxide   Date Value Ref Range Status   09/10/2012 25 20 - 32 mmol/L Final     Carbon Dioxide (CO2)   Date Value Ref Range Status   2024 23 22 - 29 mmol/L Final   2021 27 20 - 32 mmol/L Final     Anion Gap   Date Value Ref Range Status   2024 9 7 -  15 mmol/L Final   12/30/2021 5 3 - 14 mmol/L Final   09/10/2012 11 6 - 17 mmol/L Final     Glucose   Date Value Ref Range Status   05/21/2024 105 (H) 70 - 99 mg/dL Final   12/30/2021 73 70 - 99 mg/dL Final   02/21/2017 94 70 - 99 mg/dL Final     Comment:     Fasting specimen     GLUCOSE BY METER POCT   Date Value Ref Range Status   02/22/2024 106 (H) 70 - 99 mg/dL Final     Urea Nitrogen   Date Value Ref Range Status   05/21/2024 18.5 6.0 - 20.0 mg/dL Final   12/30/2021 16 7 - 30 mg/dL Final   09/10/2012 12 5 - 24 mg/dL Final     Creatinine   Date Value Ref Range Status   05/21/2024 0.83 0.51 - 0.95 mg/dL Final   09/10/2012 0.81 0.52 - 1.04 mg/dL Final     GFR Estimate   Date Value Ref Range Status   05/21/2024 89 >60 mL/min/1.73m2 Final   09/10/2012 82 >60 mL/min/1.7m2 Final     Calcium   Date Value Ref Range Status   05/21/2024 9.0 8.6 - 10.0 mg/dL Final   09/10/2012 9.3 8.5 - 10.4 mg/dL Final     Bilirubin Total   Date Value Ref Range Status   05/21/2024 0.2 <=1.2 mg/dL Final   09/10/2012 0.6 0.2 - 1.3 mg/dL Final     Alkaline Phosphatase   Date Value Ref Range Status   05/21/2024 94 40 - 150 U/L Final   09/10/2012 53 40 - 150 U/L Final     ALT   Date Value Ref Range Status   05/21/2024 28 0 - 50 U/L Final     Comment:     Reference intervals for this test were updated on 6/12/2023 to more accurately reflect our healthy population. There may be differences in the flagging of prior results with similar values performed with this method. Interpretation of those prior results can be made in the context of the updated reference intervals.     09/10/2012 27 0 - 50 U/L Final     AST   Date Value Ref Range Status   05/21/2024 35 0 - 45 U/L Final     Comment:     Reference intervals for this test were updated on 6/12/2023 to more accurately reflect our healthy population. There may be differences in the flagging of prior results with similar values performed with this method. Interpretation of those prior results can be  made in the context of the updated reference intervals.   09/10/2012 81 (H) 0 - 45 U/L Final       IMAGING:  Reviewed as per HPI.    ASSESSMENT/PLAN:  Angelica Gomez is a 44 year old female with the following issues:  1. Stage IA, [id number], grade 2 invasive ductal carcinoma of the left upper outer breast, ER positive 20%, PA positive 20%, HER-2 negative, MammaPrint high risk, Luminal B  2. CHEK2 mutation  3. Menometrorrhagia  --Lisandra is s/p bilateral mastectomies with final pathology showed a left breast 1.2 cm tumor with repeat ER positive at 61-70%, PA positive at 71-80%, HER-2 negative (IHC = 1+)  --MammaPrint + BluePrint showed high risk, Luminal B subtype.  --She proceeded with adjuvant chemotherapy with Taxotere and Cytoxan.    --Reviewed her labs from today which showed Hgb 10.8, WBC 7.6, platelets 172,000.  --She may proceed with her last 4th cycle of TC on 5/28/2024.  --Following adjuvant chemo, I would also strongly recommend either tamoxifen or ovarian suppression therapy with Zoladex and aromatase inhibitor such as anastrozole to further reduce her risk of breast cancer recurrence for at least 5 years.  However, she has had menometrorrhagia and might favor Zoladex + AI in order to induce cessation of menstruation if she is able to tolerate possible menopause effects.  Will discuss again after chemo.  --She will continue with colonoscopy screening on high-risk basis.  --Will obtain DEXA scan baseline.    3. Anemia of chemotherapy  --Hgb 10.8  --Repeat CBC with next visit.    4. Fertility   --She is s/p tubal ligation and does not wish to have any more children.    5. Fluid retention  --Related to Taxotere.  --Will be self-limited and resolved after completion of chemo.  --Continue to monitor.    Return in 1 month with lab draw.    Magali Roland MD  [facility]emat[facility]/Oncology     Total time spent today: 40 minutes in chart review, patient evaluation, counseling, documentation, test and/or  medication/prescription orders, and coordination of care.      The longitudinal plan of care for the diagnosis(es)/condition(s) as documented were addressed during this visit. Due to the added complexity in care, I will continue to support Lisandra in the subsequent management and with ongoing continuity of care.

## 2024-05-21 ENCOUNTER — HOSPITAL ENCOUNTER (EMERGENCY)
Facility: CLINIC | Age: 44
Discharge: HOME | End: 2024-05-21
Attending: EMERGENCY MEDICINE | Admitting: EMERGENCY MEDICINE
Payer: COMMERCIAL

## 2024-05-21 ENCOUNTER — VIRTUAL VISIT (OUTPATIENT)
Dept: PSYCHOLOGY | Facility: CLINIC | Age: 44
End: 2024-05-21
Payer: COMMERCIAL

## 2024-05-21 ENCOUNTER — APPOINTMENT (OUTPATIENT)
Dept: ULTRASOUND IMAGING | Facility: CLINIC | Age: 44
End: 2024-05-21
Attending: EMERGENCY MEDICINE
Payer: COMMERCIAL

## 2024-05-21 ENCOUNTER — APPOINTMENT (OUTPATIENT)
Dept: CT IMAGING | Facility: CLINIC | Age: 44
End: 2024-05-21
Attending: EMERGENCY MEDICINE
Payer: COMMERCIAL

## 2024-05-21 ENCOUNTER — APPOINTMENT (OUTPATIENT)
Dept: GENERAL RADIOLOGY | Facility: CLINIC | Age: 44
End: 2024-05-21
Attending: EMERGENCY MEDICINE
Payer: COMMERCIAL

## 2024-05-21 ENCOUNTER — TELEPHONE (OUTPATIENT)
Dept: ONCOLOGY | Facility: CLINIC | Age: 44
End: 2024-05-21
Payer: COMMERCIAL

## 2024-05-21 VITALS
HEART RATE: 70 BPM | TEMPERATURE: 98 F | OXYGEN SATURATION: 100 % | DIASTOLIC BLOOD PRESSURE: 77 MMHG | WEIGHT: 185 LBS | BODY MASS INDEX: 30.82 KG/M2 | SYSTOLIC BLOOD PRESSURE: 117 MMHG | HEIGHT: 65 IN | RESPIRATION RATE: 18 BRPM

## 2024-05-21 DIAGNOSIS — Z79.899 ON ANTINEOPLASTIC CHEMOTHERAPY: ICD-10-CM

## 2024-05-21 DIAGNOSIS — Z85.3 HISTORY OF BREAST CANCER: ICD-10-CM

## 2024-05-21 DIAGNOSIS — F43.23 ADJUSTMENT DISORDER WITH MIXED ANXIETY AND DEPRESSED MOOD: Primary | ICD-10-CM

## 2024-05-21 DIAGNOSIS — G43.909 MIGRAINE WITHOUT STATUS MIGRAINOSUS, NOT INTRACTABLE, UNSPECIFIED MIGRAINE TYPE: ICD-10-CM

## 2024-05-21 DIAGNOSIS — I51.7 CARDIAC ENLARGEMENT: ICD-10-CM

## 2024-05-21 DIAGNOSIS — M79.89 RIGHT LEG SWELLING: ICD-10-CM

## 2024-05-21 LAB
ALBUMIN SERPL BCG-MCNC: 4.1 G/DL (ref 3.5–5.2)
ALP SERPL-CCNC: 94 U/L (ref 40–150)
ALT SERPL W P-5'-P-CCNC: 28 U/L (ref 0–50)
ANION GAP SERPL CALCULATED.3IONS-SCNC: 9 MMOL/L (ref 7–15)
AST SERPL W P-5'-P-CCNC: 35 U/L (ref 0–45)
BASOPHILS # BLD AUTO: 0.1 10E3/UL (ref 0–0.2)
BASOPHILS NFR BLD AUTO: 1 %
BILIRUB SERPL-MCNC: 0.2 MG/DL
BUN SERPL-MCNC: 18.5 MG/DL (ref 6–20)
CALCIUM SERPL-MCNC: 9 MG/DL (ref 8.6–10)
CHLORIDE SERPL-SCNC: 108 MMOL/L (ref 98–107)
CREAT SERPL-MCNC: 0.83 MG/DL (ref 0.51–0.95)
D DIMER PPP FEU-MCNC: 0.73 UG/ML FEU (ref 0–0.5)
DEPRECATED HCO3 PLAS-SCNC: 23 MMOL/L (ref 22–29)
EGFRCR SERPLBLD CKD-EPI 2021: 89 ML/MIN/1.73M2
EOSINOPHIL # BLD AUTO: 0 10E3/UL (ref 0–0.7)
EOSINOPHIL NFR BLD AUTO: 0 %
ERYTHROCYTE [DISTWIDTH] IN BLOOD BY AUTOMATED COUNT: 16 % (ref 10–15)
GLUCOSE SERPL-MCNC: 105 MG/DL (ref 70–99)
HCT VFR BLD AUTO: 32.7 % (ref 35–47)
HGB BLD-MCNC: 10.8 G/DL (ref 11.7–15.7)
IMM GRANULOCYTES # BLD: 0.2 10E3/UL
IMM GRANULOCYTES NFR BLD: 2 %
LYMPHOCYTES # BLD AUTO: 0.9 10E3/UL (ref 0.8–5.3)
LYMPHOCYTES NFR BLD AUTO: 8 %
MCH RBC QN AUTO: 28.9 PG (ref 26.5–33)
MCHC RBC AUTO-ENTMCNC: 33 G/DL (ref 31.5–36.5)
MCV RBC AUTO: 87 FL (ref 78–100)
MONOCYTES # BLD AUTO: 0.5 10E3/UL (ref 0–1.3)
MONOCYTES NFR BLD AUTO: 5 %
NEUTROPHILS # BLD AUTO: 9.5 10E3/UL (ref 1.6–8.3)
NEUTROPHILS NFR BLD AUTO: 84 %
NRBC # BLD AUTO: 0.1 10E3/UL
NRBC BLD AUTO-RTO: 1 /100
PLATELET # BLD AUTO: 166 10E3/UL (ref 150–450)
POTASSIUM SERPL-SCNC: 4.2 MMOL/L (ref 3.4–5.3)
PROT SERPL-MCNC: 5.9 G/DL (ref 6.4–8.3)
RBC # BLD AUTO: 3.74 10E6/UL (ref 3.8–5.2)
SODIUM SERPL-SCNC: 140 MMOL/L (ref 135–145)
TROPONIN T SERPL HS-MCNC: <6 NG/L
WBC # BLD AUTO: 11.2 10E3/UL (ref 4–11)

## 2024-05-21 PROCEDURE — 96374 THER/PROPH/DIAG INJ IV PUSH: CPT | Mod: 59 | Performed by: EMERGENCY MEDICINE

## 2024-05-21 PROCEDURE — 84484 ASSAY OF TROPONIN QUANT: CPT | Performed by: EMERGENCY MEDICINE

## 2024-05-21 PROCEDURE — 93971 EXTREMITY STUDY: CPT | Mod: RT

## 2024-05-21 PROCEDURE — 96361 HYDRATE IV INFUSION ADD-ON: CPT | Performed by: EMERGENCY MEDICINE

## 2024-05-21 PROCEDURE — 36415 COLL VENOUS BLD VENIPUNCTURE: CPT | Performed by: EMERGENCY MEDICINE

## 2024-05-21 PROCEDURE — 93005 ELECTROCARDIOGRAM TRACING: CPT | Performed by: EMERGENCY MEDICINE

## 2024-05-21 PROCEDURE — 90834 PSYTX W PT 45 MINUTES: CPT | Mod: 95

## 2024-05-21 PROCEDURE — 258N000003 HC RX IP 258 OP 636: Performed by: EMERGENCY MEDICINE

## 2024-05-21 PROCEDURE — 99284 EMERGENCY DEPT VISIT MOD MDM: CPT | Performed by: EMERGENCY MEDICINE

## 2024-05-21 PROCEDURE — 85025 COMPLETE CBC W/AUTO DIFF WBC: CPT | Performed by: EMERGENCY MEDICINE

## 2024-05-21 PROCEDURE — 250N000009 HC RX 250: Performed by: EMERGENCY MEDICINE

## 2024-05-21 PROCEDURE — 80053 COMPREHEN METABOLIC PANEL: CPT | Performed by: EMERGENCY MEDICINE

## 2024-05-21 PROCEDURE — 96375 TX/PRO/DX INJ NEW DRUG ADDON: CPT | Performed by: EMERGENCY MEDICINE

## 2024-05-21 PROCEDURE — 71046 X-RAY EXAM CHEST 2 VIEWS: CPT

## 2024-05-21 PROCEDURE — 250N000013 HC RX MED GY IP 250 OP 250 PS 637: Performed by: EMERGENCY MEDICINE

## 2024-05-21 PROCEDURE — 70450 CT HEAD/BRAIN W/O DYE: CPT

## 2024-05-21 PROCEDURE — 85379 FIBRIN DEGRADATION QUANT: CPT | Performed by: EMERGENCY MEDICINE

## 2024-05-21 PROCEDURE — 250N000011 HC RX IP 250 OP 636: Performed by: EMERGENCY MEDICINE

## 2024-05-21 PROCEDURE — 71275 CT ANGIOGRAPHY CHEST: CPT

## 2024-05-21 PROCEDURE — 93010 ELECTROCARDIOGRAM REPORT: CPT | Performed by: EMERGENCY MEDICINE

## 2024-05-21 PROCEDURE — 99285 EMERGENCY DEPT VISIT HI MDM: CPT | Mod: 25 | Performed by: EMERGENCY MEDICINE

## 2024-05-21 RX ORDER — HEPARIN SODIUM,PORCINE 10 UNIT/ML
5-10 VIAL (ML) INTRAVENOUS
Status: DISCONTINUED | OUTPATIENT
Start: 2024-05-21 | End: 2024-05-22 | Stop reason: HOSPADM

## 2024-05-21 RX ORDER — SUMATRIPTAN 25 MG/1
25 TABLET, FILM COATED ORAL ONCE
Status: COMPLETED | OUTPATIENT
Start: 2024-05-21 | End: 2024-05-21

## 2024-05-21 RX ORDER — HEPARIN SODIUM (PORCINE) LOCK FLUSH IV SOLN 100 UNIT/ML 100 UNIT/ML
5-10 SOLUTION INTRAVENOUS
Status: DISCONTINUED | OUTPATIENT
Start: 2024-05-21 | End: 2024-05-22 | Stop reason: HOSPADM

## 2024-05-21 RX ORDER — IOPAMIDOL 755 MG/ML
100 INJECTION, SOLUTION INTRAVASCULAR ONCE
Status: COMPLETED | OUTPATIENT
Start: 2024-05-21 | End: 2024-05-21

## 2024-05-21 RX ORDER — KETOROLAC TROMETHAMINE 30 MG/ML
30 INJECTION, SOLUTION INTRAMUSCULAR; INTRAVENOUS ONCE
Status: COMPLETED | OUTPATIENT
Start: 2024-05-21 | End: 2024-05-21

## 2024-05-21 RX ORDER — HEPARIN SODIUM,PORCINE 10 UNIT/ML
5-10 VIAL (ML) INTRAVENOUS EVERY 24 HOURS
Status: DISCONTINUED | OUTPATIENT
Start: 2024-05-21 | End: 2024-05-22 | Stop reason: HOSPADM

## 2024-05-21 RX ORDER — DIPHENHYDRAMINE HYDROCHLORIDE 50 MG/ML
25 INJECTION INTRAMUSCULAR; INTRAVENOUS ONCE
Status: COMPLETED | OUTPATIENT
Start: 2024-05-21 | End: 2024-05-21

## 2024-05-21 RX ADMIN — Medication 5 ML: at 22:12

## 2024-05-21 RX ADMIN — SODIUM CHLORIDE 1000 ML: 9 INJECTION, SOLUTION INTRAVENOUS at 19:42

## 2024-05-21 RX ADMIN — SUMATRIPTAN SUCCINATE 25 MG: 25 TABLET ORAL at 19:51

## 2024-05-21 RX ADMIN — KETOROLAC TROMETHAMINE 30 MG: 30 INJECTION, SOLUTION INTRAMUSCULAR at 19:43

## 2024-05-21 RX ADMIN — PROCHLORPERAZINE EDISYLATE 10 MG: 5 INJECTION INTRAMUSCULAR; INTRAVENOUS at 19:43

## 2024-05-21 RX ADMIN — SODIUM CHLORIDE 84 ML: 9 INJECTION, SOLUTION INTRAVENOUS at 20:38

## 2024-05-21 RX ADMIN — IOPAMIDOL 64 ML: 755 INJECTION, SOLUTION INTRAVENOUS at 20:38

## 2024-05-21 RX ADMIN — DIPHENHYDRAMINE HYDROCHLORIDE 25 MG: 50 INJECTION, SOLUTION INTRAMUSCULAR; INTRAVENOUS at 19:43

## 2024-05-21 ASSESSMENT — ACTIVITIES OF DAILY LIVING (ADL)
ADLS_ACUITY_SCORE: 35
ADLS_ACUITY_SCORE: 33
ADLS_ACUITY_SCORE: 35

## 2024-05-21 ASSESSMENT — COLUMBIA-SUICIDE SEVERITY RATING SCALE - C-SSRS
6. HAVE YOU EVER DONE ANYTHING, STARTED TO DO ANYTHING, OR PREPARED TO DO ANYTHING TO END YOUR LIFE?: NO
1. IN THE PAST MONTH, HAVE YOU WISHED YOU WERE DEAD OR WISHED YOU COULD GO TO SLEEP AND NOT WAKE UP?: NO
2. HAVE YOU ACTUALLY HAD ANY THOUGHTS OF KILLING YOURSELF IN THE PAST MONTH?: NO

## 2024-05-21 NOTE — ED PROVIDER NOTES
ED Provider Note  [facility]      History     Chief Complaint   Patient presents with    Leg Swelling    Headache     Rt leg swelling/migraine since Wednesday. Breast cancer first cycle round 3 tax/cytoxan.     HPI  Angelica Gomez is a 44 year old female, currently undergoing chemotherapy for breast cancer, who presents to the emergency department today with right lower extremity pain and swelling.  Patient reports that she has noted some intermittent right lower extremity swelling which started about 5 days ago, started in her ankle and has gotten a little bit better as she has been elevating her leg.  She has noted some right calf tightness over the past few days as well.  No injury or fall or trauma.  Patient called her oncology clinic and was advised to come to the emergency department.    Patient is also complaining of some shortness of breath which has been an ongoing thing since she has been on chemotherapy.  It is not any worse than normal.  She had some palpitations last week but none today.  She has some central sternal discomfort which she attributes to her recent surgery.    Denies fevers or chills, no nasal congestion or sore throat.  No cough.  No abdominal pain, she has had some nausea which is common for her with her chemotherapy, no vomiting.  She did have some diarrhea last week which is very typical for her with her chemotherapy cycles but this is resolving.  No urinary symptoms.    Patient is also complaining of increasing migraine headaches.  She has had migraines in the past which she states were triggered by her menses however she has been in menopause since she started chemo.  She has had a migraine headache every day for the past 5 days.  It is located in the back of her head as well as in the bifrontal region.  Denies any auras or visual changes, she does have some phonophobia and photophobia.  She is somewhat nauseated with this but again has not vomited.   Her migraines have increased in frequency.  She also does not know what is triggering them as she is no longer menstruating.  She typically takes Imitrex which usually does help for her.      This part of the medical record was transcribed by Gaston Samuels Medical Scribe, from a dictation done by Shantel Martinez MD.     Past Medical History  Past Medical History:   Diagnosis Date    Malignant neoplasm of upper-outer quadrant of left breast in female, estrogen receptor positive (H) 2024    NO ACTIVE PROBLEMS      Past Surgical History:   Procedure Laterality Date    COLONOSCOPY WITH CO2 INSUFFLATION N/A 2024    Procedure: Colonoscopy with CO2 insufflation;  Surgeon: Cherise Lima DO;  Location: [facility] OR    INSERT TISSUE EXPANDER BREAST BILATERAL Bilateral 2024    Procedure: Insertion tissue expander, breasts, bilateral;  Surgeon: Elisabet Venegas MD;  Location: [facility] OR    IR CHEST PORT PLACEMENT > 5 YRS OF AGE  2024    MASTECTOMY, NIPPLE SPARING Bilateral 2024    Procedure: Bilateral nipple sparing mastectomy, bilateral implant removal, left sentinel lymph node biopsy;  Surgeon: Aba Phillips MD;  Location: [facility] OR    TUBAL LIGATION      [facility]  DELIVERY ONLY      superficial wound dehiscence     Calcium Carbonate-Vitamin D (CALCIUM 500 + D PO)  Cetirizine HCl (ZYRTEC ALLERGY PO)  COMPRESSION STOCKINGS  dexAMETHasone (DECADRON) 4 MG tablet  Fluticasone Propionate (FLONASE NA)  hydrOXYzine HCl (ATARAX) 25 MG tablet  ketoconazole (NIZORAL) 2 % external cream  lidocaine-prilocaine (EMLA) 2.5-2.5 % external cream  LORazepam (ATIVAN) 0.5 MG tablet  methocarbamol (ROBAXIN) 750 MG tablet  Multiple Vitamins-Minerals (MULTIVITAMIN & MINERAL PO)  ondansetron (ZOFRAN) 8 MG tablet  oxyCODONE (ROXICODONE) 5 MG tablet  Probiotic Product (PROBIOTIC PO)  prochlorperazine (COMPAZINE) 10 MG tablet  senna-docusate (SENOKOT-S/PERICOLACE) 8.6-50 MG tablet  SUMAtriptan (IMITREX) 25 MG  "tablet  tranexamic acid (LYSTEDA) 650 MG tablet  triamcinolone (KENALOG) 0.1 % external cream      No Known Allergies  Family History  Family History   Problem Relation Age of Onset    Thyroid Disease Mother     Breast Cancer Mother 56        breast, 3 years later    Heart Disease Father         2x heart attacks    Circulatory Father         blood clots    Cardiovascular Father         patent foramen ovale, repaired x 2, afib    Cerebrovascular Disease Father         x2    C.A.D. Father         x2    Genitourinary Problems Father         kidney disease    Asthma Brother     Food Allergy Brother     Eczema Brother     No Known Problems Maternal Grandmother     Myocardial Infarction Maternal Grandfather     Hypertension Paternal Grandmother     Alcohol/Drug Paternal Grandfather     No Known Problems Daughter     No Known Problems Son     Cancer Paternal Aunt         cervical cancer(another sisiter)    Breast Cancer Paternal Aunt         breast cancer at 70's    Breast Cancer Paternal Aunt     Ovarian Cancer Paternal Aunt     Cancer - colorectal No family hx of     Diabetes No family hx of      Social History   Social History     Tobacco Use    Smoking status: Former     Current packs/day: 0.25     Average packs/day: 0.3 packs/day for 1 year (0.3 ttl pk-yrs)     Types: Cigarettes    Smokeless tobacco: Never   Vaping Use    Vaping status: Never Used   Substance Use Topics    Alcohol use: Yes     Comment: 0-1    Drug use: No      Past medical history, past surgical history, medications, allergies, family history, and social history were reviewed with the patient. No additional pertinent items.      A medically appropriate review of systems was performed with pertinent positives and negatives noted in the HPI, and all other systems negative.    Physical Exam   BP: 136/89  Pulse: 88  Temp: 98.6  F (37  C)  Resp: 18  Height: 165.1 cm (5' 5\")  Weight: 83.9 kg (185 lb)  SpO2: 98 %  Physical Exam  Vitals and nursing note " reviewed.   Constitutional:       General: She is not in acute distress.     Appearance: She is not diaphoretic.      Comments: Adult female, lying back in bed, tearful   HENT:      Head: Atraumatic.      Comments: Alopecia     Mouth/Throat:      Mouth: Mucous membranes are moist.      Pharynx: Oropharynx is clear. No oropharyngeal exudate.   Eyes:      General: No scleral icterus.     Pupils: Pupils are equal, round, and reactive to light.   Cardiovascular:      Rate and Rhythm: Normal rate.      Pulses: Normal pulses.      Heart sounds: Normal heart sounds. No murmur heard.  Pulmonary:      Effort: No respiratory distress.      Breath sounds: Normal breath sounds.   Abdominal:      General: Bowel sounds are normal.      Palpations: Abdomen is soft.      Tenderness: There is no abdominal tenderness. There is no guarding.   Musculoskeletal:         General: Tenderness present. No swelling.      Cervical back: Normal range of motion.      Comments: Some tenderness to palpation of right calf, no swelling appreciated   Skin:     General: Skin is warm.      Findings: No rash.   Neurological:      General: No focal deficit present.      Mental Status: She is alert.      GCS: GCS eye subscore is 4. GCS verbal subscore is 5. GCS motor subscore is 6.      Cranial Nerves: Cranial nerves 2-12 are intact.      Sensory: Sensation is intact.      Motor: Motor function is intact.      Coordination: Coordination is intact.   Psychiatric:      Comments: Understandably tearful         ED Course, Procedures, & Data      Procedures            EKG Interpretation:      Interpreted by Shantel Martinez MD  Time reviewed:1900   Symptoms at time of EKG: SOB   Rhythm: normal sinus   Rate: normal  Axis: NORMAL  Ectopy: none  Conduction: normal  ST Segments/ T Waves: No ST-T wave changes  Q Waves: none  Comparison to prior: Unchanged    Clinical Impression: normal EKG         Results for orders placed or performed during the hospital  encounter of 05/21/24   US Lower Extremity Venous Duplex Right     Status: None    Narrative    EXAM: US LOWER EXTREMITY VENOUS DUPLEX RIGHT  LOCATION: [facility]  DATE: 5/21/2024    INDICATION: R leg swelling, hx of CA, eval for DVT  COMPARISON: None.  TECHNIQUE: Venous Duplex ultrasound of the right lower extremity with and without compression, augmentation and duplex. Color flow and spectral Doppler with waveform analysis performed.    FINDINGS: Exam includes the common femoral, femoral, popliteal, and contralateral common femoral veins as well as segmentally visualized deep calf veins and greater saphenous vein.     RIGHT: No deep vein thrombosis. No superficial thrombophlebitis. No popliteal cyst.      Impression    IMPRESSION:  1.  No deep venous thrombosis in the right lower extremity.   Chest XR,  PA & LAT     Status: None    Narrative    EXAM: XR CHEST 2 VIEWS  LOCATION: [facility]  DATE: 5/21/2024    INDICATION: Shortness of breath  COMPARISON: CT 5/21/2024      Impression    IMPRESSION: Stable right sided port catheter. Bilateral chest tissue expanders. No acute findings. The lungs are clear and there are no pleural effusions. Normal heart size.   Head CT w/o contrast     Status: None    Narrative    EXAM: CT HEAD W/O CONTRAST  LOCATION: [facility]  DATE: 5/21/2024    INDICATION: headache migraines increasing, hx of breast CA, eval for bleed or mass (unable to do MRI due to tissue expanders); Headache; Cancer tumor, known or r o; No new HA; Chronic or history of HA  COMPARISON: None.  TECHNIQUE: Routine CT Head without IV contrast. Multiplanar reformats. Dose reduction techniques were used.    FINDINGS:  INTRACRANIAL CONTENTS: No intracranial hemorrhage, extraaxial collection, or mass effect.  No CT evidence of acute infarct. Normal parenchymal attenuation. Normal  ventricles and sulci.     VISUALIZED ORBITS/SINUSES/MASTOIDS: No intraorbital abnormality. Moderate size mucus retention cyst in the left maxillary sinus. No middle ear or mastoid effusion.    BONES/SOFT TISSUES: No acute abnormality.      Impression    IMPRESSION:  1.  No intracranial hemorrhage, mass lesions, hydrocephalus or CT evidence for an acute infarct.  2.  Moderate size mucus retention cyst in the left maxillary sinus.     CT Chest Pulmonary Embolism w Contrast     Status: None    Narrative    EXAM: CT CHEST PULMONARY EMBOLISM W CONTRAST  LOCATION: [facility]  DATE: 5/21/2024    INDICATION: CA patient, SOB, elevated d dimer, eval for PE; Female sex; Not pregnant; No prior imaging in the last 24 hours; Pulmonary Embolism Rule Out Criteria (PERC) score not > 0  COMPARISON: None.  TECHNIQUE: CT chest pulmonary angiogram during arterial phase injection of IV contrast. Multiplanar reformats and MIP reconstructions were performed. Dose reduction techniques were used.   CONTRAST: 64mL Isovue 370    FINDINGS:  ANGIOGRAM CHEST: Pulmonary arteries are normal caliber and negative for pulmonary emboli. Thoracic aorta is negative for dissection. No CT evidence of right heart strain.    LUNGS AND PLEURA: Bibasilar atelectasis. No focal consolidation or effusion.    MEDIASTINUM/AXILLAE: Heart is moderately enlarged. Few nonenlarged mediastinal lymph nodes are noted.    CORONARY ARTERY CALCIFICATION: None.    UPPER ABDOMEN: Normal.    MUSCULOSKELETAL: Bilateral breast implants.      Impression    IMPRESSION:  1.  No pulmonary embolism. No acute findings.       Comprehensive metabolic panel     Status: Abnormal   Result Value Ref Range    Sodium 140 135 - 145 mmol/L    Potassium 4.2 3.4 - 5.3 mmol/L    Carbon Dioxide (CO2) 23 22 - 29 mmol/L    Anion Gap 9 7 - 15 mmol/L    Urea Nitrogen 18.5 6.0 - 20.0 mg/dL    Creatinine 0.83 0.51 - 0.95 mg/dL    GFR Estimate 89 >60  mL/min/1.73m2    Calcium 9.0 8.6 - 10.0 mg/dL    Chloride 108 (H) 98 - 107 mmol/L    Glucose 105 (H) 70 - 99 mg/dL    Alkaline Phosphatase 94 40 - 150 U/L    AST 35 0 - 45 U/L    ALT 28 0 - 50 U/L    Protein Total 5.9 (L) 6.4 - 8.3 g/dL    Albumin 4.1 3.5 - 5.2 g/dL    Bilirubin Total 0.2 <=1.2 mg/dL   Troponin T, High Sensitivity     Status: Normal   Result Value Ref Range    Troponin T, High Sensitivity <6 <=14 ng/L   D dimer quantitative     Status: Abnormal   Result Value Ref Range    D-Dimer Quantitative 0.73 (H) 0.00 - 0.50 ug/mL FEU    Narrative    This D-dimer assay is intended for use in conjunction with a clinical pretest probability assessment model to exclude pulmonary embolism (PE) and deep venous thrombosis (DVT) in outpatients suspected of PE or DVT. The cut-off value is 0.50 ug/mL FEU.   CBC with platelets and differential     Status: Abnormal   Result Value Ref Range    WBC Count 11.2 (H) 4.0 - 11.0 10e3/uL    RBC Count 3.74 (L) 3.80 - 5.20 10e6/uL    Hemoglobin 10.8 (L) 11.7 - 15.7 g/dL    Hematocrit 32.7 (L) 35.0 - 47.0 %    MCV 87 78 - 100 fL    MCH 28.9 26.5 - 33.0 pg    MCHC 33.0 31.5 - 36.5 g/dL    RDW 16.0 (H) 10.0 - 15.0 %    Platelet Count 166 150 - 450 10e3/uL    % Neutrophils 84 %    % Lymphocytes 8 %    % Monocytes 5 %    % Eosinophils 0 %    % Basophils 1 %    % Immature Granulocytes 2 %    NRBCs per 100 WBC 1 (H) <1 /100    Absolute Neutrophils 9.5 (H) 1.6 - 8.3 10e3/uL    Absolute Lymphocytes 0.9 0.8 - 5.3 10e3/uL    Absolute Monocytes 0.5 0.0 - 1.3 10e3/uL    Absolute Eosinophils 0.0 0.0 - 0.7 10e3/uL    Absolute Basophils 0.1 0.0 - 0.2 10e3/uL    Absolute Immature Granulocytes 0.2 <=0.4 10e3/uL    Absolute NRBCs 0.1 10e3/uL   EKG 12 lead     Status: None (Preliminary result)   Result Value Ref Range    Systolic Blood Pressure  mmHg    Diastolic Blood Pressure  mmHg    Ventricular Rate 70 BPM    Atrial Rate 70 BPM    AZ Interval 164 ms    QRS Duration 94 ms     ms    QTc 419 ms     P Axis 60 degrees    R AXIS 21 degrees    T Axis 41 degrees    Interpretation ECG Sinus rhythm  Normal ECG      CBC with Platelets & Differential     Status: Abnormal    Narrative    The following orders were created for panel order CBC with Platelets & Differential.  Procedure                               Abnormality         Status                     ---------                               -----------         ------                     CBC with platelets and d...[[id number]]  Abnormal            Final result                 Please view results for these tests on the individual orders.     Medications   sodium chloride 0.9% BOLUS 1,000 mL (0 mLs Intravenous Stopped 5/21/24 2128)   ketorolac (TORADOL) injection 30 mg (30 mg Intravenous $Given 5/21/24 1943)   diphenhydrAMINE (BENADRYL) injection 25 mg (25 mg Intravenous $Given 5/21/24 1943)   SUMAtriptan (IMITREX) tablet 25 mg (25 mg Oral $Given 5/21/24 1951)   prochlorperazine (COMPAZINE) injection 10 mg (10 mg Intravenous $Given 5/21/24 1943)   iopamidol (ISOVUE-370) solution 100 mL (64 mLs Intravenous $Given 5/21/24 2038)   sodium chloride 0.9 % bag 100mL (84 mLs Intravenous $Given 5/21/24 2038)     Labs Ordered and Resulted from Time of ED Arrival to Time of ED Departure   COMPREHENSIVE METABOLIC PANEL - Abnormal       Result Value    Sodium 140      Potassium 4.2      Carbon Dioxide (CO2) 23      Anion Gap 9      Urea Nitrogen 18.5      Creatinine 0.83      GFR Estimate 89      Calcium 9.0      Chloride 108 (*)     Glucose 105 (*)     Alkaline Phosphatase 94      AST 35      ALT 28      Protein Total 5.9 (*)     Albumin 4.1      Bilirubin Total 0.2     D DIMER QUANTITATIVE - Abnormal    D-Dimer Quantitative 0.73 (*)    CBC WITH PLATELETS AND DIFFERENTIAL - Abnormal    WBC Count 11.2 (*)     RBC Count 3.74 (*)     Hemoglobin 10.8 (*)     Hematocrit 32.7 (*)     MCV 87      MCH 28.9      MCHC 33.0      RDW 16.0 (*)     Platelet Count 166      % Neutrophils 84       % Lymphocytes 8      % Monocytes 5      % Eosinophils 0      % Basophils 1      % Immature Granulocytes 2      NRBCs per 100 WBC 1 (*)     Absolute Neutrophils 9.5 (*)     Absolute Lymphocytes 0.9      Absolute Monocytes 0.5      Absolute Eosinophils 0.0      Absolute Basophils 0.1      Absolute Immature Granulocytes 0.2      Absolute NRBCs 0.1     TROPONIN T, HIGH SENSITIVITY - Normal    Troponin T, High Sensitivity <6       US Lower Extremity Venous Duplex Right   Final Result   IMPRESSION:   1.  No deep venous thrombosis in the right lower extremity.      CT Chest Pulmonary Embolism w Contrast   Final Result   IMPRESSION:   1.  No pulmonary embolism. No acute findings.            Head CT w/o contrast   Final Result   IMPRESSION:   1.  No intracranial hemorrhage, mass lesions, hydrocephalus or CT evidence for an acute infarct.   2.  Moderate size mucus retention cyst in the left maxillary sinus.         Chest XR,  PA & LAT   Final Result   IMPRESSION: Stable right sided port catheter. Bilateral chest tissue expanders. No acute findings. The lungs are clear and there are no pleural effusions. Normal heart size.             Critical care was not performed.     Medical Decision Making  The patient's presentation was of high complexity (an acute health issue posing potential threat to life or bodily function).    The patient's evaluation involved:  review of external note(s) from 1 sources (see separate area of note for details)  review of 3+ test result(s) ordered prior to this encounter (see separate area of note for details)  strong consideration of a test (see separate area of note for details) that was ultimately deferred  ordering and/or review of 3+ test(s) in this encounter (see separate area of note for details)    The patient's management necessitated moderate risk (prescription drug management including medications given in the ED).    Assessment & Plan    This is a 44 year old female who presents for  the above complaints. On my evaluation she is alert, cooperative, tearful.  With regard to her leg, she does not have any unilateral swelling but does have some tenderness to palpation of the right calf.  Certainly need to consider the possibility of DVT.  We did do a right lower extremity ultrasound which was negative for DVT.    With regard to her shortness of breath, need to consider any number of etiologies.  It is possible that her shortness of breath could be due to her chemotherapy treatment or lab abnormalities.  PE again would be possible as she is not necessarily low risk for this.  Cardiac etiology would be possible.  We did establish IV access and we did draw blood for laboratory analysis. CBC shows minimal leukocytosis with a white count of 11.2, hemoglobin of 10.8, consistent with previous, platelets of 166.  CMP is normal with the exception of a glucose of 105, chloride of 108 and protein of 5.9.  Troponin is negative.  D-dimer is elevated at 0.73.  EKG shows normal sinus rhythm without any sign of ectopy or ischemia.  Due to elevated D-dimer we did elect to do a chest CT PE protocol to further evaluate for PE.  This shows no evidence of PE and no acute findings.  Of note, there was moderate enlargement of the heart noted.  I do not see that she has had an echo in the past.    With regard to her increase in migraine headaches, need to consider the possibility of metastases.  Unfortunately patient has tissue expanders in place and is not a candidate for MRI, so we elected to do a CT head, which which was read as negative for acute bleed, mass lesions, hydrocephalus or any sign of infarct.  There is a moderate-sized mucous retention cyst in the left maxillary sinus.    We did give the patient her Imitrex and we also given migraine cocktail with Toradol, Phenergan, and Benadryl as well as fluids.  After this she felt much improved and was eating/tolerating orals.    At this point patient will be  reassured.  I did advise her to continue to follow-up with her oncology clinic.  With regard to her moderate cardiomegaly on CT, she should talk with her cardiologist as some of the chemotherapeutic agents can be cardiotoxic and she ultimately may require outpatient echocardiogram.  Patient verbalizes understanding.    Patient does seem tearful and somewhat overwhelmed, understandably so.  Had a brief discussion with her where I did advise her of outpatient social resources with regard to breast cancer support, including [employer]. She says that she will consider contacting them.     This part of the medical record was transcribed by Gaston Samuels Medical Scribe, from a dictation done by Shantel Martinez MD.     I have reviewed the nursing notes. I have reviewed the findings, diagnosis, plan and need for follow up with the patient.    New Prescriptions    No medications on file       Final diagnoses:   Migraine without status migrainosus, not intractable, unspecified migraine type   Right leg swelling   On antineoplastic chemotherapy   Cardiac enlargement - on chest CT   History of breast cancer       Shantel Martinez MD  [facility] EMERGENCY DEPARTMENT  5/21/2024     Shantel Martinez MD  05/21/24 [id number]

## 2024-05-21 NOTE — TELEPHONE ENCOUNTER
"Pt returned call to review answers to questionnaire:    Pt states that she sneezed last week on 5/15 or 5/16 and she developed a bloody nose that lasted for about 3 minutes. She has continued to have intermittent blood on the kleenex over the past several days. Reviewed home care advice for nose bleed with pt, including tilting the head slightly forward and pinching the nostrils shut for at least 5 minutes. Advised pt to also try over the counter nasal saline spray, vaseline, or humidifier at home. Advised pt that if she cannot get nose bleed to stop, that she should be seen in ER.   Pt states that she has had increase in migraine headaches and nausea with this cycle of chemo. She has been alternating compazine with zofran for nausea, and has also been using imitrex for headaches, but pt states that the compazine and zofran did not always provide relief for the nausea. She has had nausea both with the migraine headaches and without the headaches. Denies any nausea at this time. States that overall her symptoms may be slightly better today, but also states that her migraines tend to be worse in the late afternoons and evenings. She has also had some intermittent dizziness, but this is mainly with position changes. She will continue with slow position changes and will allow her body time to adjust to any position changes before starting walking. She will continue to monitor.  Pt also states that she has developed some overall swelling/\"puffiness\" that started last week on 5/15/2024. States that she has swelling in her bilateral lower extremities in her feet, ankles, and calves. Has also noticed some mild swelling in her upper legs at times. She has been wearing compression socks. Also states that she went to lymphatic drainage yesterday. Pt feels that her right lower extremity is more swollen than her left lower extremity. States that it was hard to wear her walking shoes yesterday due to swelling. Denies chest pain, " shortness of breath, palpitations. States that she has been drinking about 120 ounces of water daily. She denies having symptoms of severe thirst now like she did last week. Advised pt that message will be sent to care team regarding swelling.  Pt states that her overall sadness is not extreme. Just states that it relates to her ongoing headaches this past week and just wanting to feel better. Pt states that she has been meeting with psychology team which has been helpful. She also has supportive family and friends. Writer advised pt to contact clinic if any additional resources are ever needed. Listening and support provided.    Patient verbalized understanding and agreement with plan.  Patient was instructed to call the clinic with any questions, concerns, or worsening symptoms.  Coco Hui RN on 5/21/2024 at 2:50 PM

## 2024-05-21 NOTE — PROGRESS NOTES
[facility]                                     Progress Note    Patient Name: Angelica Gomez  Date: 24           Service Type: Individual      Session Start Time: 9:00am  Session End Time: 9:50am     Session Length: 50 minutes    Session #: 8    Attendees: Client attended alone    Service Modality:  Video Visit:      Provider verified identity through the following two step process.  Patient provided:  Patient  and Patient address    Telemedicine Visit: The patient's condition can be safely assessed and treated via synchronous audio and visual telemedicine encounter.      Reason for Telemedicine Visit: Patient has requested telehealth visit    Originating Site (Patient Location): Patient's home    Distant Site (Provider Location): Provider Remote Setting- Home Office    Consent:  The patient/guardian has verbally consented to: the potential risks and benefits of telemedicine (video visit) versus in person care; bill my insurance or make self-payment for services provided; and responsibility for payment of non-covered services.     Patient would like the video invitation sent by:  My Chart    Mode of Communication:  Video Conference via Amwell    Distant Location (Provider):  Off-site    As the provider I attest to compliance with applicable laws and regulations related to telemedicine.    DATA  Interactive Complexity: No  Crisis: No        Progress Since Last Session (Related to Symptoms / Goals / Homework):   Symptoms: No change mild anxiety    Homework: Achieved / completed to satisfaction      Episode of Care Goals: Satisfactory progress - PREPARATION (Decided to change - considering how); Intervened by negotiating a change plan and determining options / strategies for behavior change, identifying triggers, exploring social supports, and working towards setting a date to begin behavior change     Current / Ongoing Stressors and Concerns:   Pt shared she has had some increased  symptoms with last round of chemo. Reported that they are fairly well under control. She shared that she was having trouble getting verification of her leave from work which brought up some anxiety. Reflected on some fears that people may be thinking she is taking too much time off of work but was able to challenge these thoughts. Discussed cognitive impact of chemo. Discuss impact of mastectomy on body image, relationship, and sexuality. She shared that she and her  have had an open line of communication about all of this which has been helpful.       Treatment Objective(s) Addressed in This Session:   use cognitive strategies identified in therapy to challenge anxious thoughts  Increase interest, engagement, and pleasure in doing things  Process grief related to diagnosis and loss of some abilities and hair        Intervention:   CBT: thought challenging  Emotion Focused Therapy: identify and process emotinos  Interpersonal Therapy: express needs  Solution Focused: cope ahead    Assessments completed prior to visit:  The following assessments were completed by patient for this visit:  PHQ9:       2/12/2024     7:40 AM   PHQ-9 SCORE   PHQ-9 Total Score MyChart 3 (Minimal depression)   PHQ-9 Total Score 3     GAD7:        No data to display              CAGE-AID:       2/12/2024     8:09 AM   CAGE-AID Total Score   Total Score 0   Total Score MyChart 0 (A total score of 2 or greater is considered clinically significant)     PROMIS 10-Global Health (only subscores and total score):       2/12/2024     8:09 AM 5/17/2024     9:01 PM   PROMIS-10 Scores Only   Global Mental Health Score 15 14   Global Physical Health Score 16 15   PROMIS TOTAL - SUBSCORES 31 29     Brooks Suicide Severity Rating Scale (Lifetime/Recent)      2/12/2024     9:59 AM 2/21/2024    11:37 AM   Brooks Suicide Severity Rating (Lifetime/Recent)   Q1 Wished to be Dead (Past Month)  0-->no   Q2 Suicidal Thoughts (Past Month)  0-->no   Q6  Suicide Behavior (Lifetime)  0-->no   Level of Risk per Screen  no risks indicated   Q1 Wish to be Dead (Lifetime) N    Q2 Non-Specific Active Suicidal Thoughts (Lifetime) N    Actual Attempt (Lifetime) N    Has subject engaged in non-suicidal self-injurious behavior? (Lifetime) N    Interrupted Attempts (Lifetime) N    Aborted or Self-Interrupted Attempt (Lifetime) N    Preparatory Acts or Behavior (Lifetime) N    Calculated C-SSRS Risk Score (Lifetime/Recent) No Risk Indicated          ASSESSMENT: Current Emotional / Mental Status (status of significant symptoms):   Risk status (Self / Other harm or suicidal ideation)   Patient denies current fears or concerns for personal safety.   Patient denies current or recent suicidal ideation or behaviors.   Patient denies current or recent homicidal ideation or behaviors.   Patient denies current or recent self injurious behavior or ideation.   Patient denies other safety concerns.   Patient reports there has been no change in risk factors since their last session.     Patient reports there has been no change in protective factors since their last session.     Recommended that patient call 911 or go to the local ED should there be a change in any of these risk factors.     Appearance:   Appropriate    Eye Contact:   Good    Psychomotor Behavior: Normal    Attitude:   Cooperative    Orientation:   All   Speech    Rate / Production: Normal/ Responsive    Volume:  Normal    Mood:    Fatigue, mild anxiety   Affect:    Appropriate    Thought Content:  Clear    Thought Form:  Coherent  Logical    Insight:    Good      Medication Review:   No current psychiatric medications prescribed     Medication Compliance:   NA     Changes in Health Issues:   None reported     Chemical Use Review:   Substance Use: Chemical use reviewed, no active concerns identified      Tobacco Use: No current tobacco use.      Diagnosis:  Adjustment Disorder with Anxiety and Depression    Collateral  Reports Completed:   Not Applicable    PLAN: (Patient Tasks / Therapist Tasks / Other)  Pt and writer will continue to meet on a bi-weekly basis. Next appointment 6/4  Pt will continue to find balance between rest and socialization.  Pt will continue to challenge self-judgement about taking time away from work for recovery.        GT Palencia    This note has been reviewed and I agree with the plan of care. This note is co-signed by JESÚS Meyers, [facility], Supervisor, on: May 22, 2024  _______________    Individual Treatment Plan    Patient's Name: Angelica Gomez  YOB: 1980    Date of Creation: 3/26/24  Date Treatment Plan Last Reviewed/Revised: 3/26/24    DSM5 Diagnoses: Adjustment Disorders  309.28 (F43.23) With mixed anxiety and depressed mood  Psychosocial / Contextual Factors: going through breast cancer treatment   PROMIS (reviewed every 90 days):   Global Mental Health Score (P) 15   Global Physical Health Score (P) 16   PROMIS TOTAL - SUBSCORES (P) 31     Referral / Collaboration:  Referral to another professional/service is not indicated at this time..    Anticipated number of session for this episode of care: 6-9 sessions  Anticipation frequency of session: Biweekly  Anticipated Duration of each session: 38-52 minutes  Treatment plan will be reviewed in 90 days or when goals have been changed.       MeasurableTreatment Goal(s) related to diagnosis / functional impairment(s)  Goal 1: Patient will experience a reduction in anxiety as evidenced by reduction in ENEDINA 2 score from 1 to 0    I will know I've met my goal when I am able to process anxiety related to diagnosis.      Objective #A (Patient Action)    Patient will use at least 3 coping skills for anxiety management in the next 12 weeks.  Status: New - Date: 3/26/24      Intervention(s)  Therapist will teach  grounding, mindfulness, emotion regulation .    Objective #B  Patient will use cognitive strategies identified  in therapy to challenge anxious thoughts.  Status: New - Date: 3/26/24      Intervention(s)  Therapist will teach staying in the present moment, challenging catastrophic thinking .    Objective #C  Patient will process fears related to cancer diagnosis   Status: New - Date: 3/26/24      Intervention(s)  Therapist will provide supportive and nonjudgemental space for processing      Goal 2: Patient will experience reduction in depressive symptoms as evidenced by stability in or reduction of PhQ9 score from 3 to less than 3 and self report of improved mood.    I will know I've met my goal when I am able to process sadness related to the diagnosis.      Objective #A (Patient Action)    Status: New - Date: 3/26/24      Patient will Increase interest, engagement, and pleasure in doing things.    Intervention(s)  Therapist will  discuss behavioral activation, ways patient can remain active and social .    Objective #B  Patient will  process grief related to diagnosis  .    Status: New - Date: 3/26/24      Intervention(s)  Therapist will  teach stages of grief, loss/changes of identity .        Patient has reviewed and agreed to the above plan.      GT Palencia  March 26, 2024    This note has been reviewed and I agree with the plan of care. This note is co-signed by JESÚS Meyers, [facility], Supervisor, on: March 26, 2024

## 2024-05-21 NOTE — TELEPHONE ENCOUNTER
Magdy Bragg, APRN CNP  You; Sophie Neal, RN8 minutes ago (3:39 PM)     GK    I recommend ER -she needs ultrasound     Pt was notified with recommendations per Magdy Bragg NP.  Pt states that she will have someone drive her to ER at [facility] now for evaluation.    Patient verbalized understanding and agreement with plan.  Patient was instructed to call the clinic with any questions, concerns, or worsening symptoms.    Coco Hui RN on 5/21/2024 at 3:54 PM

## 2024-05-21 NOTE — ED TRIAGE NOTES
Rt leg swelling/migraine since Wednesday. Breast cancer first cycle round 3 tax/cytoxan.     Triage Assessment (Adult)       Row Name 05/21/24 3816          Triage Assessment    Airway WDL WDL        Respiratory WDL    Respiratory WDL WDL        Skin Circulation/Temperature WDL    Skin Circulation/Temperature WDL WDL        Cardiac WDL    Cardiac WDL WDL        Peripheral/Neurovascular WDL    Peripheral Neurovascular WDL WDL        Cognitive/Neuro/Behavioral WDL    Cognitive/Neuro/Behavioral WDL WDL

## 2024-05-21 NOTE — TELEPHONE ENCOUNTER
Nutrition LOS Note: day 5 Assessment Diet: DIET GI SOFT No options chosen Food,Nutrition, and Pertinent History: Pt sleeping-observed breakfast with ~25% consumed this morning, lunch untouched this afternoon. Pt's wife at bedside states that he did not eat well for a few days PTA d/t c/o diarrhea. She states that prior to diarrhea, pt was not eating much either. Diet recall typically includes some type of a breakfast bar for breakfast and a fast food lunch (pizza, subway, etc.). She states that he often did not eat dinner. He has a h/o EtOH abuse (~1 pint vodka/day). He is receiving a banana bag, librium, and Florastor. S/P EGD on 5/7 for potential GIB-revealing gastritis, no active bleed. C Diff negative - ? antibiotic associated diarrhea. Rectal tube in place. Anthropometrics: Height: 5' 11\" (180.3 cm), Weight Source: Bed, Weight: 75.3 kg (166 lb 1.6 oz), Body mass index is 23.17 kg/m². BMI class of normal weight. Macronutrient Needs: 
· EER:  5530-2605 kcal /day (25-30 kcal/kg listed BW) · EPR:  75-90 grams protein/day (1-1.2 grams/kg listed BW) Intake/Comparative Standards: Per RD meal rounds: 0% of lunch. Average intake for past 4 day(s)/7 recorded meal(s): 21%. This potentially meets ~25% of kcal and ~27% of protein needs. Nutrition Diagnosis: Inadequate energy intake r/t decreased ability to consume adequate energy intake, as evidenced by pt with h/o EtOH abuse receiving librium and ativan, lethargic, meeting <50% EEN and est. PRO needs. Intervention:  
Meals and snacks: Continue current diet. Add preferences ONS: Ensure enlive TID If mentation does not allow PO, may want to consider NGT placement for temporary EN. Discharge nutrition plan: Too soon to determine. Renee Epstein [address], [address] [place] [address], [address] [place] [address], [phone] Patient reported sx of mild bleeding, HA, pain, dizziness, and sadness on MyChart Care Companion.     Writer called patient, left a message to call back.    Ellie Vega RN, BSN, PHN  [facility]

## 2024-05-22 LAB
ATRIAL RATE - MUSE: 70 BPM
DIASTOLIC BLOOD PRESSURE - MUSE: NORMAL MMHG
INTERPRETATION ECG - MUSE: NORMAL
P AXIS - MUSE: 60 DEGREES
PR INTERVAL - MUSE: 164 MS
QRS DURATION - MUSE: 94 MS
QT - MUSE: 388 MS
QTC - MUSE: 419 MS
R AXIS - MUSE: 21 DEGREES
SYSTOLIC BLOOD PRESSURE - MUSE: NORMAL MMHG
T AXIS - MUSE: 41 DEGREES
VENTRICULAR RATE- MUSE: 70 BPM

## 2024-05-22 NOTE — DISCHARGE INSTRUCTIONS
You have been seen in the emergency department today for migraine headache.  Your head CT is normal.  We recommend that you continue to monitor your symptoms, you may consider keeping a headache diary to try to determine if there are specific triggers for your headaches.  If you continue to have frequent headaches, your clinic can certainly refer you to a neurology specialist.    Your right lower extremity swelling was evaluated here and your ultrasound is negative for DVT.    Your blood work showed no sign of heart issues.  Your D-dimer was slightly high which is why we ended up doing a chest CT.  There is no sign of PE/blood clot in the lung.  The CT scan does show that you have slight enlargement of the heart.  Some of the chemotherapy agents can cause cardiac toxicity, so we recommend that you talk to your oncologist about this as you may require outpatient echocardiogram.    Please follow-up with your oncologist.    As discussed, if you feel that you  would benefit from additional social support for breast cancer, consider reaching out to [employer].

## 2024-05-23 ENCOUNTER — LAB (OUTPATIENT)
Dept: INFUSION THERAPY | Facility: CLINIC | Age: 44
End: 2024-05-23
Payer: COMMERCIAL

## 2024-05-23 ENCOUNTER — ONCOLOGY VISIT (OUTPATIENT)
Dept: ONCOLOGY | Facility: CLINIC | Age: 44
End: 2024-05-23
Payer: COMMERCIAL

## 2024-05-23 VITALS
SYSTOLIC BLOOD PRESSURE: 133 MMHG | WEIGHT: 185.8 LBS | HEART RATE: 76 BPM | OXYGEN SATURATION: 100 % | BODY MASS INDEX: 30.92 KG/M2 | DIASTOLIC BLOOD PRESSURE: 83 MMHG | TEMPERATURE: 98.2 F | RESPIRATION RATE: 16 BRPM

## 2024-05-23 DIAGNOSIS — C50.412 MALIGNANT NEOPLASM OF UPPER-OUTER QUADRANT OF LEFT BREAST IN FEMALE, ESTROGEN RECEPTOR POSITIVE (H): Primary | ICD-10-CM

## 2024-05-23 DIAGNOSIS — Z17.0 MALIGNANT NEOPLASM OF UPPER-OUTER QUADRANT OF LEFT BREAST IN FEMALE, ESTROGEN RECEPTOR POSITIVE (H): Primary | ICD-10-CM

## 2024-05-23 DIAGNOSIS — T45.1X5A ANEMIA DUE TO CHEMOTHERAPY: ICD-10-CM

## 2024-05-23 DIAGNOSIS — M85.9 DISORDER OF BONE DENSITY AND STRUCTURE, UNSPECIFIED: ICD-10-CM

## 2024-05-23 DIAGNOSIS — D64.81 ANEMIA DUE TO CHEMOTHERAPY: ICD-10-CM

## 2024-05-23 LAB
ALBUMIN SERPL BCG-MCNC: 4.1 G/DL (ref 3.5–5.2)
ALP SERPL-CCNC: 86 U/L (ref 40–150)
ALT SERPL W P-5'-P-CCNC: 31 U/L (ref 0–50)
ANION GAP SERPL CALCULATED.3IONS-SCNC: 8 MMOL/L (ref 7–15)
AST SERPL W P-5'-P-CCNC: 40 U/L (ref 0–45)
BASOPHILS # BLD AUTO: 0.1 10E3/UL (ref 0–0.2)
BASOPHILS NFR BLD AUTO: 1 %
BILIRUB SERPL-MCNC: 0.2 MG/DL
BUN SERPL-MCNC: 15.3 MG/DL (ref 6–20)
CALCIUM SERPL-MCNC: 9 MG/DL (ref 8.6–10)
CHLORIDE SERPL-SCNC: 110 MMOL/L (ref 98–107)
CREAT SERPL-MCNC: 1.11 MG/DL (ref 0.51–0.95)
DEPRECATED HCO3 PLAS-SCNC: 24 MMOL/L (ref 22–29)
EGFRCR SERPLBLD CKD-EPI 2021: 63 ML/MIN/1.73M2
EOSINOPHIL # BLD AUTO: 0 10E3/UL (ref 0–0.7)
EOSINOPHIL NFR BLD AUTO: 0 %
ERYTHROCYTE [DISTWIDTH] IN BLOOD BY AUTOMATED COUNT: 16.2 % (ref 10–15)
GLUCOSE SERPL-MCNC: 93 MG/DL (ref 70–99)
HCT VFR BLD AUTO: 33.6 % (ref 35–47)
HGB BLD-MCNC: 10.8 G/DL (ref 11.7–15.7)
IMM GRANULOCYTES # BLD: 0.1 10E3/UL
IMM GRANULOCYTES NFR BLD: 1 %
LYMPHOCYTES # BLD AUTO: 0.9 10E3/UL (ref 0.8–5.3)
LYMPHOCYTES NFR BLD AUTO: 12 %
MCH RBC QN AUTO: 28.5 PG (ref 26.5–33)
MCHC RBC AUTO-ENTMCNC: 32.1 G/DL (ref 31.5–36.5)
MCV RBC AUTO: 89 FL (ref 78–100)
MONOCYTES # BLD AUTO: 0.4 10E3/UL (ref 0–1.3)
MONOCYTES NFR BLD AUTO: 5 %
NEUTROPHILS # BLD AUTO: 6.2 10E3/UL (ref 1.6–8.3)
NEUTROPHILS NFR BLD AUTO: 81 %
NRBC # BLD AUTO: 0 10E3/UL
NRBC BLD AUTO-RTO: 1 /100
PLATELET # BLD AUTO: 172 10E3/UL (ref 150–450)
POTASSIUM SERPL-SCNC: 4 MMOL/L (ref 3.4–5.3)
PROT SERPL-MCNC: 6.2 G/DL (ref 6.4–8.3)
RBC # BLD AUTO: 3.79 10E6/UL (ref 3.8–5.2)
SODIUM SERPL-SCNC: 142 MMOL/L (ref 135–145)
WBC # BLD AUTO: 7.6 10E3/UL (ref 4–11)

## 2024-05-23 PROCEDURE — 36591 DRAW BLOOD OFF VENOUS DEVICE: CPT | Performed by: INTERNAL MEDICINE

## 2024-05-23 PROCEDURE — 99215 OFFICE O/P EST HI 40 MIN: CPT | Performed by: INTERNAL MEDICINE

## 2024-05-23 PROCEDURE — 99213 OFFICE O/P EST LOW 20 MIN: CPT | Performed by: INTERNAL MEDICINE

## 2024-05-23 PROCEDURE — 82040 ASSAY OF SERUM ALBUMIN: CPT | Performed by: INTERNAL MEDICINE

## 2024-05-23 PROCEDURE — 85041 AUTOMATED RBC COUNT: CPT | Performed by: INTERNAL MEDICINE

## 2024-05-23 PROCEDURE — G2211 COMPLEX E/M VISIT ADD ON: HCPCS | Performed by: INTERNAL MEDICINE

## 2024-05-23 ASSESSMENT — PAIN SCALES - GENERAL: PAINLEVEL: NO PAIN (0)

## 2024-05-23 NOTE — LETTER
5/23/2024         RE: Angelica Gomez  [address]  [facility] [address]        Dear Colleague,    Thank you for referring your patient, Angelica Gomez, to the [facility] [place]. Please see a copy of my visit note below.    [facility]    Hematology/Oncology Established Patient Note      Today's Date: 5/23/2024    Reason for visit: Left breast cancer.    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  12/7/2023: Due to worsening left upper outer breast pain, bilateral diagnostic mammogram performed. No mammographic abnormality in left upper outer breast site of symptoms but at 1:00, there is oval asymmetry measuring 0.7 cm. Remaining left breast and left axillary U/S showed morphologically normal lymph node and breast tissue.  At 1:30, 5 cm from nipple is irregular hypoechoic mass measuring 0.5 x 0.5 cm.  2. 12/15/2023: U/S-guided biopsy of left breast at 1:30 showed grade 2 invasive ductal carcinoma (3 mm) with associated grade 3 DCIS, no LVI; ER positive at 20%, NC positive at 20%, HER-2 negative with IHC = 1+.  3. 2/21/2024: Bilateral mastectomies with left axillary sentinel lymph node excision with Dr. Bean Phillips; bilateral implant removal. Pathology showed 1.2 cm grade 2 invasive ductal carcinoma with 1.4 cm grade 3 DCIS; margins negative; one left axillary SLN negative. Right breast benign. Repeat ER weakly positive at 61-70%, NC moderate positive at 71-80%. MammaPrint + BluePrint showed high  risk, Luminal B.  4. 3/25/2024: Started adjuvant chemotherapy with Taxotere and Cytoxan.    INTERVAL HISTORY:  Lisandra reports episode of extreme thirst, dysuria, palpitations, and shortness of breath on 5/15/2024. Her urinalysis was negative for infection or ketones. She was evaluated at [facility] with chest CT negative for PE, U/S negative for DVT, and head  CT negative.  She reports dyspnea has resolved. Headache mild this AM for which she took hydroxyzine.    REVIEW OF SYSTEMS:   14 point ROS was reviewed and is negative other than as noted above in HPI.       HOME MEDICATIONS:  Current Outpatient Medications   Medication Sig Dispense Refill     Calcium Carbonate-Vitamin D (CALCIUM 500 + D PO)        Cetirizine HCl (ZYRTEC ALLERGY PO) Take 10 mg by mouth daily       COMPRESSION STOCKINGS Please measure and distribute 2 pair of 20mmHg - 30mmHg thigh high open or closed toe compression stockings with extra refills as indicated. 2 each 4     Fluticasone Propionate (FLONASE NA) Spray 1 spray in nostril daily       hydrOXYzine HCl (ATARAX) 25 MG tablet Take 1-2 tablets (25-50 mg) by mouth 3 times daily as needed for anxiety or other (sleep) 90 tablet 1     ketoconazole (NIZORAL) 2 % external cream Apply topically daily 30 g 1     lidocaine-prilocaine (EMLA) 2.5-2.5 % external cream Apply to port 30-60 minutes prior to accessing it for treatment/labs. 30 g 1     LORazepam (ATIVAN) 0.5 MG tablet Take 1 tablet (0.5 mg) by mouth every 6 hours as needed for anxiety 30 tablet 0     methocarbamol (ROBAXIN) 750 MG tablet Take 1 tablet (750 mg) by mouth 3 times daily 30 tablet 0     Multiple Vitamins-Minerals (MULTIVITAMIN & MINERAL PO) Take 1 each by mouth daily. [facility] women's active supplement       ondansetron (ZOFRAN) 8 MG tablet Take 1 tablet (8 mg) by mouth every 8 hours as needed for nausea (vomiting) 30 tablet 2     oxyCODONE (ROXICODONE) 5 MG tablet Take 1 tablet (5 mg) by mouth every 4 hours as needed for pain 20 tablet 0     Probiotic Product (PROBIOTIC PO) Reported on 3/1/2017       prochlorperazine (COMPAZINE) 10 MG tablet Take 1 tablet (10 mg) by mouth every 6 hours as needed for nausea or vomiting 30 tablet 2     senna-docusate (SENOKOT-S/PERICOLACE) 8.6-50 MG tablet Take 1 tablet by mouth 2 times daily 30 tablet 0     SUMAtriptan (IMITREX) 25 MG tablet Take 1 tablet  (25 mg) by mouth at onset of headache for migraine May repeat in 2 hours. Max 8 tablets/24 hours. 18 tablet 4     tranexamic acid (LYSTEDA) 650 MG tablet Take 2 tablets (1,300 mg) by mouth 3 times daily 30 tablet 3     triamcinolone (KENALOG) 0.1 % external cream Apply topically 2 times daily 45 g 1     dexAMETHasone (DECADRON) 4 MG tablet Take 2 tablets (8 mg) by mouth 2 times daily (with meals) for 3 doses Start evening of Docetaxel infusion and continue for a total of 3 doses. 6 tablet 3         ALLERGIES:  No Known Allergies      PAST MEDICAL HISTORY:  Past Medical History:   Diagnosis Date     Malignant neoplasm of upper-outer quadrant of left breast in female, estrogen receptor positive (H) 2024     NO ACTIVE PROBLEMS    Menometrorrhagia.    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 Used IUD for 20 years.      PAST SURGICAL HISTORY:  Past Surgical History:   Procedure Laterality Date     COLONOSCOPY WITH CO2 INSUFFLATION N/A 2024    Procedure: Colonoscopy with CO2 insufflation;  Surgeon: Cherise Lima DO;  Location: [facility] OR     INSERT TISSUE EXPANDER BREAST BILATERAL Bilateral 2024    Procedure: Insertion tissue expander, breasts, bilateral;  Surgeon: Elisabet Venegas MD;  Location: [facility] OR     IR CHEST PORT PLACEMENT > 5 YRS OF AGE  2024     MASTECTOMY, NIPPLE SPARING Bilateral 2024    Procedure: Bilateral nipple sparing mastectomy, bilateral implant removal, left sentinel lymph node biopsy;  Surgeon: Aab Phillips MD;  Location:  OR     TUBAL LIGATION       [facility]  DELIVERY ONLY      superficial wound dehiscence   Bilateral breast augmentation in .      SOCIAL HISTORY:  Social History     Socioeconomic History     Marital status:      Spouse name: Not on file     Number of children: Not on file     Years of education: Not on file     Highest education level: Not on file    Occupational History     Not on file   Tobacco Use     Smoking status: Former     Current packs/day: 0.25     Average packs/day: 0.3 packs/day for 1 year (0.3 ttl pk-yrs)     Types: Cigarettes     Smokeless tobacco: Never   Vaping Use     Vaping status: Never Used   Substance and Sexual Activity     Alcohol use: Yes     Comment: 0-1     Drug use: No     Sexual activity: Yes     Partners: Male     Birth control/protection: Surgical, I.U.D., Female Surgical     Comment: Tubal ligation, 12/13/2007   Other Topics Concern     Parent/sibling w/ CABG, MI or angioplasty before 65F 55M? Yes     Comment: father 2 MIs   Social History Narrative    Caffeine intake/servings daily - 2-3 pop    Calcium intake/servings daily - 2 yogurts and 1 glass of milk    Exercise 6 times weekly - describe strength training and cardio    Sunscreen used - Yes    Seatbelts used - Yes    Guns stored in the home - No    Self Breast Exam - Yes    Pap test up to date -  Yes, as of today    Eye exam up to date -  Yes    Dental exam up to date -  Yes    DEXA scan up to date -  Not Applicable    Flex Sig/Colonoscopy up to date -  Not Applicable    Mammography up to date -  Not Applicable    Immunizations reviewed and up to date - Yes, 03/2008    Abuse: Current or Past (Physical, Sexual or Emotional) - No    Do you feel safe in your environment - Yes    Do you cope well with stress - Yes    Do you suffer from insomnia - No    Last updated by: Lit Licona  10/27/2009                 Social Determinants of Health     Financial Resource Strain: Low Risk  (2/1/2024)    Financial Resource Strain      Within the past 12 months, have you or your family members you live with been unable to get utilities (heat, electricity) when it was really needed?: No   Food Insecurity: Low Risk  (2/1/2024)    Food Insecurity      Within the past 12 months, did you worry that your food would run out before you got money to buy more?: No      Within the past 12 months, did the  food you bought just not last and you didn t have money to get more?: No   Transportation Needs: Low Risk  (2/1/2024)    Transportation Needs      Within the past 12 months, has lack of transportation kept you from medical appointments, getting your medicines, non-medical meetings or appointments, work, or from getting things that you need?: No   Physical Activity: Not on file   Stress: Not on file   Social Connections: Not on file   Interpersonal Safety: Low Risk  (2/5/2024)    Interpersonal Safety      Do you feel physically and emotionally safe where you currently live?: Yes      Within the past 12 months, have you been hit, slapped, kicked or otherwise physically hurt by someone?: No      Within the past 12 months, have you been humiliated or emotionally abused in other ways by your partner or ex-partner?: No   Housing Stability: Low Risk  (2/1/2024)    Housing Stability      Do you have housing? : Yes      Are you worried about losing your housing?: No   Has 2 grown children.  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      FAMILY HISTORY:  Family History   Problem Relation Age of Onset     Thyroid Disease Mother      Breast Cancer Mother 56        breast, 3 years later     Heart Disease Father         2x heart attacks     Circulatory Father         blood clots     Cardiovascular Father         patent foramen ovale, repaired x 2, afib     Cerebrovascular Disease Father         x2     C.A.D. Father         x2     Genitourinary Problems Father         kidney disease     Asthma Brother      Food Allergy Brother      Eczema Brother      No Known Problems Maternal Grandmother      Myocardial Infarction Maternal Grandfather      Hypertension Paternal Grandmother      Alcohol/Drug Paternal Grandfather      No Known Problems Daughter      No Known Problems Son      Cancer Paternal Aunt         cervical cancer(another sisiter)     Breast Cancer  Paternal Aunt         breast cancer at 70's     Breast Cancer Paternal Aunt      Ovarian Cancer Paternal Aunt      Cancer - colorectal No family hx of      Diabetes No family hx of          PHYSICAL EXAM:  Vital signs:  /83   Pulse 76   Temp 98.2  F (36.8  C) (Oral)   Resp 16   Wt 84.3 kg (185 lb 12.8 oz)   SpO2 100%   BMI 30.92 kg/m     ECO  GENERAL/CONSTITUTIONAL: No acute distress.  EYES: No erythema or scleral icterus.  ENT/MOUTH: Neck supple.  LYMPH: No cervical, supraclavicular, axillary adenopathy.   RESPIRATORY: Clear to auscultation bilaterally. No crackles or wheezing.   CARDIOVASCULAR: Regular rate and rhythm without murmurs.  GASTROINTESTINAL: No hepatosplenomegaly, masses, or tenderness. No guarding.  No distention.  MUSCULOSKELETAL: Warm and well-perfused, no cyanosis, clubbing, or edema.  NEUROLOGIC: No focal motor deficits. Alert, oriented, answers questions appropriately.  INTEGUMENTARY: No rashes or jaundice.  GAIT: Steady, does not use assistive device        LABS:  CBC RESULTS:   Recent Labs   Lab Test 24  1427   WBC 7.6   RBC 3.79*   HGB 10.8*   HCT 33.6*   MCV 89   MCH 28.5   MCHC 32.1   RDW 16.2*         Last Comprehensive Metabolic Panel:  Sodium   Date Value Ref Range Status   2024 140 135 - 145 mmol/L Final     Comment:     Reference intervals for this test were updated on 2023 to more accurately reflect our healthy population. There may be differences in the flagging of prior results with similar values performed with this method. Interpretation of those prior results can be made in the context of the updated reference intervals.    09/10/2012 140 133 - 144 mmol/L Final     Potassium   Date Value Ref Range Status   2024 4.2 3.4 - 5.3 mmol/L Final   2021 3.9 3.4 - 5.3 mmol/L Final   09/10/2012 4.2 3.4 - 5.3 mmol/L Final     Chloride   Date Value Ref Range Status   2024 108 (H) 98 - 107 mmol/L Final   2021 110 (H) 94 - 109  mmol/L Final   09/10/2012 104 94 - 109 mmol/L Final     Carbon Dioxide   Date Value Ref Range Status   09/10/2012 25 20 - 32 mmol/L Final     Carbon Dioxide (CO2)   Date Value Ref Range Status   05/21/2024 23 22 - 29 mmol/L Final   12/30/2021 27 20 - 32 mmol/L Final     Anion Gap   Date Value Ref Range Status   05/21/2024 9 7 - 15 mmol/L Final   12/30/2021 5 3 - 14 mmol/L Final   09/10/2012 11 6 - 17 mmol/L Final     Glucose   Date Value Ref Range Status   05/21/2024 105 (H) 70 - 99 mg/dL Final   12/30/2021 73 70 - 99 mg/dL Final   02/21/2017 94 70 - 99 mg/dL Final     Comment:     Fasting specimen     GLUCOSE BY METER POCT   Date Value Ref Range Status   02/22/2024 106 (H) 70 - 99 mg/dL Final     Urea Nitrogen   Date Value Ref Range Status   05/21/2024 18.5 6.0 - 20.0 mg/dL Final   12/30/2021 16 7 - 30 mg/dL Final   09/10/2012 12 5 - 24 mg/dL Final     Creatinine   Date Value Ref Range Status   05/21/2024 0.83 0.51 - 0.95 mg/dL Final   09/10/2012 0.81 0.52 - 1.04 mg/dL Final     GFR Estimate   Date Value Ref Range Status   05/21/2024 89 >60 mL/min/1.73m2 Final   09/10/2012 82 >60 mL/min/1.7m2 Final     Calcium   Date Value Ref Range Status   05/21/2024 9.0 8.6 - 10.0 mg/dL Final   09/10/2012 9.3 8.5 - 10.4 mg/dL Final     Bilirubin Total   Date Value Ref Range Status   05/21/2024 0.2 <=1.2 mg/dL Final   09/10/2012 0.6 0.2 - 1.3 mg/dL Final     Alkaline Phosphatase   Date Value Ref Range Status   05/21/2024 94 40 - 150 U/L Final   09/10/2012 53 40 - 150 U/L Final     ALT   Date Value Ref Range Status   05/21/2024 28 0 - 50 U/L Final     Comment:     Reference intervals for this test were updated on 6/12/2023 to more accurately reflect our healthy population. There may be differences in the flagging of prior results with similar values performed with this method. Interpretation of those prior results can be made in the context of the updated reference intervals.     09/10/2012 27 0 - 50 U/L Final     AST   Date Value  Ref Range Status   05/21/2024 35 0 - 45 U/L Final     Comment:     Reference intervals for this test were updated on 6/12/2023 to more accurately reflect our healthy population. There may be differences in the flagging of prior results with similar values performed with this method. Interpretation of those prior results can be made in the context of the updated reference intervals.   09/10/2012 81 (H) 0 - 45 U/L Final       IMAGING:  Reviewed as per HPI.    ASSESSMENT/PLAN:  Angelica Gomez is a 44 year old female with the following issues:  1. Stage IA, [id number], grade 2 invasive ductal carcinoma of the left upper outer breast, ER positive 20%, DC positive 20%, HER-2 negative, MammaPrint high risk, Luminal B  2. CHEK2 mutation  3. Menometrorrhagia  --Lisandra is s/p bilateral mastectomies with final pathology showed a left breast 1.2 cm tumor with repeat ER positive at 61-70%, DC positive at 71-80%, HER-2 negative (IHC = 1+)  --MammaPrint + BluePrint showed high risk, Luminal B subtype.  --She proceeded with adjuvant chemotherapy with Taxotere and Cytoxan.    --Reviewed her labs from today which showed Hgb 10.8, WBC 7.6, platelets 172,000.  --She may proceed with her last 4th cycle of TC on 5/28/2024.  --Following adjuvant chemo, I would also strongly recommend either tamoxifen or ovarian suppression therapy with Zoladex and aromatase inhibitor such as anastrozole to further reduce her risk of breast cancer recurrence for at least 5 years.  However, she has had menometrorrhagia and might favor Zoladex + AI in order to induce cessation of menstruation if she is able to tolerate possible menopause effects.  Will discuss again after chemo.  --She will continue with colonoscopy screening on high-risk basis.  --Will obtain DEXA scan baseline.    3. Anemia of chemotherapy  --Hgb 10.8  --Repeat CBC with next visit.    4. Fertility   --She is s/p tubal ligation and does not wish to have any more children.    5. Fluid  "retention  --Related to Taxotere.  --Will be self-limited and resolved after completion of chemo.  --Continue to monitor.    Return in 1 month with lab draw.    Magali Roland MD  [facility] Hematology/Oncology     Total time spent today: 40 minutes in chart review, patient evaluation, counseling, documentation, test and/or medication/prescription orders, and coordination of care.      The longitudinal plan of care for the diagnosis(es)/condition(s) as documented were addressed during this visit. Due to the added complexity in care, I will continue to support Lisandra in the subsequent management and with ongoing continuity of care.      Oncology Rooming Note    May 23, 2024 2:27 PM   Angelica Gomez is a 44 year old female who presents for:    Chief Complaint   Patient presents with     Oncology Clinic Visit     Initial Vitals: /83   Pulse 76   Temp 98.2  F (36.8  C) (Oral)   Resp 16   Wt 84.3 kg (185 lb 12.8 oz)   SpO2 100%   BMI 30.92 kg/m   Estimated body mass index is 30.92 kg/m  as calculated from the following:    Height as of 5/21/24: 1.651 m (5' 5\").    Weight as of this encounter: 84.3 kg (185 lb 12.8 oz). Body surface area is 1.97 meters squared.  No Pain (0) Comment: Data Unavailable   No LMP recorded.  Allergies reviewed: Yes  Medications reviewed: Yes    Medications: Medication refills not needed today.  Pharmacy name entered into [employer]: [facility] [place] - [place], [place] - [address] [place] [address].    Frailty Screening:   Is the patient here for a new oncology consult visit in cancer care? 2. No      Clinical concerns: Moderate heart enlargement on CT scan  Dr. Roland  was notified.      Shari J. Schoenberger, [facility]                Again, thank you for allowing me to participate in the care of your patient.        Sincerely,        Magali Roland MD  "

## 2024-05-23 NOTE — PROGRESS NOTES
"Oncology Rooming Note    May 23, 2024 2:27 PM   Angelica Gomez is a 44 year old female who presents for:    Chief Complaint   Patient presents with    Oncology Clinic Visit     Initial Vitals: /83   Pulse 76   Temp 98.2  F (36.8  C) (Oral)   Resp 16   Wt 84.3 kg (185 lb 12.8 oz)   SpO2 100%   BMI 30.92 kg/m   Estimated body mass index is 30.92 kg/m  as calculated from the following:    Height as of 5/21/24: 1.651 m (5' 5\").    Weight as of this encounter: 84.3 kg (185 lb 12.8 oz). Body surface area is 1.97 meters squared.  No Pain (0) Comment: Data Unavailable   No LMP recorded.  Allergies reviewed: Yes  Medications reviewed: Yes    Medications: Medication refills not needed today.  Pharmacy name entered into [employer]: [facility] - [place], [place] - [address] [place] [address].    Frailty Screening:   Is the patient here for a new oncology consult visit in cancer care? 2. No      Clinical concerns: Moderate heart enlargement on CT scan  Dr. Roland  was notified.      Shari J. Schoenberger, [facility]              "

## 2024-05-23 NOTE — PROGRESS NOTES
Medical Assistant Note:  Angelica Gomez presents today for blood draw.    Patient seen by provider today: Yes: kera.   present during visit today: Not Applicable.    Concerns: No Concerns.    Procedure:  Lab draw site: rac, Needle type: bf, Gauge: 23.    Post Assessment:  Labs drawn without difficulty: Yes.    Discharge Plan:  Departure Mode: Ambulatory.    Face to Face Time: 5 min.    Lulú Prabhakar, CMA

## 2024-05-23 NOTE — PATIENT INSTRUCTIONS
Omit 6/17/2024 infusion appt.  Arrange for DEXA scan.  Arrange for IR port removal after 5/28.  RTC MD in 1 month with lab draw.  
FREE:[LAST:[Your PMD],PHONE:[(   )    -],FAX:[(   )    -]]

## 2024-05-24 RX ORDER — ONDANSETRON 2 MG/ML
8 INJECTION INTRAMUSCULAR; INTRAVENOUS ONCE
Status: CANCELLED | OUTPATIENT
Start: 2024-05-28

## 2024-05-24 RX ORDER — ALBUTEROL SULFATE 90 UG/1
1-2 AEROSOL, METERED RESPIRATORY (INHALATION)
Status: CANCELLED
Start: 2024-05-27

## 2024-05-24 RX ORDER — MEPERIDINE HYDROCHLORIDE 25 MG/ML
25 INJECTION INTRAMUSCULAR; INTRAVENOUS; SUBCUTANEOUS EVERY 30 MIN PRN
Status: CANCELLED | OUTPATIENT
Start: 2024-05-27

## 2024-05-24 RX ORDER — EPINEPHRINE 1 MG/ML
0.3 INJECTION, SOLUTION, CONCENTRATE INTRAVENOUS EVERY 5 MIN PRN
Status: CANCELLED | OUTPATIENT
Start: 2024-05-27

## 2024-05-24 RX ORDER — HEPARIN SODIUM,PORCINE 10 UNIT/ML
5-20 VIAL (ML) INTRAVENOUS DAILY PRN
Status: CANCELLED | OUTPATIENT
Start: 2024-05-27

## 2024-05-24 RX ORDER — DIPHENHYDRAMINE HYDROCHLORIDE 50 MG/ML
50 INJECTION INTRAMUSCULAR; INTRAVENOUS
Status: CANCELLED
Start: 2024-05-27

## 2024-05-24 RX ORDER — ALBUTEROL SULFATE 0.83 MG/ML
2.5 SOLUTION RESPIRATORY (INHALATION)
Status: CANCELLED | OUTPATIENT
Start: 2024-05-27

## 2024-05-24 RX ORDER — LORAZEPAM 2 MG/ML
0.5 INJECTION INTRAMUSCULAR EVERY 4 HOURS PRN
Status: CANCELLED | OUTPATIENT
Start: 2024-05-27

## 2024-05-24 RX ORDER — HEPARIN SODIUM (PORCINE) LOCK FLUSH IV SOLN 100 UNIT/ML 100 UNIT/ML
5 SOLUTION INTRAVENOUS
Status: CANCELLED | OUTPATIENT
Start: 2024-05-27

## 2024-05-28 ENCOUNTER — INFUSION THERAPY VISIT (OUTPATIENT)
Dept: INFUSION THERAPY | Facility: CLINIC | Age: 44
End: 2024-05-28
Attending: INTERNAL MEDICINE
Payer: COMMERCIAL

## 2024-05-28 VITALS
BODY MASS INDEX: 30.69 KG/M2 | OXYGEN SATURATION: 100 % | HEART RATE: 76 BPM | TEMPERATURE: 98.1 F | RESPIRATION RATE: 20 BRPM | DIASTOLIC BLOOD PRESSURE: 84 MMHG | SYSTOLIC BLOOD PRESSURE: 133 MMHG | WEIGHT: 184.4 LBS

## 2024-05-28 DIAGNOSIS — C50.412 MALIGNANT NEOPLASM OF UPPER-OUTER QUADRANT OF LEFT BREAST IN FEMALE, ESTROGEN RECEPTOR POSITIVE (H): Primary | ICD-10-CM

## 2024-05-28 DIAGNOSIS — Z17.0 MALIGNANT NEOPLASM OF UPPER-OUTER QUADRANT OF LEFT BREAST IN FEMALE, ESTROGEN RECEPTOR POSITIVE (H): Primary | ICD-10-CM

## 2024-05-28 PROCEDURE — 96375 TX/PRO/DX INJ NEW DRUG ADDON: CPT

## 2024-05-28 PROCEDURE — 96372 THER/PROPH/DIAG INJ SC/IM: CPT | Performed by: INTERNAL MEDICINE

## 2024-05-28 PROCEDURE — 96413 CHEMO IV INFUSION 1 HR: CPT

## 2024-05-28 PROCEDURE — 96417 CHEMO IV INFUS EACH ADDL SEQ: CPT

## 2024-05-28 PROCEDURE — 258N000003 HC RX IP 258 OP 636: Performed by: INTERNAL MEDICINE

## 2024-05-28 PROCEDURE — 250N000011 HC RX IP 250 OP 636: Performed by: INTERNAL MEDICINE

## 2024-05-28 PROCEDURE — 96367 TX/PROPH/DG ADDL SEQ IV INF: CPT

## 2024-05-28 PROCEDURE — 96377 APPLICATON ON-BODY INJECTOR: CPT | Mod: XS

## 2024-05-28 RX ORDER — ONDANSETRON 2 MG/ML
8 INJECTION INTRAMUSCULAR; INTRAVENOUS ONCE
Status: COMPLETED | OUTPATIENT
Start: 2024-05-28 | End: 2024-05-28

## 2024-05-28 RX ORDER — HEPARIN SODIUM (PORCINE) LOCK FLUSH IV SOLN 100 UNIT/ML 100 UNIT/ML
5 SOLUTION INTRAVENOUS
Status: DISCONTINUED | OUTPATIENT
Start: 2024-05-28 | End: 2024-05-28 | Stop reason: HOSPADM

## 2024-05-28 RX ADMIN — PEGFILGRASTIM 6 MG: KIT SUBCUTANEOUS at 13:00

## 2024-05-28 RX ADMIN — ONDANSETRON 8 MG: 2 INJECTION INTRAMUSCULAR; INTRAVENOUS at 11:09

## 2024-05-28 RX ADMIN — Medication 5 ML: at 13:15

## 2024-05-28 RX ADMIN — SODIUM CHLORIDE 146 MG: 9 INJECTION, SOLUTION INTRAVENOUS at 11:43

## 2024-05-28 RX ADMIN — CYCLOPHOSPHAMIDE 1170 MG: 1 INJECTION, POWDER, FOR SOLUTION INTRAVENOUS; ORAL at 12:44

## 2024-05-28 RX ADMIN — FOSAPREPITANT: 150 INJECTION, POWDER, LYOPHILIZED, FOR SOLUTION INTRAVENOUS at 11:13

## 2024-05-28 RX ADMIN — SODIUM CHLORIDE 250 ML: 9 INJECTION, SOLUTION INTRAVENOUS at 11:09

## 2024-05-28 ASSESSMENT — PAIN SCALES - GENERAL: PAINLEVEL: NO PAIN (0)

## 2024-05-28 NOTE — PROGRESS NOTES
Infusion Nursing Note:  Angelica GUPTA Jason presents today for Cycle 4 Day 1 Taxotere, Cytoxan.    Patient seen by provider today: No   present during visit today: Not Applicable.    Note: N/A.      Intravenous Access:  Implanted Port.    Treatment Conditions:  Lab Results   Component Value Date    HGB 10.8 (L) 05/23/2024    WBC 7.6 05/23/2024    ANEU 33.7 (H) 05/15/2024    ANEUTAUTO 6.2 05/23/2024     05/23/2024        Lab Results   Component Value Date     05/23/2024    POTASSIUM 4.0 05/23/2024    CR 1.11 (H) 05/23/2024    CYRUS 9.0 05/23/2024    BILITOTAL 0.2 05/23/2024    ALBUMIN 4.1 05/23/2024    ALT 31 05/23/2024    AST 40 05/23/2024       Results reviewed, labs MET treatment parameters, ok to proceed with treatment.      Post Infusion Assessment:  Patient tolerated infusion without incident.  Blood return noted pre and post infusion.  Site patent and intact, free from redness, edema or discomfort.  No evidence of extravasations.  Access discontinued per protocol.     ONPRO  Was placed on patient's: back of left arm.    Was placed at 13:00 PM    Podpal used: Yes    ONPRO injector device Lot number: [id number]    Patient education included: what patient can expect after application, what colored lights mean on the device, when to remove device, when and where to call with questions or issues, all patients questions answered, and that Neulasta administration will occur at 4:00 pm on 5/29/24.    Patient tolerated administration well.       Discharge Plan:   Patient declined prescription refills.  Discharge instructions reviewed with: Patient.  Patient verbalized understanding of discharge instructions and all questions answered.  AVS to patient via [employer].  Patient will return as scheduled for next appointment.   Patient discharged in stable condition accompanied by: self.  Departure Mode: Ambulatory.      Angelica Salgado RN

## 2024-05-29 ENCOUNTER — TELEPHONE (OUTPATIENT)
Dept: ONCOLOGY | Facility: CLINIC | Age: 44
End: 2024-05-29
Payer: COMMERCIAL

## 2024-05-29 NOTE — TELEPHONE ENCOUNTER
Patient responded to MyChart Care Companion reporting occasional moderate pain, dry mouth, constipation, and fatigue.  Called patient for more information, left message to call back.     Ellie Vega RN, BSN, PHN, OCN  [facility]

## 2024-05-30 ENCOUNTER — MYC REFILL (OUTPATIENT)
Dept: FAMILY MEDICINE | Facility: CLINIC | Age: 44
End: 2024-05-30

## 2024-05-30 DIAGNOSIS — F51.02 ADJUSTMENT INSOMNIA: ICD-10-CM

## 2024-05-30 NOTE — TELEPHONE ENCOUNTER
Called patient, states her sx have improved. She was struggling with migraines last week and after fluids and meds she feels much better. No sx, questions, or concerns today. Encouraged her to call back if anything comes up. Patient agreed.     Ellie Vega, RN, BSN, PHN, OCN  [facility]

## 2024-05-31 RX ORDER — LORAZEPAM 0.5 MG/1
0.5 TABLET ORAL EVERY 6 HOURS PRN
Qty: 30 TABLET | Refills: 0 | Status: SHIPPED | OUTPATIENT
Start: 2024-05-31 | End: 2024-06-25

## 2024-06-03 ENCOUNTER — THERAPY VISIT (OUTPATIENT)
Dept: OCCUPATIONAL THERAPY | Facility: CLINIC | Age: 44
End: 2024-06-03
Attending: SURGERY
Payer: COMMERCIAL

## 2024-06-03 DIAGNOSIS — C50.912 INVASIVE DUCTAL CARCINOMA OF BREAST, FEMALE, LEFT (H): Primary | ICD-10-CM

## 2024-06-03 DIAGNOSIS — Z90.12 STATUS POST LEFT MASTECTOMY: ICD-10-CM

## 2024-06-03 PROCEDURE — 97140 MANUAL THERAPY 1/> REGIONS: CPT | Mod: GO

## 2024-06-04 ENCOUNTER — VIRTUAL VISIT (OUTPATIENT)
Dept: PSYCHOLOGY | Facility: CLINIC | Age: 44
End: 2024-06-04
Payer: COMMERCIAL

## 2024-06-04 ENCOUNTER — TELEPHONE (OUTPATIENT)
Dept: ONCOLOGY | Facility: CLINIC | Age: 44
End: 2024-06-04
Payer: COMMERCIAL

## 2024-06-04 DIAGNOSIS — F43.23 ADJUSTMENT DISORDER WITH MIXED ANXIETY AND DEPRESSED MOOD: Primary | ICD-10-CM

## 2024-06-04 PROCEDURE — 90834 PSYTX W PT 45 MINUTES: CPT | Mod: 95

## 2024-06-04 NOTE — TELEPHONE ENCOUNTER
Pt completed Care Companion questionnaire and reported the following symptoms: nausea, moderate shortness of breath, numbness/tingling, occasional pain, severe fatigue.    Attempted to reach pt, but she did not answer.  Left message for pt to return call.    Coco Hui RN on 6/4/2024 at 9:52 AM

## 2024-06-04 NOTE — PROGRESS NOTES
[facility]                                     Progress Note    Patient Name: Angelica Gomez  Date: 24           Service Type: Individual      Session Start Time: 9:10am  Session End Time: 9:52am     Session Length: 42 minutes    Session #: 9    Attendees: Client attended alone    Service Modality:  Video Visit:      Provider verified identity through the following two step process.  Patient provided:  Patient  and Patient address    Telemedicine Visit: The patient's condition can be safely assessed and treated via synchronous audio and visual telemedicine encounter.      Reason for Telemedicine Visit: Patient has requested telehealth visit    Originating Site (Patient Location): Patient's home    Distant Site (Provider Location): Provider Remote Setting- Home Office    Consent:  The patient/guardian has verbally consented to: the potential risks and benefits of telemedicine (video visit) versus in person care; bill my insurance or make self-payment for services provided; and responsibility for payment of non-covered services.     Patient would like the video invitation sent by:  My Chart    Mode of Communication:  Video Conference via Amwell    Distant Location (Provider):  Off-site    As the provider I attest to compliance with applicable laws and regulations related to telemedicine.    DATA  Interactive Complexity: No  Crisis: No        Progress Since Last Session (Related to Symptoms / Goals / Homework):   Symptoms: Worsening some irritability    Homework: Achieved / completed to satisfaction      Episode of Care Goals: Satisfactory progress - PREPARATION (Decided to change - considering how); Intervened by negotiating a change plan and determining options / strategies for behavior change, identifying triggers, exploring social supports, and working towards setting a date to begin behavior change     Current / Ongoing Stressors and Concerns:   Pt shared that she completed her  last round of chemo. She has continued to have some symptoms and had to go to the ER last week for symptoms. She endorsed some anxiety about potential causes of these symptoms at the time but relief in finding out that nothing serious was going on. She plans to get reconstructive surgery at the end of July. Will be making a decision about hormone therapy soon and feels she has done adequate research to feel confident in her decision. She shared excitement that her daughter got into [employer] but some trepidation in how to support her without micro-managing her.         Treatment Objective(s) Addressed in This Session:   use at least 3 coping skills for anxiety management in the next 12 weeks  Increase interest, engagement, and pleasure in doing things  Process grief related cancer diagnosis and treatment           Intervention:   CBT: mindfulness  Emotion Focused Therapy: identify and process emotinos  Interpersonal Therapy: assertive communication  Solution Focused: pros and cons    Assessments completed prior to visit:  The following assessments were completed by patient for this visit:  PHQ9:       2/12/2024     7:40 AM   PHQ-9 SCORE   PHQ-9 Total Score MyChart 3 (Minimal depression)   PHQ-9 Total Score 3     GAD7:        No data to display              CAGE-AID:       2/12/2024     8:09 AM   CAGE-AID Total Score   Total Score 0   Total Score MyChart 0 (A total score of 2 or greater is considered clinically significant)     PROMIS 10-Global Health (only subscores and total score):       2/12/2024     8:09 AM 5/17/2024     9:01 PM 5/30/2024     9:51 AM   PROMIS-10 Scores Only   Global Mental Health Score 15 14 15   Global Physical Health Score 16 15 15   PROMIS TOTAL - SUBSCORES 31 29 30     [place] Suicide Severity Rating Scale (Lifetime/Recent)      2/12/2024     9:59 AM 2/21/2024    11:37 AM 5/21/2024     5:50 PM   [place] Suicide Severity Rating (Lifetime/Recent)   Q1 Wished to be Dead (Past Month)  0-->no 0-->no    Q2 Suicidal Thoughts (Past Month)  0-->no 0-->no   Q6 Suicide Behavior (Lifetime)  0-->no 0-->no   Level of Risk per Screen  no risks indicated no risks indicated   Q1 Wish to be Dead (Lifetime) N     Q2 Non-Specific Active Suicidal Thoughts (Lifetime) N     Actual Attempt (Lifetime) N     Has subject engaged in non-suicidal self-injurious behavior? (Lifetime) N     Interrupted Attempts (Lifetime) N     Aborted or Self-Interrupted Attempt (Lifetime) N     Preparatory Acts or Behavior (Lifetime) N     Calculated C-SSRS Risk Score (Lifetime/Recent) No Risk Indicated           ASSESSMENT: Current Emotional / Mental Status (status of significant symptoms):   Risk status (Self / Other harm or suicidal ideation)   Patient denies current fears or concerns for personal safety.   Patient denies current or recent suicidal ideation or behaviors.   Patient denies current or recent homicidal ideation or behaviors.   Patient denies current or recent self injurious behavior or ideation.   Patient denies other safety concerns.   Patient reports there has been no change in risk factors since their last session.     Patient reports there has been no change in protective factors since their last session.     Recommended that patient call 911 or go to the local ED should there be a change in any of these risk factors.     Appearance:   Appropriate    Eye Contact:   Good    Psychomotor Behavior: Normal    Attitude:   Cooperative    Orientation:   All   Speech    Rate / Production: Normal/ Responsive    Volume:  Normal    Mood:    Mild anxiety   Affect:    Appropriate    Thought Content:  Clear    Thought Form:  Coherent    Insight:    Good      Medication Review:   No current psychiatric medications prescribed     Medication Compliance:   NA     Changes in Health Issues:   None reported     Chemical Use Review:   Substance Use: Chemical use reviewed, no active concerns identified      Tobacco Use: No current tobacco use.       Diagnosis:  Adjustment Disorder with Anxiety and Depression    Collateral Reports Completed:   Not Applicable    PLAN: (Patient Tasks / Therapist Tasks / Other)  Pt and writer will continue to meet on a bi-weekly basis. Next appointment 6/4  Pt will continue to find balance between supporting and micro managing her daughter.   Pt will continue to find balance between rest and pushing herself to engage in activities.     GT Palencia    This note has been reviewed and I agree with the plan of care. This note is co-signed by JESÚS Meyers, [facility], Supervisor, on: June 5, 2024  ----------------------------------------------------------------------------------  Individual Treatment Plan    Patient's Name: Angelica Gomez  YOB: 1980    Date of Creation: 3/26/24  Date Treatment Plan Last Reviewed/Revised: 3/26/24    DSM5 Diagnoses: Adjustment Disorders  309.28 (F43.23) With mixed anxiety and depressed mood  Psychosocial / Contextual Factors: going through breast cancer treatment   PROMIS (reviewed every 90 days):   Global Mental Health Score (P) 15   Global Physical Health Score (P) 16   PROMIS TOTAL - SUBSCORES (P) 31     Referral / Collaboration:  Referral to another professional/service is not indicated at this time..    Anticipated number of session for this episode of care: 6-9 sessions  Anticipation frequency of session: Biweekly  Anticipated Duration of each session: 38-52 minutes  Treatment plan will be reviewed in 90 days or when goals have been changed.       MeasurableTreatment Goal(s) related to diagnosis / functional impairment(s)  Goal 1: Patient will experience a reduction in anxiety as evidenced by reduction in ENEDINA 2 score from 1 to 0    I will know I've met my goal when I am able to process anxiety related to diagnosis.      Objective #A (Patient Action)    Patient will use at least 3 coping skills for anxiety management in the next 12 weeks.  Status: New - Date:  3/26/24      Intervention(s)  Therapist will teach  grounding, mindfulness, emotion regulation .    Objective #B  Patient will use cognitive strategies identified in therapy to challenge anxious thoughts.  Status: New - Date: 3/26/24      Intervention(s)  Therapist will teach staying in the present moment, challenging catastrophic thinking .    Objective #C  Patient will process fears related to cancer diagnosis   Status: New - Date: 3/26/24      Intervention(s)  Therapist will provide supportive and nonjudgemental space for processing      Goal 2: Patient will experience reduction in depressive symptoms as evidenced by stability in or reduction of PhQ9 score from 3 to less than 3 and self report of improved mood.    I will know I've met my goal when I am able to process sadness related to the diagnosis.      Objective #A (Patient Action)    Status: New - Date: 3/26/24      Patient will Increase interest, engagement, and pleasure in doing things.    Intervention(s)  Therapist will  discuss behavioral activation, ways patient can remain active and social .    Objective #B  Patient will  process grief related to diagnosis  .    Status: New - Date: 3/26/24      Intervention(s)  Therapist will  teach stages of grief, loss/changes of identity .        Patient has reviewed and agreed to the above plan.      GT Palencia  March 26, 2024    This note has been reviewed and I agree with the plan of care. This note is co-signed by JESÚS Meyers, [facility], Supervisor, on: March 26, 2024

## 2024-06-04 NOTE — TELEPHONE ENCOUNTER
"Pt returned call to clinic to discuss survey results.   She completed her final cycle of taxotere/cytoxan on 5/28/2024 and is scheduled for follow up with Dr Roland on 6/25/2024.    Pt states that her new symptom is neuropathy. She has numbness and tingling mainly in her right hand. This started yesterday. Denies redness, warmth, swelling, or pain in her right hand or arm. Neuropathy has not been impacting her day to day activities. Rates neuropathy symptoms at 2/10. Advised pt to continue to monitor and to contact the clinic if she notices visible changes in her hand or if symptoms impact her day to day activities.  Pt states that her nausea has been more severe after cycle 3 and 4. She has been taking zofran during the daytime and compazine at night which does provide relief. She has not had any vomiting. Also reports increase in acid reflux and feeling \"urpy\". She has been taking TUMS but this does not provide much relief. Pt states that she has taken famotidine in the past with acid reflux symptoms. Writer advised pt that she could try famotidine again to see if this helps with reflux symptoms and also may help to decrease some nausea. She continues to drink about 80 ounces of water daily. Denies signs of dehydration. States that she does have some dizziness with position changes but this improves when she does slow position changes and allows her body time to adjust.  Pt reports shortness of breath only with activity. Not at rest. States that she discussed this with Dr Roland at her last appt. Pt also was seen in ER on 5/21/2024 and had CT chest PE study done which was negative for PE. Pt denies any chest pain or palpitation. She states that Dr Roland felt that shortness of breath with activity was related to pt's decline in hemoglobin while undergoing chemo. Pt will continue to monitor.   Pt states that overall her migraine headaches have been improving. States that she has only had one migraine since her " infusion last week. She also had CT head done on 5/21/2024 while in the ER that was negative for any masses, infarct, or bleeding. Her body aches from the neulasta have been managed with zyrtec and occasional ibuprofen and have been improving.   Pt states that she continues to have fatigue after cycle 4. Pt states that even though she has been listening to her body and taking extra rest periods, she never feels completely rested. Writer advised pt to continue to listen to her body and take rest periods as needed. Also encouraged mild activity/walking when she feels up to it. Focus on good fluid intake and good nutrition. Pt states that her taste has changed and many foods do not taste good. She also reports metallic taste at times. Reviewed home care advice for taste changes with pt per protocol, including use of plastic utensils to see if this helps decrease metallic taste.    Patient verbalized understanding and agreement with plan.  Patient was instructed to call the clinic with any questions, concerns, or worsening symptoms.    Will route update to care team.    Coco Hui RN on 6/4/2024 at 11:52 AM

## 2024-06-12 ENCOUNTER — TELEPHONE (OUTPATIENT)
Dept: ONCOLOGY | Facility: CLINIC | Age: 44
End: 2024-06-12

## 2024-06-12 ENCOUNTER — ANCILLARY PROCEDURE (OUTPATIENT)
Dept: BONE DENSITY | Facility: CLINIC | Age: 44
End: 2024-06-12
Attending: INTERNAL MEDICINE
Payer: COMMERCIAL

## 2024-06-12 DIAGNOSIS — C50.412 MALIGNANT NEOPLASM OF UPPER-OUTER QUADRANT OF LEFT BREAST IN FEMALE, ESTROGEN RECEPTOR POSITIVE (H): ICD-10-CM

## 2024-06-12 DIAGNOSIS — Z17.0 MALIGNANT NEOPLASM OF UPPER-OUTER QUADRANT OF LEFT BREAST IN FEMALE, ESTROGEN RECEPTOR POSITIVE (H): ICD-10-CM

## 2024-06-12 DIAGNOSIS — M85.9 DISORDER OF BONE DENSITY AND STRUCTURE, UNSPECIFIED: ICD-10-CM

## 2024-06-12 PROCEDURE — 77080 DXA BONE DENSITY AXIAL: CPT | Performed by: RADIOLOGY

## 2024-06-12 NOTE — TELEPHONE ENCOUNTER
Called patient to assess sx, she is currently at an appointment and will call back once finished.     Ellie Vega RN, BSN, PHN, OCN  [facility]

## 2024-06-13 RX ORDER — ACETAMINOPHEN 325 MG/1
650 TABLET ORAL
Status: CANCELLED | OUTPATIENT
Start: 2024-06-13

## 2024-06-13 NOTE — TELEPHONE ENCOUNTER
Writer called patient, left a message to call back.    Ellie Vega RN, BSN, PHN  [facility]

## 2024-06-14 ENCOUNTER — APPOINTMENT (OUTPATIENT)
Dept: INTERVENTIONAL RADIOLOGY/VASCULAR | Facility: CLINIC | Age: 44
End: 2024-06-14
Attending: INTERNAL MEDICINE
Payer: COMMERCIAL

## 2024-06-14 ENCOUNTER — HOSPITAL ENCOUNTER (OUTPATIENT)
Facility: CLINIC | Age: 44
Discharge: HOME | End: 2024-06-14
Admitting: RADIOLOGY
Payer: COMMERCIAL

## 2024-06-14 VITALS
RESPIRATION RATE: 16 BRPM | HEART RATE: 62 BPM | OXYGEN SATURATION: 100 % | TEMPERATURE: 97.8 F | SYSTOLIC BLOOD PRESSURE: 112 MMHG | DIASTOLIC BLOOD PRESSURE: 61 MMHG

## 2024-06-14 DIAGNOSIS — Z17.0 MALIGNANT NEOPLASM OF UPPER-OUTER QUADRANT OF LEFT BREAST IN FEMALE, ESTROGEN RECEPTOR POSITIVE (H): ICD-10-CM

## 2024-06-14 DIAGNOSIS — C50.412 MALIGNANT NEOPLASM OF UPPER-OUTER QUADRANT OF LEFT BREAST IN FEMALE, ESTROGEN RECEPTOR POSITIVE (H): ICD-10-CM

## 2024-06-14 PROCEDURE — 999N000163 HC STATISTIC SIMPLE TUBE INSERTION/CHARGE, PORT, CATH, FISTULOGRAM

## 2024-06-14 PROCEDURE — 250N000009 HC RX 250: Performed by: NURSE PRACTITIONER

## 2024-06-14 PROCEDURE — 250N000011 HC RX IP 250 OP 636: Mod: JZ | Performed by: NURSE PRACTITIONER

## 2024-06-14 PROCEDURE — 99152 MOD SED SAME PHYS/QHP 5/>YRS: CPT

## 2024-06-14 PROCEDURE — 36590 REMOVAL TUNNELED CV CATH: CPT

## 2024-06-14 RX ORDER — NALOXONE HYDROCHLORIDE 0.4 MG/ML
0.2 INJECTION, SOLUTION INTRAMUSCULAR; INTRAVENOUS; SUBCUTANEOUS
Status: DISCONTINUED | OUTPATIENT
Start: 2024-06-14 | End: 2024-06-14 | Stop reason: HOSPADM

## 2024-06-14 RX ORDER — ACETAMINOPHEN 325 MG/1
325 TABLET ORAL
Status: DISCONTINUED | OUTPATIENT
Start: 2024-06-14 | End: 2024-06-14 | Stop reason: HOSPADM

## 2024-06-14 RX ORDER — NALOXONE HYDROCHLORIDE 0.4 MG/ML
0.4 INJECTION, SOLUTION INTRAMUSCULAR; INTRAVENOUS; SUBCUTANEOUS
Status: DISCONTINUED | OUTPATIENT
Start: 2024-06-14 | End: 2024-06-14 | Stop reason: HOSPADM

## 2024-06-14 RX ORDER — FENTANYL CITRATE 50 UG/ML
25-50 INJECTION, SOLUTION INTRAMUSCULAR; INTRAVENOUS EVERY 5 MIN PRN
Status: DISCONTINUED | OUTPATIENT
Start: 2024-06-14 | End: 2024-06-14 | Stop reason: HOSPADM

## 2024-06-14 RX ORDER — HEPARIN SODIUM,PORCINE 10 UNIT/ML
5-10 VIAL (ML) INTRAVENOUS EVERY 24 HOURS
Status: DISCONTINUED | OUTPATIENT
Start: 2024-06-14 | End: 2024-06-14 | Stop reason: HOSPADM

## 2024-06-14 RX ORDER — FLUMAZENIL 0.1 MG/ML
0.2 INJECTION, SOLUTION INTRAVENOUS
Status: DISCONTINUED | OUTPATIENT
Start: 2024-06-14 | End: 2024-06-14 | Stop reason: HOSPADM

## 2024-06-14 RX ORDER — HEPARIN SODIUM (PORCINE) LOCK FLUSH IV SOLN 100 UNIT/ML 100 UNIT/ML
5-10 SOLUTION INTRAVENOUS
Status: DISCONTINUED | OUTPATIENT
Start: 2024-06-14 | End: 2024-06-14 | Stop reason: HOSPADM

## 2024-06-14 RX ORDER — HEPARIN SODIUM,PORCINE 10 UNIT/ML
5-10 VIAL (ML) INTRAVENOUS
Status: DISCONTINUED | OUTPATIENT
Start: 2024-06-14 | End: 2024-06-14 | Stop reason: HOSPADM

## 2024-06-14 RX ADMIN — FENTANYL CITRATE 50 MCG: 50 INJECTION, SOLUTION INTRAMUSCULAR; INTRAVENOUS at 12:49

## 2024-06-14 RX ADMIN — SODIUM CHLORIDE 1 BAG: 9 INJECTION, SOLUTION INTRAVENOUS at 12:45

## 2024-06-14 RX ADMIN — MIDAZOLAM 1.5 MG: 1 INJECTION INTRAMUSCULAR; INTRAVENOUS at 12:39

## 2024-06-14 RX ADMIN — FENTANYL CITRATE 50 MCG: 50 INJECTION, SOLUTION INTRAMUSCULAR; INTRAVENOUS at 12:44

## 2024-06-14 RX ADMIN — MIDAZOLAM 0.5 MG: 1 INJECTION INTRAMUSCULAR; INTRAVENOUS at 12:45

## 2024-06-14 ASSESSMENT — ACTIVITIES OF DAILY LIVING (ADL)
ADLS_ACUITY_SCORE: 35

## 2024-06-14 NOTE — DISCHARGE INSTRUCTIONS
Port Removal Discharge Instructions     After you go home:    Have an adult stay with you for the first 6 hours  You may resume your normal diet       For 24 hours - due to the sedation you received:  Relax and take it easy  Do NOT make any important or legal decisions  Do NOT drive or operate machines at home or at work  Do NOT drink alcohol    Care of Puncture Site:    Keep the dressings on your sites clean & dry for 24-48 hours. Change it only if it gets wet or dirty.  You may take a shower 24-48 hours after your procedure. Do not scrub site.  No submerging site for 2 weeks or until the incision is completely healed.  Do not remove the small white strips of tape, if present. Allow them to fall off on their own.   You may cover the wound with a bandaid if needed for comfort.      Activity     Avoid heavy lifting (greater than 10 pounds) or the overuse of your shoulder for 48-72 hours.    Bleeding:    If you start bleeding from the incision site in your chest or have swelling in your neck, sit down and press on the site for 5-10 minutes.   If bleeding has not stopped after 10 minutes, call your provider.        Call 911 right away if you have heavy bleeding or bleeding that does not stop.      Medicines:    You may resume all medications  For minor pain, you may take Acetaminophen (Tylenol) or Ibuprofen (Advil)          Call the provider who ordered this procedure if:    You have swelling in your neck or over your port site  The incision area is red, swollen, hot or tender  You have chills or a fever greater than 101 F (38 C)  Any questions or concerns    Call  911 or go to the Emergency Room if you have:    Severe chest pain or trouble breathing  Bleeding that you cannot control    If you have questions call:          [facility] Radiology Dept @ [phone]    Or you can contact your provider via My Chart

## 2024-06-14 NOTE — IR NOTE
Interventional Radiology Intra-procedural Nursing Note    Patient Name: Angelica Gomez  Medical Record Number: [id number]  Today's Date: June 14, 2024    Procedure: Right port a catheter removal with moderate sedation     Start time: 1244  End time: 1255  Report provided to: [id number] RN      Note: Patient entered Interventional Radiology Suite number 1 via cart. Patient awake, alert and oriented. Assisted onto procedural table in Supine position. Prepped and draped.  Dr. Jimenez in room. Time out and procedure started. Vital signs stable. Telemetry reading NSR.    Procedure well tolerated by patient without complications. Procedure end with debrief by Dr. Jimenez.  Pressure held until hemostasis achieved. Sutures, Dermabond,  Steristrips dressing applied to Right port removal Site.    Administered medication totals:    Lidocaine with Epinephrine 9 mL Intradermal    Versed 2 mg IVP  Fentanyl 100 mcg IVP    Last dose of sedation administered at 1245.

## 2024-06-14 NOTE — PROGRESS NOTES
Care Suites Admission Nursing Note    Patient Information  Name: Angelica Gomez  Age: 44 year old  Reason for admission: port removal  Care Suites arrival time: 1110    Visitor Information  Name: Simone avitia     Patient Admission/Assessment   Pre-procedure assessment complete: Yes  If abnormal assessment/labs, provider notified: N/A  NPO: Yes  Medications held per instructions/orders: Yes  Consent: obtained  If applicable, pregnancy test status: declined  Patient oriented to room: Yes  Education/questions answered: Yes  Plan/other: proceed as planned    Discharge Planning  Discharge name/phone number: Simone avitia on board   Overnight post sedation caregiver: same    Discharge location: home    Isabel Guajardo RN

## 2024-06-14 NOTE — TELEPHONE ENCOUNTER
Per chart review, patient will be getting port removed today.    Ellie Vega, RN, BSN, PHN, OCN  [facility]

## 2024-06-14 NOTE — PRE-PROCEDURE
GENERAL PRE-PROCEDURE:   Procedure:  Right port a catheter removal with moderate sedation  Date/Time:  6/14/2024 11:22 AM    Written consent obtained?: Yes    Risks and benefits: Risks, benefits and alternatives were discussed    Consent given by:  Patient  Patient states understanding of procedure being performed: Yes    Patient's understanding of procedure matches consent: Yes    Procedure consent matches procedure scheduled: Yes    Expected level of sedation:  Moderate  Appropriately NPO:  Yes  ASA Class:  3  Mallampati  :  Grade 1- soft palate, uvula, tonsillar pillars, and posterior pharyngeal wall visible  Lungs:  Lungs clear with good breath sounds bilaterally  Heart:  Normal heart sounds and rate  History & Physical reviewed:  History and physical reviewed and no updates needed  Statement of review:  I have reviewed the lab findings, diagnostic data, medications, and the plan for sedation    Total time: 20 minutes    Thanks [facility] Interventional Radiology CNP ([phone]) (phone [phone])

## 2024-06-14 NOTE — PROGRESS NOTES
Care Suites Post Procedure Note    Patient Information  Name: Angelica Gomez  Age: 44 year old    Post Procedure  Time patient returned to Care Suites: 1300  Concerns/abnormal assessment: none  If abnormal assessment, provider notified: N/A  Plan/Other: Steri-strips to port site CDI, no bruising/bleeding/hematoma. Denies pain, VSS, tolerating PO.    Kassy Baron RN     Care Suites Discharge Nursing Note    Patient Information  Name: Angelica Gomez  Age: 44 year old    Discharge Education:  Discharge instructions reviewed: Yes  Additional education/resources provided: NA  Patient/patient representative verbalizes understanding: Yes  Patient discharging on new medications: No  Medication education completed: N/A    Discharge Plans:   Discharge location: home  Discharge ride contacted: Yes  Approximate discharge time: 1410    Discharge Criteria:  Discharge criteria met and vital signs stable: Yes    Patient Belongs:  Patient belongings returned to patient: Yes    Kassy Baron RN

## 2024-06-17 ENCOUNTER — THERAPY VISIT (OUTPATIENT)
Dept: OCCUPATIONAL THERAPY | Facility: CLINIC | Age: 44
End: 2024-06-17
Attending: SURGERY
Payer: COMMERCIAL

## 2024-06-17 DIAGNOSIS — C50.912 INVASIVE DUCTAL CARCINOMA OF BREAST, FEMALE, LEFT (H): Primary | ICD-10-CM

## 2024-06-17 DIAGNOSIS — Z90.12 STATUS POST LEFT MASTECTOMY: ICD-10-CM

## 2024-06-17 PROCEDURE — 97140 MANUAL THERAPY 1/> REGIONS: CPT | Mod: GO

## 2024-06-18 ENCOUNTER — VIRTUAL VISIT (OUTPATIENT)
Dept: PSYCHOLOGY | Facility: CLINIC | Age: 44
End: 2024-06-18
Payer: COMMERCIAL

## 2024-06-18 DIAGNOSIS — F43.23 ADJUSTMENT DISORDER WITH MIXED ANXIETY AND DEPRESSED MOOD: Primary | ICD-10-CM

## 2024-06-18 PROCEDURE — 90834 PSYTX W PT 45 MINUTES: CPT | Mod: 95

## 2024-06-18 NOTE — PROGRESS NOTES
[facility]                                     Progress Note    Patient Name: Angelica Gomez  Date: 24           Service Type: Individual      Session Start Time: 9:02am  Session End Time: 9:46am     Session Length: 44 minutes    Session #: 10    Attendees: Client attended alone    Service Modality:  Video Visit:      Provider verified identity through the following two step process.  Patient provided:  Patient  and Patient address    Telemedicine Visit: The patient's condition can be safely assessed and treated via synchronous audio and visual telemedicine encounter.      Reason for Telemedicine Visit: Patient has requested telehealth visit    Originating Site (Patient Location): Patient's home    Distant Site (Provider Location): Provider Remote Setting- Home Office    Consent:  The patient/guardian has verbally consented to: the potential risks and benefits of telemedicine (video visit) versus in person care; bill my insurance or make self-payment for services provided; and responsibility for payment of non-covered services.     Patient would like the video invitation sent by:  My Chart    Mode of Communication:  Video Conference via [employer]well    Distant Location (Provider):  Off-site    As the provider I attest to compliance with applicable laws and regulations related to telemedicine.    DATA  Interactive Complexity: No  Crisis: No        Progress Since Last Session (Related to Symptoms / Goals / Homework):   Symptoms: No change sadness and anxiety about physical symptoms    Homework: Achieved / completed to satisfaction      Episode of Care Goals: Satisfactory progress - PREPARATION (Decided to change - considering how); Intervened by negotiating a change plan and determining options / strategies for behavior change, identifying triggers, exploring social supports, and working towards setting a date to begin behavior change     Current / Ongoing Stressors and Concerns:   Pt  "reported that she is continuing to experience a lot of fatigue and brain fog. Reported that she is having trouble with word finding and memory. Notices that she gets more irritable in the evenings and has more difficulty with word finding at this time. Discussed ways she has been able to get support from some friends who have experienced cancer but there are ways that they do not connect. She noted that she advocates for herself in more ways than friends and may have a harder time with brain fog due to multiple factors. She was tearful as she reflected on fears about the impact symptoms will have long term. Discussed the \"[employer]\" an organization that she plans to try to use to get connected with someone who has had a similar breast cancer experience to hers.         Treatment Objective(s) Addressed in This Session:   identify at least 2 techniques for intervening on the escalation  use at least 3 coping skills for anxiety management in the next 12 weeks  Increase interest, engagement, and pleasure in doing things  Process grief related cancer diagnosis and treatment           Intervention:   CBT: mindfulness  Emotion Focused Therapy: identify and process emotinos  Interpersonal Therapy: assertive communication  Solution Focused: pros and cons    Assessments completed prior to visit:  The following assessments were completed by patient for this visit:  PHQ9:       2/12/2024     7:40 AM   PHQ-9 SCORE   PHQ-9 Total Score MyChart 3 (Minimal depression)   PHQ-9 Total Score 3     GAD7:        No data to display              CAGE-AID:       2/12/2024     8:09 AM   CAGE-AID Total Score   Total Score 0   Total Score MyChart 0 (A total score of 2 or greater is considered clinically significant)     PROMIS 10-Global Health (only subscores and total score):       2/12/2024     8:09 AM 5/17/2024     9:01 PM 5/30/2024     9:51 AM 6/17/2024    10:42 AM   PROMIS-10 Scores Only   Global Mental Health Score 15 14 15 13 "   Global Physical Health Score 16 15 15 14   PROMIS TOTAL - SUBSCORES 31 29 30 27     [place] Suicide Severity Rating Scale (Lifetime/Recent)      2/12/2024     9:59 AM 2/21/2024    11:37 AM 5/21/2024     5:50 PM   [place] Suicide Severity Rating (Lifetime/Recent)   Q1 Wished to be Dead (Past Month)  0-->no 0-->no   Q2 Suicidal Thoughts (Past Month)  0-->no 0-->no   Q6 Suicide Behavior (Lifetime)  0-->no 0-->no   Level of Risk per Screen  no risks indicated no risks indicated   Q1 Wish to be Dead (Lifetime) N     Q2 Non-Specific Active Suicidal Thoughts (Lifetime) N     Actual Attempt (Lifetime) N     Has subject engaged in non-suicidal self-injurious behavior? (Lifetime) N     Interrupted Attempts (Lifetime) N     Aborted or Self-Interrupted Attempt (Lifetime) N     Preparatory Acts or Behavior (Lifetime) N     Calculated C-SSRS Risk Score (Lifetime/Recent) No Risk Indicated           ASSESSMENT: Current Emotional / Mental Status (status of significant symptoms):   Risk status (Self / Other harm or suicidal ideation)   Patient denies current fears or concerns for personal safety.   Patient denies current or recent suicidal ideation or behaviors.   Patient denies current or recent homicidal ideation or behaviors.   Patient denies current or recent self injurious behavior or ideation.   Patient denies other safety concerns.   Patient reports there has been no change in risk factors since their last session.     Patient reports there has been no change in protective factors since their last session.     Recommended that patient call 911 or go to the local ED should there be a change in any of these risk factors.     Appearance:   Appropriate    Eye Contact:   Good    Psychomotor Behavior: Normal    Attitude:   Cooperative    Orientation:   All   Speech    Rate / Production: Normal/ Responsive    Volume:  Normal    Mood:    Anxiety, sadness   Affect:    Appropriate    Thought Content:  Clear    Thought  Form:  Coherent  Logical    Insight:    Good      Medication Review:   No current psychiatric medications prescribed     Medication Compliance:   NA     Changes in Health Issues:   None reported     Chemical Use Review:   Substance Use: Chemical use reviewed, no active concerns identified      Tobacco Use: No current tobacco use.      Diagnosis:  Adjustment Disorder with Anxiety and Depression    Collateral Reports Completed:   Not Applicable    PLAN: (Patient Tasks / Therapist Tasks / Other)  Pt and writer will continue to meet on a bi-weekly basis. Next appointment 6/4  Pt will connect with [employer] to try to get additional support.  Pt will practice self compassion to deal with frustration with brain fog.    Joyce Cristobal [facility]    This note has been reviewed and I agree with the plan of care. This note is co-signed by JESÚS Meyers, [facility], Supervisor, on: June 18, 2024  ----------------------------------------------------------------------------------  Individual Treatment Plan    Patient's Name: Angelica Gomez  YOB: 1980    Date of Creation: 3/26/24  Date Treatment Plan Last Reviewed/Revised: 3/26/24    DSM5 Diagnoses: Adjustment Disorders  309.28 (F43.23) With mixed anxiety and depressed mood  Psychosocial / Contextual Factors: going through breast cancer treatment   PROMIS (reviewed every 90 days):   Global Mental Health Score (P) 15   Global Physical Health Score (P) 16   PROMIS TOTAL - SUBSCORES (P) 31     Referral / Collaboration:  Referral to another professional/service is not indicated at this time..    Anticipated number of session for this episode of care: 6-9 sessions  Anticipation frequency of session: Biweekly  Anticipated Duration of each session: 38-52 minutes  Treatment plan will be reviewed in 90 days or when goals have been changed.       MeasurableTreatment Goal(s) related to diagnosis / functional impairment(s)  Goal 1: Patient will experience a reduction  in anxiety as evidenced by reduction in ENEDINA 2 score from 1 to 0    I will know I've met my goal when I am able to process anxiety related to diagnosis.      Objective #A (Patient Action)    Patient will use at least 3 coping skills for anxiety management in the next 12 weeks.  Status: New - Date: 3/26/24      Intervention(s)  Therapist will teach  grounding, mindfulness, emotion regulation .    Objective #B  Patient will use cognitive strategies identified in therapy to challenge anxious thoughts.  Status: New - Date: 3/26/24      Intervention(s)  Therapist will teach staying in the present moment, challenging catastrophic thinking .    Objective #C  Patient will process fears related to cancer diagnosis   Status: New - Date: 3/26/24      Intervention(s)  Therapist will provide supportive and nonjudgemental space for processing      Goal 2: Patient will experience reduction in depressive symptoms as evidenced by stability in or reduction of PhQ9 score from 3 to less than 3 and self report of improved mood.    I will know I've met my goal when I am able to process sadness related to the diagnosis.      Objective #A (Patient Action)    Status: New - Date: 3/26/24      Patient will Increase interest, engagement, and pleasure in doing things.    Intervention(s)  Therapist will  discuss behavioral activation, ways patient can remain active and social .    Objective #B  Patient will  process grief related to diagnosis  .    Status: New - Date: 3/26/24      Intervention(s)  Therapist will  teach stages of grief, loss/changes of identity .        Patient has reviewed and agreed to the above plan.      GT Palencia  March 26, 2024    This note has been reviewed and I agree with the plan of care. This note is co-signed by JESÚS Meyers, [facility], Supervisor, on: March 26, 2024

## 2024-06-19 NOTE — PROGRESS NOTES
[facility]    Hematology/Oncology Established Patient Note      Today's Date: 6/25/2024    Reason for visit: Left breast cancer.    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  12/7/2023: Due to worsening left upper outer breast pain, bilateral diagnostic mammogram performed. No mammographic abnormality in left upper outer breast site of symptoms but at 1:00, there is oval asymmetry measuring 0.7 cm. Remaining left breast and left axillary U/S showed morphologically normal lymph node and breast tissue.  At 1:30, 5 cm from nipple is irregular hypoechoic mass measuring 0.5 x 0.5 cm.  2. 12/15/2023: U/S-guided biopsy of left breast at 1:30 showed grade 2 invasive ductal carcinoma (3 mm) with associated grade 3 DCIS, no LVI; ER positive at 20%, ME positive at 20%, HER-2 negative with IHC = 1+.  3. 2/21/2024: Bilateral mastectomies with left axillary sentinel lymph node excision with Dr. Bean Phillips; bilateral implant removal. Pathology showed 1.2 cm grade 2 invasive ductal carcinoma with 1.4 cm grade 3 DCIS; margins negative; one left axillary SLN negative. Right breast benign. Repeat ER weakly positive at 61-70%, ME moderate positive at 71-80%. MammaPrint + BluePrint showed high  risk, Luminal B.  4. 3/25/2024-5/28/2024: Completed adjuvant chemotherapy with 4 cycles of Taxotere and Cytoxan.    INTERVAL HISTORY:  Lisandra reports brain fog and just started cognitive rehab.    REVIEW OF SYSTEMS:   14 point ROS was reviewed and is negative other than as noted above in HPI.       HOME MEDICATIONS:  Current Outpatient Medications   Medication Sig Dispense Refill    Calcium Carbonate-Vitamin D (CALCIUM 500 + D PO)       Cetirizine HCl (ZYRTEC ALLERGY PO) Take 10 mg by mouth daily      COMPRESSION STOCKINGS Please measure and distribute 2 pair of 20mmHg - 30mmHg thigh high open or closed toe compression  stockings with extra refills as indicated. 2 each 4    Fluticasone Propionate (FLONASE NA) Spray 1 spray in nostril daily      hydrOXYzine HCl (ATARAX) 25 MG tablet Take 1-2 tablets (25-50 mg) by mouth 3 times daily as needed for anxiety or other (sleep) 90 tablet 1    ketoconazole (NIZORAL) 2 % external cream Apply topically daily 30 g 1    lidocaine-prilocaine (EMLA) 2.5-2.5 % external cream Apply to port 30-60 minutes prior to accessing it for treatment/labs. 30 g 1    LORazepam (ATIVAN) 0.5 MG tablet Take 1 tablet (0.5 mg) by mouth every 6 hours as needed for anxiety 30 tablet 0    methocarbamol (ROBAXIN) 750 MG tablet Take 1 tablet (750 mg) by mouth 3 times daily 30 tablet 0    Multiple Vitamins-Minerals (MULTIVITAMIN & MINERAL PO) Take 1 each by mouth daily. [facility] women's active supplement      ondansetron (ZOFRAN) 8 MG tablet Take 1 tablet (8 mg) by mouth every 8 hours as needed for nausea (vomiting) 30 tablet 2    oxyCODONE (ROXICODONE) 5 MG tablet Take 1 tablet (5 mg) by mouth every 4 hours as needed for pain 20 tablet 0    Probiotic Product (PROBIOTIC PO) Reported on 3/1/2017      prochlorperazine (COMPAZINE) 10 MG tablet Take 1 tablet (10 mg) by mouth every 6 hours as needed for nausea or vomiting 30 tablet 2    senna-docusate (SENOKOT-S/PERICOLACE) 8.6-50 MG tablet Take 1 tablet by mouth 2 times daily 30 tablet 0    SUMAtriptan (IMITREX) 25 MG tablet Take 1 tablet (25 mg) by mouth at onset of headache for migraine May repeat in 2 hours. Max 8 tablets/24 hours. 18 tablet 4    tranexamic acid (LYSTEDA) 650 MG tablet Take 2 tablets (1,300 mg) by mouth 3 times daily 30 tablet 3    triamcinolone (KENALOG) 0.1 % external cream Apply topically 2 times daily 45 g 1         ALLERGIES:  No Known Allergies      PAST MEDICAL HISTORY:  Past Medical History:   Diagnosis Date    Malignant neoplasm of upper-outer quadrant of left breast in female, estrogen receptor positive (H) 03/11/2024    NO ACTIVE PROBLEMS     Menometrorrhagia.    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 Used IUD for 20 years.      PAST SURGICAL HISTORY:  Past Surgical History:   Procedure Laterality Date    COLONOSCOPY WITH CO2 INSUFFLATION N/A 2024    Procedure: Colonoscopy with CO2 insufflation;  Surgeon: Cherise Lima DO;  Location: [facility] OR    INSERT TISSUE EXPANDER BREAST BILATERAL Bilateral 2024    Procedure: Insertion tissue expander, breasts, bilateral;  Surgeon: Elisabet Venegas MD;  Location: [facility] OR    IR CHEST PORT PLACEMENT > 5 YRS OF AGE  2024    IR PORT REMOVAL RIGHT  2024    MASTECTOMY, NIPPLE SPARING Bilateral 2024    Procedure: Bilateral nipple sparing mastectomy, bilateral implant removal, left sentinel lymph node biopsy;  Surgeon: Aba Phillips MD;  Location: [facility] OR    TUBAL LIGATION      [facility]  DELIVERY ONLY      superficial wound dehiscence   Bilateral breast augmentation in 2013.      SOCIAL HISTORY:  Social History     Socioeconomic History    Marital status:      Spouse name: Not on file    Number of children: Not on file    Years of education: Not on file    Highest education level: Not on file   Occupational History    Not on file   Tobacco Use    Smoking status: Former     Current packs/day: 0.25     Average packs/day: 0.3 packs/day for 1 year (0.3 ttl pk-yrs)     Types: Cigarettes    Smokeless tobacco: Never   Vaping Use    Vaping status: Never Used   Substance and Sexual Activity    Alcohol use: Not Currently    Drug use: No    Sexual activity: Yes     Partners: Male     Birth control/protection: Surgical, I.U.D., Female Surgical     Comment: Tubal ligation, 2007   Other Topics Concern    Parent/sibling w/ CABG, MI or angioplasty before 65F 55M? Yes     Comment: father 2 MIs   Social History Narrative    Caffeine intake/servings daily - 2-3 pop    Calcium intake/servings daily - 2 yogurts  and 1 glass of milk    Exercise 6 times weekly - describe strength training and cardio    Sunscreen used - Yes    Seatbelts used - Yes    Guns stored in the home - No    Self Breast Exam - Yes    Pap test up to date -  Yes, as of today    Eye exam up to date -  Yes    Dental exam up to date -  Yes    DEXA scan up to date -  Not Applicable    Flex Sig/Colonoscopy up to date -  Not Applicable    Mammography up to date -  Not Applicable    Immunizations reviewed and up to date - Yes, 03/2008    Abuse: Current or Past (Physical, Sexual or Emotional) - No    Do you feel safe in your environment - Yes    Do you cope well with stress - Yes    Do you suffer from insomnia - No    Last updated by: Lit Licona  10/27/2009                 Social Determinants of Health     Financial Resource Strain: Low Risk  (2/1/2024)    Financial Resource Strain     Within the past 12 months, have you or your family members you live with been unable to get utilities (heat, electricity) when it was really needed?: No   Food Insecurity: Low Risk  (2/1/2024)    Food Insecurity     Within the past 12 months, did you worry that your food would run out before you got money to buy more?: No     Within the past 12 months, did the food you bought just not last and you didn t have money to get more?: No   Transportation Needs: Low Risk  (2/1/2024)    Transportation Needs     Within the past 12 months, has lack of transportation kept you from medical appointments, getting your medicines, non-medical meetings or appointments, work, or from getting things that you need?: No   Physical Activity: Not on file   Stress: Not on file   Social Connections: Not on file   Interpersonal Safety: Low Risk  (2/5/2024)    Interpersonal Safety     Do you feel physically and emotionally safe where you currently live?: Yes     Within the past 12 months, have you been hit, slapped, kicked or otherwise physically hurt by someone?: No     Within the past 12 months, have you  been humiliated or emotionally abused in other ways by your partner or ex-partner?: No   Housing Stability: Low Risk  (2024)    Housing Stability     Do you have housing? : Yes     Are you worried about losing your housing?: No   Has 2 grown children.  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      FAMILY HISTORY:  Family History   Problem Relation Age of Onset    Thyroid Disease Mother     Breast Cancer Mother 56        breast, 3 years later    Heart Disease Father         2x heart attacks    Circulatory Father         blood clots    Cardiovascular Father         patent foramen ovale, repaired x 2, afib    Cerebrovascular Disease Father         x2    C.A.D. Father         x2    Genitourinary Problems Father         kidney disease    Asthma Brother     Food Allergy Brother     Eczema Brother     No Known Problems Maternal Grandmother     Myocardial Infarction Maternal Grandfather     Hypertension Paternal Grandmother     Alcohol/Drug Paternal Grandfather     No Known Problems Daughter     No Known Problems Son     Cancer Paternal Aunt         cervical cancer(another sisiter)    Breast Cancer Paternal Aunt         breast cancer at 70's    Breast Cancer Paternal Aunt     Ovarian Cancer Paternal Aunt     Cancer - colorectal No family hx of     Diabetes No family hx of          PHYSICAL EXAM:  Vital signs:  /84   Pulse 73   Temp 98.2  F (36.8  C) (Oral)   Resp 16   Wt 84.8 kg (187 lb)   SpO2 99%   BMI 31.12 kg/m     ECO  GENERAL: No acute distress.  EYES: No scleral icterus. No overt erythema.  RESPIRATORY: No audible cough, wheezing, or labored breathing.  MUSCULOSKELETAL: Range of motion in the neck, shoulders, and arms appear normal.  SKIN: No overt rashes, discolorations, or lesions over the face and neck.  NEUROLOGIC: Alert.  No overt tremors.  PSYCHIATRIC: Normal affect and mood.  Does not appear anxious.        LABS:  CBC RESULTS:   Recent Labs   Lab Test 06/24/24  1203   WBC 5.0   RBC 4.08   HGB 12.1   HCT 36.4   MCV 89   MCH 29.7   MCHC 33.2   RDW 16.3*         Last Comprehensive Metabolic Panel:  Sodium   Date Value Ref Range Status   05/23/2024 142 135 - 145 mmol/L Final     Comment:     Reference intervals for this test were updated on 09/26/2023 to more accurately reflect our healthy population. There may be differences in the flagging of prior results with similar values performed with this method. Interpretation of those prior results can be made in the context of the updated reference intervals.    09/10/2012 140 133 - 144 mmol/L Final     Potassium   Date Value Ref Range Status   05/23/2024 4.0 3.4 - 5.3 mmol/L Final   12/30/2021 3.9 3.4 - 5.3 mmol/L Final   09/10/2012 4.2 3.4 - 5.3 mmol/L Final     Chloride   Date Value Ref Range Status   05/23/2024 110 (H) 98 - 107 mmol/L Final   12/30/2021 110 (H) 94 - 109 mmol/L Final   09/10/2012 104 94 - 109 mmol/L Final     Carbon Dioxide   Date Value Ref Range Status   09/10/2012 25 20 - 32 mmol/L Final     Carbon Dioxide (CO2)   Date Value Ref Range Status   05/23/2024 24 22 - 29 mmol/L Final   12/30/2021 27 20 - 32 mmol/L Final     Anion Gap   Date Value Ref Range Status   05/23/2024 8 7 - 15 mmol/L Final   12/30/2021 5 3 - 14 mmol/L Final   09/10/2012 11 6 - 17 mmol/L Final     Glucose   Date Value Ref Range Status   05/23/2024 93 70 - 99 mg/dL Final   12/30/2021 73 70 - 99 mg/dL Final   02/21/2017 94 70 - 99 mg/dL Final     Comment:     Fasting specimen     GLUCOSE BY METER POCT   Date Value Ref Range Status   02/22/2024 106 (H) 70 - 99 mg/dL Final     Urea Nitrogen   Date Value Ref Range Status   05/23/2024 15.3 6.0 - 20.0 mg/dL Final   12/30/2021 16 7 - 30 mg/dL Final   09/10/2012 12 5 - 24 mg/dL Final     Creatinine   Date Value Ref Range Status   05/23/2024 1.11 (H) 0.51 - 0.95 mg/dL Final   09/10/2012 0.81 0.52 - 1.04 mg/dL Final     GFR Estimate    Date Value Ref Range Status   05/23/2024 63 >60 mL/min/1.73m2 Final   09/10/2012 82 >60 mL/min/1.7m2 Final     Calcium   Date Value Ref Range Status   05/23/2024 9.0 8.6 - 10.0 mg/dL Final   09/10/2012 9.3 8.5 - 10.4 mg/dL Final     Bilirubin Total   Date Value Ref Range Status   05/23/2024 0.2 <=1.2 mg/dL Final   09/10/2012 0.6 0.2 - 1.3 mg/dL Final     Alkaline Phosphatase   Date Value Ref Range Status   05/23/2024 86 40 - 150 U/L Final   09/10/2012 53 40 - 150 U/L Final     ALT   Date Value Ref Range Status   05/23/2024 31 0 - 50 U/L Final     Comment:     Reference intervals for this test were updated on 6/12/2023 to more accurately reflect our healthy population. There may be differences in the flagging of prior results with similar values performed with this method. Interpretation of those prior results can be made in the context of the updated reference intervals.     09/10/2012 27 0 - 50 U/L Final     AST   Date Value Ref Range Status   05/23/2024 40 0 - 45 U/L Final     Comment:     Reference intervals for this test were updated on 6/12/2023 to more accurately reflect our healthy population. There may be differences in the flagging of prior results with similar values performed with this method. Interpretation of those prior results can be made in the context of the updated reference intervals.   09/10/2012 81 (H) 0 - 45 U/L Final       IMAGING:  Reviewed as per HPI.    ASSESSMENT/PLAN:  Angelica Gomez is a 44 year old female with the following issues:  1. Stage IA, [id number], grade 2 invasive ductal carcinoma of the left upper outer breast, ER positive 20%, VT positive 20%, HER-2 negative, MammaPrint high risk, Luminal B  2. CHEK2 mutation  3. Menometrorrhagia  --Lisandra is s/p bilateral mastectomies with final pathology which showed a left breast 1.2 cm tumor with repeat ER positive at 61-70%, VT positive at 71-80%, HER-2 negative (IHC = 1+)  --MammaPrint + BluePrint showed high risk, Luminal B  subtype.  --She completed 4 cycles of adjuvant chemotherapy with Taxotere and Cytoxan on 5/28/2024.   --I advised starting tamoxifen or ovarian suppression therapy with Zoladex and aromatase inhibitor such as anastrozole to further reduce her risk of breast cancer recurrence for at least 5 years.  However, she has had menometrorrhagia and might favor Zoladex + AI in order to induce cessation of menstruation if she is able to tolerate possible menopause effects.    --I discussed the potential side effects of anastrozole, including but not limited to: fatigue, myalgias/arthralgias, bone density loss, decrease sexual drive, vaginal dryness, nausea, headache, difficulty sleeping, hot flashes, night sweats, small cardiovascular risk.   --She agrees and elects to proceed with Zoladex and anastrozole.  --She will continue with colonoscopy screening on high-risk basis.  --6/12/2024 Baseline DEXA showed normal bone density. Repeat in 2026.  --Reconstruction with implant exchange scheduled for 7/30/2024. She has a lymphatic left axillary cord that will be cut/released at that time.    3. Anemia of chemotherapy  --Now resolved. Hgb 12.1.    4. Fertility   --She is s/p tubal ligation and does not wish to have any more children.    5. Fluid retention  --Related to Taxotere.  --Will be self-limited and improving with dry brushing and lymphatic drainage.  --Continue to monitor.    6. 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  She would not be ready to return to work at this time.  --Continue with cognitive OT/rehab.    7. Insomnia  --Will refill lorazepam.    Return in 3 months.    Magali Roland MD  [facility] Hematology/Oncology     Total time spent today: 40 minutes in chart review, patient evaluation, counseling, documentation, test and/or medication/prescription orders, and coordination of care.      The longitudinal plan of care for the  diagnosis(es)/condition(s) as documented were addressed during this visit. Due to the added complexity in care, I will continue to support Lisandra in the subsequent management and with ongoing continuity of care.

## 2024-06-24 ENCOUNTER — LAB (OUTPATIENT)
Dept: LAB | Facility: CLINIC | Age: 44
End: 2024-06-24
Payer: COMMERCIAL

## 2024-06-24 DIAGNOSIS — Z17.0 MALIGNANT NEOPLASM OF UPPER-OUTER QUADRANT OF LEFT BREAST IN FEMALE, ESTROGEN RECEPTOR POSITIVE (H): ICD-10-CM

## 2024-06-24 DIAGNOSIS — C50.412 MALIGNANT NEOPLASM OF UPPER-OUTER QUADRANT OF LEFT BREAST IN FEMALE, ESTROGEN RECEPTOR POSITIVE (H): ICD-10-CM

## 2024-06-24 DIAGNOSIS — T45.1X5A ANEMIA DUE TO CHEMOTHERAPY: ICD-10-CM

## 2024-06-24 DIAGNOSIS — D64.81 ANEMIA DUE TO CHEMOTHERAPY: ICD-10-CM

## 2024-06-24 LAB
BASOPHILS # BLD AUTO: 0.1 10E3/UL (ref 0–0.2)
BASOPHILS NFR BLD AUTO: 1 %
EOSINOPHIL # BLD AUTO: 0.1 10E3/UL (ref 0–0.7)
EOSINOPHIL NFR BLD AUTO: 1 %
ERYTHROCYTE [DISTWIDTH] IN BLOOD BY AUTOMATED COUNT: 16.3 % (ref 10–15)
HCT VFR BLD AUTO: 36.4 % (ref 35–47)
HGB BLD-MCNC: 12.1 G/DL (ref 11.7–15.7)
IMM GRANULOCYTES # BLD: 0 10E3/UL
IMM GRANULOCYTES NFR BLD: 0 %
LYMPHOCYTES # BLD AUTO: 0.8 10E3/UL (ref 0.8–5.3)
LYMPHOCYTES NFR BLD AUTO: 16 %
MCH RBC QN AUTO: 29.7 PG (ref 26.5–33)
MCHC RBC AUTO-ENTMCNC: 33.2 G/DL (ref 31.5–36.5)
MCV RBC AUTO: 89 FL (ref 78–100)
MONOCYTES # BLD AUTO: 0.5 10E3/UL (ref 0–1.3)
MONOCYTES NFR BLD AUTO: 10 %
NEUTROPHILS # BLD AUTO: 3.5 10E3/UL (ref 1.6–8.3)
NEUTROPHILS NFR BLD AUTO: 72 %
NRBC # BLD AUTO: 0 10E3/UL
NRBC BLD AUTO-RTO: 0 /100
PLATELET # BLD AUTO: 250 10E3/UL (ref 150–450)
RBC # BLD AUTO: 4.08 10E6/UL (ref 3.8–5.2)
WBC # BLD AUTO: 5 10E3/UL (ref 4–11)

## 2024-06-24 PROCEDURE — 36415 COLL VENOUS BLD VENIPUNCTURE: CPT

## 2024-06-24 PROCEDURE — 85025 COMPLETE CBC W/AUTO DIFF WBC: CPT

## 2024-06-25 ENCOUNTER — THERAPY VISIT (OUTPATIENT)
Dept: OCCUPATIONAL THERAPY | Facility: CLINIC | Age: 44
End: 2024-06-25
Attending: INTERNAL MEDICINE
Payer: COMMERCIAL

## 2024-06-25 ENCOUNTER — ONCOLOGY VISIT (OUTPATIENT)
Dept: ONCOLOGY | Facility: CLINIC | Age: 44
End: 2024-06-25
Attending: INTERNAL MEDICINE
Payer: COMMERCIAL

## 2024-06-25 VITALS
SYSTOLIC BLOOD PRESSURE: 126 MMHG | WEIGHT: 187 LBS | RESPIRATION RATE: 16 BRPM | DIASTOLIC BLOOD PRESSURE: 84 MMHG | OXYGEN SATURATION: 99 % | TEMPERATURE: 98.2 F | BODY MASS INDEX: 31.12 KG/M2 | HEART RATE: 73 BPM

## 2024-06-25 DIAGNOSIS — R41.89 COGNITIVE IMPAIRMENT: ICD-10-CM

## 2024-06-25 DIAGNOSIS — C50.412 MALIGNANT NEOPLASM OF UPPER-OUTER QUADRANT OF LEFT BREAST IN FEMALE, ESTROGEN RECEPTOR POSITIVE (H): Primary | ICD-10-CM

## 2024-06-25 DIAGNOSIS — Z17.0 MALIGNANT NEOPLASM OF UPPER-OUTER QUADRANT OF LEFT BREAST IN FEMALE, ESTROGEN RECEPTOR POSITIVE (H): Primary | ICD-10-CM

## 2024-06-25 DIAGNOSIS — Z90.12 STATUS POST LEFT MASTECTOMY: ICD-10-CM

## 2024-06-25 DIAGNOSIS — T45.1X5A ANEMIA DUE TO CHEMOTHERAPY: ICD-10-CM

## 2024-06-25 DIAGNOSIS — Z15.02 MONOALLELIC MUTATION OF CHEK2 GENE IN FEMALE PATIENT: ICD-10-CM

## 2024-06-25 DIAGNOSIS — D64.81 ANEMIA DUE TO CHEMOTHERAPY: ICD-10-CM

## 2024-06-25 DIAGNOSIS — Z15.89 MONOALLELIC MUTATION OF CHEK2 GENE IN FEMALE PATIENT: ICD-10-CM

## 2024-06-25 DIAGNOSIS — Z15.09 MONOALLELIC MUTATION OF CHEK2 GENE IN FEMALE PATIENT: ICD-10-CM

## 2024-06-25 DIAGNOSIS — Z15.01 MONOALLELIC MUTATION OF CHEK2 GENE IN FEMALE PATIENT: ICD-10-CM

## 2024-06-25 DIAGNOSIS — F51.02 ADJUSTMENT INSOMNIA: ICD-10-CM

## 2024-06-25 DIAGNOSIS — C50.912 INVASIVE DUCTAL CARCINOMA OF BREAST, FEMALE, LEFT (H): Primary | ICD-10-CM

## 2024-06-25 PROCEDURE — 99214 OFFICE O/P EST MOD 30 MIN: CPT | Performed by: INTERNAL MEDICINE

## 2024-06-25 PROCEDURE — G2211 COMPLEX E/M VISIT ADD ON: HCPCS | Performed by: INTERNAL MEDICINE

## 2024-06-25 PROCEDURE — 97535 SELF CARE MNGMENT TRAINING: CPT | Mod: GO

## 2024-06-25 PROCEDURE — 97165 OT EVAL LOW COMPLEX 30 MIN: CPT | Mod: GO

## 2024-06-25 PROCEDURE — 99215 OFFICE O/P EST HI 40 MIN: CPT | Performed by: INTERNAL MEDICINE

## 2024-06-25 RX ORDER — LORAZEPAM 0.5 MG/1
0.5 TABLET ORAL EVERY 6 HOURS PRN
Qty: 30 TABLET | Refills: 0 | Status: SHIPPED | OUTPATIENT
Start: 2024-06-25

## 2024-06-25 RX ORDER — ANASTROZOLE 1 MG/1
1 TABLET ORAL DAILY
Qty: 90 TABLET | Refills: 3 | Status: SHIPPED | OUTPATIENT
Start: 2024-06-25

## 2024-06-25 ASSESSMENT — PAIN SCALES - GENERAL: PAINLEVEL: NO PAIN (0)

## 2024-06-25 NOTE — PATIENT INSTRUCTIONS
1. Arrange for Zoladex every 4 weeks starting week of 7/8/2024.  2. Start anastrozole 1 mg orally daily day after first Zoladex injection.  3. RTC MD in 3 months.

## 2024-06-25 NOTE — PROGRESS NOTES
OCCUPATIONAL THERAPY EVALUATION  Type of Visit: Evaluation       Fall Risk Screen:  Fall screen completed by: OT  Have you fallen 2 or more times in the past year?: Yes  Have you fallen and had an injury in the past year?: No  Is patient a fall risk?: Yes  Fall screen comments: She reports slipping on stairs and falling in park, no injuries    Subjective      Presenting condition or subjective complaint: Cognitive impairment due to chemotherapy, challenges with finding the right words especially when fatigued, mental fatigue in social situations, lack of mental stamina  Date of onset: 03/21/24 (order date)    Relevant medical history: Anemia; Cancer; Migraines or headaches   Dates & types of surgery: Bilateral mastectomy on 2/21/2024    Prior diagnostic imaging/testing results: CT scan; Bone scan     Prior therapy history for the same diagnosis, illness or injury: No      Occupational Profile: Patient is a 44 year old female who was referred to outpatient occupational therapy due to cognitive fatigue/brain fog after chemotherapy in setting of cancer rehabilitation. Patient is status post bilateral mastectomy with left sentinel lymph node biopsy 2/21/2024. She finished last round of chemo 5/28/24.    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 She gives example of forgetting what her  told her to do next time in a video game  Attention: Reports problems attendingShe reports she is easily distracted. She reads a journal and can become distracted  Organization/Problem Solving:  will further assess  Executive Function:  will further assess    Patient endorses she has been experiences cognitive fatigue/brain fog since chemotherapy treatments, impacting ease and efficiency in IADL's. She notes challenges with memory, easily distracted, word finding issues, and decreased stamina in social situations.     Memory  Questionnaire administered and patient reports the following: frequent difficulty keeping track of where she put things, directions, losing thread of conversation, remembering whether she has told someone something, pass on a message, repeating something she has already said, how to carry out a recipe, remembering what she was supposed to do  Symbol Digit Modality Test: 27 (<25 indicates impairment)  The Mert Cognitive Assessment (MoCA) is a brief screen of cognitive abilities used to detect cognitive impairments in the domains of visuospatial/executive functioning, naming, memory, attention, language, abstraction, and orientation. A score of 26 or above is considered normal with a total possible score of 30.  18-26 = mild cognitive impairment, 10-17= moderate cognitive impairment and less than 10= severe cognitive impairment. Results reveal 29/30, suggesting no cognitive impairment (-1 delayed recall)    VISUAL SKILLS  Visual Acuity: No deficits identified  Visual  Field: Appears normal  Visual Attention: Appears normal    SENSATION: UE Sensation WNL    POSTURE: WFL  RANGE OF MOTION:  defer to OT lymphedema therapy  COORDINATION: WFL  BALANCE: WFL    FUNCTIONAL MOBILITY  Assistive Device(s): None  Ambulation: IND    BED MOBILITY: Independent     TRANSFERS: Independent     BATHING: Independent     UPPER BODY DRESSING: Independent    LOWER BODY DRESSING: Independent     TOILETING: Independent     GROOMING: Independent    EATING/SELF FEEDING: Independent     ACTIVITY TOLERANCE: She reports her physical stamina and tolerance to tasks has gradually improved. She engages in walking, light free weights, and brain games. She notes persisting cognitive fatigue/brain fog impacting ease and efficiency in IADL's.     INSTRUMENTAL ACTIVITIES OF DAILY LIVING (IADL):   Meal Planning/Prep:  manages the cooking. She uses [employer] pick-up for groceries  Home/Financial Management: She manages household cleaning tasks without concerns  Medications: she sets up her owns and remembers to take medications on time  Appointments: uses a calendar for reminders and alerts/reminders  Finances: She manages finances and recalls to pay on Fridays  Communication/Computer Use: independent   Community Mobility: She is driving without concerns  Sleeping: she reports she is sleeping well     MFIS Physical Subscale Score: 29/36- (Higher scores indicate a greater impact of fatigue on patient activities)  MFIS Cognitive Subscale Score: 30/40 (Higher scores indicate a greater impact of fatigue on patient activities)  MFIS Psychosocial Subscale Score: 4/8 (Higher scores indicate a greater impact of fatigue on patient activities)  Modified Fatigue Impact Scale (MFIS) Total  Score: 63/84 (Higher scores indicate a greater impact of fatigue on patient activities)      Assessment & Plan   CLINICAL IMPRESSIONS  Medical Diagnosis: C50.912 (ICD-10-CM) - Invasive ductal carcinoma of breast, female, left (H)  Z90.10  (ICD-10-CM) - Status post mastectomy    Treatment Diagnosis: cognitive complaints    Impression/Assessment: Patient is a 44 year old female who was referred to outpatient occupational therapy due to cognitive fatigue/brain fog after chemotherapy The following significant findings have been identified:  brain fog, fatigue, memory concerns .  These identified deficits interfere with their ability to perform  recalling information, social participation, managing daily routine/schedule  as compared to previous level of function.     Clinical Decision Making (Complexity):  Assessment of Occupational Performance: 1-3 Performance Deficits  Occupational Performance Limitations: recalling information, social participation, managing daily routine/schedule  Clinical Decision Making (Complexity): Low complexity    PLAN OF CARE  Treatment Interventions:  Interventions: Self-Care/Home Management, Therapeutic Activity    Long Term Goals   OT Goal 4  Goal Identifier: Memory and Attention Strategies  Goal Description: Pt will verbalize understanding about strategies to help with improving attention (concentration) and internal and external strategies to help with memory and recall into daily routine for improved ADL/IADL performance  Target Date: 08/06/24  OT Goal 5  Goal Identifier: Cogntive Fatigue - EF  Goal Description: Ptwill successfully report at least two strategies for cognitive fatigue and demonstrate the ability to complete complex problem-solving tasks (e.g. errands, meal planning, scheduling tasks, etc.) at 85%+ accuracy to promote organization, planning, and sequencing skills for home, work, and community tasks (e.g. financial management, cooking, community engagement, work) using strategies PRN.  Target Date: 08/06/24      Frequency of Treatment: 1x/week  Duration of Treatment: 6 weeks     Recommended Referrals to Other Professionals:  consider SLP  Education Assessment: Learner/Method:  Patient;Listening;Demonstration     Risks and benefits of evaluation/treatment have been explained.   Patient/Family/caregiver agrees with Plan of Care.     Evaluation Time:    OT Eval, Low Complexity Minutes (45361): 20    Signing Clinician: ARETHA Gates/CANDY, CNS, CSRS

## 2024-06-25 NOTE — LETTER
6/25/2024      Angelica Gomez  [address]  [facility] [address]      Dear Colleague,    Thank you for referring your patient, Angelica Gomez, to the [facility] [place]. Please see a copy of my visit note below.    [facility]    Hematology/Oncology Established Patient Note      Today's Date: 6/25/2024    Reason for visit: Left breast cancer.    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  12/7/2023: Due to worsening left upper outer breast pain, bilateral diagnostic mammogram performed. No mammographic abnormality in left upper outer breast site of symptoms but at 1:00, there is oval asymmetry measuring 0.7 cm. Remaining left breast and left axillary U/S showed morphologically normal lymph node and breast tissue.  At 1:30, 5 cm from nipple is irregular hypoechoic mass measuring 0.5 x 0.5 cm.  2. 12/15/2023: U/S-guided biopsy of left breast at 1:30 showed grade 2 invasive ductal carcinoma (3 mm) with associated grade 3 DCIS, no LVI; ER positive at 20%, CO positive at 20%, HER-2 negative with IHC = 1+.  3. 2/21/2024: Bilateral mastectomies with left axillary sentinel lymph node excision with Dr. Bean Phillips; bilateral implant removal. Pathology showed 1.2 cm grade 2 invasive ductal carcinoma with 1.4 cm grade 3 DCIS; margins negative; one left axillary SLN negative. Right breast benign. Repeat ER weakly positive at 61-70%, CO moderate positive at 71-80%. MammaPrint + BluePrint showed high  risk, Luminal B.  4. 3/25/2024-5/28/2024: Completed adjuvant chemotherapy with 4 cycles of Taxotere and Cytoxan.    INTERVAL HISTORY:  Lisandra reports brain fog and just started cognitive rehab.    REVIEW OF SYSTEMS:   14 point ROS was reviewed and is negative other than as noted above in HPI.       HOME MEDICATIONS:  Current Outpatient Medications   Medication Sig Dispense  Refill     Calcium Carbonate-Vitamin D (CALCIUM 500 + D PO)        Cetirizine HCl (ZYRTEC ALLERGY PO) Take 10 mg by mouth daily       COMPRESSION STOCKINGS Please measure and distribute 2 pair of 20mmHg - 30mmHg thigh high open or closed toe compression stockings with extra refills as indicated. 2 each 4     Fluticasone Propionate (FLONASE NA) Spray 1 spray in nostril daily       hydrOXYzine HCl (ATARAX) 25 MG tablet Take 1-2 tablets (25-50 mg) by mouth 3 times daily as needed for anxiety or other (sleep) 90 tablet 1     ketoconazole (NIZORAL) 2 % external cream Apply topically daily 30 g 1     lidocaine-prilocaine (EMLA) 2.5-2.5 % external cream Apply to port 30-60 minutes prior to accessing it for treatment/labs. 30 g 1     LORazepam (ATIVAN) 0.5 MG tablet Take 1 tablet (0.5 mg) by mouth every 6 hours as needed for anxiety 30 tablet 0     methocarbamol (ROBAXIN) 750 MG tablet Take 1 tablet (750 mg) by mouth 3 times daily 30 tablet 0     Multiple Vitamins-Minerals (MULTIVITAMIN & MINERAL PO) Take 1 each by mouth daily. [facility] women's active supplement       ondansetron (ZOFRAN) 8 MG tablet Take 1 tablet (8 mg) by mouth every 8 hours as needed for nausea (vomiting) 30 tablet 2     oxyCODONE (ROXICODONE) 5 MG tablet Take 1 tablet (5 mg) by mouth every 4 hours as needed for pain 20 tablet 0     Probiotic Product (PROBIOTIC PO) Reported on 3/1/2017       prochlorperazine (COMPAZINE) 10 MG tablet Take 1 tablet (10 mg) by mouth every 6 hours as needed for nausea or vomiting 30 tablet 2     senna-docusate (SENOKOT-S/PERICOLACE) 8.6-50 MG tablet Take 1 tablet by mouth 2 times daily 30 tablet 0     SUMAtriptan (IMITREX) 25 MG tablet Take 1 tablet (25 mg) by mouth at onset of headache for migraine May repeat in 2 hours. Max 8 tablets/24 hours. 18 tablet 4     tranexamic acid (LYSTEDA) 650 MG tablet Take 2 tablets (1,300 mg) by mouth 3 times daily 30 tablet 3     triamcinolone (KENALOG) 0.1 % external cream Apply topically 2  times daily 45 g 1         ALLERGIES:  No Known Allergies      PAST MEDICAL HISTORY:  Past Medical History:   Diagnosis Date     Malignant neoplasm of upper-outer quadrant of left breast in female, estrogen receptor positive (H) 2024     NO ACTIVE PROBLEMS    Menometrorrhagia.    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 Used IUD for 20 years.      PAST SURGICAL HISTORY:  Past Surgical History:   Procedure Laterality Date     COLONOSCOPY WITH CO2 INSUFFLATION N/A 2024    Procedure: Colonoscopy with CO2 insufflation;  Surgeon: Cherise Lima DO;  Location: [facility] OR     INSERT TISSUE EXPANDER BREAST BILATERAL Bilateral 2024    Procedure: Insertion tissue expander, breasts, bilateral;  Surgeon: Elisabet Venegas MD;  Location: [facility] OR     IR CHEST PORT PLACEMENT > 5 YRS OF AGE  2024     IR PORT REMOVAL RIGHT  2024     MASTECTOMY, NIPPLE SPARING Bilateral 2024    Procedure: Bilateral nipple sparing mastectomy, bilateral implant removal, left sentinel lymph node biopsy;  Surgeon: Aba Phillips MD;  Location: [facility] OR     TUBAL LIGATION       [facility]  DELIVERY ONLY      superficial wound dehiscence   Bilateral breast augmentation in 2013.      SOCIAL HISTORY:  Social History     Socioeconomic History     Marital status:      Spouse name: Not on file     Number of children: Not on file     Years of education: Not on file     Highest education level: Not on file   Occupational History     Not on file   Tobacco Use     Smoking status: Former     Current packs/day: 0.25     Average packs/day: 0.3 packs/day for 1 year (0.3 ttl pk-yrs)     Types: Cigarettes     Smokeless tobacco: Never   Vaping Use     Vaping status: Never Used   Substance and Sexual Activity     Alcohol use: Not Currently     Drug use: No     Sexual activity: Yes     Partners: Male     Birth control/protection: Surgical, I.U.D.,  Female Surgical     Comment: Tubal ligation, 12/13/2007   Other Topics Concern     Parent/sibling w/ CABG, MI or angioplasty before 65F 55M? Yes     Comment: father 2 MIs   Social History Narrative    Caffeine intake/servings daily - 2-3 pop    Calcium intake/servings daily - 2 yogurts and 1 glass of milk    Exercise 6 times weekly - describe strength training and cardio    Sunscreen used - Yes    Seatbelts used - Yes    Guns stored in the home - No    Self Breast Exam - Yes    Pap test up to date -  Yes, as of today    Eye exam up to date -  Yes    Dental exam up to date -  Yes    DEXA scan up to date -  Not Applicable    Flex Sig/Colonoscopy up to date -  Not Applicable    Mammography up to date -  Not Applicable    Immunizations reviewed and up to date - Yes, 03/2008    Abuse: Current or Past (Physical, Sexual or Emotional) - No    Do you feel safe in your environment - Yes    Do you cope well with stress - Yes    Do you suffer from insomnia - No    Last updated by: Lit Licona  10/27/2009                 Social Determinants of Health     Financial Resource Strain: Low Risk  (2/1/2024)    Financial Resource Strain      Within the past 12 months, have you or your family members you live with been unable to get utilities (heat, electricity) when it was really needed?: No   Food Insecurity: Low Risk  (2/1/2024)    Food Insecurity      Within the past 12 months, did you worry that your food would run out before you got money to buy more?: No      Within the past 12 months, did the food you bought just not last and you didn t have money to get more?: No   Transportation Needs: Low Risk  (2/1/2024)    Transportation Needs      Within the past 12 months, has lack of transportation kept you from medical appointments, getting your medicines, non-medical meetings or appointments, work, or from getting things that you need?: No   Physical Activity: Not on file   Stress: Not on file   Social Connections: Not on file    Interpersonal Safety: Low Risk  (2024)    Interpersonal Safety      Do you feel physically and emotionally safe where you currently live?: Yes      Within the past 12 months, have you been hit, slapped, kicked or otherwise physically hurt by someone?: No      Within the past 12 months, have you been humiliated or emotionally abused in other ways by your partner or ex-partner?: No   Housing Stability: Low Risk  (2024)    Housing Stability      Do you have housing? : Yes      Are you worried about losing your housing?: No   Has 2 grown children.  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      FAMILY HISTORY:  Family History   Problem Relation Age of Onset     Thyroid Disease Mother      Breast Cancer Mother 56        breast, 3 years later     Heart Disease Father         2x heart attacks     Circulatory Father         blood clots     Cardiovascular Father         patent foramen ovale, repaired x 2, afib     Cerebrovascular Disease Father         x2     C.A.D. Father         x2     Genitourinary Problems Father         kidney disease     Asthma Brother      Food Allergy Brother      Eczema Brother      No Known Problems Maternal Grandmother      Myocardial Infarction Maternal Grandfather      Hypertension Paternal Grandmother      Alcohol/Drug Paternal Grandfather      No Known Problems Daughter      No Known Problems Son      Cancer Paternal Aunt         cervical cancer(another sisiter)     Breast Cancer Paternal Aunt         breast cancer at 70's     Breast Cancer Paternal Aunt      Ovarian Cancer Paternal Aunt      Cancer - colorectal No family hx of      Diabetes No family hx of          PHYSICAL EXAM:  Vital signs:  /84   Pulse 73   Temp 98.2  F (36.8  C) (Oral)   Resp 16   Wt 84.8 kg (187 lb)   SpO2 99%   BMI 31.12 kg/m     ECO  GENERAL: No acute distress.  EYES: No scleral icterus. No overt  erythema.  RESPIRATORY: No audible cough, wheezing, or labored breathing.  MUSCULOSKELETAL: Range of motion in the neck, shoulders, and arms appear normal.  SKIN: No overt rashes, discolorations, or lesions over the face and neck.  NEUROLOGIC: Alert.  No overt tremors.  PSYCHIATRIC: Normal affect and mood.  Does not appear anxious.       LABS:  CBC RESULTS:   Recent Labs   Lab Test 06/24/24  1203   WBC 5.0   RBC 4.08   HGB 12.1   HCT 36.4   MCV 89   MCH 29.7   MCHC 33.2   RDW 16.3*         Last Comprehensive Metabolic Panel:  Sodium   Date Value Ref Range Status   05/23/2024 142 135 - 145 mmol/L Final     Comment:     Reference intervals for this test were updated on 09/26/2023 to more accurately reflect our healthy population. There may be differences in the flagging of prior results with similar values performed with this method. Interpretation of those prior results can be made in the context of the updated reference intervals.    09/10/2012 140 133 - 144 mmol/L Final     Potassium   Date Value Ref Range Status   05/23/2024 4.0 3.4 - 5.3 mmol/L Final   12/30/2021 3.9 3.4 - 5.3 mmol/L Final   09/10/2012 4.2 3.4 - 5.3 mmol/L Final     Chloride   Date Value Ref Range Status   05/23/2024 110 (H) 98 - 107 mmol/L Final   12/30/2021 110 (H) 94 - 109 mmol/L Final   09/10/2012 104 94 - 109 mmol/L Final     Carbon Dioxide   Date Value Ref Range Status   09/10/2012 25 20 - 32 mmol/L Final     Carbon Dioxide (CO2)   Date Value Ref Range Status   05/23/2024 24 22 - 29 mmol/L Final   12/30/2021 27 20 - 32 mmol/L Final     Anion Gap   Date Value Ref Range Status   05/23/2024 8 7 - 15 mmol/L Final   12/30/2021 5 3 - 14 mmol/L Final   09/10/2012 11 6 - 17 mmol/L Final     Glucose   Date Value Ref Range Status   05/23/2024 93 70 - 99 mg/dL Final   12/30/2021 73 70 - 99 mg/dL Final   02/21/2017 94 70 - 99 mg/dL Final     Comment:     Fasting specimen     GLUCOSE BY METER POCT   Date Value Ref Range Status   02/22/2024 106  (H) 70 - 99 mg/dL Final     Urea Nitrogen   Date Value Ref Range Status   05/23/2024 15.3 6.0 - 20.0 mg/dL Final   12/30/2021 16 7 - 30 mg/dL Final   09/10/2012 12 5 - 24 mg/dL Final     Creatinine   Date Value Ref Range Status   05/23/2024 1.11 (H) 0.51 - 0.95 mg/dL Final   09/10/2012 0.81 0.52 - 1.04 mg/dL Final     GFR Estimate   Date Value Ref Range Status   05/23/2024 63 >60 mL/min/1.73m2 Final   09/10/2012 82 >60 mL/min/1.7m2 Final     Calcium   Date Value Ref Range Status   05/23/2024 9.0 8.6 - 10.0 mg/dL Final   09/10/2012 9.3 8.5 - 10.4 mg/dL Final     Bilirubin Total   Date Value Ref Range Status   05/23/2024 0.2 <=1.2 mg/dL Final   09/10/2012 0.6 0.2 - 1.3 mg/dL Final     Alkaline Phosphatase   Date Value Ref Range Status   05/23/2024 86 40 - 150 U/L Final   09/10/2012 53 40 - 150 U/L Final     ALT   Date Value Ref Range Status   05/23/2024 31 0 - 50 U/L Final     Comment:     Reference intervals for this test were updated on 6/12/2023 to more accurately reflect our healthy population. There may be differences in the flagging of prior results with similar values performed with this method. Interpretation of those prior results can be made in the context of the updated reference intervals.     09/10/2012 27 0 - 50 U/L Final     AST   Date Value Ref Range Status   05/23/2024 40 0 - 45 U/L Final     Comment:     Reference intervals for this test were updated on 6/12/2023 to more accurately reflect our healthy population. There may be differences in the flagging of prior results with similar values performed with this method. Interpretation of those prior results can be made in the context of the updated reference intervals.   09/10/2012 81 (H) 0 - 45 U/L Final       IMAGING:  Reviewed as per HPI.    ASSESSMENT/PLAN:  Angelica Gomez is a 44 year old female with the following issues:  1. Stage IA, bA1o-P6-H4, grade 2 invasive ductal carcinoma of the left upper outer breast, ER positive 20%, ND positive  20%, HER-2 negative, MammaPrint high risk, Luminal B  2. CHEK2 mutation  3. Menometrorrhagia  --Lisandra is s/p bilateral mastectomies with final pathology which showed a left breast 1.2 cm tumor with repeat ER positive at 61-70%, UT positive at 71-80%, HER-2 negative (IHC = 1+)  --MammaPrint + BluePrint showed high risk, Luminal B subtype.  --She completed 4 cycles of adjuvant chemotherapy with Taxotere and Cytoxan on 5/28/2024.   --I advised starting tamoxifen or ovarian suppression therapy with Zoladex and aromatase inhibitor such as anastrozole to further reduce her risk of breast cancer recurrence for at least 5 years.  However, she has had menometrorrhagia and might favor Zoladex + AI in order to induce cessation of menstruation if she is able to tolerate possible menopause effects.    --I discussed the potential side effects of anastrozole, including but not limited to: fatigue, myalgias/arthralgias, bone density loss, decrease sexual drive, vaginal dryness, nausea, headache, difficulty sleeping, hot flashes, night sweats, small cardiovascular risk.   --She agrees and elects to proceed with Zoladex and anastrozole.  --She will continue with colonoscopy screening on high-risk basis.  --6/12/2024 Baseline DEXA showed normal bone density. Repeat in 2026.  --Reconstruction with implant exchange scheduled for 7/30/2024. She has a lymphatic left axillary cord that will be cut/released at that time.    3. Anemia of chemotherapy  --Now resolved. Hgb 12.1.    4. Fertility   --She is s/p tubal ligation and does not wish to have any more children.    5. Fluid retention  --Related to Taxotere.  --Will be self-limited and improving with dry brushing and lymphatic drainage.  --Continue to monitor.    6. 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  She would not be ready to return to work at this time.  --Continue with cognitive OT/rehab.    7. Insomnia  --Will  refill lorazepam.    Return in 3 months.    Magali Roland MD  [facility] Hematology/Oncology     Total time spent today: 40 minutes in chart review, patient evaluation, counseling, documentation, test and/or medication/prescription orders, and coordination of care.      The longitudinal plan of care for the diagnosis(es)/condition(s) as documented were addressed during this visit. Due to the added complexity in care, I will continue to support Lisandra in the subsequent management and with ongoing continuity of care.      Again, thank you for allowing me to participate in the care of your patient.        Sincerely,        Magali Roland MD

## 2024-06-25 NOTE — NURSING NOTE
"Oncology Rooming Note    June 25, 2024 2:52 PM   Angelica Gomez is a 44 year old female who presents for:    Chief Complaint   Patient presents with    Oncology Clinic Visit     Initial Vitals: /84   Pulse 73   Temp 98.2  F (36.8  C) (Oral)   Resp 16   Wt 84.8 kg (187 lb)   SpO2 99%   BMI 31.12 kg/m   Estimated body mass index is 31.12 kg/m  as calculated from the following:    Height as of 5/21/24: 1.651 m (5' 5\").    Weight as of this encounter: 84.8 kg (187 lb). Body surface area is 1.97 meters squared.  No Pain (0) Comment: Data Unavailable   No LMP recorded.  Allergies reviewed: Yes  Medications reviewed: Yes    Medications: Medication refills not needed today.  Pharmacy name entered into [employer]: [facility] - [place], [place] - [address] [place] [address] RD.    Frailty Screening:   Is the patient here for a new oncology consult visit in cancer care? 2. No      Clinical concerns: follow up        Julieth Winters            "

## 2024-07-01 ENCOUNTER — THERAPY VISIT (OUTPATIENT)
Dept: OCCUPATIONAL THERAPY | Facility: CLINIC | Age: 44
End: 2024-07-01
Attending: SURGERY
Payer: COMMERCIAL

## 2024-07-01 DIAGNOSIS — I89.0 LYMPHEDEMA: Primary | ICD-10-CM

## 2024-07-01 PROCEDURE — 97140 MANUAL THERAPY 1/> REGIONS: CPT | Mod: GO | Performed by: OCCUPATIONAL THERAPY ASSISTANT

## 2024-07-02 ENCOUNTER — THERAPY VISIT (OUTPATIENT)
Dept: OCCUPATIONAL THERAPY | Facility: CLINIC | Age: 44
End: 2024-07-02
Attending: INTERNAL MEDICINE
Payer: COMMERCIAL

## 2024-07-02 ENCOUNTER — VIRTUAL VISIT (OUTPATIENT)
Dept: PSYCHOLOGY | Facility: CLINIC | Age: 44
End: 2024-07-02
Payer: COMMERCIAL

## 2024-07-02 DIAGNOSIS — F43.23 ADJUSTMENT DISORDER WITH MIXED ANXIETY AND DEPRESSED MOOD: Primary | ICD-10-CM

## 2024-07-02 DIAGNOSIS — Z90.12 STATUS POST LEFT MASTECTOMY: Primary | ICD-10-CM

## 2024-07-02 DIAGNOSIS — C50.912 INVASIVE DUCTAL CARCINOMA OF BREAST, FEMALE, LEFT (H): ICD-10-CM

## 2024-07-02 PROCEDURE — 97535 SELF CARE MNGMENT TRAINING: CPT | Mod: GO

## 2024-07-02 PROCEDURE — 90834 PSYTX W PT 45 MINUTES: CPT | Mod: 95

## 2024-07-02 NOTE — PROGRESS NOTES
[facility]                                     Progress Note    Patient Name: Angelica Gomez  Date: 24           Service Type: Individual      Session Start Time: 9:00am  Session End Time: 9:45am     Session Length: 45 minutes    Session #: 11    Attendees: Client attended alone    Service Modality:  Video Visit:      Provider verified identity through the following two step process.  Patient provided:  Patient  and Patient address    Telemedicine Visit: The patient's condition can be safely assessed and treated via synchronous audio and visual telemedicine encounter.      Reason for Telemedicine Visit: Patient has requested telehealth visit    Originating Site (Patient Location): Patient's home    Distant Site (Provider Location): Provider Remote Setting- Home Office    Consent:  The patient/guardian has verbally consented to: the potential risks and benefits of telemedicine (video visit) versus in person care; bill my insurance or make self-payment for services provided; and responsibility for payment of non-covered services.     Patient would like the video invitation sent by:  My Chart    Mode of Communication:  Video Conference via [employer]    Distant Location (Provider):  Off-site    As the provider I attest to compliance with applicable laws and regulations related to telemedicine.    DATA  Interactive Complexity: No  Crisis: No        Progress Since Last Session (Related to Symptoms / Goals / Homework):   Symptoms: No change continued anxiety about symptoms    Homework: Achieved / completed to satisfaction      Episode of Care Goals: Satisfactory progress - PREPARATION (Decided to change - considering how); Intervened by negotiating a change plan and determining options / strategies for behavior change, identifying triggers, exploring social supports, and working towards setting a date to begin behavior change     Current / Ongoing Stressors and Concerns:   Pt shared that she  scheduled her reconstruction surgery for the end of the month. Reported that she thinks this will be good timing so that she is able to start to heal from that before returning to work. Reported that she has noticed slight improvement in cognitive symptoms since ending chemo. Reported that she has started going to cognitive therapy to try to improve brain fog and word finding and that if this does not help they recommended she try speech therapy. Continued distress about cognitive impact of chemo and fears about impact of hormone suppressent         Treatment Objective(s) Addressed in This Session:   use at least 3 coping skills for anxiety management in the next 12 weeks  Increase interest, engagement, and pleasure in doing things  Process grief related cancer diagnosis and treatment           Intervention:   CBT: mindfulness  Emotion Focused Therapy: identify and process emotinos  Interpersonal Therapy: assertive communication  Solution Focused: pros and cons    Assessments completed prior to visit:  The following assessments were completed by patient for this visit:  PHQ9:       2/12/2024     7:40 AM   PHQ-9 SCORE   PHQ-9 Total Score MyChart 3 (Minimal depression)   PHQ-9 Total Score 3     GAD7:        No data to display              CAGE-AID:       2/12/2024     8:09 AM   CAGE-AID Total Score   Total Score 0   Total Score MyChart 0 (A total score of 2 or greater is considered clinically significant)     PROMIS 10-Global Health (only subscores and total score):       2/12/2024     8:09 AM 5/17/2024     9:01 PM 5/30/2024     9:51 AM 6/17/2024    10:42 AM   PROMIS-10 Scores Only   Global Mental Health Score 15 14 15 13   Global Physical Health Score 16 15 15 14   PROMIS TOTAL - SUBSCORES 31 29 30 27     [place] Suicide Severity Rating Scale (Lifetime/Recent)      2/12/2024     9:59 AM 2/21/2024    11:37 AM 5/21/2024     5:50 PM   [place] Suicide Severity Rating (Lifetime/Recent)   Q1 Wished to be Dead (Past  Month)  0-->no 0-->no   Q2 Suicidal Thoughts (Past Month)  0-->no 0-->no   Q6 Suicide Behavior (Lifetime)  0-->no 0-->no   Level of Risk per Screen  no risks indicated no risks indicated   Q1 Wish to be Dead (Lifetime) N     Q2 Non-Specific Active Suicidal Thoughts (Lifetime) N     Actual Attempt (Lifetime) N     Has subject engaged in non-suicidal self-injurious behavior? (Lifetime) N     Interrupted Attempts (Lifetime) N     Aborted or Self-Interrupted Attempt (Lifetime) N     Preparatory Acts or Behavior (Lifetime) N     Calculated C-SSRS Risk Score (Lifetime/Recent) No Risk Indicated           ASSESSMENT: Current Emotional / Mental Status (status of significant symptoms):   Risk status (Self / Other harm or suicidal ideation)   Patient denies current fears or concerns for personal safety.   Patient denies current or recent suicidal ideation or behaviors.   Patient denies current or recent homicidal ideation or behaviors.   Patient denies current or recent self injurious behavior or ideation.   Patient denies other safety concerns.   Patient reports there has been no change in risk factors since their last session.     Patient reports there has been no change in protective factors since their last session.     Recommended that patient call 911 or go to the local ED should there be a change in any of these risk factors.     Appearance:   Appropriate    Eye Contact:   Good    Psychomotor Behavior: Normal    Attitude:   Cooperative    Orientation:   All   Speech    Rate / Production: Normal/ Responsive    Volume:  Normal    Mood:    Anxiety   Affect:    Subdued  tired    Thought Content:  Clear    Thought Form:  Coherent  Logical    Insight:    Good      Medication Review:   No current psychiatric medications prescribed     Medication Compliance:   NA     Changes in Health Issues:   None reported     Chemical Use Review:   Substance Use: Chemical use reviewed, no active concerns identified      Tobacco Use: No  current tobacco use.      Diagnosis:  Adjustment Disorder with Anxiety and Depression    Collateral Reports Completed:   Not Applicable    PLAN: (Patient Tasks / Therapist Tasks / Other)  Pt and writer will continue to meet on a bi-weekly basis. Next appointment   Pt will continue to try to focus on the present moment and not jump too far to the future.   Pt will continue to advocate for herself to get the best medical care possible     Joyce Cristobal [facility]    This note has been reviewed and I agree with the plan of care. This note is co-signed by JESÚS Meyers, [facility], Supervisor, on: July 10, 2024  ----------------------------------------------------------------------------------  Individual Treatment Plan    Patient's Name: Angelica Gomez  YOB: 1980    Date of Creation: 3/26/24  Date Treatment Plan Last Reviewed/Revised: 7/2/24    DSM5 Diagnoses: Adjustment Disorders  309.28 (F43.23) With mixed anxiety and depressed mood  Psychosocial / Contextual Factors: going through breast cancer treatment   PROMIS (reviewed every 90 days):   Global Mental Health Score (P) 15   Global Physical Health Score (P) 16   PROMIS TOTAL - SUBSCORES (P) 31     Referral / Collaboration:  Referral to another professional/service is not indicated at this time..    Anticipated number of session for this episode of care: 6-9 sessions  Anticipation frequency of session: Biweekly  Anticipated Duration of each session: 38-52 minutes  Treatment plan will be reviewed in 90 days or when goals have been changed.       MeasurableTreatment Goal(s) related to diagnosis / functional impairment(s)  Goal 1: Patient will experience a reduction in anxiety as evidenced by reduction in ENEDINA 2 score from 1 to 0    I will know I've met my goal when I am able to process anxiety related to diagnosis.      Objective #A (Patient Action)    Patient will use at least 3 coping skills for anxiety management in the next 12 weeks.  Status:  Continued - Date(s): 7/2/24    Intervention(s)  Therapist will teach  grounding, mindfulness, emotion regulation .    Objective #B  Patient will use cognitive strategies identified in therapy to challenge anxious thoughts.  Status: continued 7/2/24    Intervention(s)  Therapist will teach staying in the present moment, challenging catastrophic thinking .    Objective #C  Patient will process fears related to cancer diagnosis and impacts of cancer treatment   Status: new 7/2/24    Intervention(s)  Therapist will provide supportive and nonjudgemental space for processing and support in focusing on the present moment      Goal 2: Patient will experience reduction in depressive symptoms as evidenced by stability in or reduction of PhQ9 score from 3 to less than 3 and self report of improved mood.    I will know I've met my goal when I am able to process sadness related to the diagnosis.      Objective #A (Patient Action)    Status: continued 7/2/24    Patient will Increase interest, engagement, and pleasure in doing things.    Intervention(s)  Therapist will  discuss behavioral activation, ways patient can remain active and social .    Objective #B  Patient will  process grief related to diagnosis  .    Status: continued 7/2/24    Intervention(s)  Therapist will  teach stages of grief, loss/changes of identity .        Patient has reviewed and agreed to the above plan.      GT Palencia  July 2, 2024    This note has been reviewed and I agree with the plan of care. This note is co-signed by JESÚS Meyers, [facility], Supervisor, on: July 10, 2024

## 2024-07-08 ENCOUNTER — TELEPHONE (OUTPATIENT)
Dept: ONCOLOGY | Facility: CLINIC | Age: 44
End: 2024-07-08

## 2024-07-08 ENCOUNTER — LAB (OUTPATIENT)
Dept: INFUSION THERAPY | Facility: CLINIC | Age: 44
End: 2024-07-08
Attending: INTERNAL MEDICINE
Payer: COMMERCIAL

## 2024-07-08 VITALS
RESPIRATION RATE: 16 BRPM | OXYGEN SATURATION: 100 % | SYSTOLIC BLOOD PRESSURE: 130 MMHG | TEMPERATURE: 98.2 F | DIASTOLIC BLOOD PRESSURE: 83 MMHG | HEART RATE: 71 BPM

## 2024-07-08 DIAGNOSIS — C50.412 MALIGNANT NEOPLASM OF UPPER-OUTER QUADRANT OF LEFT BREAST IN FEMALE, ESTROGEN RECEPTOR POSITIVE (H): Primary | ICD-10-CM

## 2024-07-08 DIAGNOSIS — Z17.0 MALIGNANT NEOPLASM OF UPPER-OUTER QUADRANT OF LEFT BREAST IN FEMALE, ESTROGEN RECEPTOR POSITIVE (H): Primary | ICD-10-CM

## 2024-07-08 PROCEDURE — 96402 CHEMO HORMON ANTINEOPL SQ/IM: CPT

## 2024-07-08 PROCEDURE — 250N000011 HC RX IP 250 OP 636: Performed by: INTERNAL MEDICINE

## 2024-07-08 RX ADMIN — GOSERELIN ACETATE 3.6 MG: 3.6 IMPLANT SUBCUTANEOUS at 08:59

## 2024-07-08 NOTE — PROGRESS NOTES
Infusion Nursing Note:  Angelica Gomez presents today for Monthly Zoladex.    Patient seen by provider today: No   present during visit today: Not Applicable.    Note: Pt reports no new health changes or concern today. First time getting Zoladex today. Zoladex teaching, side effects, and schedule reviewed with patient. General and individual side effects reviewed with patient. All questions answered.  Injection given to R lower abdomen.     Intravenous Access:  No Intravenous access/labs at this visit.    Treatment Conditions:  Not Applicable.      Post Infusion Assessment:  Patient tolerated injection without incident.  Site patent and intact, free from redness, edema or discomfort.  Access discontinued per protocol.       Discharge Plan:   Discharge instructions reviewed with: Patient.  Patient and/or family verbalized understanding of discharge instructions and all questions answered.  AVS to patient via [employer].  Patient will return 8/5/24 for next appointment.   Patient discharged in stable condition accompanied by: self.  Departure Mode: Ambulatory.      Bernice Jimenez RN

## 2024-07-08 NOTE — CONFIDENTIAL NOTE
Called pt to assess for symptoms from survey. Pt stated that she has no symptom concerns at this time. She will call with any future symptom concerns.

## 2024-07-11 ENCOUNTER — OFFICE VISIT (OUTPATIENT)
Dept: FAMILY MEDICINE | Facility: CLINIC | Age: 44
End: 2024-07-11
Payer: COMMERCIAL

## 2024-07-11 VITALS
HEART RATE: 81 BPM | BODY MASS INDEX: 30.57 KG/M2 | SYSTOLIC BLOOD PRESSURE: 120 MMHG | TEMPERATURE: 98.6 F | WEIGHT: 183.5 LBS | RESPIRATION RATE: 10 BRPM | HEIGHT: 65 IN | OXYGEN SATURATION: 98 % | DIASTOLIC BLOOD PRESSURE: 82 MMHG

## 2024-07-11 DIAGNOSIS — Z23 ENCOUNTER FOR VACCINATION: ICD-10-CM

## 2024-07-11 DIAGNOSIS — C50.912 MALIGNANT NEOPLASM OF LEFT FEMALE BREAST, UNSPECIFIED ESTROGEN RECEPTOR STATUS, UNSPECIFIED SITE OF BREAST (H): ICD-10-CM

## 2024-07-11 DIAGNOSIS — Z01.818 PREOP GENERAL PHYSICAL EXAM: Primary | ICD-10-CM

## 2024-07-11 DIAGNOSIS — C50.412 MALIGNANT NEOPLASM OF UPPER-OUTER QUADRANT OF LEFT BREAST IN FEMALE, ESTROGEN RECEPTOR POSITIVE (H): ICD-10-CM

## 2024-07-11 DIAGNOSIS — Z17.0 MALIGNANT NEOPLASM OF UPPER-OUTER QUADRANT OF LEFT BREAST IN FEMALE, ESTROGEN RECEPTOR POSITIVE (H): ICD-10-CM

## 2024-07-11 DIAGNOSIS — F43.22 ADJUSTMENT DISORDER WITH ANXIETY: ICD-10-CM

## 2024-07-11 DIAGNOSIS — F43.23 ADJUSTMENT DISORDER WITH MIXED ANXIETY AND DEPRESSED MOOD: Primary | ICD-10-CM

## 2024-07-11 PROCEDURE — 99214 OFFICE O/P EST MOD 30 MIN: CPT | Mod: 25 | Performed by: NURSE PRACTITIONER

## 2024-07-11 PROCEDURE — 91320 SARSCV2 VAC 30MCG TRS-SUC IM: CPT | Performed by: NURSE PRACTITIONER

## 2024-07-11 PROCEDURE — 90677 PCV20 VACCINE IM: CPT | Performed by: NURSE PRACTITIONER

## 2024-07-11 PROCEDURE — 90471 IMMUNIZATION ADMIN: CPT | Performed by: NURSE PRACTITIONER

## 2024-07-11 PROCEDURE — 90480 ADMN SARSCOV2 VAC 1/ONLY CMP: CPT | Performed by: NURSE PRACTITIONER

## 2024-07-11 ASSESSMENT — PAIN SCALES - GENERAL: PAINLEVEL: NO PAIN (0)

## 2024-07-11 NOTE — PROGRESS NOTES
Preoperative Evaluation  [facility]  [address] [place], [place] [address] [place]  [address]  [facility] [address]  Phone: [phone]  Fax: [phone]  Primary Provider: DEISI Hein CNP  Pre-op Performing Provider: DEISI Hein CNP  Jul 11, 2024 7/8/2024   Surgical Information   What procedure is being done? Phase 2 Breast Reconstruction   Facility or Hospital where procedure/surgery will be performed: [facility]   Who is doing the procedure / surgery? Dr. Venegas   Date of surgery / procedure: 7/30/2024   Time of surgery / procedure: 1:00pm   Where do you plan to recover after surgery? at home with family          Assessment & Plan     The proposed surgical procedure is considered INTERMEDIATE risk.    Preop general physical exam      Malignant neoplasm of upper-outer quadrant of left breast in female, estrogen receptor positive (H)      Adjustment disorder with anxiety  -stable  Encounter for vaccination    - Pneumococcal 20 Valent Conjugate (Prevnar 20)  - COVID-19 12+ (2023-24) (PFIZER)            - No identified additional risk factors other than previously addressed         Recommendation  Approval given to proceed with proposed procedure, without further diagnostic evaluation.    Geovany Fry is a 44 year old, presenting for the following:  Pre-Op Exam          7/11/2024     1:44 PM   Additional Questions   Roomed by Brenda   Accompanied by Self     HPI related to upcoming procedure: Reconstruction of Breasts following masectomy        7/8/2024   Pre-Op Questionnaire   Have you ever had a heart attack or stroke? No   Have you ever had surgery on your heart or blood vessels, such as a stent placement, a coronary artery bypass, or surgery on an artery in your head, neck, heart, or legs? No   Do you have chest pain with activity? No   Do you have a history of heart failure? No   Do you currently have a cold, bronchitis or symptoms of other infection? No   Do you  have a cough, shortness of breath, or wheezing? No   Do you or anyone in your family have previous history of blood clots? (!) YES    Do you or does anyone in your family have a serious bleeding problem such as prolonged bleeding following surgeries or cuts? No   Have you ever had problems with anemia or been told to take iron pills? No   Have you had any abnormal blood loss such as black, tarry or bloody stools, or abnormal vaginal bleeding? No   Have you ever had a blood transfusion? No   Are you willing to have a blood transfusion if it is medically needed before, during, or after your surgery? Yes   Have you or any of your relatives ever had problems with anesthesia? No   Do you have sleep apnea, excessive snoring or daytime drowsiness? No   Do you have any artifical heart valves or other implanted medical devices like a pacemaker, defibrillator, or continuous glucose monitor? No   Do you have artificial joints? No   Are you allergic to latex? No        Health Care Directive  Patient has a Health Care Directive on file    56}    Status of Chronic Conditions:  Migraines- currently well controlled. She does not have a period currently, which helps prevent migraine symptoms.  Adjustment Disorder- She currently feels like her mood symptoms are well controlled. No current symptoms.    Patient Active Problem List    Diagnosis Date Noted    Intractable chronic migraine without aura and with status migrainosus 04/10/2024     Priority: Medium    Malignant neoplasm of upper-outer quadrant of left breast in female, estrogen receptor positive (H) 03/11/2024     Priority: Medium    Adjustment disorder with anxiety 03/11/2024     Priority: Medium    Breast cancer, left (H) 02/21/2024     Priority: Medium    Adjustment disorder with mixed anxiety and depressed mood 02/12/2024     Priority: Medium    Invasive ductal carcinoma of breast, female, left (H) 12/26/2023     Priority: Medium    Headache 09/10/2012     Priority: Medium      Problem list name updated by automated process. Provider to review and confirm  Problem list name updated by automated process. Provider to review      Dysesthesia 09/10/2012     Priority: Medium     Both feet during exercise but otherwise not present  (Problem list name updated by automated process. Provider to review and confirm.)      Fatigue 09/10/2012     Priority: Medium    Leg pain 09/10/2012     Priority: Medium     Pain in right mid-tibial level and adjacent calf      CARDIOVASCULAR SCREENING; LDL GOAL LESS THAN 160 10/31/2010     Priority: Medium    Ovarian cyst 2009     Priority: Medium    Galactorrhea 10/27/2009     Priority: Medium    Dyspareunia 10/27/2009     Priority: Medium      Past Medical History:   Diagnosis Date    Malignant neoplasm of upper-outer quadrant of left breast in female, estrogen receptor positive (H) 2024    NO ACTIVE PROBLEMS      Past Surgical History:   Procedure Laterality Date    COLONOSCOPY WITH CO2 INSUFFLATION N/A 2024    Procedure: Colonoscopy with CO2 insufflation;  Surgeon: Cherise Lima DO;  Location: [facility] OR    INSERT TISSUE EXPANDER BREAST BILATERAL Bilateral 2024    Procedure: Insertion tissue expander, breasts, bilateral;  Surgeon: Elisabet Venegas MD;  Location: [facility] OR    IR CHEST PORT PLACEMENT > 5 YRS OF AGE  2024    IR PORT REMOVAL RIGHT  2024    MASTECTOMY, NIPPLE SPARING Bilateral 2024    Procedure: Bilateral nipple sparing mastectomy, bilateral implant removal, left sentinel lymph node biopsy;  Surgeon: Aba Phillips MD;  Location: [facility] OR    TUBAL LIGATION      [facility]  DELIVERY ONLY      superficial wound dehiscence     Current Outpatient Medications   Medication Sig Dispense Refill    anastrozole (ARIMIDEX) 1 MG tablet Take 1 tablet (1 mg) by mouth daily 90 tablet 3    Calcium Carbonate-Vitamin D (CALCIUM 500 + D PO)       Cetirizine HCl (ZYRTEC ALLERGY PO) Take 10 mg by mouth daily       COMPRESSION STOCKINGS Please measure and distribute 2 pair of 20mmHg - 30mmHg thigh high open or closed toe compression stockings with extra refills as indicated. 2 each 4    Fluticasone Propionate (FLONASE NA) Spray 1 spray in nostril daily      hydrOXYzine HCl (ATARAX) 25 MG tablet Take 1-2 tablets (25-50 mg) by mouth 3 times daily as needed for anxiety or other (sleep) 90 tablet 1    ketoconazole (NIZORAL) 2 % external cream Apply topically daily 30 g 1    lidocaine-prilocaine (EMLA) 2.5-2.5 % external cream Apply to port 30-60 minutes prior to accessing it for treatment/labs. 30 g 1    LORazepam (ATIVAN) 0.5 MG tablet Take 1 tablet (0.5 mg) by mouth every 6 hours as needed for anxiety 30 tablet 0    methocarbamol (ROBAXIN) 750 MG tablet Take 1 tablet (750 mg) by mouth 3 times daily 30 tablet 0    Multiple Vitamins-Minerals (MULTIVITAMIN & MINERAL PO) Take 1 each by mouth daily. [facility] women's active supplement      Probiotic Product (PROBIOTIC PO) Reported on 3/1/2017      senna-docusate (SENOKOT-S/PERICOLACE) 8.6-50 MG tablet Take 1 tablet by mouth 2 times daily 30 tablet 0    SUMAtriptan (IMITREX) 25 MG tablet Take 1 tablet (25 mg) by mouth at onset of headache for migraine May repeat in 2 hours. Max 8 tablets/24 hours. 18 tablet 4    tranexamic acid (LYSTEDA) 650 MG tablet Take 2 tablets (1,300 mg) by mouth 3 times daily 30 tablet 3    triamcinolone (KENALOG) 0.1 % external cream Apply topically 2 times daily 45 g 1       No Known Allergies     Social History     Tobacco Use    Smoking status: Former     Current packs/day: 0.25     Average packs/day: 0.3 packs/day for 1 year (0.3 ttl pk-yrs)     Types: Cigarettes    Smokeless tobacco: Never   Substance Use Topics    Alcohol use: Not Currently     Family History   Problem Relation Age of Onset    Thyroid Disease Mother     Breast Cancer Mother 56        breast, 3 years later    Heart Disease Father         2x heart attacks    Circulatory Father         blood  "clots    Cardiovascular Father         patent foramen ovale, repaired x 2, afib    Cerebrovascular Disease Father         x2    C.A.D. Father         x2    Genitourinary Problems Father         kidney disease    Asthma Brother     Food Allergy Brother     Eczema Brother     No Known Problems Maternal Grandmother     Myocardial Infarction Maternal Grandfather     Hypertension Paternal Grandmother     Alcohol/Drug Paternal Grandfather     No Known Problems Daughter     No Known Problems Son     Cancer Paternal Aunt         cervical cancer(another sisiter)    Breast Cancer Paternal Aunt         breast cancer at 70's    Breast Cancer Paternal Aunt     Ovarian Cancer Paternal Aunt     Cancer - colorectal No family hx of     Diabetes No family hx of      History   Drug Use No             Review of Systems  CONSTITUTIONAL: NEGATIVE for fever, chills, change in weight  INTEGUMENTARY/SKIN: NEGATIVE for worrisome rashes, moles or lesions  EYES: NEGATIVE for vision changes or irritation  ENT/MOUTH: NEGATIVE for ear, mouth and throat problems  RESP: NEGATIVE for significant cough or SOB  BREAST: NEGATIVE for masses, tenderness or discharge  CV: NEGATIVE for chest pain, palpitations or peripheral edema  GI: NEGATIVE for nausea, abdominal pain, heartburn, or change in bowel habits  : NEGATIVE for frequency, dysuria, or hematuria  MUSCULOSKELETAL: NEGATIVE for significant arthralgias or myalgia  NEURO: NEGATIVE for weakness, dizziness or paresthesias  ENDOCRINE: NEGATIVE for temperature intolerance, skin/hair changes  HEME: NEGATIVE for bleeding problems  PSYCHIATRIC: NEGATIVE for changes in mood or affect    Objective    /82   Pulse 81   Temp 98.6  F (37  C) (Temporal)   Resp 10   Ht 1.645 m (5' 4.76\")   Wt 83.2 kg (183 lb 8 oz)   LMP 04/09/2024 (Exact Date)   SpO2 98%   BMI 30.76 kg/m     Estimated body mass index is 30.76 kg/m  as calculated from the following:    Height as of this encounter: 1.645 m (5' " "4.76\").    Weight as of this encounter: 83.2 kg (183 lb 8 oz).  Physical Exam  GENERAL: alert and no distress  EYES: Eyes grossly normal to inspection, PERRL and conjunctivae and sclerae normal  HENT: ear canals and TM's normal, nose and mouth without ulcers or lesions  NECK: no adenopathy, no asymmetry, masses, or scars  RESP: lungs clear to auscultation - no rales, rhonchi or wheezes  CV: regular rate and rhythm, normal S1 S2, no S3 or S4, no murmur, click or rub, no peripheral edema  ABDOMEN: soft, nontender, no hepatosplenomegaly, no masses and bowel sounds normal  MS: no gross musculoskeletal defects noted, no edema  SKIN: no suspicious lesions or rashes  NEURO: Normal strength and tone, mentation intact and speech normal  PSYCH: mentation appears normal, affect normal/bright    Recent Labs   Lab Test 06/24/24  1203 05/23/24  1427 05/21/24  1927   HGB 12.1 10.8* 10.8*    172 166   NA  --  142 140   POTASSIUM  --  4.0 4.2   CR  --  1.11* 0.83        Diagnostics  No labs were ordered during this visit.   No EKG required for low risk surgery (cataract, skin procedure, breast biopsy, etc).    Revised Cardiac Risk Index (RCRI)  The patient has the following serious cardiovascular risks for perioperative complications:   - No serious cardiac risks = 0 points     RCRI Interpretation: 0 points: Class I (very low risk - 0.4% complication rate)         Signed Electronically by: DEISI Hein CNP  Copy of this evaluation report is provided to requesting physician.         "

## 2024-07-15 ENCOUNTER — PRE VISIT (OUTPATIENT)
Dept: PSYCHIATRY | Facility: CLINIC | Age: 44
End: 2024-07-15
Payer: COMMERCIAL

## 2024-07-15 NOTE — TELEPHONE ENCOUNTER
PSYCHIATRY CLINIC PHONE INTAKE     SERVICES REQUESTED / INTERESTED IN          Med Management    Presenting Problem and Brief History                              What would you like to be seen for? (brief description):  Pt stated she has been going through chemo therapy for 2 months and feels like it has been effecting her overall mental health. Pt stated that her PCP recommended her to see a psychiatrist because she has been having some memory fogginess. Pt stated that her PCP recommended a stimulant but did not feel comfortable prescribing the medication.    Have you received a mental health diagnosis? Yes   Which one (s): Anxiety (Pt did not remember date)  Is there any history of developmental delay?  No   Are you currently seeing a mental health provider?  Yes            Who / month last seen:  Pt is currently seeing Therapist Joyce Graves at [facility]. Pt last saw her on 7/2/2024  Do you have mental health records elsewhere?  No  Will you sign a release so we can obtain them?   N/A     Have you ever been hospitalized for psychiatric reasons?  No  Describe:  N/A    Do you have current thoughts of self-harm?  No    Do you currently have thoughts of harming others?  No    Do you have any safety concerns? No   If yes to these, offer to reach out to a  for follow up.      Substance Use History     Do you have any history of alcohol  or other substance use?  No  Describe:  N/A  Have you ever received treatment for this?  No    Describe: N/A       Social History     Who is the patient's a guardian?  No    Name / number: N/A  Have you had an ACT team in last 12 months?  No  Describe: N/A    OK to leave a detailed voicemail?  Yes    Would you be interested in learning more about research opportunities for which you or your child may qualify? We can connect you with a team member for more information.   N/A   If yes, send an WebVet message to Vy Cherry    Medical/ Surgical History                                    Patient Active Problem List   Diagnosis    Galactorrhea    Dyspareunia    Ovarian cyst    CARDIOVASCULAR SCREENING; LDL GOAL LESS THAN 160    Headache    Dysesthesia    Leg pain    Adjustment disorder with mixed anxiety and depressed mood    Malignant neoplasm of upper-outer quadrant of left breast in female, estrogen receptor positive (H)    Adjustment disorder with anxiety    Intractable chronic migraine without aura and with status migrainosus          Medications             Have you taken >3 psychiatric medications in your past?  No   Do you currently take 5 or more medications, including prescriptions, supplements, and other over the counter products?  Yes (Pt did not list medications)     If YES to at least one of these questions:   As part of your evaluation in our clinic, we have specially trained pharmacists as part of your care team. Your provider would like for you to meet with one of our pharmacists to review your current and past medications, ensure your med list is up to date, and queue up any questions or concerns you have about medications. They will review all of your medications, not just for mental health, to help ensure you know what you re taking and that everything is working together.     Please schedule patient in [facility] (Joana Campos or Stephanie Nuñez) for 60m MTM in any green space as virtual (video), telephone, or in person (designated in person days per Epic templates).  -Appt notes can say  Psych eval on xx/xx   -Route telephone encounter to the pharmacist who will be seeing the patient.  If patient has questions about insurance coverage or billing, please still schedule the visit and refer them to call the [facility] coordinators at [phone].    Current Outpatient Medications   Medication Sig Dispense Refill    anastrozole (ARIMIDEX) 1 MG tablet Take 1 tablet (1 mg) by mouth daily 90 tablet 3    Calcium Carbonate-Vitamin D (CALCIUM 500 + D PO)        Cetirizine HCl (ZYRTEC ALLERGY PO) Take 10 mg by mouth daily      COMPRESSION STOCKINGS Please measure and distribute 2 pair of 20mmHg - 30mmHg thigh high open or closed toe compression stockings with extra refills as indicated. 2 each 4    Fluticasone Propionate (FLONASE NA) Spray 1 spray in nostril daily      hydrOXYzine HCl (ATARAX) 25 MG tablet Take 1-2 tablets (25-50 mg) by mouth 3 times daily as needed for anxiety or other (sleep) 90 tablet 1    ketoconazole (NIZORAL) 2 % external cream Apply topically daily 30 g 1    lidocaine-prilocaine (EMLA) 2.5-2.5 % external cream Apply to port 30-60 minutes prior to accessing it for treatment/labs. 30 g 1    LORazepam (ATIVAN) 0.5 MG tablet Take 1 tablet (0.5 mg) by mouth every 6 hours as needed for anxiety 30 tablet 0    methocarbamol (ROBAXIN) 750 MG tablet Take 1 tablet (750 mg) by mouth 3 times daily 30 tablet 0    Multiple Vitamins-Minerals (MULTIVITAMIN & MINERAL PO) Take 1 each by mouth daily. [facility] women's active supplement      Probiotic Product (PROBIOTIC PO) Reported on 3/1/2017      senna-docusate (SENOKOT-S/PERICOLACE) 8.6-50 MG tablet Take 1 tablet by mouth 2 times daily 30 tablet 0    SUMAtriptan (IMITREX) 25 MG tablet Take 1 tablet (25 mg) by mouth at onset of headache for migraine May repeat in 2 hours. Max 8 tablets/24 hours. 18 tablet 4    tranexamic acid (LYSTEDA) 650 MG tablet Take 2 tablets (1,300 mg) by mouth 3 times daily 30 tablet 3    triamcinolone (KENALOG) 0.1 % external cream Apply topically 2 times daily 45 g 1         DISPOSITION      7/15/2024: Intake Complete (Scheduled patient an appointment with Mariely Islas on 9/17/2024 at 9:00 AM)     Mimi Blackman (Complex )

## 2024-07-16 ENCOUNTER — THERAPY VISIT (OUTPATIENT)
Dept: OCCUPATIONAL THERAPY | Facility: CLINIC | Age: 44
End: 2024-07-16
Attending: INTERNAL MEDICINE
Payer: COMMERCIAL

## 2024-07-16 DIAGNOSIS — Z90.12 STATUS POST LEFT MASTECTOMY: Primary | ICD-10-CM

## 2024-07-16 PROCEDURE — 97537 COMMUNITY/WORK REINTEGRATION: CPT | Mod: GO

## 2024-07-19 ENCOUNTER — TELEPHONE (OUTPATIENT)
Dept: ONCOLOGY | Facility: CLINIC | Age: 44
End: 2024-07-19
Payer: COMMERCIAL

## 2024-07-19 NOTE — CONFIDENTIAL NOTE
Called pt to assess for symptoms from survey.    Reports that she occasionally has pain on her left side that is associated with some puffiness of the skin since surgery. She was told by her plastic surgeon that this area will be addressed at a reconstruction surgery later this month. She does not have any acute concerns.    Will call if any new or worsening symptoms.

## 2024-07-23 ENCOUNTER — THERAPY VISIT (OUTPATIENT)
Dept: OCCUPATIONAL THERAPY | Facility: CLINIC | Age: 44
End: 2024-07-23
Attending: INTERNAL MEDICINE
Payer: COMMERCIAL

## 2024-07-23 DIAGNOSIS — C50.912 INVASIVE DUCTAL CARCINOMA OF BREAST, FEMALE, LEFT (H): ICD-10-CM

## 2024-07-23 DIAGNOSIS — Z90.12 STATUS POST LEFT MASTECTOMY: Primary | ICD-10-CM

## 2024-07-23 PROCEDURE — 97537 COMMUNITY/WORK REINTEGRATION: CPT | Mod: GO

## 2024-07-26 ENCOUNTER — TELEPHONE (OUTPATIENT)
Dept: ONCOLOGY | Facility: CLINIC | Age: 44
End: 2024-07-26
Payer: COMMERCIAL

## 2024-07-26 NOTE — TELEPHONE ENCOUNTER
Pt completed Care Companion questionnaire and reported the following symptom: occasional moderate pain.  Pt reports that pain is located in the left breast near the edge of the tissue expander along the chest wall near the axilla. She has discussed this with plastic surgeon. She is scheduled to have tissue expanders replaced with implants next week on 7/30/2024. Plastic surgeon has told her they will also clean up any scar tissue in the area.  Pt reports that this pain seems to be worse this week, and she is thinking it is due to increase in activity. Denies any signs of infection.  She will continue to monitor. She is hopeful that surgery next week will relieve her symptoms.    Patient verbalized understanding and agreement with plan.  Patient was instructed to call the clinic with any questions, concerns, or worsening symptoms.  Will route update to care team.    Coco Hui RN on 7/26/2024 at 10:36 AM

## 2024-07-29 NOTE — PROGRESS NOTES
[facility]                                     Progress Note    Patient Name: Angelica Gomez  Date: 24           Service Type: Individual      Session Start Time: 11:00am  Session End Time: 11:45am     Session Length: 45 minutes    Session #: 12    Attendees: Client attended alone    Service Modality:  Video Visit:      Provider verified identity through the following two step process.  Patient provided:  Patient  and Patient address    Telemedicine Visit: The patient's condition can be safely assessed and treated via synchronous audio and visual telemedicine encounter.      Reason for Telemedicine Visit: Patient has requested telehealth visit    Originating Site (Patient Location): Patient's home    Distant Site (Provider Location): Provider Remote Setting- Home Office    Consent:  The patient/guardian has verbally consented to: the potential risks and benefits of telemedicine (video visit) versus in person care; bill my insurance or make self-payment for services provided; and responsibility for payment of non-covered services.     Patient would like the video invitation sent by:  My Chart    Mode of Communication:  Video Conference via Amwell    Distant Location (Provider):  Off-site    As the provider I attest to compliance with applicable laws and regulations related to telemedicine.    DATA  Interactive Complexity: No  Crisis: No        Progress Since Last Session (Related to Symptoms / Goals / Homework):   Symptoms: Improving decrease in anxiety    Homework: Achieved / completed to satisfaction      Episode of Care Goals: Satisfactory progress - PREPARATION (Decided to change - considering how); Intervened by negotiating a change plan and determining options / strategies for behavior change, identifying triggers, exploring social supports, and working towards setting a date to begin behavior change     Current / Ongoing Stressors and Concerns:   Pt shared that she experienced  some recent stress related to conflict on the [employer] club for her daughters soccer team. She reported that she was able to engage in some problem solving and feels relief in the way things worked out. Reported that she is not experiencing as much pain related to her recent surgery as her mastectomy. Noted improvement in memory in cognition. She shared that she plans to try to return to work after labor day. Endorsed some concern about how this will go, but confidence in advocating for accommodations for herself.          Treatment Objective(s) Addressed in This Session:   use cognitive strategies identified in therapy to challenge anxious thoughts  Increase interest, engagement, and pleasure in doing things  Process grief related cancer diagnosis and treatment    learn & utilize at least 3 assertive communication skills weekly         Intervention:   CBT: mindfulness  Emotion Focused Therapy: identify and process emotinos  Interpersonal Therapy: assertive communication  Solution Focused: pros and cons    Assessments completed prior to visit:  The following assessments were completed by patient for this visit:  PHQ9:       2/12/2024     7:40 AM   PHQ-9 SCORE   PHQ-9 Total Score MyChart 3 (Minimal depression)   PHQ-9 Total Score 3     GAD7:        No data to display              CAGE-AID:       2/12/2024     8:09 AM   CAGE-AID Total Score   Total Score 0   Total Score MyChart 0 (A total score of 2 or greater is considered clinically significant)     PROMIS 10-Global Health (only subscores and total score):       2/12/2024     8:09 AM 5/17/2024     9:01 PM 5/30/2024     9:51 AM 6/17/2024    10:42 AM   PROMIS-10 Scores Only   Global Mental Health Score 15 14 15 13   Global Physical Health Score 16 15 15 14   PROMIS TOTAL - SUBSCORES 31 29 30 27     [place] Suicide Severity Rating Scale (Lifetime/Recent)      2/12/2024     9:59 AM 2/21/2024    11:37 AM 5/21/2024     5:50 PM   [place] Suicide Severity Rating  (Lifetime/Recent)   Q1 Wished to be Dead (Past Month)  0-->no 0-->no   Q2 Suicidal Thoughts (Past Month)  0-->no 0-->no   Q6 Suicide Behavior (Lifetime)  0-->no 0-->no   Level of Risk per Screen  no risks indicated no risks indicated   Q1 Wish to be Dead (Lifetime) N     Q2 Non-Specific Active Suicidal Thoughts (Lifetime) N     Actual Attempt (Lifetime) N     Has subject engaged in non-suicidal self-injurious behavior? (Lifetime) N     Interrupted Attempts (Lifetime) N     Aborted or Self-Interrupted Attempt (Lifetime) N     Preparatory Acts or Behavior (Lifetime) N     Calculated C-SSRS Risk Score (Lifetime/Recent) No Risk Indicated           ASSESSMENT: Current Emotional / Mental Status (status of significant symptoms):   Risk status (Self / Other harm or suicidal ideation)   Patient denies current fears or concerns for personal safety.   Patient denies current or recent suicidal ideation or behaviors.   Patient denies current or recent homicidal ideation or behaviors.   Patient denies current or recent self injurious behavior or ideation.   Patient denies other safety concerns.   Patient reports there has been no change in risk factors since their last session.     Patient reports there has been no change in protective factors since their last session.     Recommended that patient call 911 or go to the local ED should there be a change in any of these risk factors.     Appearance:   Appropriate    Eye Contact:   Fair    Psychomotor Behavior: Normal    Attitude:   Cooperative  Friendly Pleasant   Orientation:   All   Speech    Rate / Production: Normal/ Responsive    Volume:  Normal    Mood:    Anxiety   Affect:    Appropriate    Thought Content:  Clear    Thought Form:  Coherent  Logical    Insight:    Good      Medication Review:   No current psychiatric medications prescribed     Medication Compliance:   NA     Changes in Health Issues:   None reported     Chemical Use Review:   Substance Use: Chemical use  reviewed, no active concerns identified      Tobacco Use: No current tobacco use.      Diagnosis:  Adjustment Disorder with Anxiety and Depression    Collateral Reports Completed:   Not Applicable    PLAN: (Patient Tasks / Therapist Tasks / Other)  Pt and writer will continue to meet on a bi-weekly basis. Next appointment   Pt will discuss accommodations with her boss prior to returning to work.  Pt will focus on rest and taking care of her body to recover from surgery.     Joyce Cristobal [facility]    This note has been reviewed and I agree with the plan of care. This note is co-signed by JESÚS Meyers, [facility], Supervisor, on: July 31, 2024  ----------------------------------------------------------------------------------  Individual Treatment Plan    Patient's Name: Angelica Gomez  YOB: 1980    Date of Creation: 3/26/24  Date Treatment Plan Last Reviewed/Revised: 7/2/24    DSM5 Diagnoses: Adjustment Disorders  309.28 (F43.23) With mixed anxiety and depressed mood  Psychosocial / Contextual Factors: going through breast cancer treatment   PROMIS (reviewed every 90 days):   Global Mental Health Score (P) 15   Global Physical Health Score (P) 16   PROMIS TOTAL - SUBSCORES (P) 31     Referral / Collaboration:  Referral to another professional/service is not indicated at this time..    Anticipated number of session for this episode of care: 6-9 sessions  Anticipation frequency of session: Biweekly  Anticipated Duration of each session: 38-52 minutes  Treatment plan will be reviewed in 90 days or when goals have been changed.       MeasurableTreatment Goal(s) related to diagnosis / functional impairment(s)  Goal 1: Patient will experience a reduction in anxiety as evidenced by reduction in ENEDINA 2 score from 1 to 0    I will know I've met my goal when I am able to process anxiety related to diagnosis.      Objective #A (Patient Action)    Patient will use at least 3 coping skills for anxiety  management in the next 12 weeks.  Status: Continued - Date(s): 7/2/24    Intervention(s)  Therapist will teach  grounding, mindfulness, emotion regulation .    Objective #B  Patient will use cognitive strategies identified in therapy to challenge anxious thoughts.  Status: continued 7/2/24    Intervention(s)  Therapist will teach staying in the present moment, challenging catastrophic thinking .    Objective #C  Patient will process fears related to cancer diagnosis and impacts of cancer treatment   Status: new 7/2/24    Intervention(s)  Therapist will provide supportive and nonjudgemental space for processing and support in focusing on the present moment      Goal 2: Patient will experience reduction in depressive symptoms as evidenced by stability in or reduction of PhQ9 score from 3 to less than 3 and self report of improved mood.    I will know I've met my goal when I am able to process sadness related to the diagnosis.      Objective #A (Patient Action)    Status: continued 7/2/24    Patient will Increase interest, engagement, and pleasure in doing things.    Intervention(s)  Therapist will  discuss behavioral activation, ways patient can remain active and social .    Objective #B  Patient will  process grief related to diagnosis  .    Status: continued 7/2/24    Intervention(s)  Therapist will  teach stages of grief, loss/changes of identity .        Patient has reviewed and agreed to the above plan.      GT Palencia  July 2, 2024    This note has been reviewed and I agree with the plan of care. This note is co-signed by JESÚS Meyers, [facility], Supervisor, on: July 10, 2024

## 2024-07-30 ENCOUNTER — ANESTHESIA EVENT (OUTPATIENT)
Dept: SURGERY | Facility: CLINIC | Age: 44
End: 2024-07-30
Payer: COMMERCIAL

## 2024-07-30 ENCOUNTER — HOSPITAL ENCOUNTER (OUTPATIENT)
Facility: CLINIC | Age: 44
Discharge: HOME | End: 2024-07-30
Attending: PLASTIC SURGERY | Admitting: PLASTIC SURGERY
Payer: COMMERCIAL

## 2024-07-30 ENCOUNTER — ANESTHESIA (OUTPATIENT)
Dept: SURGERY | Facility: CLINIC | Age: 44
End: 2024-07-30
Payer: COMMERCIAL

## 2024-07-30 VITALS
BODY MASS INDEX: 29.81 KG/M2 | HEIGHT: 65 IN | RESPIRATION RATE: 12 BRPM | WEIGHT: 178.9 LBS | TEMPERATURE: 97.5 F | HEART RATE: 77 BPM | OXYGEN SATURATION: 100 % | DIASTOLIC BLOOD PRESSURE: 85 MMHG | SYSTOLIC BLOOD PRESSURE: 136 MMHG

## 2024-07-30 DIAGNOSIS — Z85.3 PERSONAL HISTORY OF BREAST CANCER: Primary | ICD-10-CM

## 2024-07-30 LAB
HCG UR QL: NEGATIVE
PATH REPORT.COMMENTS IMP SPEC: NORMAL
PATH REPORT.COMMENTS IMP SPEC: NORMAL
PATH REPORT.FINAL DX SPEC: NORMAL
PATH REPORT.GROSS SPEC: NORMAL
PATH REPORT.MICROSCOPIC SPEC OTHER STN: NORMAL
PATH REPORT.RELEVANT HX SPEC: NORMAL
PHOTO IMAGE: NORMAL

## 2024-07-30 PROCEDURE — 258N000003 HC RX IP 258 OP 636: Performed by: PLASTIC SURGERY

## 2024-07-30 PROCEDURE — L8600 IMPLANT BREAST SILICONE/EQ: HCPCS | Performed by: PLASTIC SURGERY

## 2024-07-30 PROCEDURE — 258N000003 HC RX IP 258 OP 636: Performed by: ANESTHESIOLOGY

## 2024-07-30 PROCEDURE — 250N000011 HC RX IP 250 OP 636: Performed by: REGISTERED NURSE

## 2024-07-30 PROCEDURE — 250N000013 HC RX MED GY IP 250 OP 250 PS 637: Performed by: ANESTHESIOLOGY

## 2024-07-30 PROCEDURE — 272N000002 HC OR SUPPLY OTHER OPNP: Performed by: PLASTIC SURGERY

## 2024-07-30 PROCEDURE — 710N000012 HC RECOVERY PHASE 2, PER MINUTE: Performed by: PLASTIC SURGERY

## 2024-07-30 PROCEDURE — 250N000011 HC RX IP 250 OP 636: Performed by: PLASTIC SURGERY

## 2024-07-30 PROCEDURE — 88300 SURGICAL PATH GROSS: CPT | Mod: TC | Performed by: PLASTIC SURGERY

## 2024-07-30 PROCEDURE — 88300 SURGICAL PATH GROSS: CPT | Mod: 26 | Performed by: PATHOLOGY

## 2024-07-30 PROCEDURE — 272N000001 HC OR GENERAL SUPPLY STERILE: Performed by: PLASTIC SURGERY

## 2024-07-30 PROCEDURE — 999N000141 HC STATISTIC PRE-PROCEDURE NURSING ASSESSMENT: Performed by: PLASTIC SURGERY

## 2024-07-30 PROCEDURE — 250N000025 HC SEVOFLURANE, PER MIN: Performed by: PLASTIC SURGERY

## 2024-07-30 PROCEDURE — 250N000009 HC RX 250: Performed by: PLASTIC SURGERY

## 2024-07-30 PROCEDURE — 360N000077 HC SURGERY LEVEL 4, PER MIN: Performed by: PLASTIC SURGERY

## 2024-07-30 PROCEDURE — 710N000009 HC RECOVERY PHASE 1, LEVEL 1, PER MIN: Performed by: PLASTIC SURGERY

## 2024-07-30 PROCEDURE — 370N000017 HC ANESTHESIA TECHNICAL FEE, PER MIN: Performed by: PLASTIC SURGERY

## 2024-07-30 PROCEDURE — 250N000013 HC RX MED GY IP 250 OP 250 PS 637: Performed by: PLASTIC SURGERY

## 2024-07-30 PROCEDURE — 19370 REVJ PERI-IMPLT CAPSULE BRST: CPT | Performed by: REGISTERED NURSE

## 2024-07-30 PROCEDURE — 19370 REVJ PERI-IMPLT CAPSULE BRST: CPT | Performed by: ANESTHESIOLOGY

## 2024-07-30 PROCEDURE — 81025 URINE PREGNANCY TEST: CPT | Performed by: ANESTHESIOLOGY

## 2024-07-30 PROCEDURE — 250N000009 HC RX 250: Performed by: REGISTERED NURSE

## 2024-07-30 DEVICE — NATRELLE INSPIRA SSF 605CC FULL PROFILE  SMOOTH ROUND SILICONE
Type: IMPLANTABLE DEVICE | Site: BREAST | Status: FUNCTIONAL
Brand: NATRELLE INSPIRA SOFTTOUCH BREAST IMPLANTS

## 2024-07-30 RX ORDER — GLYCINE 1.5 G/100ML
SOLUTION IRRIGATION PRN
Status: DISCONTINUED | OUTPATIENT
Start: 2024-07-30 | End: 2024-07-30 | Stop reason: HOSPADM

## 2024-07-30 RX ORDER — SODIUM CHLORIDE, SODIUM LACTATE, POTASSIUM CHLORIDE, CALCIUM CHLORIDE 600; 310; 30; 20 MG/100ML; MG/100ML; MG/100ML; MG/100ML
INJECTION, SOLUTION INTRAVENOUS CONTINUOUS
Status: DISCONTINUED | OUTPATIENT
Start: 2024-07-30 | End: 2024-07-30 | Stop reason: HOSPADM

## 2024-07-30 RX ORDER — LABETALOL HYDROCHLORIDE 5 MG/ML
10 INJECTION, SOLUTION INTRAVENOUS
Status: DISCONTINUED | OUTPATIENT
Start: 2024-07-30 | End: 2024-07-30 | Stop reason: HOSPADM

## 2024-07-30 RX ORDER — BUPIVACAINE HYDROCHLORIDE 2.5 MG/ML
INJECTION, SOLUTION INFILTRATION; PERINEURAL PRN
Status: DISCONTINUED | OUTPATIENT
Start: 2024-07-30 | End: 2024-07-30 | Stop reason: HOSPADM

## 2024-07-30 RX ORDER — OXYCODONE HYDROCHLORIDE 5 MG/1
5 TABLET ORAL ONCE
Status: COMPLETED | OUTPATIENT
Start: 2024-07-30 | End: 2024-07-30

## 2024-07-30 RX ORDER — ACETAMINOPHEN 500 MG
1000 TABLET ORAL ONCE
Status: COMPLETED | OUTPATIENT
Start: 2024-07-30 | End: 2024-07-30

## 2024-07-30 RX ORDER — PROPOFOL 10 MG/ML
INJECTION, EMULSION INTRAVENOUS PRN
Status: DISCONTINUED | OUTPATIENT
Start: 2024-07-30 | End: 2024-07-30

## 2024-07-30 RX ORDER — METOCLOPRAMIDE HYDROCHLORIDE 5 MG/ML
INJECTION INTRAMUSCULAR; INTRAVENOUS PRN
Status: DISCONTINUED | OUTPATIENT
Start: 2024-07-30 | End: 2024-07-30

## 2024-07-30 RX ORDER — FENTANYL CITRATE 50 UG/ML
INJECTION, SOLUTION INTRAMUSCULAR; INTRAVENOUS PRN
Status: DISCONTINUED | OUTPATIENT
Start: 2024-07-30 | End: 2024-07-30

## 2024-07-30 RX ORDER — FENTANYL CITRATE 0.05 MG/ML
25 INJECTION, SOLUTION INTRAMUSCULAR; INTRAVENOUS EVERY 5 MIN PRN
Status: DISCONTINUED | OUTPATIENT
Start: 2024-07-30 | End: 2024-07-30 | Stop reason: HOSPADM

## 2024-07-30 RX ORDER — HYDROMORPHONE HCL IN WATER/PF 6 MG/30 ML
0.2 PATIENT CONTROLLED ANALGESIA SYRINGE INTRAVENOUS EVERY 5 MIN PRN
Status: DISCONTINUED | OUTPATIENT
Start: 2024-07-30 | End: 2024-07-30 | Stop reason: HOSPADM

## 2024-07-30 RX ORDER — DEXAMETHASONE SODIUM PHOSPHATE 4 MG/ML
INJECTION, SOLUTION INTRA-ARTICULAR; INTRALESIONAL; INTRAMUSCULAR; INTRAVENOUS; SOFT TISSUE PRN
Status: DISCONTINUED | OUTPATIENT
Start: 2024-07-30 | End: 2024-07-30

## 2024-07-30 RX ORDER — LIDOCAINE HYDROCHLORIDE 20 MG/ML
INJECTION, SOLUTION INFILTRATION; PERINEURAL PRN
Status: DISCONTINUED | OUTPATIENT
Start: 2024-07-30 | End: 2024-07-30

## 2024-07-30 RX ORDER — FENTANYL CITRATE 0.05 MG/ML
50 INJECTION, SOLUTION INTRAMUSCULAR; INTRAVENOUS EVERY 5 MIN PRN
Status: DISCONTINUED | OUTPATIENT
Start: 2024-07-30 | End: 2024-07-30 | Stop reason: HOSPADM

## 2024-07-30 RX ORDER — NALOXONE HYDROCHLORIDE 0.4 MG/ML
0.1 INJECTION, SOLUTION INTRAMUSCULAR; INTRAVENOUS; SUBCUTANEOUS
Status: DISCONTINUED | OUTPATIENT
Start: 2024-07-30 | End: 2024-07-30 | Stop reason: HOSPADM

## 2024-07-30 RX ORDER — CEFAZOLIN SODIUM/WATER 2 G/20 ML
2 SYRINGE (ML) INTRAVENOUS
Status: COMPLETED | OUTPATIENT
Start: 2024-07-30 | End: 2024-07-30

## 2024-07-30 RX ORDER — OXYCODONE HYDROCHLORIDE 5 MG/1
5 TABLET ORAL 4 TIMES DAILY PRN
Qty: 12 TABLET | Refills: 0 | Status: SHIPPED | OUTPATIENT
Start: 2024-07-30 | End: 2024-08-02

## 2024-07-30 RX ORDER — CEFAZOLIN SODIUM/WATER 2 G/20 ML
2 SYRINGE (ML) INTRAVENOUS SEE ADMIN INSTRUCTIONS
Status: DISCONTINUED | OUTPATIENT
Start: 2024-07-30 | End: 2024-07-30 | Stop reason: HOSPADM

## 2024-07-30 RX ORDER — ACETAMINOPHEN 325 MG/1
975 TABLET ORAL ONCE
Status: DISCONTINUED | OUTPATIENT
Start: 2024-07-30 | End: 2024-07-30 | Stop reason: HOSPADM

## 2024-07-30 RX ORDER — ONDANSETRON 4 MG/1
4 TABLET, ORALLY DISINTEGRATING ORAL EVERY 30 MIN PRN
Status: DISCONTINUED | OUTPATIENT
Start: 2024-07-30 | End: 2024-07-30 | Stop reason: HOSPADM

## 2024-07-30 RX ORDER — BUPIVACAINE HYDROCHLORIDE 2.5 MG/ML
INJECTION, SOLUTION EPIDURAL; INFILTRATION; INTRACAUDAL
Status: DISCONTINUED
Start: 2024-07-30 | End: 2024-07-30 | Stop reason: HOSPADM

## 2024-07-30 RX ORDER — HYDRALAZINE HYDROCHLORIDE 20 MG/ML
2.5-5 INJECTION INTRAMUSCULAR; INTRAVENOUS EVERY 10 MIN PRN
Status: DISCONTINUED | OUTPATIENT
Start: 2024-07-30 | End: 2024-07-30 | Stop reason: HOSPADM

## 2024-07-30 RX ORDER — MAGNESIUM HYDROXIDE 1200 MG/15ML
LIQUID ORAL PRN
Status: DISCONTINUED | OUTPATIENT
Start: 2024-07-30 | End: 2024-07-30 | Stop reason: HOSPADM

## 2024-07-30 RX ORDER — HYDROMORPHONE HCL IN WATER/PF 6 MG/30 ML
0.4 PATIENT CONTROLLED ANALGESIA SYRINGE INTRAVENOUS EVERY 5 MIN PRN
Status: DISCONTINUED | OUTPATIENT
Start: 2024-07-30 | End: 2024-07-30 | Stop reason: HOSPADM

## 2024-07-30 RX ORDER — ATROPINE SULFATE 1 MG/ML
INJECTION, SOLUTION INTRAVENOUS PRN
Status: DISCONTINUED | OUTPATIENT
Start: 2024-07-30 | End: 2024-07-30

## 2024-07-30 RX ORDER — ONDANSETRON 2 MG/ML
INJECTION INTRAMUSCULAR; INTRAVENOUS PRN
Status: DISCONTINUED | OUTPATIENT
Start: 2024-07-30 | End: 2024-07-30

## 2024-07-30 RX ORDER — ONDANSETRON 2 MG/ML
4 INJECTION INTRAMUSCULAR; INTRAVENOUS EVERY 30 MIN PRN
Status: DISCONTINUED | OUTPATIENT
Start: 2024-07-30 | End: 2024-07-30 | Stop reason: HOSPADM

## 2024-07-30 RX ORDER — DEXAMETHASONE SODIUM PHOSPHATE 4 MG/ML
4 INJECTION, SOLUTION INTRA-ARTICULAR; INTRALESIONAL; INTRAMUSCULAR; INTRAVENOUS; SOFT TISSUE
Status: DISCONTINUED | OUTPATIENT
Start: 2024-07-30 | End: 2024-07-30 | Stop reason: HOSPADM

## 2024-07-30 RX ORDER — HYDROXYZINE HYDROCHLORIDE 25 MG/1
25 TABLET, FILM COATED ORAL EVERY 6 HOURS PRN
Status: DISCONTINUED | OUTPATIENT
Start: 2024-07-30 | End: 2024-07-30 | Stop reason: HOSPADM

## 2024-07-30 RX ADMIN — OXYCODONE HYDROCHLORIDE 5 MG: 5 TABLET ORAL at 12:59

## 2024-07-30 RX ADMIN — MIDAZOLAM 2 MG: 1 INJECTION INTRAMUSCULAR; INTRAVENOUS at 11:05

## 2024-07-30 RX ADMIN — DEXAMETHASONE SODIUM PHOSPHATE 4 MG: 4 INJECTION, SOLUTION INTRA-ARTICULAR; INTRALESIONAL; INTRAMUSCULAR; INTRAVENOUS; SOFT TISSUE at 11:18

## 2024-07-30 RX ADMIN — Medication 2 G: at 11:05

## 2024-07-30 RX ADMIN — SODIUM CHLORIDE, POTASSIUM CHLORIDE, SODIUM LACTATE AND CALCIUM CHLORIDE: 600; 310; 30; 20 INJECTION, SOLUTION INTRAVENOUS at 11:04

## 2024-07-30 RX ADMIN — PROPOFOL 150 MCG/KG/MIN: 10 INJECTION, EMULSION INTRAVENOUS at 11:12

## 2024-07-30 RX ADMIN — ACETAMINOPHEN 1000 MG: 500 TABLET, FILM COATED ORAL at 09:43

## 2024-07-30 RX ADMIN — ONDANSETRON 4 MG: 2 INJECTION INTRAMUSCULAR; INTRAVENOUS at 11:18

## 2024-07-30 RX ADMIN — METOCLOPRAMIDE HYDROCHLORIDE 10 MG: 5 INJECTION INTRAMUSCULAR; INTRAVENOUS at 11:35

## 2024-07-30 RX ADMIN — ATROPINE SULFATE 0.5 MG: 1 INJECTION, SOLUTION INTRAVENOUS at 11:25

## 2024-07-30 RX ADMIN — FENTANYL CITRATE 50 MCG: 50 INJECTION INTRAMUSCULAR; INTRAVENOUS at 11:09

## 2024-07-30 RX ADMIN — PROPOFOL 200 MG: 10 INJECTION, EMULSION INTRAVENOUS at 11:09

## 2024-07-30 RX ADMIN — FENTANYL CITRATE 50 MCG: 50 INJECTION INTRAMUSCULAR; INTRAVENOUS at 11:22

## 2024-07-30 RX ADMIN — LIDOCAINE HYDROCHLORIDE 80 MG: 20 INJECTION, SOLUTION INFILTRATION; PERINEURAL at 11:09

## 2024-07-30 ASSESSMENT — ACTIVITIES OF DAILY LIVING (ADL)
ADLS_ACUITY_SCORE: 35

## 2024-07-30 ASSESSMENT — LIFESTYLE VARIABLES: TOBACCO_USE: 1

## 2024-07-30 NOTE — OP NOTE
PLASTIC SURGERY OPERATIVE NOTE  Patient Name:  Angelica Gomez     Date of Service:  7/30/2024     PREOPERATIVE DIAGNOSES:   1.  History of breast cancer [Z85.3]     POSTOPERATIVE DIAGNOSES:   1.  History of breast cancer [Z85.3]       SURGEON:  Elisabet Venegas MD   OR STAFF:  Circulator: Humza Comer RN  Relief Circulator: Yuval Millan RN  Scrub Person: Betty Perez; Jayla Cobb  First Assistant: Quinten Quinn     ANESTHESIA:  General     ESTIMATED BLOOD LOSS:  10 mL    SPECIMEN(S):  * No specimens in log *    INDICATIONS: Patient is a 44-year-old female with a history of breast cancer.  She previous underwent bilateral mastectomies with tissue expander placement.  She presents today for removal of the tissue expanders and placement breast implants.      PROCEDURES:   1.  Removal bilateral tissue expanders  2.  Bilateral implant placement  3.  Bilateral 2 quadrant capsulotomies    DESCRIPTION OF PROCEDURE:  Patient was marked for incisions and taken to the operating room.  General anesthesia was administered.  The chest area is prepped and draped in a sterile manner.  On the right side, an incision was made along the inframammary scar and dissection was carried down to the underlying tissue expander capsule.  Dissection was carried out anterior to the capsule for several centimeters and a transverse incision was made through the capsule.  The underlying tissue expander was ruptured removed from the pocket.  A capsulotomy was performed from the 6:00 from the 3:00 to 6 o'clock position in the 6:00 to 9 o'clock position.  Hemostasis was achieved.  Additional dissection was done along the inframammary fold to allow for proper placement of the implant.      A sizer was obtained and placed within the pocket.  Patient was brought the upright position and she had good breast contours.  Patient was returned to supine position. The sizer was removed.  The pocket was irrigated with Irrisept  followed by Vashe solution.  A silicone implant obtained and placed within the pocket.  The capsule was reapproximated erupted 3-0 Monocryl and the skin was closed with interrupted 3-0 Monocryl in the subcutaneous tissue followed by running 4-0 subcuticular Monocryl suture.    A similar procedure was completed on the other side.  An inframammary incision was made following the previous scar and dissection was carried down to underlying tissue expander capsule.  Dissection was carried out anterior to the capsule and a transverse incision was made through the capsule.  The underlying tissue expander was ruptured and removed from the pocket.  A capsulotomy was performed from the 9:00 to the insert from 3:00 to the 6 o'clock position and from the 6:00 to the 9 o'clock position.  Hemostasis was achieved.  Additional dissection was done along the inframammary fold to allow for proper positioning of the implant.  A size was obtained and placed within the pocket.  Patient was brought the upright position and she had good symmetry.    Patient was returned to supine position.  The sizer was removed.  The pocket was irrigated with Irrisept followed by Vashe solution.  The incision was then closed with interrupted 3-0 PDS 3-0 Monocryl and the capsule followed by interrupted 3-0 Monocryl in the subcutaneous tissue and a running 4-0 subcuticular Monocryl suture.    Tumescence infiltrated to the bilateral preaxillary areas and standard liposuction was completed with a 4 mm cannula.  Port sites were closed erupted 5-0 nylon suture.    Xeroform followed by light dressing were applied and the patient was circumferentially wrapped with double forage Ace bandage.    IMPLANTS:    Implant Name Type Inv. Item Serial No.  Lot No. LRB No. Used Action   [facility] BREAST NATRELLE INSPIRA [facility] - [id number] Breast Implant/Tissue Expander [facility] BREAST NATRELLE INSPIRA FULL PROFILE SOFFTFormerly [facility] [id number] [employer]  Right 1 Implanted   EXPANDER TISSUE STYLE 133S 500ML [id number] - [id number] Breast Implant/Tissue Expander EXPANDER TISSUE STYLE 133S 500ML [id number] [employer]  Left 1 Explanted   EXPANDER TISSUE STYLE 133S 500ML [id number] - [id number] Breast Implant/Tissue Expander EXPANDER TISSUE STYLE 133S 500ML [id number] [employer]  Right 1 Explanted   IMP BREAST NATRELLE INSPIRA FULL PROFILE [facility]FLeon[facility] HospitalF-605 - [id number] Breast Implant/Tissue Expander IMP BREAST NATRELLE INSPIRA FULL PROFILE SOFFTAtrium Health SouthParkF-605 [id number] [employer]  Left 1 Implanted       FINDINGS: Natrelle  cc implants placed    MD Theo Sanchez Plastic Surgery   [phone]

## 2024-07-30 NOTE — ANESTHESIA POSTPROCEDURE EVALUATION
Patient: Angelica Gomez    Procedure: Procedure(s):  RECONSTRUCTION BILATERAL BREASTS WITH IMPLANTS       Anesthesia Type:  General    Note:     Postop Pain Control: Uneventful            Sign Out: Well controlled pain   PONV: No   Neuro/Psych: Uneventful            Sign Out: Acceptable/Baseline neuro status   Airway/Respiratory: Uneventful            Sign Out: Acceptable/Baseline resp. status   CV/Hemodynamics: Uneventful            Sign Out: Acceptable CV status   Other NRE: NONE   DID A NON-ROUTINE EVENT OCCUR?            Last vitals:  Vitals Value Taken Time   /90 07/30/24 1300   Temp 36.2  C (97.1  F) 07/30/24 1245   Pulse 77 07/30/24 1311   Resp 16 07/30/24 1311   SpO2 100 % 07/30/24 1311   Vitals shown include unfiled device data.    Electronically Signed By: Tiago Tapia MD  July 30, 2024  2:30 PM

## 2024-07-30 NOTE — ANESTHESIA CARE TRANSFER NOTE
Patient: Angelica Gomez    Procedure: Procedure(s):  RECONSTRUCTION BILATERAL BREASTS WITH IMPLANTS       Diagnosis: History of breast cancer [Z85.3]  Diagnosis Additional Information: No value filed.    Anesthesia Type:   General     Note:    Oropharynx: oropharynx clear of all foreign objects and spontaneously breathing  Level of Consciousness: drowsy  Oxygen Supplementation: face mask  Level of Supplemental Oxygen (L/min / FiO2): 6  Independent Airway: airway patency satisfactory and stable  Dentition: dentition unchanged  Vital Signs Stable: post-procedure vital signs reviewed and stable  Report to RN Given: handoff report given  Patient transferred to: PACU  Comments: Patient comfortable  Handoff Report: Identifed the Patient, Identified the Reponsible Provider, Reviewed the pertinent medical history, Discussed the surgical course, Reviewed Intra-OP anesthesia mangement and issues during anesthesia, Set expectations for post-procedure period and Allowed opportunity for questions and acknowledgement of understanding      Vitals:  Vitals Value Taken Time   /80 07/30/24 1217   Temp     Pulse 78 07/30/24 1219   Resp 21 07/30/24 1219   SpO2 100 % 07/30/24 1219   Vitals shown include unfiled device data.    Electronically Signed By: DEISI Bo CRNA  July 30, 2024  12:20 PM

## 2024-07-30 NOTE — ANESTHESIA PROCEDURE NOTES
Airway    Staff -        Anesthesiologist:  Tiago Tapia MD       CRNA: Krysta Mao APRN CRNA       Performed By: anesthesiologistIndications and Patient Condition       Indications for airway management: tiago-procedural       Induction type:intravenous       Mask difficulty assessment: 1 - vent by mask    Final Airway Details       Final airway type: supraglottic airway    Supraglottic Airway Details        Type: LMA       Brand: I-Gel       LMA size: 4    Post intubation assessment        Placement verified by: capnometry and chest rise        Number of attempts at approach: 1       Number of other approaches attempted: 0       Secured with: ties       Ease of procedure: easy       Dentition: Intact and Unchanged

## 2024-07-30 NOTE — DISCHARGE INSTRUCTIONS
Today you were given 975 mg of Tylenol at 1000. The recommended daily maximum dose is 4000 mg.     Same Day Surgery Discharge Instructions for  Sedation and General Anesthesia     It's not unusual to feel dizzy, light-headed or faint for up to 24 hours after surgery or while taking pain medication.  If you have these symptoms: sit for a few minutes before standing and have someone assist you when you get up to walk or use the bathroom.    You should rest and relax for the next 24 hours. We recommend you make arrangements to have an adult stay with you for at least 24 hours after your discharge.  Avoid hazardous and strenuous activity.    DO NOT DRIVE any vehicle or operate mechanical equipment for 24 hours following the end of your surgery.  Even though you may feel normal, your reactions may be affected by the medication you have received.    Do not drink alcoholic beverages for 24 hours following surgery.     Slowly progress to your regular diet as you feel able. It's not unusual to feel nauseated and/or vomit after receiving anesthesia.  If you develop these symptoms, drink clear liquids (apple juice, ginger ale, broth, 7-up, etc. ) until you feel better.  If your nausea and vomiting persists for 24 hours, please notify your surgeon.      All narcotic pain medications, along with inactivity and anesthesia, can cause constipation. Drinking plenty of liquids and increasing fiber intake will help.    For any questions of a medical nature, call your surgeon.    Do not make important decisions for 24 hours.    If you had general anesthesia, you may have a sore throat for a couple of days related to the breathing tube used during surgery.  You may use Cepacol lozenges to help with this discomfort.  If it worsens or if you develop a fever, contact your surgeon.     If you feel your pain is not well managed with the pain medications prescribed by your surgeon, please contact your surgeon's office to let them know so they  can address your concerns.     Reasons to contact your surgeon:    Signs of possible infection: Check your incision daily for redness, swelling, warmth, red streaks or foul drainage.   Elevated temperature.  Pain not controlled with pain medication and/or rest.   Uncontrolled nausea or vomiting.  Any questions or concerns.     **If you have questions or concerns about your procedure,  call Dr. Venegas at [phone]**

## 2024-07-30 NOTE — ANESTHESIA PREPROCEDURE EVALUATION
Anesthesia Pre-Procedure Evaluation    Patient: Angelica Gomez   MRN: [id number] : 1980        Procedure : Procedure(s):  RECONSTRUCTION BILATERAL BREASTS WITH IMPLANTS          Past Medical History:   Diagnosis Date    Malignant neoplasm of upper-outer quadrant of left breast in female, estrogen receptor positive (H) 2024    NO ACTIVE PROBLEMS       Past Surgical History:   Procedure Laterality Date    COLONOSCOPY WITH CO2 INSUFFLATION N/A 2024    Procedure: Colonoscopy with CO2 insufflation;  Surgeon: Cherise Lima DO;  Location: [facility] OR    INSERT TISSUE EXPANDER BREAST BILATERAL Bilateral 2024    Procedure: Insertion tissue expander, breasts, bilateral;  Surgeon: Elisabet Venegas MD;  Location: [facility] OR    IR CHEST PORT PLACEMENT > 5 YRS OF AGE  2024    IR PORT REMOVAL RIGHT  2024    MASTECTOMY, NIPPLE SPARING Bilateral 2024    Procedure: Bilateral nipple sparing mastectomy, bilateral implant removal, left sentinel lymph node biopsy;  Surgeon: Aba Phillips MD;  Location: [facility] OR    TUBAL LIGATION      [facility]  DELIVERY ONLY      superficial wound dehiscence      No Known Allergies   Social History     Tobacco Use    Smoking status: Former     Current packs/day: 0.25     Average packs/day: 0.3 packs/day for 1 year (0.3 ttl pk-yrs)     Types: Cigarettes    Smokeless tobacco: Never   Substance Use Topics    Alcohol use: Not Currently      Wt Readings from Last 1 Encounters:   24 83.2 kg (183 lb 8 oz)        Anesthesia Evaluation   Pt has had prior anesthetic. Type: General.    No history of anesthetic complications       ROS/MED HX  ENT/Pulmonary:     (+)           allergic rhinitis,     tobacco use, Past use,                       Neurologic:     (+)      migraines,                          Cardiovascular:       METS/Exercise Tolerance:     Hematologic:       Musculoskeletal:       GI/Hepatic:       Renal/Genitourinary:       Endo:     (+)                Obesity (Class 1),       Psychiatric/Substance Use:     (+) psychiatric history anxiety and depression       Infectious Disease:       Malignancy:   (+) Malignancy, History of Breast.Breast CA status post Surgery.      Other:            Physical Exam    Airway        Mallampati: II   TM distance: > 3 FB   Neck ROM: full   Mouth opening: > 3 cm    Respiratory Devices and Support         Dental           Cardiovascular          Rhythm and rate: regular and normal     Pulmonary           breath sounds clear to auscultation           OUTSIDE LABS:  CBC:   Lab Results   Component Value Date    WBC 5.0 06/24/2024    WBC 7.6 05/23/2024    HGB 12.1 06/24/2024    HGB 10.8 (L) 05/23/2024    HCT 36.4 06/24/2024    HCT 33.6 (L) 05/23/2024     06/24/2024     05/23/2024     BMP:   Lab Results   Component Value Date     05/23/2024     05/21/2024    POTASSIUM 4.0 05/23/2024    POTASSIUM 4.2 05/21/2024    CHLORIDE 110 (H) 05/23/2024    CHLORIDE 108 (H) 05/21/2024    CO2 24 05/23/2024    CO2 23 05/21/2024    BUN 15.3 05/23/2024    BUN 18.5 05/21/2024    CR 1.11 (H) 05/23/2024    CR 0.83 05/21/2024    GLC 93 05/23/2024     (H) 05/21/2024     COAGS:   Lab Results   Component Value Date    PTT 27 08/07/2009    INR 1.00 08/07/2009     POC:   Lab Results   Component Value Date    HCG Negative 02/21/2024     HEPATIC:   Lab Results   Component Value Date    ALBUMIN 4.1 05/23/2024    PROTTOTAL 6.2 (L) 05/23/2024    ALT 31 05/23/2024    AST 40 05/23/2024    ALKPHOS 86 05/23/2024    BILITOTAL 0.2 05/23/2024     OTHER:   Lab Results   Component Value Date    CYRUS 9.0 05/23/2024    TSH 1.63 01/19/2022    CRP <2.9 08/04/2021    SED 5 08/04/2021       Anesthesia Plan    ASA Status:  2    NPO Status:  NPO Appropriate    Anesthesia Type: General.   Induction: Intravenous.   Maintenance: Balanced.        Consents    Anesthesia Plan(s) and associated risks, benefits, and realistic alternatives discussed.  "Questions answered and patient/representative(s) expressed understanding.     - Discussed:     - Discussed with:  Patient            Postoperative Care    Pain management: IV analgesics, Oral pain medications, Multi-modal analgesia.   PONV prophylaxis: Ondansetron (or other 5HT-3), Dexamethasone or Solumedrol, Background Propofol Infusion     Comments:               Tiago Tapia MD    I have reviewed the pertinent notes and labs in the chart from the past 30 days and (re)examined the patient.  Any updates or changes from those notes are reflected in this note.              # Obesity: Estimated body mass index is 30.76 kg/m  as calculated from the following:    Height as of 7/11/24: 1.645 m (5' 4.76\").    Weight as of 7/11/24: 83.2 kg (183 lb 8 oz).      "

## 2024-07-31 ENCOUNTER — VIRTUAL VISIT (OUTPATIENT)
Dept: PSYCHOLOGY | Facility: CLINIC | Age: 44
End: 2024-07-31
Payer: COMMERCIAL

## 2024-07-31 DIAGNOSIS — F43.22 ADJUSTMENT DISORDER WITH ANXIETY: Primary | ICD-10-CM

## 2024-07-31 PROCEDURE — 90834 PSYTX W PT 45 MINUTES: CPT | Mod: 95

## 2024-08-05 ENCOUNTER — LAB (OUTPATIENT)
Dept: INFUSION THERAPY | Facility: CLINIC | Age: 44
End: 2024-08-05
Attending: INTERNAL MEDICINE
Payer: COMMERCIAL

## 2024-08-05 VITALS
HEART RATE: 81 BPM | OXYGEN SATURATION: 100 % | RESPIRATION RATE: 16 BRPM | SYSTOLIC BLOOD PRESSURE: 129 MMHG | TEMPERATURE: 98.1 F | DIASTOLIC BLOOD PRESSURE: 86 MMHG

## 2024-08-05 DIAGNOSIS — C50.412 MALIGNANT NEOPLASM OF UPPER-OUTER QUADRANT OF LEFT BREAST IN FEMALE, ESTROGEN RECEPTOR POSITIVE (H): Primary | ICD-10-CM

## 2024-08-05 DIAGNOSIS — Z17.0 MALIGNANT NEOPLASM OF UPPER-OUTER QUADRANT OF LEFT BREAST IN FEMALE, ESTROGEN RECEPTOR POSITIVE (H): Primary | ICD-10-CM

## 2024-08-05 PROCEDURE — 250N000011 HC RX IP 250 OP 636: Performed by: INTERNAL MEDICINE

## 2024-08-05 PROCEDURE — 96402 CHEMO HORMON ANTINEOPL SQ/IM: CPT

## 2024-08-05 RX ADMIN — GOSERELIN ACETATE 3.6 MG: 3.6 IMPLANT SUBCUTANEOUS at 10:00

## 2024-08-05 ASSESSMENT — PAIN SCALES - GENERAL: PAINLEVEL: NO PAIN (1)

## 2024-08-05 NOTE — PROGRESS NOTES
Infusion Nursing Note:  Angelica GUPTA Jason presents today for zoladex.    Patient seen by provider today: No   present during visit today: Not Applicable.    Note: Patient reports soreness in right axilla s/p surgery on 7/30. No new concerns to report today. Continues with mild hot flashes related to zoladex.      Intravenous Access:  No Intravenous access/labs at this visit.    Treatment Conditions:  Not Applicable.      Post Infusion Assessment:  Patient tolerated injection without incident.  Site patent and intact, free from redness, edema or discomfort.       Discharge Plan:   Patient discharged in stable condition accompanied by: self.  Departure Mode: Ambulatory.      Angy Mina RN

## 2024-08-06 ENCOUNTER — TELEPHONE (OUTPATIENT)
Dept: ONCOLOGY | Facility: CLINIC | Age: 44
End: 2024-08-06
Payer: COMMERCIAL

## 2024-08-06 NOTE — CONFIDENTIAL NOTE
Called pt to assess for symptoms from survey. Pt states that the only change from other weeks is slight increased in pain from having surgery last week, but that it is stable and well managed. She will call with any concerns.

## 2024-08-07 ENCOUNTER — THERAPY VISIT (OUTPATIENT)
Dept: OCCUPATIONAL THERAPY | Facility: CLINIC | Age: 44
End: 2024-08-07
Attending: INTERNAL MEDICINE
Payer: COMMERCIAL

## 2024-08-07 DIAGNOSIS — C50.912 INVASIVE DUCTAL CARCINOMA OF BREAST, FEMALE, LEFT (H): ICD-10-CM

## 2024-08-07 DIAGNOSIS — Z90.12 STATUS POST LEFT MASTECTOMY: Primary | ICD-10-CM

## 2024-08-07 PROCEDURE — 97537 COMMUNITY/WORK REINTEGRATION: CPT | Mod: GO

## 2024-08-07 PROCEDURE — 97530 THERAPEUTIC ACTIVITIES: CPT | Mod: GO

## 2024-08-07 NOTE — PROGRESS NOTES
DISCHARGE  Reason for Discharge: Patient has met all goals.    Equipment Issued: n/a    Discharge Plan: implement learned energy management strategies    Referring Provider:  Magali Roland    08/07/24 0500   Appointment Info   Treating Provider Anna Heredia OTR/L   Total/Authorized Visits 5/10 [facility]   Medical Diagnosis C50.912 (ICD-10-CM) - Invasive ductal carcinoma of breast, female, left (H)  Z90.10 (ICD-10-CM) - Status post mastectomy   OT Tx Diagnosis cognitive complaints   Progress Note/Certification   Start Of Care Date 06/25/24   Onset of Illness/Injury or Date of Surgery 03/21/24  (order date)   Therapy Frequency 1x/week   Predicted Duration 6 weeks   OT Goal 4   Goal Identifier Memory and Attention Strategies   Goal Description Pt will verbalize understanding about strategies to help with improving attention (concentration) and internal and external strategies to help with memory and recall into daily routine for improved ADL/IADL performance   Target Date 08/06/24   Date Met 07/02/24   Goal Progress goal met with skilled training   OT Goal 5   Goal Identifier Cogntive Fatigue - EF   Goal Description Ptwill successfully report at least two strategies for cognitive fatigue and demonstrate the ability to complete complex problem-solving tasks (e.g. errands, meal planning, scheduling tasks, etc.) at 85%+ accuracy to promote organization, planning, and sequencing skills for home, work, and community tasks (e.g. financial management, cooking, community engagement, work) using strategies PRN.   Target Date 08/06/24   Goal Progress Goal met with skilled training - pt has been educted on cognitive fatigue/brain fog and how to manage during daily tasks. Pt states she feels like everything is improving including energy levels and stamina. She states she initially felt like she had~ 45 min-1 hour tolerance to activity but now this has increased. She demo's 100% accuracy on executive function tasks in clinic.  As of 8/7/24 - MFIS (Modified Fatigue Impact Scale) scores improved: Total score: 29/84. Subscale totals: Physical 9/36; Cognitive 18/40; Psychosocial=2/8. Cut-off value for MFIS is 38, discriminating between fatigued & non-fatigued. The higher the score the greater the impact of fatigue on activity participation; As of 6/25/24: MFIS Physical Subscale Score: 29/36; MFIS Cognitive Subscale Score: 30/40; MFIS Psychosocial Subscale Score: 4/8; Modified Fatigue Impact Scale (MFIS) Total Score: 63/84 (Higher scores indicate a greater impact of fatigue on patient activities   Date Met 08/07/24   Subjective Report   Subjective Report Pt s/p breast reconstruction surgery 7/30/24 and she is recovering well. She states she feels like everything is improving including energy levels and stamina. She states she initially felt like she had~ 45 min-1 hour tolerance to activity but now this has increased. She feels like she is able to  utilize tools she has learned to manage fatigue with daily tasks   Objective Measures   Objective Measures Objective Measure 1   Objective Measure 1   Objective Measure Modified Fatigue Impact Scale   Details 8/7/24: MFIS (Modified Fatigue Impact Scale). Total score: 29/84. Subscale totals: Physical 9/36; Cognitive 18/40; Psychosocial=2/8. Cut-off value for MFIS is 38, discriminating between fatigued & non-fatigued. The higher the score the greater the impact of fatigue on activity participation6/25/24: MFIS Physical Subscale Score: 29/36;  MFIS Cognitive Subscale Score: 30/40; MFIS Psychosocial Subscale Score: 4/8;  Modified Fatigue Impact Scale (MFIS) Total  Score: 63/84 (Higher scores indicate a greater impact of fatigue on patient activities)   Treatment Interventions (OT)   Interventions Therapeutic Activity   Community/Work Reintegration   Community/Work Reintegration Minutes (78927) 03   Community/Work Reintegration 1 EF - Planning a Meeting   Community/Work Reintegration 1 - Details Skilled  "facilitation of  functional cognitive activities targeting executive functioning (planning, organizing, working memory, cognitive flexibility) and strategy use when applied to complex cognitive task. Facilitated functional \"Planning Scenario\" C - planning breakfast meeting for 16 people (stay under $40) - two options. Pt utilizes scratch paper to organize options, considering all variables and calculator for configuring amounts. She was able to confiure two accurate options planning meeting.   Therapeutic Activity   Therapeutic Activity Minutes (46708) 15   Ther Act 1 - Details Skilled follow up on energy levels and IND in ADL/IADL's. Pt states her energy levels have improved, she remains active, and learning how to break up tasks and take breaks. To assess energy levels and self-management of condition, facilitated MFIS - see objective findings. Pt educated on results and pt pleased with progress, feeling like she has improved. She vrebalizes understanding of recommendations and learning to make energy management strategies a habit   Education   Learner/Method Patient;Listening;Demonstration   Plan   Plan for next session LYMPH: MLD, manual work, assess scars, measurements;   Comments   Comments Discharge Summary: Patient is a 44 year old female who has particiated in 5 outpateitn occupational therapy sessions to address cognitive fatigue/brain fog after chemotherapy in setting of cancer rehabilitation. Patient is status post bilateral mastectomy with left sentinel lymph node biopsy 2/21/2024. She finished last round of chemo 5/28/24. Patient has been educated on strategies to help with improving attention (concentration) and internal and external strategies to help with memory and recall into daily routine for improved ADL/IADL performance as well as how to manage cognitive fatigue. Emphasised importance of taking breaks, alternating demanding tasks with lighter tasks, and monitoring energy levels with daily " activities. She feels like her energy levels have improved and she is able to engage in sustained activity for longer periods of time. As of 8/7/24,  MFIS (Modified Fatigue Impact Scale) scores improved: Total score: 29/84. Subscale totals: Physical 9/36; Cognitive 18/40; Psychosocial=2/8. Cut-off value for MFIS is 38, discriminating between fatigued & non-fatigued. The higher the score the greater the impact of fatigue on activity participation. As of 6/25/24: MFIS Physical Subscale Score: 29/36; MFIS Cognitive Subscale Score: 30/40; MFIS Psychosocial Subscale Score: 4/8; Modified Fatigue Impact Scale (MFIS) Total Score: 63/84 (Higher scores indicate a greater impact of fatigue on patient activities. Due to progress, meeting goals, and independence and implementing energy management strategies, no further outpatient occupational therapy indicated at this time.   Total Session Time   Timed Code Treatment Minutes 40   Total Treatment Time (sum of timed and untimed services) 40

## 2024-08-10 PROBLEM — G44.89 CHRONIC MIXED HEADACHE SYNDROME: Status: ACTIVE | Noted: 2024-08-10

## 2024-08-10 PROBLEM — F43.23 ADJUSTMENT DISORDER WITH MIXED ANXIETY AND DEPRESSED MOOD: Status: RESOLVED | Noted: 2024-02-12 | Resolved: 2024-08-10

## 2024-08-10 PROBLEM — G43.829 MENSTRUAL MIGRAINE WITHOUT STATUS MIGRAINOSUS, NOT INTRACTABLE: Status: ACTIVE | Noted: 2024-04-10

## 2024-08-10 NOTE — PROGRESS NOTES
NEUROLOGY CONSULTATION NOTE       [facility] [place]  [address] [place] [address]  Tel: [phone]  Fax: [phone]  [email or web address]     Angelica Gomez,  1980, MRN [id number]  PCP: Maddi Sneed  Date: 2024     ASSESSMENT & PLAN     Visit Diagnosis  Chronic mixed headache syndrome  Menstrual migraine without status migrainosus, not intractable     Migraine headache without aura  Arnold 44-year-old female with history of depression with anxiety, breast cancer referred for ongoing management of migraine headache without aura.  Previously she had perimenstrual migraine but after she was diagnosed with breast cancer and treated with chemotherapy she has stopped having.  And instead has started experiencing headaches couple of times a week.  I have recommended:    1.  MRI brain with and without contrast  2.  Start nortriptyline 25 mg daily and after 1 week increase to 50 mg at bedtime  3.  She finds low-dose of Imitrex helpful and I have asked her to continue to use Imitrex for abortive treatment.  4.  Follow-up in 4 months    Thank you again for this referral, please feel free to contact me if you have any questions.    Kike Jones MD  [facility], [place]     REASON FOR CONSULTATION Headache        HISTORY OF PRESENT ILLNESS     We have been requested by Maddi Sneed to evaluate Angelica Gomez who is a 44 year old  female for headaches    Patient is a arnold 44-year-old female with history of depression with anxiety, breast cancer who was referred for evaluation of worsening headaches.  According to patient her headaches develop in  and previously these headaches or cluster close to her menstrual cycle.  She was using Imitrex that was helpful.  She was diagnosed with breast cancer and went through chemotherapy and has stopped having monthly cycles and gets headaches at least once or twice a week.  These headaches occur  without any warning and they usually are associated with photophobia and phonophobia.  She denies any nausea or vomiting, focal weakness or facial droop.  She has noticed that she wakes up in the middle of the night and has trouble to go to sleep.  She had a CT of the brain in May 2024 that was unremarkable.     PROBLEM LIST   Patient Active Problem List   Diagnosis    Dyspareunia    Ovarian cyst    CARDIOVASCULAR SCREENING; LDL GOAL LESS THAN 160    Malignant neoplasm of upper-outer quadrant of left breast in female, estrogen receptor positive (H)    Adjustment disorder with anxiety    Menstrual migraine without status migrainosus, not intractable    Chronic mixed headache syndrome         PAST MEDICAL & SURGICAL HISTORY     Past Medical History:   Patient  has a past medical history of Malignant neoplasm of upper-outer quadrant of left breast in female, estrogen receptor positive (H) (2024), Migraine, and NO ACTIVE PROBLEMS.    Surgical History:  She  has a past surgical history that includes  DELIVERY ONLY (); tubal ligation (); Colonoscopy with CO2 insufflation (N/A, 2024); Mastectomy, Nipple Sparing (Bilateral, 2024); Insert tissue expander breast bilateral (Bilateral, 2024); IR Chest Port Placement > 5 Yrs of Age (2024); IR Port Removal Right (2024); and Reconstruct breast bilateral, implant prosthesis bilateral, combined (Bilateral, 2024).     SOCIAL HISTORY     Reviewed, and she  reports that she has quit smoking. Her smoking use included cigarettes. She has a 0.3 pack-year smoking history. She has never used smokeless tobacco. She reports that she does not currently use alcohol. She reports that she does not use drugs.     FAMILY HISTORY     Reviewed, and family history includes Alcohol/Drug in her paternal grandfather; Asthma in her brother; Breast Cancer in her paternal aunt and paternal aunt; Breast Cancer (age of onset: 56) in her mother; C.A.D. in her  father; Cancer in her paternal aunt; Cardiovascular in her father; Cerebrovascular Disease in her father; Circulatory in her father; Eczema in her brother; Food Allergy in her brother; Genitourinary Problems in her father; Heart Disease in her father; Hypertension in her paternal grandmother; Myocardial Infarction in her maternal grandfather; No Known Problems in her daughter, maternal grandmother, and son; Ovarian Cancer in her paternal aunt; Thyroid Disease in her mother.     ALLERGIES     No Known Allergies      REVIEW OF SYSTEMS     A 12 point review of system was performed and was negative except as outlined in the history of present illness.     HOME MEDICATIONS     Current Outpatient Rx   Medication Sig Dispense Refill    anastrozole (ARIMIDEX) 1 MG tablet Take 1 tablet (1 mg) by mouth daily 90 tablet 3    Calcium Carbonate-Vitamin D (CALCIUM 500 + D PO)       Cetirizine HCl (ZYRTEC ALLERGY PO) Take 10 mg by mouth daily      COMPRESSION STOCKINGS Please measure and distribute 2 pair of 20mmHg - 30mmHg thigh high open or closed toe compression stockings with extra refills as indicated. 2 each 4    Fluticasone Propionate (FLONASE NA) Spray 1 spray in nostril daily      hydrOXYzine HCl (ATARAX) 25 MG tablet Take 1-2 tablets (25-50 mg) by mouth 3 times daily as needed for anxiety or other (sleep) 90 tablet 1    ketoconazole (NIZORAL) 2 % external cream Apply topically daily 30 g 1    lidocaine-prilocaine (EMLA) 2.5-2.5 % external cream Apply to port 30-60 minutes prior to accessing it for treatment/labs. 30 g 1    LORazepam (ATIVAN) 0.5 MG tablet Take 1 tablet (0.5 mg) by mouth every 6 hours as needed for anxiety 30 tablet 0    methocarbamol (ROBAXIN) 750 MG tablet Take 1 tablet (750 mg) by mouth 3 times daily 30 tablet 0    Multiple Vitamins-Minerals (MULTIVITAMIN & MINERAL PO) Take 1 each by mouth daily. [facility] women's active supplement      nortriptyline (PAMELOR) 25 MG capsule 1 PO at bedtime x 1 week then 2 PO  "QHS 50 capsule 0    [START ON 9/14/2024] nortriptyline (PAMELOR) 50 MG capsule Take 1 capsule (50 mg) by mouth at bedtime 90 capsule 3    Probiotic Product (PROBIOTIC PO) Reported on 3/1/2017      senna-docusate (SENOKOT-S/PERICOLACE) 8.6-50 MG tablet Take 1 tablet by mouth 2 times daily 30 tablet 0    SUMAtriptan (IMITREX) 25 MG tablet Take 1 tablet (25 mg) by mouth at onset of headache for migraine May repeat in 2 hours. Max 8 tablets/24 hours. 18 tablet 4    tranexamic acid (LYSTEDA) 650 MG tablet Take 2 tablets (1,300 mg) by mouth 3 times daily 30 tablet 3    triamcinolone (KENALOG) 0.1 % external cream Apply topically 2 times daily 45 g 1         PHYSICAL EXAM     Vital signs  /70 (BP Location: Left arm, Patient Position: Sitting)   Pulse 69   Ht 1.651 m (5' 5\")   Wt 80.7 kg (178 lb)   LMP 04/09/2024 (Approximate)   BMI 29.62 kg/m      Weight:   178 lbs 0 oz    Patient is alert and oriented x4 in no acute distress. Vital signs were reviewed and are documented in electronic medical record. Neck was supple, no carotid bruits, thyromegaly, JVD, or lymphadenopathy was noted.   NEUROLOGY EXAM:   Patient s speech was normal with no aphasia or dysarthria. Mentation, and affect were also normal.    Funduscopic exam was normal, with normal cup to disc ratio. Cranial nerves II -XII were intact.    Patient had normal mass, tone and motor strength was 5/5 in all extremities without pronator drift.    Sensation was intact to light touch, pinprick, and vibratory sensation.    Reflexes were 1+ symmetrical with downgoing toes.    No dysmetria noted on FNF or HKS. Romberg was negative.   Gait testing was normal. Able to walk on toes/heels. Tandem walk normal.     PERTINENT DIAGNOSTIC STUDIES     Following studies were reviewed:     CT BRAIN 5/21/2024  1.  No intracranial hemorrhage, mass lesions, hydrocephalus or CT evidence for an acute infarct.  2.  Moderate size mucus retention cyst in the left maxillary sinus.   "     PERTINENT LABS  Following labs were reviewed:  Admission on 07/30/2024, Discharged on 07/30/2024   Component Date Value Ref Range Status    hCG Urine Qualitative 07/30/2024 Negative  Negative Final    Case Report 07/30/2024    Final                    Value:Surgical Pathology Report                         Case: [id number]                                  Authorizing Provider:  Elisabet Venegas MD Collected:           07/30/2024 12:06 PM          Ordering Location:     [facility]          Received:            07/30/2024 12:25 PM                                 [facility] OR                                                            Pathologist:           Bailey Doshi MD                                                             Specimens:   A) - Breast, Left, left breast explant                                                              B) - Breast, Right, right breast explant                                                   Final Diagnosis 07/30/2024    Final                    Value:This result contains rich text formatting which cannot be displayed here.    Clinical Information 07/30/2024    Final                    Value:This result contains rich text formatting which cannot be displayed here.    Gross Description 07/30/2024    Final                    Value:This result contains rich text formatting which cannot be displayed here.    Microscopic Description 07/30/2024    Final                    Value:This result contains rich text formatting which cannot be displayed here.    Performing Labs 07/30/2024    Final                    Value:This result contains rich text formatting which cannot be displayed here.   Lab on 06/24/2024   Component Date Value Ref Range Status    WBC Count 06/24/2024 5.0  4.0 - 11.0 10e3/uL Final    RBC Count 06/24/2024 4.08  3.80 - 5.20 10e6/uL Final    Hemoglobin 06/24/2024 12.1  11.7 - 15.7 g/dL Final    Hematocrit 06/24/2024 36.4  35.0 - 47.0 % Final    MCV  06/24/2024 89  78 - 100 fL Final    MCH 06/24/2024 29.7  26.5 - 33.0 pg Final    MCHC 06/24/2024 33.2  31.5 - 36.5 g/dL Final    RDW 06/24/2024 16.3 (H)  10.0 - 15.0 % Final    Platelet Count 06/24/2024 250  150 - 450 10e3/uL Final    % Neutrophils 06/24/2024 72  % Final    % Lymphocytes 06/24/2024 16  % Final    % Monocytes 06/24/2024 10  % Final    % Eosinophils 06/24/2024 1  % Final    % Basophils 06/24/2024 1  % Final    % Immature Granulocytes 06/24/2024 0  % Final    NRBCs per 100 WBC 06/24/2024 0  <1 /100 Final    Absolute Neutrophils 06/24/2024 3.5  1.6 - 8.3 10e3/uL Final    Absolute Lymphocytes 06/24/2024 0.8  0.8 - 5.3 10e3/uL Final    Absolute Monocytes 06/24/2024 0.5  0.0 - 1.3 10e3/uL Final    Absolute Eosinophils 06/24/2024 0.1  0.0 - 0.7 10e3/uL Final    Absolute Basophils 06/24/2024 0.1  0.0 - 0.2 10e3/uL Final    Absolute Immature Granulocytes 06/24/2024 0.0  <=0.4 10e3/uL Final    Absolute NRBCs 06/24/2024 0.0  10e3/uL Final   Lab on 05/23/2024   Component Date Value Ref Range Status    Sodium 05/23/2024 142  135 - 145 mmol/L Final    Potassium 05/23/2024 4.0  3.4 - 5.3 mmol/L Final    Carbon Dioxide (CO2) 05/23/2024 24  22 - 29 mmol/L Final    Anion Gap 05/23/2024 8  7 - 15 mmol/L Final    Urea Nitrogen 05/23/2024 15.3  6.0 - 20.0 mg/dL Final    Creatinine 05/23/2024 1.11 (H)  0.51 - 0.95 mg/dL Final    GFR Estimate 05/23/2024 63  >60 mL/min/1.73m2 Final    Calcium 05/23/2024 9.0  8.6 - 10.0 mg/dL Final    Chloride 05/23/2024 110 (H)  98 - 107 mmol/L Final    Glucose 05/23/2024 93  70 - 99 mg/dL Final    Alkaline Phosphatase 05/23/2024 86  40 - 150 U/L Final    AST 05/23/2024 40  0 - 45 U/L Final    ALT 05/23/2024 31  0 - 50 U/L Final    Protein Total 05/23/2024 6.2 (L)  6.4 - 8.3 g/dL Final    Albumin 05/23/2024 4.1  3.5 - 5.2 g/dL Final    Bilirubin Total 05/23/2024 0.2  <=1.2 mg/dL Final    WBC Count 05/23/2024 7.6  4.0 - 11.0 10e3/uL Final    RBC Count 05/23/2024 3.79 (L)  3.80 - 5.20 10e6/uL  Final    Hemoglobin 05/23/2024 10.8 (L)  11.7 - 15.7 g/dL Final    Hematocrit 05/23/2024 33.6 (L)  35.0 - 47.0 % Final    MCV 05/23/2024 89  78 - 100 fL Final    MCH 05/23/2024 28.5  26.5 - 33.0 pg Final    MCHC 05/23/2024 32.1  31.5 - 36.5 g/dL Final    RDW 05/23/2024 16.2 (H)  10.0 - 15.0 % Final    Platelet Count 05/23/2024 172  150 - 450 10e3/uL Final    % Neutrophils 05/23/2024 81  % Final    % Lymphocytes 05/23/2024 12  % Final    % Monocytes 05/23/2024 5  % Final    % Eosinophils 05/23/2024 0  % Final    % Basophils 05/23/2024 1  % Final    % Immature Granulocytes 05/23/2024 1  % Final    NRBCs per 100 WBC 05/23/2024 1 (H)  <1 /100 Final    Absolute Neutrophils 05/23/2024 6.2  1.6 - 8.3 10e3/uL Final    Absolute Lymphocytes 05/23/2024 0.9  0.8 - 5.3 10e3/uL Final    Absolute Monocytes 05/23/2024 0.4  0.0 - 1.3 10e3/uL Final    Absolute Eosinophils 05/23/2024 0.0  0.0 - 0.7 10e3/uL Final    Absolute Basophils 05/23/2024 0.1  0.0 - 0.2 10e3/uL Final    Absolute Immature Granulocytes 05/23/2024 0.1  <=0.4 10e3/uL Final    Absolute NRBCs 05/23/2024 0.0  10e3/uL Final   Admission on 05/21/2024, Discharged on 05/21/2024   Component Date Value Ref Range Status    Sodium 05/21/2024 140  135 - 145 mmol/L Final    Potassium 05/21/2024 4.2  3.4 - 5.3 mmol/L Final    Carbon Dioxide (CO2) 05/21/2024 23  22 - 29 mmol/L Final    Anion Gap 05/21/2024 9  7 - 15 mmol/L Final    Urea Nitrogen 05/21/2024 18.5  6.0 - 20.0 mg/dL Final    Creatinine 05/21/2024 0.83  0.51 - 0.95 mg/dL Final    GFR Estimate 05/21/2024 89  >60 mL/min/1.73m2 Final    Calcium 05/21/2024 9.0  8.6 - 10.0 mg/dL Final    Chloride 05/21/2024 108 (H)  98 - 107 mmol/L Final    Glucose 05/21/2024 105 (H)  70 - 99 mg/dL Final    Alkaline Phosphatase 05/21/2024 94  40 - 150 U/L Final    AST 05/21/2024 35  0 - 45 U/L Final    ALT 05/21/2024 28  0 - 50 U/L Final    Protein Total 05/21/2024 5.9 (L)  6.4 - 8.3 g/dL Final    Albumin 05/21/2024 4.1  3.5 - 5.2 g/dL  Final    Bilirubin Total 05/21/2024 0.2  <=1.2 mg/dL Final    Troponin T, High Sensitivity 05/21/2024 <6  <=14 ng/L Final    D-Dimer Quantitative 05/21/2024 0.73 (H)  0.00 - 0.50 ug/mL FEU Final    Ventricular Rate 05/21/2024 70  BPM Final    Atrial Rate 05/21/2024 70  BPM Final    TN Interval 05/21/2024 164  ms Final    QRS Duration 05/21/2024 94  ms Final    QT 05/21/2024 388  ms Final    QTc 05/21/2024 419  ms Final    P Axis 05/21/2024 60  degrees Final    R AXIS 05/21/2024 21  degrees Final    T Axis 05/21/2024 41  degrees Final    Interpretation ECG 05/21/2024    Final                    Value:Sinus rhythm  Normal ECG  Unconfirmed report - interpretation of this ECG is computer generated - see medical record for final interpretation    Confirmed by - EMERGENCY ROOM, PHYSICIAN ([id number]),  JORI GUPTA ([id number]) on 5/22/2024 3:11:31 AM      WBC Count 05/21/2024 11.2 (H)  4.0 - 11.0 10e3/uL Final    RBC Count 05/21/2024 3.74 (L)  3.80 - 5.20 10e6/uL Final    Hemoglobin 05/21/2024 10.8 (L)  11.7 - 15.7 g/dL Final    Hematocrit 05/21/2024 32.7 (L)  35.0 - 47.0 % Final    MCV 05/21/2024 87  78 - 100 fL Final    MCH 05/21/2024 28.9  26.5 - 33.0 pg Final    MCHC 05/21/2024 33.0  31.5 - 36.5 g/dL Final    RDW 05/21/2024 16.0 (H)  10.0 - 15.0 % Final    Platelet Count 05/21/2024 166  150 - 450 10e3/uL Final    % Neutrophils 05/21/2024 84  % Final    % Lymphocytes 05/21/2024 8  % Final    % Monocytes 05/21/2024 5  % Final    % Eosinophils 05/21/2024 0  % Final    % Basophils 05/21/2024 1  % Final    % Immature Granulocytes 05/21/2024 2  % Final    NRBCs per 100 WBC 05/21/2024 1 (H)  <1 /100 Final    Absolute Neutrophils 05/21/2024 9.5 (H)  1.6 - 8.3 10e3/uL Final    Absolute Lymphocytes 05/21/2024 0.9  0.8 - 5.3 10e3/uL Final    Absolute Monocytes 05/21/2024 0.5  0.0 - 1.3 10e3/uL Final    Absolute Eosinophils 05/21/2024 0.0  0.0 - 0.7 10e3/uL Final    Absolute Basophils 05/21/2024 0.1  0.0 - 0.2 10e3/uL Final     Absolute Immature Granulocytes 05/21/2024 0.2  <=0.4 10e3/uL Final    Absolute NRBCs 05/21/2024 0.1  10e3/uL Final   Lab on 05/15/2024   Component Date Value Ref Range Status    Sodium 05/15/2024 144  135 - 145 mmol/L Final    Potassium 05/15/2024 3.6  3.4 - 5.3 mmol/L Final    Carbon Dioxide (CO2) 05/15/2024 27  22 - 29 mmol/L Final    Anion Gap 05/15/2024 9  7 - 15 mmol/L Final    Urea Nitrogen 05/15/2024 10.4  6.0 - 20.0 mg/dL Final    Creatinine 05/15/2024 1.08 (H)  0.51 - 0.95 mg/dL Final    GFR Estimate 05/15/2024 65  >60 mL/min/1.73m2 Final    Calcium 05/15/2024 8.7  8.6 - 10.0 mg/dL Final    Chloride 05/15/2024 108 (H)  98 - 107 mmol/L Final    Glucose 05/15/2024 94  70 - 99 mg/dL Final    Alkaline Phosphatase 05/15/2024 138  40 - 150 U/L Final    AST 05/15/2024 29  0 - 45 U/L Final    ALT 05/15/2024 25  0 - 50 U/L Final    Protein Total 05/15/2024 5.9 (L)  6.4 - 8.3 g/dL Final    Albumin 05/15/2024 4.1  3.5 - 5.2 g/dL Final    Bilirubin Total 05/15/2024 0.2  <=1.2 mg/dL Final    Color Urine 05/15/2024 Straw  Colorless, Straw, Light Yellow, Yellow Final    Appearance Urine 05/15/2024 Clear  Clear Final    Glucose Urine 05/15/2024 Negative  Negative mg/dL Final    Bilirubin Urine 05/15/2024 Negative  Negative Final    Ketones Urine 05/15/2024 Negative  Negative mg/dL Final    Specific Gravity Urine 05/15/2024 1.007  1.003 - 1.035 Final    Blood Urine 05/15/2024 Negative  Negative Final    pH Urine 05/15/2024 6.5  5.0 - 7.0 Final    Protein Albumin Urine 05/15/2024 Negative  Negative mg/dL Final    Urobilinogen Urine 05/15/2024 Normal  Normal, 2.0 mg/dL Final    Nitrite Urine 05/15/2024 Negative  Negative Final    Leukocyte Esterase Urine 05/15/2024 Negative  Negative Final    Mucus Urine 05/15/2024 Present (A)  None Seen /LPF Final    RBC Urine 05/15/2024 <1  <=2 /HPF Final    WBC Urine 05/15/2024 1  <=5 /HPF Final    Squamous Epithelials Urine 05/15/2024 <1  <=1 /HPF Final    WBC Count 05/15/2024 41.1 (H)   4.0 - 11.0 10e3/uL Final    RBC Count 05/15/2024 3.56 (L)  3.80 - 5.20 10e6/uL Final    Hemoglobin 05/15/2024 10.3 (L)  11.7 - 15.7 g/dL Final    Hematocrit 05/15/2024 31.5 (L)  35.0 - 47.0 % Final    MCV 05/15/2024 89  78 - 100 fL Final    MCH 05/15/2024 28.9  26.5 - 33.0 pg Final    MCHC 05/15/2024 32.7  31.5 - 36.5 g/dL Final    RDW 05/15/2024 15.8 (H)  10.0 - 15.0 % Final    Platelet Count 05/15/2024 199  150 - 450 10e3/uL Final    NRBCs per 100 WBC 05/15/2024 0  <1 /100 Final    Absolute NRBCs 05/15/2024 0.1  10e3/uL Final    % Neutrophils 05/15/2024 82  % Final    % Lymphocytes 05/15/2024 5  % Final    % Monocytes 05/15/2024 2  % Final    % Eosinophils 05/15/2024 1  % Final    % Basophils 05/15/2024 0  % Final    % Metamyelocytes 05/15/2024 4  % Final    % Myelocytes 05/15/2024 6  % Final    NRBCs per 100 WBC 05/15/2024 1 (H)  <=0 % Final    Absolute Neutrophils 05/15/2024 33.7 (H)  1.6 - 8.3 10e3/uL Final    Absolute Lymphocytes 05/15/2024 2.1  0.8 - 5.3 10e3/uL Final    Absolute Monocytes 05/15/2024 0.8  0.0 - 1.3 10e3/uL Final    Absolute Eosinophils 05/15/2024 0.4  0.0 - 0.7 10e3/uL Final    Absolute Basophils 05/15/2024 0.0  0.0 - 0.2 10e3/uL Final    Absolute Metamyelocytes 05/15/2024 1.6 (H)  <=0.0 10e3/uL Final    Absolute Myelocytes 05/15/2024 2.5 (H)  <=0.0 10e3/uL Final    Absolute NRBCs 05/15/2024 0.4 (H)  <=0.0 10e3/uL Final    RBC Morphology 05/15/2024 Confirmed RBC Indices   Final    Platelet Assessment 05/15/2024 Automated Count Confirmed. Platelet morphology is normal.  Automated Count Confirmed. Platelet morphology is normal. Final        Total time spent for face to face visit, reviewing labs/imaging studies, counseling and coordination of care was: 1 Hour spent on the date of the encounter doing chart review, review of outside records, review of test results, interpretation of tests, patient visit, and documentation     The longitudinal plan of care for the diagnosis(es)/condition(s) as  documented were addressed during this visit. Due to the added complexity in care, I will continue to support Lisandra in the subsequent management and with ongoing continuity of care.    This note was dictated using voice recognition software.  Any grammatical or context distortions are unintentional and inherent to the software.    Orders Placed This Encounter   Procedures    MR Brain w/o & w Contrast      New Prescriptions    NORTRIPTYLINE (PAMELOR) 25 MG CAPSULE    1 PO at bedtime x 1 week then 2 PO QHS    NORTRIPTYLINE (PAMELOR) 50 MG CAPSULE    Take 1 capsule (50 mg) by mouth at bedtime      Modified Medications    No medications on file

## 2024-08-13 ENCOUNTER — THERAPY VISIT (OUTPATIENT)
Dept: OCCUPATIONAL THERAPY | Facility: CLINIC | Age: 44
End: 2024-08-13
Attending: INTERNAL MEDICINE
Payer: COMMERCIAL

## 2024-08-13 DIAGNOSIS — C50.912 INVASIVE DUCTAL CARCINOMA OF BREAST, FEMALE, LEFT (H): ICD-10-CM

## 2024-08-13 DIAGNOSIS — I89.0 LYMPHEDEMA: ICD-10-CM

## 2024-08-13 DIAGNOSIS — Z90.12 STATUS POST LEFT MASTECTOMY: Primary | ICD-10-CM

## 2024-08-13 PROCEDURE — 97140 MANUAL THERAPY 1/> REGIONS: CPT | Mod: GO

## 2024-08-14 ENCOUNTER — OFFICE VISIT (OUTPATIENT)
Dept: NEUROLOGY | Facility: CLINIC | Age: 44
End: 2024-08-14
Payer: COMMERCIAL

## 2024-08-14 VITALS
BODY MASS INDEX: 29.66 KG/M2 | WEIGHT: 178 LBS | HEART RATE: 69 BPM | DIASTOLIC BLOOD PRESSURE: 70 MMHG | HEIGHT: 65 IN | SYSTOLIC BLOOD PRESSURE: 108 MMHG

## 2024-08-14 DIAGNOSIS — C50.412 MALIGNANT NEOPLASM OF UPPER-OUTER QUADRANT OF LEFT BREAST IN FEMALE, ESTROGEN RECEPTOR POSITIVE (H): ICD-10-CM

## 2024-08-14 DIAGNOSIS — G43.829 MENSTRUAL MIGRAINE WITHOUT STATUS MIGRAINOSUS, NOT INTRACTABLE: ICD-10-CM

## 2024-08-14 DIAGNOSIS — G44.89 CHRONIC MIXED HEADACHE SYNDROME: Primary | ICD-10-CM

## 2024-08-14 DIAGNOSIS — Z17.0 MALIGNANT NEOPLASM OF UPPER-OUTER QUADRANT OF LEFT BREAST IN FEMALE, ESTROGEN RECEPTOR POSITIVE (H): ICD-10-CM

## 2024-08-14 PROCEDURE — G2211 COMPLEX E/M VISIT ADD ON: HCPCS | Performed by: PSYCHIATRY & NEUROLOGY

## 2024-08-14 PROCEDURE — 99215 OFFICE O/P EST HI 40 MIN: CPT | Performed by: PSYCHIATRY & NEUROLOGY

## 2024-08-14 PROCEDURE — 99417 PROLNG OP E/M EACH 15 MIN: CPT | Performed by: PSYCHIATRY & NEUROLOGY

## 2024-08-14 RX ORDER — NORTRIPTYLINE HYDROCHLORIDE 50 MG/1
50 CAPSULE ORAL AT BEDTIME
Qty: 90 CAPSULE | Refills: 3 | Status: SHIPPED | OUTPATIENT
Start: 2024-09-14

## 2024-08-14 RX ORDER — NORTRIPTYLINE HCL 25 MG
CAPSULE ORAL
Qty: 50 CAPSULE | Refills: 0 | Status: SHIPPED | OUTPATIENT
Start: 2024-08-14 | End: 2024-09-17

## 2024-08-14 ASSESSMENT — HEADACHE IMPACT TEST (HIT 6)
HOW OFTEN HAVE YOU FELT FED UP OR IRRITATED BECAUSE OF YOUR HEADACHES: SOMETIMES
WHEN YOU HAVE A HEADACHE HOW OFTEN DO YOU WISH YOU COULD LIE DOWN: VERY OFTEN
HOW OFTEN HAVE YOU FELT FED UP OR IRRITATED BECAUSE OF YOUR HEADACHES: SOMETIMES
WHEN YOU HAVE HEADACHES HOW OFTEN IS THE PAIN SEVERE: VERY OFTEN
HIT6 TOTAL SCORE: 62
WHEN YOU HAVE A HEADACHE HOW OFTEN DO YOU WISH YOU COULD LIE DOWN: VERY OFTEN
HOW OFTEN DO HEADACHES LIMIT YOUR DAILY ACTIVITIES: SOMETIMES
HIT6 TOTAL SCORE: 62
HOW OFTEN DO HEADACHES LIMIT YOUR DAILY ACTIVITIES: SOMETIMES
HOW OFTEN DID HEADACHS LIMIT CONCENTRATION ON WORK OR DAILY ACTIVITY: SOMETIMES
HOW OFTEN DID HEADACHS LIMIT CONCENTRATION ON WORK OR DAILY ACTIVITY: SOMETIMES
WHEN YOU HAVE HEADACHES HOW OFTEN IS THE PAIN SEVERE: VERY OFTEN
HOW OFTEN HAVE YOU FELT TOO TIRED TO WORK BECAUSE OF YOUR HEADACHES: SOMETIMES
HOW OFTEN HAVE YOU FELT TOO TIRED TO WORK BECAUSE OF YOUR HEADACHES: SOMETIMES

## 2024-08-14 NOTE — LETTER
2024      Angelica Gomez  [address]  [facility] [address]      Dear Colleague,    Thank you for referring your patient, Angelica Gomez, to the [facility] [place]. Please see a copy of my visit note below.    NEUROLOGY CONSULTATION NOTE       [facility] [place]  [address] [place] [address]  Tel: [phone]  Fax: [phone]  [email or web address]     Angelica Gomez,  1980, MRN [id number]  PCP: Maddi Sneed  Date: 2024     ASSESSMENT & PLAN     Visit Diagnosis  Chronic mixed headache syndrome  Menstrual migraine without status migrainosus, not intractable     Migraine headache without aura  Arnold 44-year-old female with history of depression with anxiety, breast cancer referred for ongoing management of migraine headache without aura.  Previously she had perimenstrual migraine but after she was diagnosed with breast cancer and treated with chemotherapy she has stopped having.  And instead has started experiencing headaches couple of times a week.  I have recommended:    1.  MRI brain with and without contrast  2.  Start nortriptyline 25 mg daily and after 1 week increase to 50 mg at bedtime  3.  She finds low-dose of Imitrex helpful and I have asked her to continue to use Imitrex for abortive treatment.  4.  Follow-up in 4 months    Thank you again for this referral, please feel free to contact me if you have any questions.    Kike Jones MD  [facility], [place]     REASON FOR CONSULTATION Headache        HISTORY OF PRESENT ILLNESS     We have been requested by Maddi Sneed to evaluate Angelica Gomez who is a 44 year old  female for headaches    Patient is a pleasant 44-year-old female with history of depression with anxiety, breast cancer who was referred for evaluation of worsening headaches.  According to patient her headaches develop in  and previously these headaches  or cluster close to her menstrual cycle.  She was using Imitrex that was helpful.  She was diagnosed with breast cancer and went through chemotherapy and has stopped having monthly cycles and gets headaches at least once or twice a week.  These headaches occur without any warning and they usually are associated with photophobia and phonophobia.  She denies any nausea or vomiting, focal weakness or facial droop.  She has noticed that she wakes up in the middle of the night and has trouble to go to sleep.  She had a CT of the brain in May 2024 that was unremarkable.     PROBLEM LIST   Patient Active Problem List   Diagnosis     Dyspareunia     Ovarian cyst     CARDIOVASCULAR SCREENING; LDL GOAL LESS THAN 160     Malignant neoplasm of upper-outer quadrant of left breast in female, estrogen receptor positive (H)     Adjustment disorder with anxiety     Menstrual migraine without status migrainosus, not intractable     Chronic mixed headache syndrome         PAST MEDICAL & SURGICAL HISTORY     Past Medical History:   Patient  has a past medical history of Malignant neoplasm of upper-outer quadrant of left breast in female, estrogen receptor positive (H) (2024), Migraine, and NO ACTIVE PROBLEMS.    Surgical History:  She  has a past surgical history that includes  DELIVERY ONLY (); tubal ligation (); Colonoscopy with CO2 insufflation (N/A, 2024); Mastectomy, Nipple Sparing (Bilateral, 2024); Insert tissue expander breast bilateral (Bilateral, 2024); IR Chest Port Placement > 5 Yrs of Age (2024); IR Port Removal Right (2024); and Reconstruct breast bilateral, implant prosthesis bilateral, combined (Bilateral, 2024).     SOCIAL HISTORY     Reviewed, and she  reports that she has quit smoking. Her smoking use included cigarettes. She has a 0.3 pack-year smoking history. She has never used smokeless tobacco. She reports that she does not currently use alcohol. She reports  that she does not use drugs.     FAMILY HISTORY     Reviewed, and family history includes Alcohol/Drug in her paternal grandfather; Asthma in her brother; Breast Cancer in her paternal aunt and paternal aunt; Breast Cancer (age of onset: 56) in her mother; C.A.D. in her father; Cancer in her paternal aunt; Cardiovascular in her father; Cerebrovascular Disease in her father; Circulatory in her father; Eczema in her brother; Food Allergy in her brother; Genitourinary Problems in her father; Heart Disease in her father; Hypertension in her paternal grandmother; Myocardial Infarction in her maternal grandfather; No Known Problems in her daughter, maternal grandmother, and son; Ovarian Cancer in her paternal aunt; Thyroid Disease in her mother.     ALLERGIES     No Known Allergies      REVIEW OF SYSTEMS     A 12 point review of system was performed and was negative except as outlined in the history of present illness.     HOME MEDICATIONS     Current Outpatient Rx   Medication Sig Dispense Refill     anastrozole (ARIMIDEX) 1 MG tablet Take 1 tablet (1 mg) by mouth daily 90 tablet 3     Calcium Carbonate-Vitamin D (CALCIUM 500 + D PO)        Cetirizine HCl (ZYRTEC ALLERGY PO) Take 10 mg by mouth daily       COMPRESSION STOCKINGS Please measure and distribute 2 pair of 20mmHg - 30mmHg thigh high open or closed toe compression stockings with extra refills as indicated. 2 each 4     Fluticasone Propionate (FLONASE NA) Spray 1 spray in nostril daily       hydrOXYzine HCl (ATARAX) 25 MG tablet Take 1-2 tablets (25-50 mg) by mouth 3 times daily as needed for anxiety or other (sleep) 90 tablet 1     ketoconazole (NIZORAL) 2 % external cream Apply topically daily 30 g 1     lidocaine-prilocaine (EMLA) 2.5-2.5 % external cream Apply to port 30-60 minutes prior to accessing it for treatment/labs. 30 g 1     LORazepam (ATIVAN) 0.5 MG tablet Take 1 tablet (0.5 mg) by mouth every 6 hours as needed for anxiety 30 tablet 0      "methocarbamol (ROBAXIN) 750 MG tablet Take 1 tablet (750 mg) by mouth 3 times daily 30 tablet 0     Multiple Vitamins-Minerals (MULTIVITAMIN & MINERAL PO) Take 1 each by mouth daily. [facility] women's active supplement       nortriptyline (PAMELOR) 25 MG capsule 1 PO at bedtime x 1 week then 2 PO QHS 50 capsule 0     [START ON 9/14/2024] nortriptyline (PAMELOR) 50 MG capsule Take 1 capsule (50 mg) by mouth at bedtime 90 capsule 3     Probiotic Product (PROBIOTIC PO) Reported on 3/1/2017       senna-docusate (SENOKOT-S/PERICOLACE) 8.6-50 MG tablet Take 1 tablet by mouth 2 times daily 30 tablet 0     SUMAtriptan (IMITREX) 25 MG tablet Take 1 tablet (25 mg) by mouth at onset of headache for migraine May repeat in 2 hours. Max 8 tablets/24 hours. 18 tablet 4     tranexamic acid (LYSTEDA) 650 MG tablet Take 2 tablets (1,300 mg) by mouth 3 times daily 30 tablet 3     triamcinolone (KENALOG) 0.1 % external cream Apply topically 2 times daily 45 g 1         PHYSICAL EXAM     Vital signs  /70 (BP Location: Left arm, Patient Position: Sitting)   Pulse 69   Ht 1.651 m (5' 5\")   Wt 80.7 kg (178 lb)   LMP 04/09/2024 (Approximate)   BMI 29.62 kg/m      Weight:   178 lbs 0 oz    Patient is alert and oriented x4 in no acute distress. Vital signs were reviewed and are documented in electronic medical record. Neck was supple, no carotid bruits, thyromegaly, JVD, or lymphadenopathy was noted.   NEUROLOGY EXAM:    Patient s speech was normal with no aphasia or dysarthria. Mentation, and affect were also normal.     Funduscopic exam was normal, with normal cup to disc ratio. Cranial nerves II -XII were intact.     Patient had normal mass, tone and motor strength was 5/5 in all extremities without pronator drift.     Sensation was intact to light touch, pinprick, and vibratory sensation.     Reflexes were 1+ symmetrical with downgoing toes.     No dysmetria noted on FNF or HKS. Romberg was negative.    Gait testing was normal. Able " to walk on toes/heels. Tandem walk normal.     PERTINENT DIAGNOSTIC STUDIES     Following studies were reviewed:     CT BRAIN 5/21/2024  1.  No intracranial hemorrhage, mass lesions, hydrocephalus or CT evidence for an acute infarct.  2.  Moderate size mucus retention cyst in the left maxillary sinus.       PERTINENT LABS  Following labs were reviewed:  Admission on 07/30/2024, Discharged on 07/30/2024   Component Date Value Ref Range Status     hCG Urine Qualitative 07/30/2024 Negative  Negative Final     Case Report 07/30/2024    Final                    Value:Surgical Pathology Report                         Case: [id number]                                  Authorizing Provider:  Elisabet Venegas MD Collected:           07/30/2024 12:06 PM          Ordering Location:     [facility]          Received:            07/30/2024 12:25 PM                                 [facility]                                                            Pathologist:           Bailey Doshi MD                                                             Specimens:   A) - Breast, Left, left breast explant                                                              B) - Breast, Right, right breast explant                                                    Final Diagnosis 07/30/2024    Final                    Value:This result contains rich text formatting which cannot be displayed here.     Clinical Information 07/30/2024    Final                    Value:This result contains rich text formatting which cannot be displayed here.     Gross Description 07/30/2024    Final                    Value:This result contains rich text formatting which cannot be displayed here.     Microscopic Description 07/30/2024    Final                    Value:This result contains rich text formatting which cannot be displayed here.     Performing Labs 07/30/2024    Final                    Value:This result contains rich text formatting  which cannot be displayed here.   Lab on 06/24/2024   Component Date Value Ref Range Status     WBC Count 06/24/2024 5.0  4.0 - 11.0 10e3/uL Final     RBC Count 06/24/2024 4.08  3.80 - 5.20 10e6/uL Final     Hemoglobin 06/24/2024 12.1  11.7 - 15.7 g/dL Final     Hematocrit 06/24/2024 36.4  35.0 - 47.0 % Final     MCV 06/24/2024 89  78 - 100 fL Final     MCH 06/24/2024 29.7  26.5 - 33.0 pg Final     MCHC 06/24/2024 33.2  31.5 - 36.5 g/dL Final     RDW 06/24/2024 16.3 (H)  10.0 - 15.0 % Final     Platelet Count 06/24/2024 250  150 - 450 10e3/uL Final     % Neutrophils 06/24/2024 72  % Final     % Lymphocytes 06/24/2024 16  % Final     % Monocytes 06/24/2024 10  % Final     % Eosinophils 06/24/2024 1  % Final     % Basophils 06/24/2024 1  % Final     % Immature Granulocytes 06/24/2024 0  % Final     NRBCs per 100 WBC 06/24/2024 0  <1 /100 Final     Absolute Neutrophils 06/24/2024 3.5  1.6 - 8.3 10e3/uL Final     Absolute Lymphocytes 06/24/2024 0.8  0.8 - 5.3 10e3/uL Final     Absolute Monocytes 06/24/2024 0.5  0.0 - 1.3 10e3/uL Final     Absolute Eosinophils 06/24/2024 0.1  0.0 - 0.7 10e3/uL Final     Absolute Basophils 06/24/2024 0.1  0.0 - 0.2 10e3/uL Final     Absolute Immature Granulocytes 06/24/2024 0.0  <=0.4 10e3/uL Final     Absolute NRBCs 06/24/2024 0.0  10e3/uL Final   Lab on 05/23/2024   Component Date Value Ref Range Status     Sodium 05/23/2024 142  135 - 145 mmol/L Final     Potassium 05/23/2024 4.0  3.4 - 5.3 mmol/L Final     Carbon Dioxide (CO2) 05/23/2024 24  22 - 29 mmol/L Final     Anion Gap 05/23/2024 8  7 - 15 mmol/L Final     Urea Nitrogen 05/23/2024 15.3  6.0 - 20.0 mg/dL Final     Creatinine 05/23/2024 1.11 (H)  0.51 - 0.95 mg/dL Final     GFR Estimate 05/23/2024 63  >60 mL/min/1.73m2 Final     Calcium 05/23/2024 9.0  8.6 - 10.0 mg/dL Final     Chloride 05/23/2024 110 (H)  98 - 107 mmol/L Final     Glucose 05/23/2024 93  70 - 99 mg/dL Final     Alkaline Phosphatase 05/23/2024 86  40 - 150 U/L  Final     AST 05/23/2024 40  0 - 45 U/L Final     ALT 05/23/2024 31  0 - 50 U/L Final     Protein Total 05/23/2024 6.2 (L)  6.4 - 8.3 g/dL Final     Albumin 05/23/2024 4.1  3.5 - 5.2 g/dL Final     Bilirubin Total 05/23/2024 0.2  <=1.2 mg/dL Final     WBC Count 05/23/2024 7.6  4.0 - 11.0 10e3/uL Final     RBC Count 05/23/2024 3.79 (L)  3.80 - 5.20 10e6/uL Final     Hemoglobin 05/23/2024 10.8 (L)  11.7 - 15.7 g/dL Final     Hematocrit 05/23/2024 33.6 (L)  35.0 - 47.0 % Final     MCV 05/23/2024 89  78 - 100 fL Final     MCH 05/23/2024 28.5  26.5 - 33.0 pg Final     MCHC 05/23/2024 32.1  31.5 - 36.5 g/dL Final     RDW 05/23/2024 16.2 (H)  10.0 - 15.0 % Final     Platelet Count 05/23/2024 172  150 - 450 10e3/uL Final     % Neutrophils 05/23/2024 81  % Final     % Lymphocytes 05/23/2024 12  % Final     % Monocytes 05/23/2024 5  % Final     % Eosinophils 05/23/2024 0  % Final     % Basophils 05/23/2024 1  % Final     % Immature Granulocytes 05/23/2024 1  % Final     NRBCs per 100 WBC 05/23/2024 1 (H)  <1 /100 Final     Absolute Neutrophils 05/23/2024 6.2  1.6 - 8.3 10e3/uL Final     Absolute Lymphocytes 05/23/2024 0.9  0.8 - 5.3 10e3/uL Final     Absolute Monocytes 05/23/2024 0.4  0.0 - 1.3 10e3/uL Final     Absolute Eosinophils 05/23/2024 0.0  0.0 - 0.7 10e3/uL Final     Absolute Basophils 05/23/2024 0.1  0.0 - 0.2 10e3/uL Final     Absolute Immature Granulocytes 05/23/2024 0.1  <=0.4 10e3/uL Final     Absolute NRBCs 05/23/2024 0.0  10e3/uL Final   Admission on 05/21/2024, Discharged on 05/21/2024   Component Date Value Ref Range Status     Sodium 05/21/2024 140  135 - 145 mmol/L Final     Potassium 05/21/2024 4.2  3.4 - 5.3 mmol/L Final     Carbon Dioxide (CO2) 05/21/2024 23  22 - 29 mmol/L Final     Anion Gap 05/21/2024 9  7 - 15 mmol/L Final     Urea Nitrogen 05/21/2024 18.5  6.0 - 20.0 mg/dL Final     Creatinine 05/21/2024 0.83  0.51 - 0.95 mg/dL Final     GFR Estimate 05/21/2024 89  >60 mL/min/1.73m2 Final      Calcium 05/21/2024 9.0  8.6 - 10.0 mg/dL Final     Chloride 05/21/2024 108 (H)  98 - 107 mmol/L Final     Glucose 05/21/2024 105 (H)  70 - 99 mg/dL Final     Alkaline Phosphatase 05/21/2024 94  40 - 150 U/L Final     AST 05/21/2024 35  0 - 45 U/L Final     ALT 05/21/2024 28  0 - 50 U/L Final     Protein Total 05/21/2024 5.9 (L)  6.4 - 8.3 g/dL Final     Albumin 05/21/2024 4.1  3.5 - 5.2 g/dL Final     Bilirubin Total 05/21/2024 0.2  <=1.2 mg/dL Final     Troponin T, High Sensitivity 05/21/2024 <6  <=14 ng/L Final     D-Dimer Quantitative 05/21/2024 0.73 (H)  0.00 - 0.50 ug/mL FEU Final     Ventricular Rate 05/21/2024 70  BPM Final     Atrial Rate 05/21/2024 70  BPM Final     WV Interval 05/21/2024 164  ms Final     QRS Duration 05/21/2024 94  ms Final     QT 05/21/2024 388  ms Final     QTc 05/21/2024 419  ms Final     P Axis 05/21/2024 60  degrees Final     R AXIS 05/21/2024 21  degrees Final     T Axis 05/21/2024 41  degrees Final     Interpretation ECG 05/21/2024    Final                    Value:Sinus rhythm  Normal ECG  Unconfirmed report - interpretation of this ECG is computer generated - see medical record for final interpretation    Confirmed by - EMERGENCY ROOM, PHYSICIAN ([id number]),  JORI GUPTA ([id number]) on 5/22/2024 3:11:31 AM       WBC Count 05/21/2024 11.2 (H)  4.0 - 11.0 10e3/uL Final     RBC Count 05/21/2024 3.74 (L)  3.80 - 5.20 10e6/uL Final     Hemoglobin 05/21/2024 10.8 (L)  11.7 - 15.7 g/dL Final     Hematocrit 05/21/2024 32.7 (L)  35.0 - 47.0 % Final     MCV 05/21/2024 87  78 - 100 fL Final     MCH 05/21/2024 28.9  26.5 - 33.0 pg Final     MCHC 05/21/2024 33.0  31.5 - 36.5 g/dL Final     RDW 05/21/2024 16.0 (H)  10.0 - 15.0 % Final     Platelet Count 05/21/2024 166  150 - 450 10e3/uL Final     % Neutrophils 05/21/2024 84  % Final     % Lymphocytes 05/21/2024 8  % Final     % Monocytes 05/21/2024 5  % Final     % Eosinophils 05/21/2024 0  % Final     % Basophils 05/21/2024 1  % Final     %  Immature Granulocytes 05/21/2024 2  % Final     NRBCs per 100 WBC 05/21/2024 1 (H)  <1 /100 Final     Absolute Neutrophils 05/21/2024 9.5 (H)  1.6 - 8.3 10e3/uL Final     Absolute Lymphocytes 05/21/2024 0.9  0.8 - 5.3 10e3/uL Final     Absolute Monocytes 05/21/2024 0.5  0.0 - 1.3 10e3/uL Final     Absolute Eosinophils 05/21/2024 0.0  0.0 - 0.7 10e3/uL Final     Absolute Basophils 05/21/2024 0.1  0.0 - 0.2 10e3/uL Final     Absolute Immature Granulocytes 05/21/2024 0.2  <=0.4 10e3/uL Final     Absolute NRBCs 05/21/2024 0.1  10e3/uL Final   Lab on 05/15/2024   Component Date Value Ref Range Status     Sodium 05/15/2024 144  135 - 145 mmol/L Final     Potassium 05/15/2024 3.6  3.4 - 5.3 mmol/L Final     Carbon Dioxide (CO2) 05/15/2024 27  22 - 29 mmol/L Final     Anion Gap 05/15/2024 9  7 - 15 mmol/L Final     Urea Nitrogen 05/15/2024 10.4  6.0 - 20.0 mg/dL Final     Creatinine 05/15/2024 1.08 (H)  0.51 - 0.95 mg/dL Final     GFR Estimate 05/15/2024 65  >60 mL/min/1.73m2 Final     Calcium 05/15/2024 8.7  8.6 - 10.0 mg/dL Final     Chloride 05/15/2024 108 (H)  98 - 107 mmol/L Final     Glucose 05/15/2024 94  70 - 99 mg/dL Final     Alkaline Phosphatase 05/15/2024 138  40 - 150 U/L Final     AST 05/15/2024 29  0 - 45 U/L Final     ALT 05/15/2024 25  0 - 50 U/L Final     Protein Total 05/15/2024 5.9 (L)  6.4 - 8.3 g/dL Final     Albumin 05/15/2024 4.1  3.5 - 5.2 g/dL Final     Bilirubin Total 05/15/2024 0.2  <=1.2 mg/dL Final     Color Urine 05/15/2024 Straw  Colorless, Straw, Light Yellow, Yellow Final     Appearance Urine 05/15/2024 Clear  Clear Final     Glucose Urine 05/15/2024 Negative  Negative mg/dL Final     Bilirubin Urine 05/15/2024 Negative  Negative Final     Ketones Urine 05/15/2024 Negative  Negative mg/dL Final     Specific Gravity Urine 05/15/2024 1.007  1.003 - 1.035 Final     Blood Urine 05/15/2024 Negative  Negative Final     pH Urine 05/15/2024 6.5  5.0 - 7.0 Final     Protein Albumin Urine 05/15/2024  Negative  Negative mg/dL Final     Urobilinogen Urine 05/15/2024 Normal  Normal, 2.0 mg/dL Final     Nitrite Urine 05/15/2024 Negative  Negative Final     Leukocyte Esterase Urine 05/15/2024 Negative  Negative Final     Mucus Urine 05/15/2024 Present (A)  None Seen /LPF Final     RBC Urine 05/15/2024 <1  <=2 /HPF Final     WBC Urine 05/15/2024 1  <=5 /HPF Final     Squamous Epithelials Urine 05/15/2024 <1  <=1 /HPF Final     WBC Count 05/15/2024 41.1 (H)  4.0 - 11.0 10e3/uL Final     RBC Count 05/15/2024 3.56 (L)  3.80 - 5.20 10e6/uL Final     Hemoglobin 05/15/2024 10.3 (L)  11.7 - 15.7 g/dL Final     Hematocrit 05/15/2024 31.5 (L)  35.0 - 47.0 % Final     MCV 05/15/2024 89  78 - 100 fL Final     MCH 05/15/2024 28.9  26.5 - 33.0 pg Final     MCHC 05/15/2024 32.7  31.5 - 36.5 g/dL Final     RDW 05/15/2024 15.8 (H)  10.0 - 15.0 % Final     Platelet Count 05/15/2024 199  150 - 450 10e3/uL Final     NRBCs per 100 WBC 05/15/2024 0  <1 /100 Final     Absolute NRBCs 05/15/2024 0.1  10e3/uL Final     % Neutrophils 05/15/2024 82  % Final     % Lymphocytes 05/15/2024 5  % Final     % Monocytes 05/15/2024 2  % Final     % Eosinophils 05/15/2024 1  % Final     % Basophils 05/15/2024 0  % Final     % Metamyelocytes 05/15/2024 4  % Final     % Myelocytes 05/15/2024 6  % Final     NRBCs per 100 WBC 05/15/2024 1 (H)  <=0 % Final     Absolute Neutrophils 05/15/2024 33.7 (H)  1.6 - 8.3 10e3/uL Final     Absolute Lymphocytes 05/15/2024 2.1  0.8 - 5.3 10e3/uL Final     Absolute Monocytes 05/15/2024 0.8  0.0 - 1.3 10e3/uL Final     Absolute Eosinophils 05/15/2024 0.4  0.0 - 0.7 10e3/uL Final     Absolute Basophils 05/15/2024 0.0  0.0 - 0.2 10e3/uL Final     Absolute Metamyelocytes 05/15/2024 1.6 (H)  <=0.0 10e3/uL Final     Absolute Myelocytes 05/15/2024 2.5 (H)  <=0.0 10e3/uL Final     Absolute NRBCs 05/15/2024 0.4 (H)  <=0.0 10e3/uL Final     RBC Morphology 05/15/2024 Confirmed RBC Indices   Final     Platelet Assessment 05/15/2024  Automated Count Confirmed. Platelet morphology is normal.  Automated Count Confirmed. Platelet morphology is normal. Final        Total time spent for face to face visit, reviewing labs/imaging studies, counseling and coordination of care was: 1 Hour spent on the date of the encounter doing chart review, review of outside records, review of test results, interpretation of tests, patient visit, and documentation     The longitudinal plan of care for the diagnosis(es)/condition(s) as documented were addressed during this visit. Due to the added complexity in care, I will continue to support Lisandra in the subsequent management and with ongoing continuity of care.    This note was dictated using voice recognition software.  Any grammatical or context distortions are unintentional and inherent to the software.    Orders Placed This Encounter   Procedures     MR Brain w/o & w Contrast      New Prescriptions    NORTRIPTYLINE (PAMELOR) 25 MG CAPSULE    1 PO at bedtime x 1 week then 2 PO QHS    NORTRIPTYLINE (PAMELOR) 50 MG CAPSULE    Take 1 capsule (50 mg) by mouth at bedtime      Modified Medications    No medications on file                Again, thank you for allowing me to participate in the care of your patient.        Sincerely,        Kike Jones MD

## 2024-08-14 NOTE — NURSING NOTE
Chief Complaint   Patient presents with    Headache     Follow up- patient reports she originally has menstrual headaches but since being dx with breast cancer, she is now having headaches/migraines when fatigued. Migraines 2-3x per month      Anabel Holcomb CMA on 8/14/2024 at 12:19 PM  [facility]

## 2024-08-15 ENCOUNTER — VIRTUAL VISIT (OUTPATIENT)
Dept: PSYCHOLOGY | Facility: CLINIC | Age: 44
End: 2024-08-15
Payer: COMMERCIAL

## 2024-08-15 DIAGNOSIS — F43.22 ADJUSTMENT DISORDER WITH ANXIETY: Primary | ICD-10-CM

## 2024-08-15 PROCEDURE — 90834 PSYTX W PT 45 MINUTES: CPT | Mod: 95

## 2024-08-15 NOTE — PROGRESS NOTES
[facility]                                     Progress Note    Patient Name: Angelica Gomez  Date: 8/15/24           Service Type: Individual      Session Start Time: 12:30pm  Session End Time: 1:15pm     Session Length: 45 minutes    Session #: 14    Attendees: Client attended alone    Service Modality:  Video Visit:      Provider verified identity through the following two step process.  Patient provided:  Patient  and Patient address    Telemedicine Visit: The patient's condition can be safely assessed and treated via synchronous audio and visual telemedicine encounter.      Reason for Telemedicine Visit: Patient has requested telehealth visit    Originating Site (Patient Location): Patient's home    Distant Site (Provider Location): Provider Remote Setting- Home Office    Consent:  The patient/guardian has verbally consented to: the potential risks and benefits of telemedicine (video visit) versus in person care; bill my insurance or make self-payment for services provided; and responsibility for payment of non-covered services.     Patient would like the video invitation sent by:  My Chart    Mode of Communication:  Video Conference via [employer]well    Distant Location (Provider):  Off-site    As the provider I attest to compliance with applicable laws and regulations related to telemedicine.    DATA  Interactive Complexity: No  Crisis: No        Progress Since Last Session (Related to Symptoms / Goals / Homework):   Symptoms: No change continued mild anxiety    Homework: Achieved / completed to satisfaction      Episode of Care Goals: Satisfactory progress - PREPARATION (Decided to change - considering how); Intervened by negotiating a change plan and determining options / strategies for behavior change, identifying triggers, exploring social supports, and working towards setting a date to begin behavior change     Current / Ongoing Stressors and Concerns:   Pt shared that she is  continuing to experience some anxiety about returning to work, but feels like she is ready to do so. Reported that she had a conversation with her boss and her boss was very supportive of her gradually returning to work and not pushing herself too hard at the beginning. Discussed ways she may need to set boundaries around other responsibilities outside of work when she returns to work. Writer informed pt that she will be leaving the organization. Pt expressed interest in getting connected with a new therapist.          Treatment Objective(s) Addressed in This Session:   Engage in problem solving related to situations causing anxiety  Process grief related cancer diagnosis and treatment    learn & utilize at least 3 assertive communication skills weekly         Intervention:   CBT: mindfulness  Emotion Focused Therapy: identify and process emotinos  Interpersonal Therapy: setting boundaries  Solution Focused: problem solving    Assessments completed prior to visit:  The following assessments were completed by patient for this visit:  PHQ9:       2/12/2024     7:40 AM   PHQ-9 SCORE   PHQ-9 Total Score MyChart 3 (Minimal depression)   PHQ-9 Total Score 3     GAD7:        No data to display              CAGE-AID:       2/12/2024     8:09 AM   CAGE-AID Total Score   Total Score 0   Total Score MyChart 0 (A total score of 2 or greater is considered clinically significant)     PROMIS 10-Global Health (only subscores and total score):       2/12/2024     8:09 AM 5/17/2024     9:01 PM 5/30/2024     9:51 AM 6/17/2024    10:42 AM   PROMIS-10 Scores Only   Global Mental Health Score 15 14 15 13   Global Physical Health Score 16 15 15 14   PROMIS TOTAL - SUBSCORES 31 29 30 27     [place] Suicide Severity Rating Scale (Lifetime/Recent)      2/12/2024     9:59 AM 2/21/2024    11:37 AM 5/21/2024     5:50 PM 7/30/2024     9:36 AM   [place] Suicide Severity Rating (Lifetime/Recent)   Q1 Wished to be Dead (Past Month)  0-->no 0-->no  0-->no   Q2 Suicidal Thoughts (Past Month)  0-->no 0-->no 0-->no   Q6 Suicide Behavior (Lifetime)  0-->no 0-->no 0-->no   Level of Risk per Screen  no risks indicated no risks indicated no risks indicated   Q1 Wish to be Dead (Lifetime) N      Q2 Non-Specific Active Suicidal Thoughts (Lifetime) N      Actual Attempt (Lifetime) N      Has subject engaged in non-suicidal self-injurious behavior? (Lifetime) N      Interrupted Attempts (Lifetime) N      Aborted or Self-Interrupted Attempt (Lifetime) N      Preparatory Acts or Behavior (Lifetime) N      Calculated C-SSRS Risk Score (Lifetime/Recent) No Risk Indicated            ASSESSMENT: Current Emotional / Mental Status (status of significant symptoms):   Risk status (Self / Other harm or suicidal ideation)   Patient denies current fears or concerns for personal safety.   Patient denies current or recent suicidal ideation or behaviors.   Patient denies current or recent homicidal ideation or behaviors.   Patient denies current or recent self injurious behavior or ideation.   Patient denies other safety concerns.   Patient reports there has been no change in risk factors since their last session.     Patient reports there has been no change in protective factors since their last session.     Recommended that patient call 911 or go to the local ED should there be a change in any of these risk factors.     Appearance:   Appropriate    Eye Contact:   Good    Psychomotor Behavior: Normal    Attitude:   Cooperative  Friendly   Orientation:   All   Speech    Rate / Production: Normal/ Responsive    Volume:  Normal    Mood:    Anxiety, grief   Affect:    Appropriate    Thought Content:  Clear    Thought Form:  Coherent  Logical    Insight:    Good      Medication Review:   No current psychiatric medications prescribed     Medication Compliance:   NA     Changes in Health Issues:   None reported     Chemical Use Review:   Substance Use: Chemical use reviewed, no active  concerns identified      Tobacco Use: No current tobacco use.      Diagnosis:  Adjustment Disorder with Anxiety and Depression    Collateral Reports Completed:   Not Applicable    PLAN: (Patient Tasks / Therapist Tasks / Other)  Pt and writer will continue to meet on a bi-weekly basis. Next appointment   Pt will practice setting boundaries with booster club responsibilities.  Pt will continue to express needs with boss and supervisor as she returns to work.    Joyce Cristobal [facility]    This note has been reviewed and I agree with the plan of care. This note is co-signed by JESÚS Meyers, [facility], Supervisor, on: August 16, 2024  ----------------------------------------------------------------------------------  Individual Treatment Plan    Patient's Name: Angelica Gomez  YOB: 1980    Date of Creation: 3/26/24  Date Treatment Plan Last Reviewed/Revised: 7/2/24    DSM5 Diagnoses: Adjustment Disorders  309.28 (F43.23) With mixed anxiety and depressed mood  Psychosocial / Contextual Factors: going through breast cancer treatment   PROMIS (reviewed every 90 days):   Global Mental Health Score (P) 15   Global Physical Health Score (P) 16   PROMIS TOTAL - SUBSCORES (P) 31     Referral / Collaboration:  Referral to another professional/service is not indicated at this time..    Anticipated number of session for this episode of care: 6-9 sessions  Anticipation frequency of session: Biweekly  Anticipated Duration of each session: 38-52 minutes  Treatment plan will be reviewed in 90 days or when goals have been changed.       MeasurableTreatment Goal(s) related to diagnosis / functional impairment(s)  Goal 1: Patient will experience a reduction in anxiety as evidenced by reduction in ENEDINA 2 score from 1 to 0    I will know I've met my goal when I am able to process anxiety related to diagnosis.      Objective #A (Patient Action)    Patient will use at least 3 coping skills for anxiety management in the  next 12 weeks.  Status: Continued - Date(s): 7/2/24    Intervention(s)  Therapist will teach  grounding, mindfulness, emotion regulation .    Objective #B  Patient will use cognitive strategies identified in therapy to challenge anxious thoughts.  Status: continued 7/2/24    Intervention(s)  Therapist will teach staying in the present moment, challenging catastrophic thinking .    Objective #C  Patient will process fears related to cancer diagnosis and impacts of cancer treatment   Status: new 7/2/24    Intervention(s)  Therapist will provide supportive and nonjudgemental space for processing and support in focusing on the present moment      Goal 2: Patient will experience reduction in depressive symptoms as evidenced by stability in or reduction of PhQ9 score from 3 to less than 3 and self report of improved mood.    I will know I've met my goal when I am able to process sadness related to the diagnosis.      Objective #A (Patient Action)    Status: continued 7/2/24    Patient will Increase interest, engagement, and pleasure in doing things.    Intervention(s)  Therapist will  discuss behavioral activation, ways patient can remain active and social .    Objective #B  Patient will  process grief related to diagnosis  .    Status: continued 7/2/24    Intervention(s)  Therapist will  teach stages of grief, loss/changes of identity .        Patient has reviewed and agreed to the above plan.      GT Palencia  July 2, 2024    This note has been reviewed and I agree with the plan of care. This note is co-signed by JESÚS Meyers, [facility], Supervisor, on: July 10, 2024

## 2024-08-19 ENCOUNTER — TELEPHONE (OUTPATIENT)
Dept: ONCOLOGY | Facility: CLINIC | Age: 44
End: 2024-08-19
Payer: COMMERCIAL

## 2024-08-19 NOTE — CONFIDENTIAL NOTE
Called pt to assess for symptoms from survey.    Pt reports some joint pain in elbows and knees, but states that it has been this way for the past week and is manageable with NSAID medication. She thinks this is likely from the Zoladex. She will let the clinic know if the pain is getting worse or is not manageable.    Will send to team to update.

## 2024-08-21 ENCOUNTER — ANCILLARY PROCEDURE (OUTPATIENT)
Dept: MRI IMAGING | Facility: CLINIC | Age: 44
End: 2024-08-21
Attending: PSYCHIATRY & NEUROLOGY
Payer: COMMERCIAL

## 2024-08-21 DIAGNOSIS — Z17.0 MALIGNANT NEOPLASM OF UPPER-OUTER QUADRANT OF LEFT BREAST IN FEMALE, ESTROGEN RECEPTOR POSITIVE (H): ICD-10-CM

## 2024-08-21 DIAGNOSIS — G43.829 MENSTRUAL MIGRAINE WITHOUT STATUS MIGRAINOSUS, NOT INTRACTABLE: ICD-10-CM

## 2024-08-21 DIAGNOSIS — G44.89 CHRONIC MIXED HEADACHE SYNDROME: ICD-10-CM

## 2024-08-21 DIAGNOSIS — C50.412 MALIGNANT NEOPLASM OF UPPER-OUTER QUADRANT OF LEFT BREAST IN FEMALE, ESTROGEN RECEPTOR POSITIVE (H): ICD-10-CM

## 2024-08-21 RX ORDER — GADOBUTROL 604.72 MG/ML
4 INJECTION INTRAVENOUS ONCE
Status: COMPLETED | OUTPATIENT
Start: 2024-08-21 | End: 2024-08-21

## 2024-08-21 RX ADMIN — GADOBUTROL 4 ML: 604.72 INJECTION INTRAVENOUS at 09:08

## 2024-08-26 ENCOUNTER — THERAPY VISIT (OUTPATIENT)
Dept: OCCUPATIONAL THERAPY | Facility: CLINIC | Age: 44
End: 2024-08-26
Attending: SURGERY
Payer: COMMERCIAL

## 2024-08-26 DIAGNOSIS — C50.912 INVASIVE DUCTAL CARCINOMA OF BREAST, FEMALE, LEFT (H): ICD-10-CM

## 2024-08-26 DIAGNOSIS — Z90.12 STATUS POST LEFT MASTECTOMY: Primary | ICD-10-CM

## 2024-08-26 DIAGNOSIS — I89.0 LYMPHEDEMA: ICD-10-CM

## 2024-08-26 PROCEDURE — 97140 MANUAL THERAPY 1/> REGIONS: CPT | Mod: GO

## 2024-08-28 ENCOUNTER — VIRTUAL VISIT (OUTPATIENT)
Dept: PSYCHOLOGY | Facility: CLINIC | Age: 44
End: 2024-08-28
Payer: COMMERCIAL

## 2024-08-28 DIAGNOSIS — F43.23 ADJUSTMENT DISORDER WITH MIXED ANXIETY AND DEPRESSED MOOD: Primary | ICD-10-CM

## 2024-08-28 PROCEDURE — 90834 PSYTX W PT 45 MINUTES: CPT | Mod: 95

## 2024-08-28 NOTE — PROGRESS NOTES
[facility]    Hematology/Oncology Established Patient Note      Today's Date: 9/4/2024    Reason for visit: Left breast cancer.    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  12/7/2023: Due to worsening left upper outer breast pain, bilateral diagnostic mammogram performed. No mammographic abnormality in left upper outer breast site of symptoms but at 1:00, there is oval asymmetry measuring 0.7 cm. Remaining left breast and left axillary U/S showed morphologically normal lymph node and breast tissue.  At 1:30, 5 cm from nipple is irregular hypoechoic mass measuring 0.5 x 0.5 cm.  2. 12/15/2023: U/S-guided biopsy of left breast at 1:30 showed grade 2 invasive ductal carcinoma (3 mm) with associated grade 3 DCIS, no LVI; ER positive at 20%, NY positive at 20%, HER-2 negative with IHC = 1+.  3. 2/21/2024: Bilateral mastectomies with left axillary sentinel lymph node excision with Dr. Bean Phillips; bilateral implant removal. Pathology showed 1.2 cm grade 2 invasive ductal carcinoma with 1.4 cm grade 3 DCIS; margins negative; one left axillary SLN negative. Right breast benign. Repeat ER weakly positive at 61-70%, NY moderate positive at 71-80%. MammaPrint + BluePrint showed high  risk, Luminal B.  4. 3/25/2024-5/28/2024: Completed adjuvant chemotherapy with 4 cycles of Taxotere and Cytoxan.  5. 7/08/2024: Started Zoladex monthly and anastrozole.    INTERVAL HISTORY:  Lisandra reports her brain fog has largely cleared. She has intermittent joint pain. She is doing acupuncture and cupping.    REVIEW OF SYSTEMS:   14 point ROS was reviewed and is negative other than as noted above in HPI.       HOME MEDICATIONS:  Current Outpatient Medications   Medication Sig Dispense Refill    anastrozole (ARIMIDEX) 1 MG tablet Take 1 tablet (1 mg) by mouth daily 90 tablet 3    Calcium Carbonate-Vitamin D (CALCIUM 500 +  D PO)       Cetirizine HCl (ZYRTEC ALLERGY PO) Take 10 mg by mouth daily      COMPRESSION STOCKINGS Please measure and distribute 2 pair of 20mmHg - 30mmHg thigh high open or closed toe compression stockings with extra refills as indicated. 2 each 4    Fluticasone Propionate (FLONASE NA) Spray 1 spray in nostril daily      hydrOXYzine HCl (ATARAX) 25 MG tablet Take 1-2 tablets (25-50 mg) by mouth 3 times daily as needed for anxiety or other (sleep) 90 tablet 1    ketoconazole (NIZORAL) 2 % external cream Apply topically daily 30 g 1    lidocaine-prilocaine (EMLA) 2.5-2.5 % external cream Apply to port 30-60 minutes prior to accessing it for treatment/labs. 30 g 1    LORazepam (ATIVAN) 0.5 MG tablet Take 1 tablet (0.5 mg) by mouth every 6 hours as needed for anxiety 30 tablet 0    methocarbamol (ROBAXIN) 750 MG tablet Take 1 tablet (750 mg) by mouth 3 times daily 30 tablet 0    Multiple Vitamins-Minerals (MULTIVITAMIN & MINERAL PO) Take 1 each by mouth daily. [facility] women's active supplement      nortriptyline (PAMELOR) 25 MG capsule 1 PO at bedtime x 1 week then 2 PO QHS 50 capsule 0    [START ON 9/14/2024] nortriptyline (PAMELOR) 50 MG capsule Take 1 capsule (50 mg) by mouth at bedtime 90 capsule 3    Probiotic Product (PROBIOTIC PO) Reported on 3/1/2017      senna-docusate (SENOKOT-S/PERICOLACE) 8.6-50 MG tablet Take 1 tablet by mouth 2 times daily 30 tablet 0    SUMAtriptan (IMITREX) 25 MG tablet Take 1 tablet (25 mg) by mouth at onset of headache for migraine May repeat in 2 hours. Max 8 tablets/24 hours. 18 tablet 4    tranexamic acid (LYSTEDA) 650 MG tablet Take 2 tablets (1,300 mg) by mouth 3 times daily 30 tablet 3    triamcinolone (KENALOG) 0.1 % external cream Apply topically 2 times daily 45 g 1         ALLERGIES:  No Known Allergies      PAST MEDICAL HISTORY:  Past Medical History:   Diagnosis Date    Malignant neoplasm of upper-outer quadrant of left breast in female, estrogen receptor positive (H)  2024    Migraine     NO ACTIVE PROBLEMS    Menometrorrhagia.    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 Used IUD for 20 years.      PAST SURGICAL HISTORY:  Past Surgical History:   Procedure Laterality Date    COLONOSCOPY WITH CO2 INSUFFLATION N/A 2024    Procedure: Colonoscopy with CO2 insufflation;  Surgeon: Cherise Lima DO;  Location: [facility] OR    INSERT TISSUE EXPANDER BREAST BILATERAL Bilateral 2024    Procedure: Insertion tissue expander, breasts, bilateral;  Surgeon: Elisabet Venegas MD;  Location: [facility] OR    IR CHEST PORT PLACEMENT > 5 YRS OF AGE  2024    IR PORT REMOVAL RIGHT  2024    MASTECTOMY, NIPPLE SPARING Bilateral 2024    Procedure: Bilateral nipple sparing mastectomy, bilateral implant removal, left sentinel lymph node biopsy;  Surgeon: Aba Phillips MD;  Location: [facility] OR    RECONSTRUCT BREAST BILATERAL, IMPLANT PROSTHESIS BILATERAL, COMBINED Bilateral 2024    Procedure: RECONSTRUCTION BILATERAL BREASTS WITH IMPLANTS;  Surgeon: Elisabet Venegas MD;  Location: [facility] OR    TUBAL LIGATION      [facility]  DELIVERY ONLY      superficial wound dehiscence   Bilateral breast augmentation in 2013.      SOCIAL HISTORY:  Social History     Socioeconomic History    Marital status:      Spouse name: Not on file    Number of children: Not on file    Years of education: Not on file    Highest education level: Not on file   Occupational History    Not on file   Tobacco Use    Smoking status: Former     Current packs/day: 0.25     Average packs/day: 0.3 packs/day for 1 year (0.3 ttl pk-yrs)     Types: Cigarettes    Smokeless tobacco: Never   Vaping Use    Vaping status: Never Used   Substance and Sexual Activity    Alcohol use: Not Currently    Drug use: No    Sexual activity: Yes     Partners: Male     Birth control/protection: Surgical, I.U.D., Female Surgical     Comment:  Tubal ligation, 12/13/2007   Other Topics Concern    Parent/sibling w/ CABG, MI or angioplasty before 65F 55M? Yes     Comment: father 2 MIs   Social History Narrative    Caffeine intake/servings daily - 2-3 pop    Calcium intake/servings daily - 2 yogurts and 1 glass of milk    Exercise 6 times weekly - describe strength training and cardio    Sunscreen used - Yes    Seatbelts used - Yes    Guns stored in the home - No    Self Breast Exam - Yes    Pap test up to date -  Yes, as of today    Eye exam up to date -  Yes    Dental exam up to date -  Yes    DEXA scan up to date -  Not Applicable    Flex Sig/Colonoscopy up to date -  Not Applicable    Mammography up to date -  Not Applicable    Immunizations reviewed and up to date - Yes, 03/2008    Abuse: Current or Past (Physical, Sexual or Emotional) - No    Do you feel safe in your environment - Yes    Do you cope well with stress - Yes    Do you suffer from insomnia - No    Last updated by: Lit Licona  10/27/2009                 Social Determinants of Health     Financial Resource Strain: Low Risk  (2/1/2024)    Financial Resource Strain     Within the past 12 months, have you or your family members you live with been unable to get utilities (heat, electricity) when it was really needed?: No   Food Insecurity: Low Risk  (2/1/2024)    Food Insecurity     Within the past 12 months, did you worry that your food would run out before you got money to buy more?: No     Within the past 12 months, did the food you bought just not last and you didn t have money to get more?: No   Transportation Needs: Low Risk  (2/1/2024)    Transportation Needs     Within the past 12 months, has lack of transportation kept you from medical appointments, getting your medicines, non-medical meetings or appointments, work, or from getting things that you need?: No   Physical Activity: Not on file   Stress: Not on file   Social Connections: Not on file   Interpersonal Safety: Low Risk  (2/5/2024)  "   Interpersonal Safety     Do you feel physically and emotionally safe where you currently live?: Yes     Within the past 12 months, have you been hit, slapped, kicked or otherwise physically hurt by someone?: No     Within the past 12 months, have you been humiliated or emotionally abused in other ways by your partner or ex-partner?: No   Housing Stability: Low Risk  (2024)    Housing Stability     Do you have housing? : Yes     Are you worried about losing your housing?: No   Has 2 grown children.  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      FAMILY HISTORY:  Family History   Problem Relation Age of Onset    Thyroid Disease Mother     Breast Cancer Mother 56        breast, 3 years later    Heart Disease Father         2x heart attacks    Circulatory Father         blood clots    Cardiovascular Father         patent foramen ovale, repaired x 2, afib    Cerebrovascular Disease Father         x2    C.A.D. Father         x2    Genitourinary Problems Father         kidney disease    Asthma Brother     Food Allergy Brother     Eczema Brother     No Known Problems Maternal Grandmother     Myocardial Infarction Maternal Grandfather     Hypertension Paternal Grandmother     Alcohol/Drug Paternal Grandfather     No Known Problems Daughter     No Known Problems Son     Cancer Paternal Aunt         cervical cancer(another sisiter)    Breast Cancer Paternal Aunt         breast cancer at 70's    Breast Cancer Paternal Aunt     Ovarian Cancer Paternal Aunt     Cancer - colorectal No family hx of     Diabetes No family hx of          PHYSICAL EXAM:  Vital signs:  /85   Pulse 81   Temp 97.9  F (36.6  C)   Resp 16   Ht 1.651 m (5' 5\")   Wt 79.7 kg (175 lb 12.8 oz)   LMP 2024 (Approximate)   SpO2 98%   BMI 29.25 kg/m     ECO  GENERAL/CONSTITUTIONAL: No acute distress.  EYES: No erythema or scleral icterus.  LYMPH: No cervical, " supraclavicular, axillary adenopathy.  BREAST: Bilateral breasts surgically absent with implants in place and no chest wall masses or periprosthetic fluid. No skin erythema. Incisions well healed.  RESPIRATORY: No audible cough or wheezing.   GASTROINTESTINAL: No hepatosplenomegaly, masses, or tenderness. No guarding.  No distention.  MUSCULOSKELETAL: Warm and well-perfused, no cyanosis, clubbing, or edema.  NEUROLOGIC: No focal motor deficits. Alert, oriented, answers questions appropriately.  INTEGUMENTARY: No rashes or jaundice.  GAIT: Steady, does not use assistive device       LABS:  CBC RESULTS:   Recent Labs   Lab Test 06/24/24  1203   WBC 5.0   RBC 4.08   HGB 12.1   HCT 36.4   MCV 89   MCH 29.7   MCHC 33.2   RDW 16.3*         Last Comprehensive Metabolic Panel:  Sodium   Date Value Ref Range Status   05/23/2024 142 135 - 145 mmol/L Final     Comment:     Reference intervals for this test were updated on 09/26/2023 to more accurately reflect our healthy population. There may be differences in the flagging of prior results with similar values performed with this method. Interpretation of those prior results can be made in the context of the updated reference intervals.    09/10/2012 140 133 - 144 mmol/L Final     Potassium   Date Value Ref Range Status   05/23/2024 4.0 3.4 - 5.3 mmol/L Final   12/30/2021 3.9 3.4 - 5.3 mmol/L Final   09/10/2012 4.2 3.4 - 5.3 mmol/L Final     Chloride   Date Value Ref Range Status   05/23/2024 110 (H) 98 - 107 mmol/L Final   12/30/2021 110 (H) 94 - 109 mmol/L Final   09/10/2012 104 94 - 109 mmol/L Final     Carbon Dioxide   Date Value Ref Range Status   09/10/2012 25 20 - 32 mmol/L Final     Carbon Dioxide (CO2)   Date Value Ref Range Status   05/23/2024 24 22 - 29 mmol/L Final   12/30/2021 27 20 - 32 mmol/L Final     Anion Gap   Date Value Ref Range Status   05/23/2024 8 7 - 15 mmol/L Final   12/30/2021 5 3 - 14 mmol/L Final   09/10/2012 11 6 - 17 mmol/L Final      Glucose   Date Value Ref Range Status   05/23/2024 93 70 - 99 mg/dL Final   12/30/2021 73 70 - 99 mg/dL Final   02/21/2017 94 70 - 99 mg/dL Final     Comment:     Fasting specimen     GLUCOSE BY METER POCT   Date Value Ref Range Status   02/22/2024 106 (H) 70 - 99 mg/dL Final     Urea Nitrogen   Date Value Ref Range Status   05/23/2024 15.3 6.0 - 20.0 mg/dL Final   12/30/2021 16 7 - 30 mg/dL Final   09/10/2012 12 5 - 24 mg/dL Final     Creatinine   Date Value Ref Range Status   05/23/2024 1.11 (H) 0.51 - 0.95 mg/dL Final   09/10/2012 0.81 0.52 - 1.04 mg/dL Final     GFR Estimate   Date Value Ref Range Status   05/23/2024 63 >60 mL/min/1.73m2 Final   09/10/2012 82 >60 mL/min/1.7m2 Final     Calcium   Date Value Ref Range Status   05/23/2024 9.0 8.6 - 10.0 mg/dL Final   09/10/2012 9.3 8.5 - 10.4 mg/dL Final     Bilirubin Total   Date Value Ref Range Status   05/23/2024 0.2 <=1.2 mg/dL Final   09/10/2012 0.6 0.2 - 1.3 mg/dL Final     Alkaline Phosphatase   Date Value Ref Range Status   05/23/2024 86 40 - 150 U/L Final   09/10/2012 53 40 - 150 U/L Final     ALT   Date Value Ref Range Status   05/23/2024 31 0 - 50 U/L Final     Comment:     Reference intervals for this test were updated on 6/12/2023 to more accurately reflect our healthy population. There may be differences in the flagging of prior results with similar values performed with this method. Interpretation of those prior results can be made in the context of the updated reference intervals.     09/10/2012 27 0 - 50 U/L Final     AST   Date Value Ref Range Status   05/23/2024 40 0 - 45 U/L Final     Comment:     Reference intervals for this test were updated on 6/12/2023 to more accurately reflect our healthy population. There may be differences in the flagging of prior results with similar values performed with this method. Interpretation of those prior results can be made in the context of the updated reference intervals.   09/10/2012 81 (H) 0 - 45 U/L  Final       IMAGING:  Reviewed as per HPI.    ASSESSMENT/PLAN:  Angelica Gomez is a 44 year old female with the following issues:  1. Stage IA, [id number], grade 2 invasive ductal carcinoma of the left upper outer breast, ER positive 20%, KS positive 20%, HER-2 negative, MammaPrint high risk, Luminal B  2. CHEK2 mutation  3. Menometrorrhagia  --Lisandra is s/p bilateral mastectomies with final pathology which showed a left breast 1.2 cm tumor with repeat ER positive at 61-70%, KS positive at 71-80%, HER-2 negative (IHC = 1+)  --MammaPrint + BluePrint showed high risk, Luminal B subtype.  --She completed 4 cycles of adjuvant chemotherapy with Taxotere and Cytoxan on 5/28/2024.   --On 7/8/2024, she started ovarian suppression therapy with Zoladex and anastrozole, so far tolerating this well.  --She will continue with colonoscopy screening on high-risk basis.  --6/12/2024 Baseline DEXA showed normal bone density. Repeat in 2026.  --She has gone to [email or web address] for 90-minute sessions with added cupping and had microneedling done for her scars. She sees Carina.      4. Fertility   --She is s/p tubal ligation and does not wish to have any more children.    5. 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  She would not be ready to return to work at this time.  --She will continue with cognitive OT/rehab.    6. Insomnia  --May continue lorazepam as needed.    Return in 3 months.    Magali Roland MD  [facility] Hematology/Oncology     Total time spent today: 30 minutes in chart review, patient evaluation, counseling, documentation, test and/or medication/prescription orders, and coordination of care.      The longitudinal plan of care for the diagnosis(es)/condition(s) as documented were addressed during this visit. Due to the added complexity in care, I will continue to support Lisandra in the subsequent management and with ongoing continuity of  care.

## 2024-08-28 NOTE — PROGRESS NOTES
[facility]                                     Progress Note    Patient Name: Angelica Gomez  Date: 24           Service Type: Individual      Session Start Time: 10:00am  Session End Time: 10:43am     Session Length: 43 minutes    Session #: 16    Attendees: Client attended alone    Service Modality:  Video Visit:      Provider verified identity through the following two step process.  Patient provided:  Patient  and Patient address    Telemedicine Visit: The patient's condition can be safely assessed and treated via synchronous audio and visual telemedicine encounter.      Reason for Telemedicine Visit: Patient has requested telehealth visit    Originating Site (Patient Location): Patient's home    Distant Site (Provider Location): Provider Remote Setting- Home Office    Consent:  The patient/guardian has verbally consented to: the potential risks and benefits of telemedicine (video visit) versus in person care; bill my insurance or make self-payment for services provided; and responsibility for payment of non-covered services.     Patient would like the video invitation sent by:  My Chart    Mode of Communication:  Video Conference via [employer]well    Distant Location (Provider):  Off-site    As the provider I attest to compliance with applicable laws and regulations related to telemedicine.    DATA  Interactive Complexity: No  Crisis: No        Progress Since Last Session (Related to Symptoms / Goals / Homework):   Symptoms: No change mild work related anxiety    Homework: Achieved / completed to satisfaction      Episode of Care Goals: Satisfactory progress - PREPARATION (Decided to change - considering how); Intervened by negotiating a change plan and determining options / strategies for behavior change, identifying triggers, exploring social supports, and working towards setting a date to begin behavior change     Current / Ongoing Stressors and Concerns:   Pt endorsed some anxiety  related to going back to work. She shared that she feels it is the right decision and feels supported by other staff members. She shared that she is feeling very supported in getting many different types of care post cancer. Reflected on having one more reconstructive surgery to go and feelings around that. 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         Treatment Objective(s) Addressed in This Session:   use at least 3 coping skills for anxiety management in the next 12 weeks  Process impacts of cancer  learn & utilize at least 3 assertive communication skills weekly         Intervention:   CBT: mindfulness  Emotion Focused Therapy: identify and process emotinos  Interpersonal Therapy: setting boundaries  Solution Focused: problem solving    Assessments completed prior to visit:  The following assessments were completed by patient for this visit:  PHQ9:       2/12/2024     7:40 AM   PHQ-9 SCORE   PHQ-9 Total Score MyChart 3 (Minimal depression)   PHQ-9 Total Score 3     GAD7:        No data to display              CAGE-AID:       2/12/2024     8:09 AM   CAGE-AID Total Score   Total Score 0   Total Score MyChart 0 (A total score of 2 or greater is considered clinically significant)     PROMIS 10-Global Health (only subscores and total score):       2/12/2024     8:09 AM 5/17/2024     9:01 PM 5/30/2024     9:51 AM 6/17/2024    10:42 AM   PROMIS-10 Scores Only   Global Mental Health Score 15 14 15 13   Global Physical Health Score 16 15 15 14   PROMIS TOTAL - SUBSCORES 31 29 30 27     Check Suicide Severity Rating Scale (Lifetime/Recent)      2/12/2024     9:59 AM 2/21/2024    11:37 AM 5/21/2024     5:50 PM 7/30/2024     9:36 AM   Check Suicide Severity Rating (Lifetime/Recent)   Q1 Wished to be Dead (Past Month)  0-->no 0-->no 0-->no   Q2 Suicidal Thoughts (Past Month)  0-->no 0-->no 0-->no   Q6 Suicide Behavior (Lifetime)  0-->no 0-->no 0-->no   Level of Risk per Screen  no risks indicated no  risks indicated no risks indicated   Q1 Wish to be Dead (Lifetime) N      Q2 Non-Specific Active Suicidal Thoughts (Lifetime) N      Actual Attempt (Lifetime) N      Has subject engaged in non-suicidal self-injurious behavior? (Lifetime) N      Interrupted Attempts (Lifetime) N      Aborted or Self-Interrupted Attempt (Lifetime) N      Preparatory Acts or Behavior (Lifetime) N      Calculated C-SSRS Risk Score (Lifetime/Recent) No Risk Indicated            ASSESSMENT: Current Emotional / Mental Status (status of significant symptoms):   Risk status (Self / Other harm or suicidal ideation)   Patient denies current fears or concerns for personal safety.   Patient denies current or recent suicidal ideation or behaviors.   Patient denies current or recent homicidal ideation or behaviors.   Patient denies current or recent self injurious behavior or ideation.   Patient denies other safety concerns.   Patient reports there has been no change in risk factors since their last session.     Patient reports there has been no change in protective factors since their last session.     Recommended that patient call 911 or go to the local ED should there be a change in any of these risk factors.     Appearance:   Appropriate    Eye Contact:   Fair    Psychomotor Behavior: Normal    Attitude:   Cooperative  Pleasant   Orientation:   All   Speech    Rate / Production: Normal/ Responsive    Volume:  Normal    Mood:    Anxiety   Affect:    Appropriate    Thought Content:  Clear    Thought Form:  Coherent  Logical    Insight:    Good      Medication Review:   No current psychiatric medications prescribed     Medication Compliance:   NA     Changes in Health Issues:   None reported     Chemical Use Review:   Substance Use: Chemical use reviewed, no active concerns identified      Tobacco Use: No current tobacco use.      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   Pt will continue to reflect on where boundaries need to be set around work responsibilities.   Pt will find time for rest and relaxation during first week back to work    Joyce Cristobal MATTHEW    This note has been reviewed and I agree with the plan of care. This note is co-signed by JESÚS Meyers, [facility], Supervisor, on: September 4, 2024  ------------------------  Individual Treatment Plan    Patient's Name: Angelica Gomez  YOB: 1980    Date of Creation: 3/26/24  Date Treatment Plan Last Reviewed/Revised: 7/2/24    DSM5 Diagnoses: Adjustment Disorders  309.28 (F43.23) With mixed anxiety and depressed mood  Psychosocial / Contextual Factors: going through breast cancer treatment   PROMIS (reviewed every 90 days):   Global Mental Health Score (P) 15   Global Physical Health Score (P) 16   PROMIS TOTAL - SUBSCORES (P) 31     Referral / Collaboration:  Referral to another professional/service is not indicated at this time..    Anticipated number of session for this episode of care: 6-9 sessions  Anticipation frequency of session: Biweekly  Anticipated Duration of each session: 38-52 minutes  Treatment plan will be reviewed in 90 days or when goals have been changed.       MeasurableTreatment Goal(s) related to diagnosis / functional impairment(s)  Goal 1: Patient will experience a reduction in anxiety as evidenced by reduction in ENEDINA 2 score from 1 to 0    I will know I've met my goal when I am able to process anxiety related to diagnosis.      Objective #A (Patient Action)    Patient will use at least 3 coping skills for anxiety management in the next 12 weeks.  Status: Continued - Date(s): 7/2/24    Intervention(s)  Therapist will teach  grounding, mindfulness, emotion regulation .    Objective #B  Patient will use cognitive strategies identified in therapy to  challenge anxious thoughts.  Status: continued 7/2/24    Intervention(s)  Therapist will teach staying in the present moment, challenging catastrophic thinking .    Objective #C  Patient will process fears related to cancer diagnosis and impacts of cancer treatment   Status: new 7/2/24    Intervention(s)  Therapist will provide supportive and nonjudgemental space for processing and support in focusing on the present moment      Goal 2: Patient will experience reduction in depressive symptoms as evidenced by stability in or reduction of PhQ9 score from 3 to less than 3 and self report of improved mood.    I will know I've met my goal when I am able to process sadness related to the diagnosis.      Objective #A (Patient Action)    Status: continued 7/2/24    Patient will Increase interest, engagement, and pleasure in doing things.    Intervention(s)  Therapist will  discuss behavioral activation, ways patient can remain active and social .    Objective #B  Patient will  process grief related to diagnosis  .    Status: continued 7/2/24    Intervention(s)  Therapist will  teach stages of grief, loss/changes of identity .        Patient has reviewed and agreed to the above plan.      GT Palencia  July 2, 2024    This note has been reviewed and I agree with the plan of care. This note is co-signed by JESÚS Meyers, [facility], Supervisor, on: July 10, 2024

## 2024-09-04 ENCOUNTER — ONCOLOGY VISIT (OUTPATIENT)
Dept: ONCOLOGY | Facility: CLINIC | Age: 44
End: 2024-09-04
Attending: INTERNAL MEDICINE
Payer: COMMERCIAL

## 2024-09-04 ENCOUNTER — LAB (OUTPATIENT)
Dept: INFUSION THERAPY | Facility: CLINIC | Age: 44
End: 2024-09-04
Attending: INTERNAL MEDICINE
Payer: COMMERCIAL

## 2024-09-04 VITALS
RESPIRATION RATE: 16 BRPM | SYSTOLIC BLOOD PRESSURE: 129 MMHG | TEMPERATURE: 97.9 F | DIASTOLIC BLOOD PRESSURE: 85 MMHG | HEART RATE: 81 BPM

## 2024-09-04 VITALS
SYSTOLIC BLOOD PRESSURE: 129 MMHG | TEMPERATURE: 97.9 F | DIASTOLIC BLOOD PRESSURE: 85 MMHG | HEART RATE: 81 BPM | OXYGEN SATURATION: 98 % | WEIGHT: 175.8 LBS | BODY MASS INDEX: 29.29 KG/M2 | RESPIRATION RATE: 16 BRPM | HEIGHT: 65 IN

## 2024-09-04 DIAGNOSIS — Z15.01 MONOALLELIC MUTATION OF CHEK2 GENE IN FEMALE PATIENT: ICD-10-CM

## 2024-09-04 DIAGNOSIS — C50.412 MALIGNANT NEOPLASM OF UPPER-OUTER QUADRANT OF LEFT BREAST IN FEMALE, ESTROGEN RECEPTOR POSITIVE (H): Primary | ICD-10-CM

## 2024-09-04 DIAGNOSIS — Z15.89 MONOALLELIC MUTATION OF CHEK2 GENE IN FEMALE PATIENT: ICD-10-CM

## 2024-09-04 DIAGNOSIS — Z17.0 MALIGNANT NEOPLASM OF UPPER-OUTER QUADRANT OF LEFT BREAST IN FEMALE, ESTROGEN RECEPTOR POSITIVE (H): Primary | ICD-10-CM

## 2024-09-04 DIAGNOSIS — Z15.09 MONOALLELIC MUTATION OF CHEK2 GENE IN FEMALE PATIENT: ICD-10-CM

## 2024-09-04 DIAGNOSIS — Z15.02 MONOALLELIC MUTATION OF CHEK2 GENE IN FEMALE PATIENT: ICD-10-CM

## 2024-09-04 PROCEDURE — 99213 OFFICE O/P EST LOW 20 MIN: CPT | Mod: 25 | Performed by: INTERNAL MEDICINE

## 2024-09-04 PROCEDURE — 96402 CHEMO HORMON ANTINEOPL SQ/IM: CPT

## 2024-09-04 PROCEDURE — G2211 COMPLEX E/M VISIT ADD ON: HCPCS | Performed by: INTERNAL MEDICINE

## 2024-09-04 PROCEDURE — 250N000011 HC RX IP 250 OP 636: Performed by: INTERNAL MEDICINE

## 2024-09-04 PROCEDURE — 99214 OFFICE O/P EST MOD 30 MIN: CPT | Performed by: INTERNAL MEDICINE

## 2024-09-04 RX ADMIN — GOSERELIN ACETATE 3.6 MG: 3.6 IMPLANT SUBCUTANEOUS at 14:37

## 2024-09-04 ASSESSMENT — PAIN SCALES - GENERAL: PAINLEVEL: NO PAIN (0)

## 2024-09-04 NOTE — LETTER
9/4/2024      Angelica Gomez  [address]  [facility] [address]      Dear Colleague,    Thank you for referring your patient, Angelica Gomez, to the [facility]. Please see a copy of my visit note below.    [facility] Cancer Care    Hematology/Oncology Established Patient Note      Today's Date: 9/4/2024    Reason for visit: Left breast cancer.    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  12/7/2023: Due to worsening left upper outer breast pain, bilateral diagnostic mammogram performed. No mammographic abnormality in left upper outer breast site of symptoms but at 1:00, there is oval asymmetry measuring 0.7 cm. Remaining left breast and left axillary U/S showed morphologically normal lymph node and breast tissue.  At 1:30, 5 cm from nipple is irregular hypoechoic mass measuring 0.5 x 0.5 cm.  2. 12/15/2023: U/S-guided biopsy of left breast at 1:30 showed grade 2 invasive ductal carcinoma (3 mm) with associated grade 3 DCIS, no LVI; ER positive at 20%, MI positive at 20%, HER-2 negative with IHC = 1+.  3. 2/21/2024: Bilateral mastectomies with left axillary sentinel lymph node excision with Dr. Bean Phillips; bilateral implant removal. Pathology showed 1.2 cm grade 2 invasive ductal carcinoma with 1.4 cm grade 3 DCIS; margins negative; one left axillary SLN negative. Right breast benign. Repeat ER weakly positive at 61-70%, MI moderate positive at 71-80%. MammaPrint + BluePrint showed high  risk, Luminal B.  4. 3/25/2024-5/28/2024: Completed adjuvant chemotherapy with 4 cycles of Taxotere and Cytoxan.  5. 7/08/2024: Started Zoladex monthly and anastrozole.    INTERVAL HISTORY:  Lisandra reports her brain fog has largely cleared. She has intermittent joint pain. She is doing acupuncture and cupping.    REVIEW OF SYSTEMS:   14 point ROS was reviewed and is negative  other than as noted above in HPI.       HOME MEDICATIONS:  Current Outpatient Medications   Medication Sig Dispense Refill     anastrozole (ARIMIDEX) 1 MG tablet Take 1 tablet (1 mg) by mouth daily 90 tablet 3     Calcium Carbonate-Vitamin D (CALCIUM 500 + D PO)        Cetirizine HCl (ZYRTEC ALLERGY PO) Take 10 mg by mouth daily       COMPRESSION STOCKINGS Please measure and distribute 2 pair of 20mmHg - 30mmHg thigh high open or closed toe compression stockings with extra refills as indicated. 2 each 4     Fluticasone Propionate (FLONASE NA) Spray 1 spray in nostril daily       hydrOXYzine HCl (ATARAX) 25 MG tablet Take 1-2 tablets (25-50 mg) by mouth 3 times daily as needed for anxiety or other (sleep) 90 tablet 1     ketoconazole (NIZORAL) 2 % external cream Apply topically daily 30 g 1     lidocaine-prilocaine (EMLA) 2.5-2.5 % external cream Apply to port 30-60 minutes prior to accessing it for treatment/labs. 30 g 1     LORazepam (ATIVAN) 0.5 MG tablet Take 1 tablet (0.5 mg) by mouth every 6 hours as needed for anxiety 30 tablet 0     methocarbamol (ROBAXIN) 750 MG tablet Take 1 tablet (750 mg) by mouth 3 times daily 30 tablet 0     Multiple Vitamins-Minerals (MULTIVITAMIN & MINERAL PO) Take 1 each by mouth daily. [facility] women's active supplement       nortriptyline (PAMELOR) 25 MG capsule 1 PO at bedtime x 1 week then 2 PO QHS 50 capsule 0     [START ON 9/14/2024] nortriptyline (PAMELOR) 50 MG capsule Take 1 capsule (50 mg) by mouth at bedtime 90 capsule 3     Probiotic Product (PROBIOTIC PO) Reported on 3/1/2017       senna-docusate (SENOKOT-S/PERICOLACE) 8.6-50 MG tablet Take 1 tablet by mouth 2 times daily 30 tablet 0     SUMAtriptan (IMITREX) 25 MG tablet Take 1 tablet (25 mg) by mouth at onset of headache for migraine May repeat in 2 hours. Max 8 tablets/24 hours. 18 tablet 4     tranexamic acid (LYSTEDA) 650 MG tablet Take 2 tablets (1,300 mg) by mouth 3 times daily 30 tablet 3     triamcinolone (KENALOG)  0.1 % external cream Apply topically 2 times daily 45 g 1         ALLERGIES:  No Known Allergies      PAST MEDICAL HISTORY:  Past Medical History:   Diagnosis Date     Malignant neoplasm of upper-outer quadrant of left breast in female, estrogen receptor positive (H) 2024     Migraine      NO ACTIVE PROBLEMS    Menometrorrhagia.    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 Used IUD for 20 years.      PAST SURGICAL HISTORY:  Past Surgical History:   Procedure Laterality Date     COLONOSCOPY WITH CO2 INSUFFLATION N/A 2024    Procedure: Colonoscopy with CO2 insufflation;  Surgeon: Cherise Lima DO;  Location: [facility] OR     INSERT TISSUE EXPANDER BREAST BILATERAL Bilateral 2024    Procedure: Insertion tissue expander, breasts, bilateral;  Surgeon: Elisabet Venegas MD;  Location: [facility] OR     IR CHEST PORT PLACEMENT > 5 YRS OF AGE  2024     IR PORT REMOVAL RIGHT  2024     MASTECTOMY, NIPPLE SPARING Bilateral 2024    Procedure: Bilateral nipple sparing mastectomy, bilateral implant removal, left sentinel lymph node biopsy;  Surgeon: Aba Phillips MD;  Location: [facility] OR     RECONSTRUCT BREAST BILATERAL, IMPLANT PROSTHESIS BILATERAL, COMBINED Bilateral 2024    Procedure: RECONSTRUCTION BILATERAL BREASTS WITH IMPLANTS;  Surgeon: Elisabet Venegas MD;  Location: [facility] OR     TUBAL LIGATION       [facility]  DELIVERY ONLY      superficial wound dehiscence   Bilateral breast augmentation in .      SOCIAL HISTORY:  Social History     Socioeconomic History     Marital status:      Spouse name: Not on file     Number of children: Not on file     Years of education: Not on file     Highest education level: Not on file   Occupational History     Not on file   Tobacco Use     Smoking status: Former     Current packs/day: 0.25     Average packs/day: 0.3 packs/day for 1 year (0.3 ttl pk-yrs)      Types: Cigarettes     Smokeless tobacco: Never   Vaping Use     Vaping status: Never Used   Substance and Sexual Activity     Alcohol use: Not Currently     Drug use: No     Sexual activity: Yes     Partners: Male     Birth control/protection: Surgical, I.U.D., Female Surgical     Comment: Tubal ligation, 12/13/2007   Other Topics Concern     Parent/sibling w/ CABG, MI or angioplasty before 65F 55M? Yes     Comment: father 2 MIs   Social History Narrative    Caffeine intake/servings daily - 2-3 pop    Calcium intake/servings daily - 2 yogurts and 1 glass of milk    Exercise 6 times weekly - describe strength training and cardio    Sunscreen used - Yes    Seatbelts used - Yes    Guns stored in the home - No    Self Breast Exam - Yes    Pap test up to date -  Yes, as of today    Eye exam up to date -  Yes    Dental exam up to date -  Yes    DEXA scan up to date -  Not Applicable    Flex Sig/Colonoscopy up to date -  Not Applicable    Mammography up to date -  Not Applicable    Immunizations reviewed and up to date - Yes, 03/2008    Abuse: Current or Past (Physical, Sexual or Emotional) - No    Do you feel safe in your environment - Yes    Do you cope well with stress - Yes    Do you suffer from insomnia - No    Last updated by: Lit Licona  10/27/2009                 Social Determinants of Health     Financial Resource Strain: Low Risk  (2/1/2024)    Financial Resource Strain      Within the past 12 months, have you or your family members you live with been unable to get utilities (heat, electricity) when it was really needed?: No   Food Insecurity: Low Risk  (2/1/2024)    Food Insecurity      Within the past 12 months, did you worry that your food would run out before you got money to buy more?: No      Within the past 12 months, did the food you bought just not last and you didn t have money to get more?: No   Transportation Needs: Low Risk  (2/1/2024)    Transportation Needs      Within the past 12 months, has lack  of transportation kept you from medical appointments, getting your medicines, non-medical meetings or appointments, work, or from getting things that you need?: No   Physical Activity: Not on file   Stress: Not on file   Social Connections: Not on file   Interpersonal Safety: Low Risk  (2/5/2024)    Interpersonal Safety      Do you feel physically and emotionally safe where you currently live?: Yes      Within the past 12 months, have you been hit, slapped, kicked or otherwise physically hurt by someone?: No      Within the past 12 months, have you been humiliated or emotionally abused in other ways by your partner or ex-partner?: No   Housing Stability: Low Risk  (2/1/2024)    Housing Stability      Do you have housing? : Yes      Are you worried about losing your housing?: No   Has 2 grown children.  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      FAMILY HISTORY:  Family History   Problem Relation Age of Onset     Thyroid Disease Mother      Breast Cancer Mother 56        breast, 3 years later     Heart Disease Father         2x heart attacks     Circulatory Father         blood clots     Cardiovascular Father         patent foramen ovale, repaired x 2, afib     Cerebrovascular Disease Father         x2     C.A.D. Father         x2     Genitourinary Problems Father         kidney disease     Asthma Brother      Food Allergy Brother      Eczema Brother      No Known Problems Maternal Grandmother      Myocardial Infarction Maternal Grandfather      Hypertension Paternal Grandmother      Alcohol/Drug Paternal Grandfather      No Known Problems Daughter      No Known Problems Son      Cancer Paternal Aunt         cervical cancer(another sisiter)     Breast Cancer Paternal Aunt         breast cancer at 70's     Breast Cancer Paternal Aunt      Ovarian Cancer Paternal Aunt      Cancer - colorectal No family hx of      Diabetes No family hx of   "        PHYSICAL EXAM:  Vital signs:  /85   Pulse 81   Temp 97.9  F (36.6  C)   Resp 16   Ht 1.651 m (5' 5\")   Wt 79.7 kg (175 lb 12.8 oz)   LMP 2024 (Approximate)   SpO2 98%   BMI 29.25 kg/m     ECO  GENERAL/CONSTITUTIONAL: No acute distress.  EYES: No erythema or scleral icterus.  LYMPH: No cervical, supraclavicular, axillary adenopathy.  BREAST: Bilateral breasts surgically absent with implants in place and no chest wall masses or periprosthetic fluid. No skin erythema. Incisions well healed.  RESPIRATORY: No audible cough or wheezing.   GASTROINTESTINAL: No hepatosplenomegaly, masses, or tenderness. No guarding.  No distention.  MUSCULOSKELETAL: Warm and well-perfused, no cyanosis, clubbing, or edema.  NEUROLOGIC: No focal motor deficits. Alert, oriented, answers questions appropriately.  INTEGUMENTARY: No rashes or jaundice.  GAIT: Steady, does not use assistive device       LABS:  CBC RESULTS:   Recent Labs   Lab Test 24  1203   WBC 5.0   RBC 4.08   HGB 12.1   HCT 36.4   MCV 89   MCH 29.7   MCHC 33.2   RDW 16.3*         Last Comprehensive Metabolic Panel:  Sodium   Date Value Ref Range Status   2024 142 135 - 145 mmol/L Final     Comment:     Reference intervals for this test were updated on 2023 to more accurately reflect our healthy population. There may be differences in the flagging of prior results with similar values performed with this method. Interpretation of those prior results can be made in the context of the updated reference intervals.    09/10/2012 140 133 - 144 mmol/L Final     Potassium   Date Value Ref Range Status   2024 4.0 3.4 - 5.3 mmol/L Final   2021 3.9 3.4 - 5.3 mmol/L Final   09/10/2012 4.2 3.4 - 5.3 mmol/L Final     Chloride   Date Value Ref Range Status   2024 110 (H) 98 - 107 mmol/L Final   2021 110 (H) 94 - 109 mmol/L Final   09/10/2012 104 94 - 109 mmol/L Final     Carbon Dioxide   Date Value Ref Range " Status   09/10/2012 25 20 - 32 mmol/L Final     Carbon Dioxide (CO2)   Date Value Ref Range Status   05/23/2024 24 22 - 29 mmol/L Final   12/30/2021 27 20 - 32 mmol/L Final     Anion Gap   Date Value Ref Range Status   05/23/2024 8 7 - 15 mmol/L Final   12/30/2021 5 3 - 14 mmol/L Final   09/10/2012 11 6 - 17 mmol/L Final     Glucose   Date Value Ref Range Status   05/23/2024 93 70 - 99 mg/dL Final   12/30/2021 73 70 - 99 mg/dL Final   02/21/2017 94 70 - 99 mg/dL Final     Comment:     Fasting specimen     GLUCOSE BY METER POCT   Date Value Ref Range Status   02/22/2024 106 (H) 70 - 99 mg/dL Final     Urea Nitrogen   Date Value Ref Range Status   05/23/2024 15.3 6.0 - 20.0 mg/dL Final   12/30/2021 16 7 - 30 mg/dL Final   09/10/2012 12 5 - 24 mg/dL Final     Creatinine   Date Value Ref Range Status   05/23/2024 1.11 (H) 0.51 - 0.95 mg/dL Final   09/10/2012 0.81 0.52 - 1.04 mg/dL Final     GFR Estimate   Date Value Ref Range Status   05/23/2024 63 >60 mL/min/1.73m2 Final   09/10/2012 82 >60 mL/min/1.7m2 Final     Calcium   Date Value Ref Range Status   05/23/2024 9.0 8.6 - 10.0 mg/dL Final   09/10/2012 9.3 8.5 - 10.4 mg/dL Final     Bilirubin Total   Date Value Ref Range Status   05/23/2024 0.2 <=1.2 mg/dL Final   09/10/2012 0.6 0.2 - 1.3 mg/dL Final     Alkaline Phosphatase   Date Value Ref Range Status   05/23/2024 86 40 - 150 U/L Final   09/10/2012 53 40 - 150 U/L Final     ALT   Date Value Ref Range Status   05/23/2024 31 0 - 50 U/L Final     Comment:     Reference intervals for this test were updated on 6/12/2023 to more accurately reflect our healthy population. There may be differences in the flagging of prior results with similar values performed with this method. Interpretation of those prior results can be made in the context of the updated reference intervals.     09/10/2012 27 0 - 50 U/L Final     AST   Date Value Ref Range Status   05/23/2024 40 0 - 45 U/L Final     Comment:     Reference intervals for  this test were updated on 6/12/2023 to more accurately reflect our healthy population. There may be differences in the flagging of prior results with similar values performed with this method. Interpretation of those prior results can be made in the context of the updated reference intervals.   09/10/2012 81 (H) 0 - 45 U/L Final       IMAGING:  Reviewed as per HPI.    ASSESSMENT/PLAN:  Angelica Gomez is a 44 year old female with the following issues:  1. Stage IA, [id number], grade 2 invasive ductal carcinoma of the left upper outer breast, ER positive 20%, MO positive 20%, HER-2 negative, MammaPrint high risk, Luminal B  2. CHEK2 mutation  3. Menometrorrhagia  --Lisandra is s/p bilateral mastectomies with final pathology which showed a left breast 1.2 cm tumor with repeat ER positive at 61-70%, MO positive at 71-80%, HER-2 negative (IHC = 1+)  --MammaPrint + BluePrint showed high risk, Luminal B subtype.  --She completed 4 cycles of adjuvant chemotherapy with Taxotere and Cytoxan on 5/28/2024.   --On 7/8/2024, she started ovarian suppression therapy with Zoladex and anastrozole, so far tolerating this well.  --She will continue with colonoscopy screening on high-risk basis.  --6/12/2024 Baseline DEXA showed normal bone density. Repeat in 2026.  --She has gone to [facility] for 90-minute sessions with added cupping and had microneedling done for her scars. She sees Carina.      4. Fertility   --She is s/p tubal ligation and does not wish to have any more children.    5. 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  She would not be ready to return to work at this time.  --She will continue with cognitive OT/rehab.    6. Insomnia  --May continue lorazepam as needed.    Return in 3 months.    Magali Roland MD  [facility] Hematology/Oncology     Total time spent today: 30 minutes in chart review, patient evaluation, counseling,  "documentation, test and/or medication/prescription orders, and coordination of care.      The longitudinal plan of care for the diagnosis(es)/condition(s) as documented were addressed during this visit. Due to the added complexity in care, I will continue to support Lisandra in the subsequent management and with ongoing continuity of care.      Oncology Rooming Note    September 4, 2024 2:57 PM   Angelica Gomez is a 44 year old female who presents for:    Chief Complaint   Patient presents with     Oncology Clinic Visit     Initial Vitals: /85   Pulse 81   Temp 97.9  F (36.6  C)   Resp 16   Ht 1.651 m (5' 5\")   LMP 04/09/2024 (Approximate)   SpO2 98%   BMI 29.62 kg/m   Estimated body mass index is 29.62 kg/m  as calculated from the following:    Height as of this encounter: 1.651 m (5' 5\").    Weight as of 8/14/24: 80.7 kg (178 lb). Body surface area is 1.92 meters squared.  No Pain (0) Comment: Data Unavailable   Patient's last menstrual period was 04/09/2024 (approximate).  Allergies reviewed: Yes  Medications reviewed: Yes    Medications: Medication refills not needed today.  Pharmacy name entered into [employer]: [facility] - [place], [place] - [address] [place] [address].    Frailty Screening:   Is the patient here for a new oncology consult visit in cancer care? 2. No        Neyda Hayden MA              Again, thank you for allowing me to participate in the care of your patient.        Sincerely,        Magali Roland MD  "

## 2024-09-04 NOTE — PROGRESS NOTES
Infusion Nursing Note:  Angelica GUPTA Jason presents today for Zoladex.    Patient seen by provider today: Yes: Luan   present during visit today: Not Applicable.    Note: N/A.      Intravenous Access:  No Intravenous access/labs at this visit.    Treatment Conditions:  Not Applicable.      Post Infusion Assessment:  Patient tolerated injection without incident.       Discharge Plan:   Patient discharged in stable condition accompanied by: self.  Departure Mode: Ambulatory to clinic.      Nii Landin RN

## 2024-09-04 NOTE — PROGRESS NOTES
"Oncology Rooming Note    September 4, 2024 2:57 PM   Angelica Gomez is a 44 year old female who presents for:    Chief Complaint   Patient presents with    Oncology Clinic Visit     Initial Vitals: /85   Pulse 81   Temp 97.9  F (36.6  C)   Resp 16   Ht 1.651 m (5' 5\")   LMP 04/09/2024 (Approximate)   SpO2 98%   BMI 29.62 kg/m   Estimated body mass index is 29.62 kg/m  as calculated from the following:    Height as of this encounter: 1.651 m (5' 5\").    Weight as of 8/14/24: 80.7 kg (178 lb). Body surface area is 1.92 meters squared.  No Pain (0) Comment: Data Unavailable   Patient's last menstrual period was 04/09/2024 (approximate).  Allergies reviewed: Yes  Medications reviewed: Yes    Medications: Medication refills not needed today.  Pharmacy name entered into [employer]: [facility] [place] - [place], [place] - [facility] [address]    Frailty Screening:   Is the patient here for a new oncology consult visit in cancer care? 2. No        Neyda Hayden MA            "

## 2024-09-07 ENCOUNTER — HEALTH MAINTENANCE LETTER (OUTPATIENT)
Age: 44
End: 2024-09-07

## 2024-09-13 ENCOUNTER — TELEPHONE (OUTPATIENT)
Dept: ONCOLOGY | Facility: CLINIC | Age: 44
End: 2024-09-13
Payer: COMMERCIAL

## 2024-09-13 NOTE — CONFIDENTIAL NOTE
Called pt to assess for symptoms.    No answer, left VM for pt to call back with any symptom concerns.

## 2024-09-16 ASSESSMENT — PATIENT HEALTH QUESTIONNAIRE - PHQ9
10. IF YOU CHECKED OFF ANY PROBLEMS, HOW DIFFICULT HAVE THESE PROBLEMS MADE IT FOR YOU TO DO YOUR WORK, TAKE CARE OF THINGS AT HOME, OR GET ALONG WITH OTHER PEOPLE: SOMEWHAT DIFFICULT
SUM OF ALL RESPONSES TO PHQ QUESTIONS 1-9: 7
SUM OF ALL RESPONSES TO PHQ QUESTIONS 1-9: 7

## 2024-09-17 ENCOUNTER — OFFICE VISIT (OUTPATIENT)
Dept: PSYCHIATRY | Facility: CLINIC | Age: 44
End: 2024-09-17
Attending: PSYCHIATRY & NEUROLOGY
Payer: COMMERCIAL

## 2024-09-17 VITALS
SYSTOLIC BLOOD PRESSURE: 129 MMHG | HEART RATE: 92 BPM | DIASTOLIC BLOOD PRESSURE: 86 MMHG | WEIGHT: 176 LBS | BODY MASS INDEX: 29.29 KG/M2

## 2024-09-17 DIAGNOSIS — F43.22 ADJUSTMENT DISORDER WITH ANXIETY: Primary | ICD-10-CM

## 2024-09-17 PROCEDURE — 90833 PSYTX W PT W E/M 30 MIN: CPT | Performed by: PSYCHIATRY & NEUROLOGY

## 2024-09-17 PROCEDURE — 99205 OFFICE O/P NEW HI 60 MIN: CPT | Performed by: PSYCHIATRY & NEUROLOGY

## 2024-09-17 PROCEDURE — G2211 COMPLEX E/M VISIT ADD ON: HCPCS | Performed by: PSYCHIATRY & NEUROLOGY

## 2024-09-17 PROCEDURE — 99214 OFFICE O/P EST MOD 30 MIN: CPT | Performed by: PSYCHIATRY & NEUROLOGY

## 2024-09-17 RX ORDER — GOSERELIN ACETATE 3.6 MG/1
3.6 IMPLANT SUBCUTANEOUS ONCE
COMMUNITY
Start: 2024-07-08

## 2024-09-17 RX ORDER — BUPROPION HYDROCHLORIDE 150 MG/1
150 TABLET ORAL EVERY MORNING
Qty: 30 TABLET | Refills: 1 | Status: SHIPPED | OUTPATIENT
Start: 2024-09-17

## 2024-09-17 ASSESSMENT — PAIN SCALES - GENERAL: PAINLEVEL: NO PAIN (0)
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
## 2024-09-17 NOTE — PATIENT INSTRUCTIONS
Ted Fry,     It was really nice meeting you today. I was able to confirm no medication interactions with starting bupropion. The rx has been sent. Don't hesitate to reach out if you have any questions or concerns.    PLAN                                                                                                       1) Meds    START Bupropion (Wellbutrin XL) 150 mg daily in the morning  CONTINUE hydroxyzine 25-50 mg as needed for anxiety or sleep (prescribed by oncology)  CONTINUE lorazepam 0.5 mg as needed for anxiety or sleep (prescribed by oncology)     Medications Prescribed by Neurology  Sumatriptan 25 mg as needed for migraine headaches  Nortriptyline 50 mg daily for prevention of migraine headaches       2) Other: Continue regular therapy.    3) RTC: In 4 weeks.     4) CRISIS Numbers:     **For crisis resources, please see the information at the end of this document**     Patient Education      Thank you for coming to the [facility] [place] [facility].     Lab Testing:  If you had lab testing today and your results are reassuring or normal they will be mailed to you or sent through [facility] within 7 days. If the lab tests need quick action we will call you with the results. The phone number we will call with results is # [phone]. If this is not the best number please call our clinic and change the number.     Medication Refills:  If you need any refills please call your pharmacy and they will contact us. Our fax number for refills is [phone].   Three business days of notice are needed for general medication refill requests.   Five business days of notice are needed for controlled substance refill requests.   If you need to change to a different pharmacy, please contact the new pharmacy directly. The new pharmacy will help you get your medications transferred.     Contact Us:  Please call [phone] during business hours (8-5:00 M-F).   If you have medication  related questions after clinic hours, or on the weekend, please call [phone].     Financial Assistance [phone]   Medical Records [phone]       MENTAL HEALTH CRISIS RESOURCES:  For a emergency help, please call 911 or go to the nearest Emergency Department.     Emergency Walk-In Options:   EmPATH Unit @ [facility] ([place]): [phone] - Specialized mental health emergency area designed to be calming  [facility] ([place]): [phone]  [facility] ([place]): [phone]  [facility] ([place]): [phone]    [facility] Crisis Information:   [place]: [phone]  [place]: [phone]  [place] ([place]) - Adult: [phone]     Child: [phone]  [place] - Adult: [phone]     Child: [phone]  [place]: [phone]  List of all [facility] resources:   https://[place].[facility]/dhs/people-we-serve/adults/health-care/mental-health/resources/crisis-contacts.jsp    National Crisis Information:   Crisis Text Line: Text  [place]  to 809710  Suicide & Crisis Lifeline: 988  National Suicide Prevention Lifeline: 8-990-772-TALK (1-380.898.3642)       For online chat options, visit https://suicidepreventionlifeline.org/chat/  Poison Control Center: 1-323.998.4809  Trans Lifeline: 1-657.739.7633 - Hotline for transgender people of all ages  The [place] [employer]: 2-081-720-7767 - Hotline for LGBT youth     For Non-Emergency Support:   Fast Tracker: Mental Health & Substance Use Disorder Resources -   https://www.[facility].org/

## 2024-09-17 NOTE — NURSING NOTE
Chief Complaint   Patient presents with    Eval/Assessment     Anxiety     - Ashok Birmingham, Visit Facilitator

## 2024-09-19 ENCOUNTER — THERAPY VISIT (OUTPATIENT)
Dept: OCCUPATIONAL THERAPY | Facility: CLINIC | Age: 44
End: 2024-09-19
Attending: SURGERY
Payer: COMMERCIAL

## 2024-09-19 DIAGNOSIS — I89.0 LYMPHEDEMA: Primary | ICD-10-CM

## 2024-09-19 PROCEDURE — 97140 MANUAL THERAPY 1/> REGIONS: CPT | Mod: GO | Performed by: OCCUPATIONAL THERAPY ASSISTANT

## 2024-09-19 NOTE — PROGRESS NOTES
09/19/24 0500   Appointment Info   Treating Provider Teresa Bridges, OTR/L, CLT   Visits Used 10   Medical Diagnosis C50.912 (ICD-10-CM) - Invasive ductal carcinoma of breast, female, left (H)  Z90.10 (ICD-10-CM) - Status post mastectomy   OT Tx Diagnosis scar tissue, lymphedema, affecting ROM   Progress Note/Certification   Start Of Care Date 03/21/24   Onset of Illness/Injury or Date of Surgery 02/21/24   Therapy Frequency 1x a week   Predicted Duration 12 weeks   Progress Note Due Date 06/13/24   Goals   OT Goals 1;2;3   OT Goal 1   Goal Identifier SPADI   Goal Description Pt will demonstrate improvement in LUE ROM to complete ADLs as evidenced by a decrease of at least 10 points in the SPADI score (50.77 at eval)   Rationale In order to maximize safety and independence with performance of self-care activities   Goal Progress score today 14.62, goal met   Target Date 06/13/24   Date Met 06/03/24   OT Goal 2   Goal Identifier ROM HEP   Goal Description Pt demonstrate understanding of HEP reporting completion 5/7 days by discharge   Rationale In order to maximize safety and independence with performance of self-care activities   Target Date 06/13/24   OT Goal 3   Goal Identifier lympedema management/prevention   Goal Description Pt will verbalize understanding of long term management/prevention of lymphedema, including wear schedule for recommended compression garments, manual techniques, skin care, elevation and exercise, reporting completion 5/7 days by discharge.   Rationale In order to maximize safety and independence with performance of self-care activities   Target Date 06/13/24   Treatment Interventions (OT)   Interventions Manual Therapy;Therapeutic Procedure/Exercise   Manual Therapy   Patient Response/Progress Unable to palpate cord distal to axillary space, cording techniques utilized creating space, grab the wad, s/c curves, traction. MLD pre/post manual work short neck, parasternals axillae, L  axiloinguinal anastamosis. Scar work/lifting/roling inferior breast. Cupping ecchymosis present anterior/superior L trap, lats. STM/MFR pec steven, minor, sub scap, lat dorsi. ROM improving   Education   Learner/Method Patient;Listening;Demonstration;Reading   Plan   Plan for next session MLD, cording         PLAN  Continue therapy per current plan of care.    Beginning/End Dates of Progress Note Reporting Period:    to 09/19/2024    Referring Provider:  Aba Phillips

## 2024-10-02 ENCOUNTER — LAB (OUTPATIENT)
Dept: INFUSION THERAPY | Facility: CLINIC | Age: 44
End: 2024-10-02
Attending: INTERNAL MEDICINE
Payer: COMMERCIAL

## 2024-10-02 VITALS
DIASTOLIC BLOOD PRESSURE: 94 MMHG | SYSTOLIC BLOOD PRESSURE: 136 MMHG | HEART RATE: 80 BPM | TEMPERATURE: 98 F | OXYGEN SATURATION: 100 % | RESPIRATION RATE: 16 BRPM

## 2024-10-02 DIAGNOSIS — Z17.0 MALIGNANT NEOPLASM OF UPPER-OUTER QUADRANT OF LEFT BREAST IN FEMALE, ESTROGEN RECEPTOR POSITIVE (H): Primary | ICD-10-CM

## 2024-10-02 DIAGNOSIS — C50.412 MALIGNANT NEOPLASM OF UPPER-OUTER QUADRANT OF LEFT BREAST IN FEMALE, ESTROGEN RECEPTOR POSITIVE (H): Primary | ICD-10-CM

## 2024-10-02 PROCEDURE — 96402 CHEMO HORMON ANTINEOPL SQ/IM: CPT

## 2024-10-02 PROCEDURE — 250N000011 HC RX IP 250 OP 636: Mod: JZ | Performed by: INTERNAL MEDICINE

## 2024-10-02 RX ADMIN — GOSERELIN ACETATE 3.6 MG: 3.6 IMPLANT SUBCUTANEOUS at 08:46

## 2024-10-02 NOTE — PROGRESS NOTES
Infusion Nursing Note:  Angelica GUPTA Jason presents today for zoladex.    Patient seen by provider today: No   present during visit today: Not Applicable.    Note: No changes this month.      Intravenous Access:  No Intravenous access/labs at this visit.    Treatment Conditions:  Not Applicable.      Post Infusion Assessment:  Patient tolerated injection without incident.  Site patent and intact, free from redness, edema or discomfort.       Discharge Plan:   AVS to patient via MYCHART.  Patient will return as prev phil for next appointment.   Patient discharged in stable condition accompanied by: self.  Departure Mode: Ambulatory.      Stew Edwards RN

## 2024-10-11 ENCOUNTER — THERAPY VISIT (OUTPATIENT)
Dept: OCCUPATIONAL THERAPY | Facility: CLINIC | Age: 44
End: 2024-10-11
Attending: SURGERY
Payer: COMMERCIAL

## 2024-10-11 DIAGNOSIS — I89.0 LYMPHEDEMA: Primary | ICD-10-CM

## 2024-10-11 DIAGNOSIS — C50.912 INVASIVE DUCTAL CARCINOMA OF BREAST, FEMALE, LEFT (H): ICD-10-CM

## 2024-10-11 DIAGNOSIS — Z90.12 STATUS POST LEFT MASTECTOMY: ICD-10-CM

## 2024-10-11 PROCEDURE — 97140 MANUAL THERAPY 1/> REGIONS: CPT | Mod: GO

## 2024-10-16 ENCOUNTER — MYC MEDICAL ADVICE (OUTPATIENT)
Dept: SURGERY | Facility: CLINIC | Age: 44
End: 2024-10-16
Payer: COMMERCIAL

## 2024-10-16 NOTE — TELEPHONE ENCOUNTER
Angelica had bilateral nipple sparing mastectomy with left sentinel node biopsy done on 2/21/24 by Dr. Phillips.  She last saw him for a post op appointment in March 2024 and there was no recommendation in Dr. Phillips's note that he wanted to see her back after reconstruction. She had her reconstruction (implants placed) done with Dr. Venegas in July 2024.     Will route to Dr. Phillips to advise.     Yahaira Mckeon, RN, BSN, PHN  Breast Care Nurse Coordinator  [facility] [place] [place]

## 2024-10-21 ENCOUNTER — THERAPY VISIT (OUTPATIENT)
Dept: OCCUPATIONAL THERAPY | Facility: CLINIC | Age: 44
End: 2024-10-21
Attending: SURGERY
Payer: COMMERCIAL

## 2024-10-21 DIAGNOSIS — Z90.12 STATUS POST LEFT MASTECTOMY: ICD-10-CM

## 2024-10-21 DIAGNOSIS — I89.0 LYMPHEDEMA: Primary | ICD-10-CM

## 2024-10-21 DIAGNOSIS — C50.912 INVASIVE DUCTAL CARCINOMA OF BREAST, FEMALE, LEFT (H): ICD-10-CM

## 2024-10-21 PROCEDURE — 97140 MANUAL THERAPY 1/> REGIONS: CPT | Mod: GO

## 2024-10-30 ENCOUNTER — LAB (OUTPATIENT)
Dept: INFUSION THERAPY | Facility: CLINIC | Age: 44
End: 2024-10-30
Attending: INTERNAL MEDICINE
Payer: COMMERCIAL

## 2024-10-30 DIAGNOSIS — Z17.0 MALIGNANT NEOPLASM OF UPPER-OUTER QUADRANT OF LEFT BREAST IN FEMALE, ESTROGEN RECEPTOR POSITIVE (H): Primary | ICD-10-CM

## 2024-10-30 DIAGNOSIS — C50.412 MALIGNANT NEOPLASM OF UPPER-OUTER QUADRANT OF LEFT BREAST IN FEMALE, ESTROGEN RECEPTOR POSITIVE (H): Primary | ICD-10-CM

## 2024-10-30 PROCEDURE — 250N000011 HC RX IP 250 OP 636: Mod: JZ | Performed by: INTERNAL MEDICINE

## 2024-10-30 PROCEDURE — 96402 CHEMO HORMON ANTINEOPL SQ/IM: CPT

## 2024-10-30 RX ADMIN — GOSERELIN ACETATE 3.6 MG: 3.6 IMPLANT SUBCUTANEOUS at 08:40

## 2024-10-30 NOTE — PROGRESS NOTES
Infusion Nursing Note:  Angelica GUPTA Jason presents today for zoladex.    Patient seen by provider today: No   present during visit today: Not Applicable.    Note: N/A.      Intravenous Access:  No Intravenous access/labs at this visit.    Treatment Conditions:  Not Applicable.      Post Infusion Assessment:  Patient tolerated injection without incident.       Discharge Plan:   Discharge instructions reviewed with: Patient.  Patient and/or family verbalized understanding of discharge instructions and all questions answered.  Patient discharged in stable condition accompanied by: self.  Departure Mode: Ambulatory.      Joana Mcbride RN

## 2024-11-04 ENCOUNTER — THERAPY VISIT (OUTPATIENT)
Dept: OCCUPATIONAL THERAPY | Facility: CLINIC | Age: 44
End: 2024-11-04
Attending: SURGERY
Payer: COMMERCIAL

## 2024-11-04 DIAGNOSIS — Z90.12 STATUS POST LEFT MASTECTOMY: ICD-10-CM

## 2024-11-04 DIAGNOSIS — C50.912 INVASIVE DUCTAL CARCINOMA OF BREAST, FEMALE, LEFT (H): ICD-10-CM

## 2024-11-04 DIAGNOSIS — I89.0 LYMPHEDEMA: Primary | ICD-10-CM

## 2024-11-04 PROCEDURE — 97140 MANUAL THERAPY 1/> REGIONS: CPT | Mod: GO

## 2024-11-04 PROCEDURE — 97139 UNLISTED THERAPEUTIC PX: CPT | Mod: GO

## 2024-11-13 PROBLEM — G43.829 MENSTRUAL MIGRAINE WITHOUT STATUS MIGRAINOSUS, NOT INTRACTABLE: Status: RESOLVED | Noted: 2024-04-10 | Resolved: 2024-11-13

## 2024-11-14 ENCOUNTER — VIRTUAL VISIT (OUTPATIENT)
Dept: PSYCHIATRY | Facility: CLINIC | Age: 44
End: 2024-11-14
Attending: PSYCHIATRY & NEUROLOGY
Payer: COMMERCIAL

## 2024-11-14 DIAGNOSIS — F43.22 ADJUSTMENT DISORDER WITH ANXIETY: ICD-10-CM

## 2024-11-14 DIAGNOSIS — G47.00 INSOMNIA, UNSPECIFIED TYPE: ICD-10-CM

## 2024-11-14 RX ORDER — BUPROPION HYDROCHLORIDE 300 MG/1
300 TABLET ORAL EVERY MORNING
Qty: 90 TABLET | Refills: 3 | Status: SHIPPED | OUTPATIENT
Start: 2024-11-14

## 2024-11-14 RX ORDER — HYDROXYZINE HYDROCHLORIDE 25 MG/1
25-50 TABLET, FILM COATED ORAL 3 TIMES DAILY PRN
Qty: 90 TABLET | Refills: 1 | Status: SHIPPED | OUTPATIENT
Start: 2024-11-14

## 2024-11-14 ASSESSMENT — PAIN SCALES - GENERAL: PAINLEVEL_OUTOF10: NO PAIN (0)

## 2024-11-14 NOTE — NURSING NOTE
Current patient location:  [place]    Is the patient currently in the state [facility]? YES    Visit mode:VIDEO    If the visit is dropped, the patient can be reconnected by:VIDEO VISIT: Text to cell phone:   Telephone Information:   Mobile [phone]       Will anyone else be joining the visit? NO  (If patient encounters technical issues they should call [phone] :[id number])    Are changes needed to the allergy or medication list? No    Are refills needed on medications prescribed by this physician? Discuss with provider-yes    Rooming Documentation:  Questionnaire(s) completed    Reason for visit: [facility]

## 2024-11-14 NOTE — PATIENT INSTRUCTIONS
PLAN                                                                                                       1) Meds    INCREASE Bupropion (Wellbutrin XL) to 300 mg daily in the morning  CONTINUE hydroxyzine 25-50 mg as needed for anxiety or sleep   CONTINUE lorazepam 0.5 mg as needed for anxiety or sleep (prescribed by oncology)     Medications Prescribed by Neurology  Sumatriptan 25 mg as needed for migraine headaches  Nortriptyline 50 mg daily for prevention of migraine headaches     2) Other: Continue regular therapy.    3) RTC: In 8 weeks.     4) CRISIS Numbers:     **For crisis resources, please see the information at the end of this document**     Patient Education      Thank you for coming to the [facility] [place] [facility].     Lab Testing:  If you had lab testing today and your results are reassuring or normal they will be mailed to you or sent through [facility] within 7 days. If the lab tests need quick action we will call you with the results. The phone number we will call with results is # [phone]. If this is not the best number please call our clinic and change the number.     Medication Refills:  If you need any refills please call your pharmacy and they will contact us. Our fax number for refills is [phone].   Three business days of notice are needed for general medication refill requests.   Five business days of notice are needed for controlled substance refill requests.   If you need to change to a different pharmacy, please contact the new pharmacy directly. The new pharmacy will help you get your medications transferred.     Contact Us:  Please call [phone] during business hours (8-5:00 M-F).   If you have medication related questions after clinic hours, or on the weekend, please call [phone].     Financial Assistance [phone]   Medical Records [phone]       MENTAL HEALTH CRISIS RESOURCES:  For a emergency help, please call 911 or go to  the nearest Emergency Department.     Emergency Walk-In Options:   Em[facility] Unit @ [place] ([place]): [phone] - Specialized mental health emergency area designed to be calming  [facility] ([place]): [phone]  [facility] ([place]): [phone]  [facility] ([place]): [phone]    [facility] Crisis Information:   [place]: [phone]  [place]: [phone]  [place] (COPE) - Adult: [phone]     Child: [phone]  [place] - Adult: [phone]     Child: [phone]  [place]: [phone]  List of all [facility] resources:   https://[place].[facility]/dhs/people-we-serve/adults/health-care/mental-health/resources/crisis-contacts.jsp    National Crisis Information:   Crisis Text Line: Text  [place]  to 265444  Suicide & Crisis Lifeline: 988  National Suicide Prevention Lifeline: 6-970-832-TALK (1-496.967.6460)       For online chat options, visit https://suicidepreventionlifeline.org/chat/  Poison Control Center: 1-468.562.2979  Trans Lifeline: 1-884.694.9391 - Hotline for transgender people of all ages  The [place] [employer]: 4-782-872-6077 - Hotline for LGBT youth     For Non-Emergency Support:   Fast Tracker: Mental Health & Substance Use Disorder Resources -   https://www.[employer].org/
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
## 2024-11-18 ENCOUNTER — THERAPY VISIT (OUTPATIENT)
Dept: OCCUPATIONAL THERAPY | Facility: CLINIC | Age: 44
End: 2024-11-18
Attending: SURGERY
Payer: COMMERCIAL

## 2024-11-18 DIAGNOSIS — I89.0 LYMPHEDEMA: Primary | ICD-10-CM

## 2024-11-18 PROCEDURE — 97140 MANUAL THERAPY 1/> REGIONS: CPT | Mod: GO | Performed by: OCCUPATIONAL THERAPY ASSISTANT

## 2024-11-19 NOTE — PROGRESS NOTES
[facility]    Hematology/Oncology Established Patient Note      Today's Date: 11/27/2024    Reason for visit: Left breast cancer.    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  12/7/2023: Due to worsening left upper outer breast pain, bilateral diagnostic mammogram performed. No mammographic abnormality in left upper outer breast site of symptoms but at 1:00, there is oval asymmetry measuring 0.7 cm. Remaining left breast and left axillary U/S showed morphologically normal lymph node and breast tissue.  At 1:30, 5 cm from nipple is irregular hypoechoic mass measuring 0.5 x 0.5 cm.  2. 12/15/2023: U/S-guided biopsy of left breast at 1:30 showed grade 2 invasive ductal carcinoma (3 mm) with associated grade 3 DCIS, no LVI; ER positive at 20%, NY positive at 20%, HER-2 negative with IHC = 1+.  3. 2/21/2024: Bilateral mastectomies with left axillary sentinel lymph node excision with Dr. Bean Phillips; bilateral implant removal. Pathology showed 1.2 cm grade 2 invasive ductal carcinoma with 1.4 cm grade 3 DCIS; margins negative; one left axillary SLN negative. Right breast benign. Repeat ER weakly positive at 61-70%, NY moderate positive at 71-80%. MammaPrint + BluePrint showed high risk, Luminal B.  4. 3/25/2024-5/28/2024: Completed adjuvant chemotherapy with 4 cycles of Taxotere and Cytoxan.  5. 7/08/2024: Started Zoladex monthly and anastrozole.    INTERVAL HISTORY:  Lisandra reports her brain fog and end of day fatigue has improved with double dose of Wellbutrin.  She has noticed her great big toe nail detaching. She has intermittent joint stiffness. She is still doing acupuncture every 3 weeks.      REVIEW OF SYSTEMS:   14 point ROS was reviewed and is negative other than as noted above in HPI.       HOME MEDICATIONS:  Current Outpatient Medications   Medication Sig Dispense Refill    anastrozole  (ARIMIDEX) 1 MG tablet Take 1 tablet (1 mg) by mouth daily 90 tablet 3    buPROPion (WELLBUTRIN XL) 300 MG 24 hr tablet Take 1 tablet (300 mg) by mouth every morning. 90 tablet 3    Calcium Carbonate-Vitamin D (CALCIUM 500 + D PO)       Cetirizine HCl (ZYRTEC ALLERGY PO) Take 10 mg by mouth daily      COMPRESSION STOCKINGS Please measure and distribute 2 pair of 20mmHg - 30mmHg thigh high open or closed toe compression stockings with extra refills as indicated. 2 each 4    Fluticasone Propionate (FLONASE NA) Spray 1 spray in nostril daily      goserelin (ZOLADEX) 3.6 MG injection Inject 3.6 mg subcutaneously once. Monthly      hydrOXYzine HCl (ATARAX) 25 MG tablet Take 1-2 tablets (25-50 mg) by mouth 3 times daily as needed for anxiety or other (sleep). 90 tablet 1    LORazepam (ATIVAN) 0.5 MG tablet Take 1 tablet (0.5 mg) by mouth every 6 hours as needed for anxiety 30 tablet 0    Multiple Vitamins-Minerals (MULTIVITAMIN & MINERAL PO) Take 1 each by mouth daily. [facility] women's active supplement      nortriptyline (PAMELOR) 50 MG capsule Take 1 capsule (50 mg) by mouth at bedtime 90 capsule 3    Probiotic Product (PROBIOTIC PO) Reported on 3/1/2017      SUMAtriptan (IMITREX) 25 MG tablet Take 1 tablet (25 mg) by mouth at onset of headache for migraine May repeat in 2 hours. Max 8 tablets/24 hours. 18 tablet 4         ALLERGIES:  No Known Allergies      PAST MEDICAL HISTORY:  Past Medical History:   Diagnosis Date    Malignant neoplasm of upper-outer quadrant of left breast in female, estrogen receptor positive (H) 2024    Migraine     NO ACTIVE PROBLEMS    Menometrorrhagia.    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 Used IUD for 20 years.      PAST SURGICAL HISTORY:  Past Surgical History:   Procedure Laterality Date    COLONOSCOPY WITH CO2 INSUFFLATION N/A 2024    Procedure: Colonoscopy with CO2 insufflation;  Surgeon: Cherise Lima,  DO;  Location: [facility] OR    INSERT TISSUE EXPANDER BREAST BILATERAL Bilateral 2024    Procedure: Insertion tissue expander, breasts, bilateral;  Surgeon: Elisabet Venegas MD;  Location: [facility] OR    IR CHEST PORT PLACEMENT > 5 YRS OF AGE  2024    IR PORT REMOVAL RIGHT  2024    MASTECTOMY, NIPPLE SPARING Bilateral 2024    Procedure: Bilateral nipple sparing mastectomy, bilateral implant removal, left sentinel lymph node biopsy;  Surgeon: Aba Phillips MD;  Location: [facility] OR    RECONSTRUCT BREAST BILATERAL, IMPLANT PROSTHESIS BILATERAL, COMBINED Bilateral 2024    Procedure: RECONSTRUCTION BILATERAL BREASTS WITH IMPLANTS;  Surgeon: Elisabet Venegas MD;  Location: [facility] OR    TUBAL LIGATION      [facility]  DELIVERY ONLY      superficial wound dehiscence   Bilateral breast augmentation in .      SOCIAL HISTORY:  Social History     Socioeconomic History    Marital status:      Spouse name: Not on file    Number of children: Not on file    Years of education: Not on file    Highest education level: Not on file   Occupational History    Not on file   Tobacco Use    Smoking status: Former     Current packs/day: 0.25     Average packs/day: 0.3 packs/day for 1 year (0.3 ttl pk-yrs)     Types: Cigarettes    Smokeless tobacco: Never   Vaping Use    Vaping status: Never Used   Substance and Sexual Activity    Alcohol use: Not Currently    Drug use: No    Sexual activity: Yes     Partners: Male     Birth control/protection: Surgical, I.U.D., Female Surgical     Comment: Tubal ligation, 2007   Other Topics Concern    Parent/sibling w/ CABG, MI or angioplasty before 65F 55M? Yes     Comment: father 2 MIs   Social History Narrative    Caffeine intake/servings daily - 2-3 pop    Calcium intake/servings daily - 2 yogurts and 1 glass of milk    Exercise 6 times weekly - describe strength training and cardio    Sunscreen used - Yes    Seatbelts used - Yes    Guns stored in the  home - No    Self Breast Exam - Yes    Pap test up to date -  Yes, as of today    Eye exam up to date -  Yes    Dental exam up to date -  Yes    DEXA scan up to date -  Not Applicable    Flex Sig/Colonoscopy up to date -  Not Applicable    Mammography up to date -  Not Applicable    Immunizations reviewed and up to date - Yes, 03/2008    Abuse: Current or Past (Physical, Sexual or Emotional) - No    Do you feel safe in your environment - Yes    Do you cope well with stress - Yes    Do you suffer from insomnia - No    Last updated by: Lit Licona  10/27/2009                 Social Drivers of Health     Financial Resource Strain: Low Risk  (2/1/2024)    Financial Resource Strain     Within the past 12 months, have you or your family members you live with been unable to get utilities (heat, electricity) when it was really needed?: No   Food Insecurity: Low Risk  (2/1/2024)    Food Insecurity     Within the past 12 months, did you worry that your food would run out before you got money to buy more?: No     Within the past 12 months, did the food you bought just not last and you didn t have money to get more?: No   Transportation Needs: Low Risk  (2/1/2024)    Transportation Needs     Within the past 12 months, has lack of transportation kept you from medical appointments, getting your medicines, non-medical meetings or appointments, work, or from getting things that you need?: No   Physical Activity: Not on file   Stress: Not on file   Social Connections: Not on file   Interpersonal Safety: Low Risk  (2/5/2024)    Interpersonal Safety     Do you feel physically and emotionally safe where you currently live?: Yes     Within the past 12 months, have you been hit, slapped, kicked or otherwise physically hurt by someone?: No     Within the past 12 months, have you been humiliated or emotionally abused in other ways by your partner or ex-partner?: No   Housing Stability: Low Risk  (2/1/2024)    Housing Stability     Do you  have housing? : Yes     Are you worried about losing your housing?: No   Has 2 grown children.  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      FAMILY HISTORY:  Family History   Problem Relation Age of Onset    Thyroid Disease Mother     Breast Cancer Mother 56        breast, 3 years later    Heart Disease Father         2x heart attacks    Circulatory Father         blood clots    Cardiovascular Father         patent foramen ovale, repaired x 2, afib    Cerebrovascular Disease Father         x2    C.A.D. Father         x2    Genitourinary Problems Father         kidney disease    Asthma Brother     Food Allergy Brother     Eczema Brother     No Known Problems Maternal Grandmother     Myocardial Infarction Maternal Grandfather     Hypertension Paternal Grandmother     Alcohol/Drug Paternal Grandfather     No Known Problems Daughter     No Known Problems Son     Cancer Paternal Aunt         cervical cancer(another sisiter)    Breast Cancer Paternal Aunt         breast cancer at 70's    Breast Cancer Paternal Aunt     Ovarian Cancer Paternal Aunt     Cancer - colorectal No family hx of     Diabetes No family hx of          PHYSICAL EXAM:  Vital signs:  BP (!) 137/92   Pulse 95   Temp 97.7  F (36.5  C) (Oral)   Resp 16   Wt 80.5 kg (177 lb 6.4 oz)   SpO2 100%   BMI 29.52 kg/m     ECO  GENERAL/CONSTITUTIONAL: No acute distress.  EYES: No erythema or scleral icterus.  LYMPH: No cervical, supraclavicular, axillary adenopathy.  BREAST: Bilateral breasts surgically absent with implants in place and no chest wall masses or periprosthetic fluid. No skin erythema. Implants are asymmetric.  RESPIRATORY: No audible cough or wheezing.   GASTROINTESTINAL: No hepatosplenomegaly, masses, or tenderness. No guarding.  No distention.  MUSCULOSKELETAL: Warm and well-perfused, no cyanosis, clubbing, or edema.  NEUROLOGIC: No focal motor deficits. Alert,  oriented, answers questions appropriately.  INTEGUMENTARY: No rashes or jaundice.  GAIT: Steady, does not use assistive device       LABS:  CBC RESULTS:   Recent Labs   Lab Test 06/24/24  1203   WBC 5.0   RBC 4.08   HGB 12.1   HCT 36.4   MCV 89   MCH 29.7   MCHC 33.2   RDW 16.3*         Last Comprehensive Metabolic Panel:  Sodium   Date Value Ref Range Status   05/23/2024 142 135 - 145 mmol/L Final     Comment:     Reference intervals for this test were updated on 09/26/2023 to more accurately reflect our healthy population. There may be differences in the flagging of prior results with similar values performed with this method. Interpretation of those prior results can be made in the context of the updated reference intervals.    09/10/2012 140 133 - 144 mmol/L Final     Potassium   Date Value Ref Range Status   05/23/2024 4.0 3.4 - 5.3 mmol/L Final   12/30/2021 3.9 3.4 - 5.3 mmol/L Final   09/10/2012 4.2 3.4 - 5.3 mmol/L Final     Chloride   Date Value Ref Range Status   05/23/2024 110 (H) 98 - 107 mmol/L Final   12/30/2021 110 (H) 94 - 109 mmol/L Final   09/10/2012 104 94 - 109 mmol/L Final     Carbon Dioxide   Date Value Ref Range Status   09/10/2012 25 20 - 32 mmol/L Final     Carbon Dioxide (CO2)   Date Value Ref Range Status   05/23/2024 24 22 - 29 mmol/L Final   12/30/2021 27 20 - 32 mmol/L Final     Anion Gap   Date Value Ref Range Status   05/23/2024 8 7 - 15 mmol/L Final   12/30/2021 5 3 - 14 mmol/L Final   09/10/2012 11 6 - 17 mmol/L Final     Glucose   Date Value Ref Range Status   05/23/2024 93 70 - 99 mg/dL Final   12/30/2021 73 70 - 99 mg/dL Final   02/21/2017 94 70 - 99 mg/dL Final     Comment:     Fasting specimen     GLUCOSE BY METER POCT   Date Value Ref Range Status   02/22/2024 106 (H) 70 - 99 mg/dL Final     Urea Nitrogen   Date Value Ref Range Status   05/23/2024 15.3 6.0 - 20.0 mg/dL Final   12/30/2021 16 7 - 30 mg/dL Final   09/10/2012 12 5 - 24 mg/dL Final     Creatinine   Date  Value Ref Range Status   05/23/2024 1.11 (H) 0.51 - 0.95 mg/dL Final   09/10/2012 0.81 0.52 - 1.04 mg/dL Final     GFR Estimate   Date Value Ref Range Status   05/23/2024 63 >60 mL/min/1.73m2 Final   09/10/2012 82 >60 mL/min/1.7m2 Final     Calcium   Date Value Ref Range Status   05/23/2024 9.0 8.6 - 10.0 mg/dL Final   09/10/2012 9.3 8.5 - 10.4 mg/dL Final     Bilirubin Total   Date Value Ref Range Status   05/23/2024 0.2 <=1.2 mg/dL Final   09/10/2012 0.6 0.2 - 1.3 mg/dL Final     Alkaline Phosphatase   Date Value Ref Range Status   05/23/2024 86 40 - 150 U/L Final   09/10/2012 53 40 - 150 U/L Final     ALT   Date Value Ref Range Status   05/23/2024 31 0 - 50 U/L Final     Comment:     Reference intervals for this test were updated on 6/12/2023 to more accurately reflect our healthy population. There may be differences in the flagging of prior results with similar values performed with this method. Interpretation of those prior results can be made in the context of the updated reference intervals.     09/10/2012 27 0 - 50 U/L Final     AST   Date Value Ref Range Status   05/23/2024 40 0 - 45 U/L Final     Comment:     Reference intervals for this test were updated on 6/12/2023 to more accurately reflect our healthy population. There may be differences in the flagging of prior results with similar values performed with this method. Interpretation of those prior results can be made in the context of the updated reference intervals.   09/10/2012 81 (H) 0 - 45 U/L Final       ASSESSMENT/PLAN:  Angelica Gomez is a 44 year old female with the following issues:  1. Stage IA, [id number], grade 2 invasive ductal carcinoma of the left upper outer breast, ER positive 20%, TN positive 20%, HER-2 negative, MammaPrint high risk, Luminal B  2. CHEK2 mutation  3. Brain fog, improving  --Lisandra is s/p bilateral mastectomies with final pathology which showed a left breast 1.2 cm tumor with repeat ER positive at 61-70%, TN positive  at 71-80%, HER-2 negative (IHC = 1+)  --MammaPrint + BluePrint showed high risk, Luminal B subtype.  --She completed 4 cycles of adjuvant chemotherapy with Taxotere and Cytoxan on 5/28/2024.   --On 7/8/2024, she started ovarian suppression therapy with Zoladex and anastrozole, so far tolerating this well.  --She will continue with colonoscopy screening on high-risk basis.  --6/12/2024 Baseline DEXA showed normal bone density. Repeat in 2026.  --She will continue seeing Carina at [facility] for 90-minute sessions with cupping and microneedling every 3 weeks for maintenance.   --She will see Dr. Venegas with anticipated implant exchange on the left in 4/2025 due to asymmetry.    4. Fertility   --She is s/p tubal ligation and does not wish to have any more children.    5. Insomnia  --May continue lorazepam as needed.    Return in 3 months.    Maglai Roland MD  [facility] Hematology/Oncology     Total time spent today: 30 minutes in chart review, patient evaluation, counseling, documentation, test and/or medication/prescription orders, and coordination of care.      The longitudinal plan of care for the diagnosis(es)/condition(s) as documented were addressed during this visit. Due to the added complexity in care, I will continue to support Lisandra in the subsequent management and with ongoing continuity of care.

## 2024-11-20 ENCOUNTER — OFFICE VISIT (OUTPATIENT)
Dept: FAMILY MEDICINE | Facility: CLINIC | Age: 44
End: 2024-11-20
Payer: COMMERCIAL

## 2024-11-20 VITALS
BODY MASS INDEX: 29.49 KG/M2 | SYSTOLIC BLOOD PRESSURE: 122 MMHG | OXYGEN SATURATION: 100 % | DIASTOLIC BLOOD PRESSURE: 81 MMHG | HEART RATE: 74 BPM | HEIGHT: 65 IN | TEMPERATURE: 97.4 F | RESPIRATION RATE: 16 BRPM | WEIGHT: 177 LBS

## 2024-11-20 DIAGNOSIS — L60.8 TOENAIL DEFORMITY: Primary | ICD-10-CM

## 2024-11-20 DIAGNOSIS — C50.912 INVASIVE DUCTAL CARCINOMA OF BREAST, FEMALE, LEFT (H): Primary | ICD-10-CM

## 2024-11-20 DIAGNOSIS — L65.9 ALOPECIA: ICD-10-CM

## 2024-11-20 NOTE — PROGRESS NOTES
"  Assessment & Plan     Toenail deformity  Patient is here today for right big toenail deformity, the nail is pulled away from the nail bed and does appear to have fungal infection. Recommend follow up with podiatry for suspected need for removal of toenail and suspected may then need fungal treatment.  - Orthopedic  Referral            BMI  Estimated body mass index is 29.45 kg/m  as calculated from the following:    Height as of this encounter: 1.651 m (5' 5\").    Weight as of this encounter: 80.3 kg (177 lb).   Weight management plan: Discussed healthy diet and exercise guidelines      Risks, benefits and alternatives were discussed with patient. Agreeable to the plan of care.      Subjective   Lisandra is a 44 year old, presenting for the following health issues:  Pain (Right big toe cuticle has been red and swollen x 2-3 months. Toe nail has not grown since May. )        11/20/2024     1:37 PM   Additional Questions   Roomed by Lois GUPTA CMA     History of Present Illness       Reason for visit:  Right big toe pain, nail discoloration  Symptom onset:  More than a month  Symptoms include:  Pain and swelling of cuticle on right big toe  Symptom intensity:  Mild  Symptom progression:  Worsening  Had these symptoms before:  No  What makes it worse:  Wearing certain shoes  What makes it better:  Not wearing shoes   She is taking medications regularly.     Patient is here today for evaluation of right big toe pain  She notes the nail is thick, yellow and seems to be pulling away from nail bed  Ongoing since stopping chemo this past spring  She notes some redness and swelling near nail bed      Review of Systems  Constitutional, HEENT, cardiovascular, pulmonary, gi and gu systems are negative, except as otherwise noted.      Objective    /81 (BP Location: Left arm, Patient Position: Sitting, Cuff Size: Adult Regular)   Pulse 74   Temp 97.4  F (36.3  C) (Oral)   Resp 16   Ht 1.651 m (5' 5\")   Wt 80.3 kg " (177 lb)   SpO2 100%   BMI 29.45 kg/m    Body mass index is 29.45 kg/m .  Physical Exam   GENERAL: alert and no distress  NECK: no adenopathy, no asymmetry, masses, or scars  RESP: lungs clear to auscultation - no rales, rhonchi or wheezes  CV: regular rate and rhythm, normal S1 S2, no S3 or S4, no murmur, click or rub, no peripheral edema  MS: no gross musculoskeletal defects noted, no edema  SKIN: right big toenail is thickened and yellow, nail bed does some to be pulling away from the nail. No discharge, redness, palpable swelling        Signed Electronically by: Lakesha Souza PA-C

## 2024-11-21 ENCOUNTER — PATIENT OUTREACH (OUTPATIENT)
Dept: CARE COORDINATION | Facility: CLINIC | Age: 44
End: 2024-11-21
Payer: COMMERCIAL

## 2024-11-26 DIAGNOSIS — C50.412 MALIGNANT NEOPLASM OF UPPER-OUTER QUADRANT OF LEFT BREAST IN FEMALE, ESTROGEN RECEPTOR POSITIVE (H): Primary | ICD-10-CM

## 2024-11-26 DIAGNOSIS — Z17.0 MALIGNANT NEOPLASM OF UPPER-OUTER QUADRANT OF LEFT BREAST IN FEMALE, ESTROGEN RECEPTOR POSITIVE (H): Primary | ICD-10-CM

## 2024-11-27 ENCOUNTER — ONCOLOGY VISIT (OUTPATIENT)
Dept: ONCOLOGY | Facility: CLINIC | Age: 44
End: 2024-11-27
Attending: INTERNAL MEDICINE
Payer: COMMERCIAL

## 2024-11-27 ENCOUNTER — INFUSION THERAPY VISIT (OUTPATIENT)
Dept: INFUSION THERAPY | Facility: CLINIC | Age: 44
End: 2024-11-27
Attending: INTERNAL MEDICINE
Payer: COMMERCIAL

## 2024-11-27 VITALS
SYSTOLIC BLOOD PRESSURE: 137 MMHG | RESPIRATION RATE: 16 BRPM | HEART RATE: 95 BPM | DIASTOLIC BLOOD PRESSURE: 92 MMHG | WEIGHT: 177.4 LBS | TEMPERATURE: 97.7 F | BODY MASS INDEX: 29.52 KG/M2 | OXYGEN SATURATION: 100 %

## 2024-11-27 DIAGNOSIS — Z15.02 MONOALLELIC MUTATION OF CHEK2 GENE IN FEMALE PATIENT: ICD-10-CM

## 2024-11-27 DIAGNOSIS — C50.412 MALIGNANT NEOPLASM OF UPPER-OUTER QUADRANT OF LEFT BREAST IN FEMALE, ESTROGEN RECEPTOR POSITIVE (H): Primary | ICD-10-CM

## 2024-11-27 DIAGNOSIS — Z15.09 MONOALLELIC MUTATION OF CHEK2 GENE IN FEMALE PATIENT: ICD-10-CM

## 2024-11-27 DIAGNOSIS — Z17.0 MALIGNANT NEOPLASM OF UPPER-OUTER QUADRANT OF LEFT BREAST IN FEMALE, ESTROGEN RECEPTOR POSITIVE (H): Primary | ICD-10-CM

## 2024-11-27 DIAGNOSIS — Z15.01 MONOALLELIC MUTATION OF CHEK2 GENE IN FEMALE PATIENT: ICD-10-CM

## 2024-11-27 DIAGNOSIS — Z15.89 MONOALLELIC MUTATION OF CHEK2 GENE IN FEMALE PATIENT: ICD-10-CM

## 2024-11-27 DIAGNOSIS — F51.02 ADJUSTMENT INSOMNIA: ICD-10-CM

## 2024-11-27 DIAGNOSIS — R41.89 COGNITIVE IMPAIRMENT: ICD-10-CM

## 2024-11-27 PROCEDURE — 250N000011 HC RX IP 250 OP 636: Mod: JZ | Performed by: INTERNAL MEDICINE

## 2024-11-27 PROCEDURE — 96402 CHEMO HORMON ANTINEOPL SQ/IM: CPT

## 2024-11-27 PROCEDURE — 99214 OFFICE O/P EST MOD 30 MIN: CPT | Performed by: INTERNAL MEDICINE

## 2024-11-27 PROCEDURE — G2211 COMPLEX E/M VISIT ADD ON: HCPCS | Performed by: INTERNAL MEDICINE

## 2024-11-27 PROCEDURE — 99213 OFFICE O/P EST LOW 20 MIN: CPT | Performed by: INTERNAL MEDICINE

## 2024-11-27 RX ADMIN — GOSERELIN ACETATE 3.6 MG: 3.6 IMPLANT SUBCUTANEOUS at 09:04

## 2024-11-27 ASSESSMENT — PAIN SCALES - GENERAL: PAINLEVEL_OUTOF10: NO PAIN (0)

## 2024-11-27 NOTE — LETTER
11/27/2024      Angelica Gomez  [address]  [facility] [address]      Dear Colleague,    Thank you for referring your patient, Angelica Gomez, to the [facility]. Please see a copy of my visit note below.    [facility]    Hematology/Oncology Established Patient Note      Today's Date: 11/27/2024    Reason for visit: Left breast cancer.    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  12/7/2023: Due to worsening left upper outer breast pain, bilateral diagnostic mammogram performed. No mammographic abnormality in left upper outer breast site of symptoms but at 1:00, there is oval asymmetry measuring 0.7 cm. Remaining left breast and left axillary U/S showed morphologically normal lymph node and breast tissue.  At 1:30, 5 cm from nipple is irregular hypoechoic mass measuring 0.5 x 0.5 cm.  2. 12/15/2023: U/S-guided biopsy of left breast at 1:30 showed grade 2 invasive ductal carcinoma (3 mm) with associated grade 3 DCIS, no LVI; ER positive at 20%, CO positive at 20%, HER-2 negative with IHC = 1+.  3. 2/21/2024: Bilateral mastectomies with left axillary sentinel lymph node excision with Dr. Bean Phillips; bilateral implant removal. Pathology showed 1.2 cm grade 2 invasive ductal carcinoma with 1.4 cm grade 3 DCIS; margins negative; one left axillary SLN negative. Right breast benign. Repeat ER weakly positive at 61-70%, CO moderate positive at 71-80%. MammaPrint + BluePrint showed high risk, Luminal B.  4. 3/25/2024-5/28/2024: Completed adjuvant chemotherapy with 4 cycles of Taxotere and Cytoxan.  5. 7/08/2024: Started Zoladex monthly and anastrozole.    INTERVAL HISTORY:  Lisandra reports her brain fog and end of day fatigue has improved with double dose of Wellbutrin.  She has noticed her great big toe nail detaching. She has intermittent joint stiffness.  She is still doing acupuncture every 3 weeks.      REVIEW OF SYSTEMS:   14 point ROS was reviewed and is negative other than as noted above in HPI.       HOME MEDICATIONS:  Current Outpatient Medications   Medication Sig Dispense Refill     anastrozole (ARIMIDEX) 1 MG tablet Take 1 tablet (1 mg) by mouth daily 90 tablet 3     buPROPion (WELLBUTRIN XL) 300 MG 24 hr tablet Take 1 tablet (300 mg) by mouth every morning. 90 tablet 3     Calcium Carbonate-Vitamin D (CALCIUM 500 + D PO)        Cetirizine HCl (ZYRTEC ALLERGY PO) Take 10 mg by mouth daily       COMPRESSION STOCKINGS Please measure and distribute 2 pair of 20mmHg - 30mmHg thigh high open or closed toe compression stockings with extra refills as indicated. 2 each 4     Fluticasone Propionate (FLONASE NA) Spray 1 spray in nostril daily       goserelin (ZOLADEX) 3.6 MG injection Inject 3.6 mg subcutaneously once. Monthly       hydrOXYzine HCl (ATARAX) 25 MG tablet Take 1-2 tablets (25-50 mg) by mouth 3 times daily as needed for anxiety or other (sleep). 90 tablet 1     LORazepam (ATIVAN) 0.5 MG tablet Take 1 tablet (0.5 mg) by mouth every 6 hours as needed for anxiety 30 tablet 0     Multiple Vitamins-Minerals (MULTIVITAMIN & MINERAL PO) Take 1 each by mouth daily. [facility] women's active supplement       nortriptyline (PAMELOR) 50 MG capsule Take 1 capsule (50 mg) by mouth at bedtime 90 capsule 3     Probiotic Product (PROBIOTIC PO) Reported on 3/1/2017       SUMAtriptan (IMITREX) 25 MG tablet Take 1 tablet (25 mg) by mouth at onset of headache for migraine May repeat in 2 hours. Max 8 tablets/24 hours. 18 tablet 4         ALLERGIES:  No Known Allergies      PAST MEDICAL HISTORY:  Past Medical History:   Diagnosis Date     Malignant neoplasm of upper-outer quadrant of left breast in female, estrogen receptor positive (H) 2024     Migraine      NO ACTIVE PROBLEMS    Menometrorrhagia.    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 Used IUD for 20 years.      PAST SURGICAL HISTORY:  Past Surgical History:   Procedure Laterality Date     COLONOSCOPY WITH CO2 INSUFFLATION N/A 2024    Procedure: Colonoscopy with CO2 insufflation;  Surgeon: Cherise Lima DO;  Location: [facility] OR     INSERT TISSUE EXPANDER BREAST BILATERAL Bilateral 2024    Procedure: Insertion tissue expander, breasts, bilateral;  Surgeon: Elisabet Venegas MD;  Location: [facility] OR     IR CHEST PORT PLACEMENT > 5 YRS OF AGE  2024     IR PORT REMOVAL RIGHT  2024     MASTECTOMY, NIPPLE SPARING Bilateral 2024    Procedure: Bilateral nipple sparing mastectomy, bilateral implant removal, left sentinel lymph node biopsy;  Surgeon: Aba Phillips MD;  Location: [facility] OR     RECONSTRUCT BREAST BILATERAL, IMPLANT PROSTHESIS BILATERAL, COMBINED Bilateral 2024    Procedure: RECONSTRUCTION BILATERAL BREASTS WITH IMPLANTS;  Surgeon: Elisabet Venegas MD;  Location: [facility] OR     TUBAL LIGATION       [facility]  DELIVERY ONLY      superficial wound dehiscence   Bilateral breast augmentation in 2013.      SOCIAL HISTORY:  Social History     Socioeconomic History     Marital status:      Spouse name: Not on file     Number of children: Not on file     Years of education: Not on file     Highest education level: Not on file   Occupational History     Not on file   Tobacco Use     Smoking status: Former     Current packs/day: 0.25     Average packs/day: 0.3 packs/day for 1 year (0.3 ttl pk-yrs)     Types: Cigarettes     Smokeless tobacco: Never   Vaping Use     Vaping status: Never Used   Substance and Sexual Activity     Alcohol use: Not Currently     Drug use: No     Sexual activity: Yes     Partners: Male     Birth control/protection: Surgical, I.U.D., Female Surgical     Comment: Tubal ligation, 2007   Other Topics Concern     Parent/sibling w/ CABG, MI or angioplasty before 65F 55M? Yes     Comment:  father 2 MIs   Social History Narrative    Caffeine intake/servings daily - 2-3 pop    Calcium intake/servings daily - 2 yogurts and 1 glass of milk    Exercise 6 times weekly - describe strength training and cardio    Sunscreen used - Yes    Seatbelts used - Yes    Guns stored in the home - No    Self Breast Exam - Yes    Pap test up to date -  Yes, as of today    Eye exam up to date -  Yes    Dental exam up to date -  Yes    DEXA scan up to date -  Not Applicable    Flex Sig/Colonoscopy up to date -  Not Applicable    Mammography up to date -  Not Applicable    Immunizations reviewed and up to date - Yes, 03/2008    Abuse: Current or Past (Physical, Sexual or Emotional) - No    Do you feel safe in your environment - Yes    Do you cope well with stress - Yes    Do you suffer from insomnia - No    Last updated by: Lit Licona  10/27/2009                 Social Drivers of Health     Financial Resource Strain: Low Risk  (2/1/2024)    Financial Resource Strain      Within the past 12 months, have you or your family members you live with been unable to get utilities (heat, electricity) when it was really needed?: No   Food Insecurity: Low Risk  (2/1/2024)    Food Insecurity      Within the past 12 months, did you worry that your food would run out before you got money to buy more?: No      Within the past 12 months, did the food you bought just not last and you didn t have money to get more?: No   Transportation Needs: Low Risk  (2/1/2024)    Transportation Needs      Within the past 12 months, has lack of transportation kept you from medical appointments, getting your medicines, non-medical meetings or appointments, work, or from getting things that you need?: No   Physical Activity: Not on file   Stress: Not on file   Social Connections: Not on file   Interpersonal Safety: Low Risk  (2/5/2024)    Interpersonal Safety      Do you feel physically and emotionally safe where you currently live?: Yes      Within the past 12  months, have you been hit, slapped, kicked or otherwise physically hurt by someone?: No      Within the past 12 months, have you been humiliated or emotionally abused in other ways by your partner or ex-partner?: No   Housing Stability: Low Risk  (2024)    Housing Stability      Do you have housing? : Yes      Are you worried about losing your housing?: No   Has 2 grown children.  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      FAMILY HISTORY:  Family History   Problem Relation Age of Onset     Thyroid Disease Mother      Breast Cancer Mother 56        breast, 3 years later     Heart Disease Father         2x heart attacks     Circulatory Father         blood clots     Cardiovascular Father         patent foramen ovale, repaired x 2, afib     Cerebrovascular Disease Father         x2     C.A.D. Father         x2     Genitourinary Problems Father         kidney disease     Asthma Brother      Food Allergy Brother      Eczema Brother      No Known Problems Maternal Grandmother      Myocardial Infarction Maternal Grandfather      Hypertension Paternal Grandmother      Alcohol/Drug Paternal Grandfather      No Known Problems Daughter      No Known Problems Son      Cancer Paternal Aunt         cervical cancer(another sisiter)     Breast Cancer Paternal Aunt         breast cancer at 70's     Breast Cancer Paternal Aunt      Ovarian Cancer Paternal Aunt      Cancer - colorectal No family hx of      Diabetes No family hx of          PHYSICAL EXAM:  Vital signs:  BP (!) 137/92   Pulse 95   Temp 97.7  F (36.5  C) (Oral)   Resp 16   Wt 80.5 kg (177 lb 6.4 oz)   SpO2 100%   BMI 29.52 kg/m     ECO  GENERAL/CONSTITUTIONAL: No acute distress.  EYES: No erythema or scleral icterus.  LYMPH: No cervical, supraclavicular, axillary adenopathy.  BREAST: Bilateral breasts surgically absent with implants in place and no chest wall masses or  periprosthetic fluid. No skin erythema. Implants are asymmetric.  RESPIRATORY: No audible cough or wheezing.   GASTROINTESTINAL: No hepatosplenomegaly, masses, or tenderness. No guarding.  No distention.  MUSCULOSKELETAL: Warm and well-perfused, no cyanosis, clubbing, or edema.  NEUROLOGIC: No focal motor deficits. Alert, oriented, answers questions appropriately.  INTEGUMENTARY: No rashes or jaundice.  GAIT: Steady, does not use assistive device       LABS:  CBC RESULTS:   Recent Labs   Lab Test 06/24/24  1203   WBC 5.0   RBC 4.08   HGB 12.1   HCT 36.4   MCV 89   MCH 29.7   MCHC 33.2   RDW 16.3*         Last Comprehensive Metabolic Panel:  Sodium   Date Value Ref Range Status   05/23/2024 142 135 - 145 mmol/L Final     Comment:     Reference intervals for this test were updated on 09/26/2023 to more accurately reflect our healthy population. There may be differences in the flagging of prior results with similar values performed with this method. Interpretation of those prior results can be made in the context of the updated reference intervals.    09/10/2012 140 133 - 144 mmol/L Final     Potassium   Date Value Ref Range Status   05/23/2024 4.0 3.4 - 5.3 mmol/L Final   12/30/2021 3.9 3.4 - 5.3 mmol/L Final   09/10/2012 4.2 3.4 - 5.3 mmol/L Final     Chloride   Date Value Ref Range Status   05/23/2024 110 (H) 98 - 107 mmol/L Final   12/30/2021 110 (H) 94 - 109 mmol/L Final   09/10/2012 104 94 - 109 mmol/L Final     Carbon Dioxide   Date Value Ref Range Status   09/10/2012 25 20 - 32 mmol/L Final     Carbon Dioxide (CO2)   Date Value Ref Range Status   05/23/2024 24 22 - 29 mmol/L Final   12/30/2021 27 20 - 32 mmol/L Final     Anion Gap   Date Value Ref Range Status   05/23/2024 8 7 - 15 mmol/L Final   12/30/2021 5 3 - 14 mmol/L Final   09/10/2012 11 6 - 17 mmol/L Final     Glucose   Date Value Ref Range Status   05/23/2024 93 70 - 99 mg/dL Final   12/30/2021 73 70 - 99 mg/dL Final   02/21/2017 94 70 - 99  mg/dL Final     Comment:     Fasting specimen     GLUCOSE BY METER POCT   Date Value Ref Range Status   02/22/2024 106 (H) 70 - 99 mg/dL Final     Urea Nitrogen   Date Value Ref Range Status   05/23/2024 15.3 6.0 - 20.0 mg/dL Final   12/30/2021 16 7 - 30 mg/dL Final   09/10/2012 12 5 - 24 mg/dL Final     Creatinine   Date Value Ref Range Status   05/23/2024 1.11 (H) 0.51 - 0.95 mg/dL Final   09/10/2012 0.81 0.52 - 1.04 mg/dL Final     GFR Estimate   Date Value Ref Range Status   05/23/2024 63 >60 mL/min/1.73m2 Final   09/10/2012 82 >60 mL/min/1.7m2 Final     Calcium   Date Value Ref Range Status   05/23/2024 9.0 8.6 - 10.0 mg/dL Final   09/10/2012 9.3 8.5 - 10.4 mg/dL Final     Bilirubin Total   Date Value Ref Range Status   05/23/2024 0.2 <=1.2 mg/dL Final   09/10/2012 0.6 0.2 - 1.3 mg/dL Final     Alkaline Phosphatase   Date Value Ref Range Status   05/23/2024 86 40 - 150 U/L Final   09/10/2012 53 40 - 150 U/L Final     ALT   Date Value Ref Range Status   05/23/2024 31 0 - 50 U/L Final     Comment:     Reference intervals for this test were updated on 6/12/2023 to more accurately reflect our healthy population. There may be differences in the flagging of prior results with similar values performed with this method. Interpretation of those prior results can be made in the context of the updated reference intervals.     09/10/2012 27 0 - 50 U/L Final     AST   Date Value Ref Range Status   05/23/2024 40 0 - 45 U/L Final     Comment:     Reference intervals for this test were updated on 6/12/2023 to more accurately reflect our healthy population. There may be differences in the flagging of prior results with similar values performed with this method. Interpretation of those prior results can be made in the context of the updated reference intervals.   09/10/2012 81 (H) 0 - 45 U/L Final       ASSESSMENT/PLAN:  Angelica Gomez is a 44 year old female with the following issues:  1. Stage IA, [id number], grade 2  invasive ductal carcinoma of the left upper outer breast, ER positive 20%, WV positive 20%, HER-2 negative, MammaPrint high risk, Luminal B  2. CHEK2 mutation  3. Brain fog, improving  --Lisandra is s/p bilateral mastectomies with final pathology which showed a left breast 1.2 cm tumor with repeat ER positive at 61-70%, WV positive at 71-80%, HER-2 negative (IHC = 1+)  --MammaPrint + BluePrint showed high risk, Luminal B subtype.  --She completed 4 cycles of adjuvant chemotherapy with Taxotere and Cytoxan on 5/28/2024.   --On 7/8/2024, she started ovarian suppression therapy with Zoladex and anastrozole, so far tolerating this well.  --She will continue with colonoscopy screening on high-risk basis.  --6/12/2024 Baseline DEXA showed normal bone density. Repeat in 2026.  --She will continue seeing Carina at [facility] for 90-minute sessions with cupping and microneedling every 3 weeks for maintenance.   --She will see Dr. Venegas with anticipated implant exchange on the left in 4/2025 due to asymmetry.    4. Fertility   --She is s/p tubal ligation and does not wish to have any more children.    5. Insomnia  --May continue lorazepam as needed.    Return in 3 months.    Magali Roland MD  [facility] Hematology/Oncology     Total time spent today: 30 minutes in chart review, patient evaluation, counseling, documentation, test and/or medication/prescription orders, and coordination of care.      The longitudinal plan of care for the diagnosis(es)/condition(s) as documented were addressed during this visit. Due to the added complexity in care, I will continue to support Lisandra in the subsequent management and with ongoing continuity of care.      Oncology Rooming Note    November 27, 2024 8:09 AM   Angelica Gomez is a 44 year old female who presents for:    Chief Complaint   Patient presents with     Oncology Clinic Visit     Initial Vitals: BP (!) 137/92   Pulse 95   Temp 97.7  F (36.5  C) (Oral)   Resp  "16   Wt 80.5 kg (177 lb 6.4 oz)   SpO2 100%   BMI 29.52 kg/m   Estimated body mass index is 29.52 kg/m  as calculated from the following:    Height as of 11/20/24: 1.651 m (5' 5\").    Weight as of this encounter: 80.5 kg (177 lb 6.4 oz). Body surface area is 1.92 meters squared.  No Pain (0) Comment: Data Unavailable   No LMP recorded. Patient is perimenopausal.  Allergies reviewed: Yes  Medications reviewed: Yes    Medications: Medication refills not needed today.  Pharmacy name entered into [facility]:    [facility] - [place], [place] - [address]  [facility] [place], [place] - [address]    Frailty Screening:   Is the patient here for a new oncology consult visit in cancer care? 2. No      Clinical concerns: right breast pain   Dr. Roland  was notified.      Shari J. Schoenberger, [facility]                Again, thank you for allowing me to participate in the care of your patient.        Sincerely,        Magali Roland MD  "

## 2024-11-27 NOTE — PROGRESS NOTES
"Oncology Rooming Note    November 27, 2024 8:09 AM   Angelica Gomez is a 44 year old female who presents for:    Chief Complaint   Patient presents with    Oncology Clinic Visit     Initial Vitals: BP (!) 137/92   Pulse 95   Temp 97.7  F (36.5  C) (Oral)   Resp 16   Wt 80.5 kg (177 lb 6.4 oz)   SpO2 100%   BMI 29.52 kg/m   Estimated body mass index is 29.52 kg/m  as calculated from the following:    Height as of 11/20/24: 1.651 m (5' 5\").    Weight as of this encounter: 80.5 kg (177 lb 6.4 oz). Body surface area is 1.92 meters squared.  No Pain (0) Comment: Data Unavailable   No LMP recorded. Patient is perimenopausal.  Allergies reviewed: Yes  Medications reviewed: Yes    Medications: Medication refills not needed today.  Pharmacy name entered into [facility]:    [facility] [place], [place] - [address]  [facility] [place], [place] - [address]    Frailty Screening:   Is the patient here for a new oncology consult visit in cancer care? 2. No      Clinical concerns: right breast pain   Dr. Roland  was notified.      Shari J. Schoenberger, [facility]              "

## 2024-11-27 NOTE — PROGRESS NOTES
Infusion Nursing Note:  Angelica GUPTA Jason presents today for Zoladex.    Patient seen by provider today: Yes: Luan   present during visit today: Not Applicable.    Note: N/A.      Intravenous Access:  No Intravenous access/labs at this visit.    Treatment Conditions:  Not Applicable.      Post Infusion Assessment:  Patient tolerated injection without incident.       Discharge Plan:   Patient discharged in stable condition accompanied by: self.  Departure Mode: Ambulatory.      Nii Landin RN

## 2024-12-02 ENCOUNTER — OFFICE VISIT (OUTPATIENT)
Dept: PODIATRY | Facility: CLINIC | Age: 44
End: 2024-12-02
Attending: PHYSICIAN ASSISTANT
Payer: COMMERCIAL

## 2024-12-02 VITALS
SYSTOLIC BLOOD PRESSURE: 144 MMHG | BODY MASS INDEX: 29.49 KG/M2 | WEIGHT: 177 LBS | HEART RATE: 69 BPM | HEIGHT: 65 IN | DIASTOLIC BLOOD PRESSURE: 96 MMHG

## 2024-12-02 DIAGNOSIS — L60.8 TOENAIL DEFORMITY: ICD-10-CM

## 2024-12-02 DIAGNOSIS — L60.0 INGROWN RIGHT GREATER TOENAIL: Primary | ICD-10-CM

## 2024-12-02 PROCEDURE — 99203 OFFICE O/P NEW LOW 30 MIN: CPT | Mod: 25 | Performed by: PODIATRIST

## 2024-12-02 PROCEDURE — 11730 AVULSION NAIL PLATE SIMPLE 1: CPT | Mod: T5 | Performed by: PODIATRIST

## 2024-12-02 NOTE — PROGRESS NOTES
"Angelica Gomez is a 44 year old female who presents with a chief complaint of a painful ingrown toenail to the right great toe.  The patient relates pain when wearing shoes.  The patient denies any redness extending up the big toe into the foot.  The patient relates the condition has been getting worse over the past several weeks.         Pertinent medical, surgical and family history was reviewed in the chart.    Vitals: BP (!) 144/96   Pulse 69   Ht 1.651 m (5' 5\")   Wt 80.3 kg (177 lb)   BMI 29.45 kg/m    BMI= Body mass index is 29.45 kg/m .    LOWER EXTREMITY PHYSICAL EXAM    Dermatologic: One notes an inflamed nail border of the right great toe.  There is noted erythema and edema located around the labial fold and does not extend past the interphalangeal joint.  There is no apparent purulent drainage noted.  There is pain on palpation to the proximal aspect of the nail border.  Otherwise, the skin is intact to both lower extremities without significant lesions, rash or abrasion.           Vascular: DP & PT pulses are intact & regular on the right.   CFT and skin temperature is normal to the right lower extremities.     Neurologic: Lower extremity sensation is intact to light touch.  No evidence of weakness in the right lower extremities.        Musculoskeletal: Patient is ambulatory without assistive device or brace.  No gross ankle deformity noted.  No foot or ankle joint effusion is noted.         ASSESSMENT / PLAN:     ICD-10-CM    1. Ingrown right greater toenail  L60.0 REMOVAL OF NAIL PLATE SIMPLE SINGLE      2. Toenail deformity  L60.8 Orthopedic  Referral          Plan:  I have explained to Angelica about the condition.  The potential causes and nature of an ingrown toenail were discussed with the patient.  We reviewed the natural history and prognosis of the condition and potential risks if no treatment is provided.  Treatment options discussed included conservative management (oral " antibiotics, soaking of foot, adequate width shoes)  as well as surgical management (partial or total nail removal).  The pros and cons of both forms as well as risks and benefits of treatment were reviewed.       At this point, I recommended having the offending nail border removed from the right great toe.  I have explained to the patient all of the possible risks, benefits, alternatives to the procedure.  The patient consented to the proposed procedure.  The right hallux was swabbed with alcohol.  Next, approximately 3 cc of 1% lidocaine plain was injected around the right hallux.  The right hallux was then prepped with Betadine ointment.  A tourniquet was applied to the right hallux.  A [place] elevator was utilized to free up the eponychium of the offending nail border.  Next, the offending nail border was split using an English anvil back to the matrix.  Next, utilizing a straight hemostat, the offending nail border was avulsed in toto.  The wound bed was debrided on any remaining nail and hyperkeratotic skin.    The wound was then irrigated and dressed with bacitracin antibiotic ointment and a compressive  sterile dressing.  The tourniquet was removed and a prompt hyperemic response was noted.  The patient tolerated the procedure well with no complications.  The patient was given post procedure instructions for the care of the wound.  The patient was informed that it is common to experience redness with watery drainage coming from the treated areas of the toe related to the phenol application.  The patient may return for reevaluation and was instructed to notify the office if any redness extending past the big toe joint or fever with chills are experienced before then.      There is low risk of morbidity with the procedure.  There was no overlap in work associated with the evaluation/management and the work associated with the procedure.    Angelica verbalized agreement with and understanding of the rational  for the diagnosis and treatment plan.  All questions were answered to best of my ability and the patient's satisfaction. The patient was advised to contact the clinic with any questions that may arise after the clinic visit.      Disclaimer: This note consists of symbols derived from keyboarding, dictation and/or voice recognition software. As a result, there may be errors in the script that have gone undetected. Please consider this when interpreting information found in this chart.      MARIELENA Collazo D.P.M., [email or web address].

## 2024-12-02 NOTE — LETTER
"12/2/2024      Angelica Gomez  [address] [facility] [address]      Dear Colleague,    Thank you for referring your patient, Angelica Gomez, to the [facility] [place]. Please see a copy of my visit note below.    Angelica Gomez is a 44 year old female who presents with a chief complaint of a painful ingrown toenail to the right great toe.  The patient relates pain when wearing shoes.  The patient denies any redness extending up the big toe into the foot.  The patient relates the condition has been getting worse over the past several weeks.         Pertinent medical, surgical and family history was reviewed in the chart.    Vitals: BP (!) 144/96   Pulse 69   Ht 1.651 m (5' 5\")   Wt 80.3 kg (177 lb)   BMI 29.45 kg/m    BMI= Body mass index is 29.45 kg/m .    LOWER EXTREMITY PHYSICAL EXAM    Dermatologic: One notes an inflamed nail border of the right great toe.  There is noted erythema and edema located around the labial fold and does not extend past the interphalangeal joint.  There is no apparent purulent drainage noted.  There is pain on palpation to the proximal aspect of the nail border.  Otherwise, the skin is intact to both lower extremities without significant lesions, rash or abrasion.           Vascular: DP & PT pulses are intact & regular on the right.   CFT and skin temperature is normal to the right lower extremities.     Neurologic: Lower extremity sensation is intact to light touch.  No evidence of weakness in the right lower extremities.        Musculoskeletal: Patient is ambulatory without assistive device or brace.  No gross ankle deformity noted.  No foot or ankle joint effusion is noted.         ASSESSMENT / PLAN:     ICD-10-CM    1. Ingrown right greater toenail  L60.0 REMOVAL OF NAIL PLATE SIMPLE SINGLE      2. Toenail deformity  L60.8 Orthopedic  Referral          Plan:  I have explained to Angelica about the condition.  The potential " causes and nature of an ingrown toenail were discussed with the patient.  We reviewed the natural history and prognosis of the condition and potential risks if no treatment is provided.  Treatment options discussed included conservative management (oral antibiotics, soaking of foot, adequate width shoes)  as well as surgical management (partial or total nail removal).  The pros and cons of both forms as well as risks and benefits of treatment were reviewed.       At this point, I recommended having the offending nail border removed from the right great toe.  I have explained to the patient all of the possible risks, benefits, alternatives to the procedure.  The patient consented to the proposed procedure.  The right hallux was swabbed with alcohol.  Next, approximately 3 cc of 1% lidocaine plain was injected around the right hallux.  The right hallux was then prepped with Betadine ointment.  A tourniquet was applied to the right hallux.  A [place] elevator was utilized to free up the eponychium of the offending nail border.  Next, the offending nail border was split using an English anvil back to the matrix.  Next, utilizing a straight hemostat, the offending nail border was avulsed in toto.  The wound bed was debrided on any remaining nail and hyperkeratotic skin.    The wound was then irrigated and dressed with bacitracin antibiotic ointment and a compressive  sterile dressing.  The tourniquet was removed and a prompt hyperemic response was noted.  The patient tolerated the procedure well with no complications.  The patient was given post procedure instructions for the care of the wound.  The patient was informed that it is common to experience redness with watery drainage coming from the treated areas of the toe related to the phenol application.  The patient may return for reevaluation and was instructed to notify the office if any redness extending past the big toe joint or fever with chills are experienced  before then.      There is low risk of morbidity with the procedure.  There was no overlap in work associated with the evaluation/management and the work associated with the procedure.    Angelica verbalized agreement with and understanding of the rational for the diagnosis and treatment plan.  All questions were answered to best of my ability and the patient's satisfaction. The patient was advised to contact the clinic with any questions that may arise after the clinic visit.      Disclaimer: This note consists of symbols derived from keyboarding, dictation and/or voice recognition software. As a result, there may be errors in the script that have gone undetected. Please consider this when interpreting information found in this chart.      MARIELENA Collazo D.P.M., F.A.C.F.A.S.      Again, thank you for allowing me to participate in the care of your patient.        Sincerely,        Lyndon Collazo DPM

## 2024-12-02 NOTE — NURSING NOTE
"Chief Complaint   Patient presents with    Consult     Great right toenail deformity       Initial BP (!) 144/96   Pulse 69   Ht 1.651 m (5' 5\")   Wt 80.3 kg (177 lb)   BMI 29.45 kg/m   Estimated body mass index is 29.45 kg/m  as calculated from the following:    Height as of this encounter: 1.651 m (5' 5\").    Weight as of this encounter: 80.3 kg (177 lb).  Medications and allergies reviewed.      Suzi AGUILAR MA    " 2.5

## 2024-12-03 NOTE — PROGRESS NOTES
NEUROLOGY FOLLOW UP VISIT  NOTE       [facility] [place]  [address] [place] [address]  Tel: [phone]  Fax: [phone]  [email or web address]     Angelica Gomez,  1980, MRN [id number]  PCP: Maddi Sneed  Date: 2024      ASSESSMENT & PLAN     Visit Diagnosis  Chronic mixed headache syndrome     Chronic mixed headache syndrome  Pleasant 44-year-old female with history depression and anxiety, breast cancer who previously had migraine headache without aura but after she was diagnosed with breast cancer developed chronic mixed headache syndrome that responded to nortriptyline.  Her headache frequency has declined significantly.  I have recommended:    1.  Continue nortriptyline 50 mg at bedtime  2.  Patient will use Imitrex for abortive treatment  3.  Follow-up in 1 year when I plan on tapering her off nortriptyline    Thank you again for this referral, please feel free to contact me if you have any questions.    Kike Jones MD  [facility], [place]     HISTORY OF PRESENT ILLNESS     Patient is a pleasant 44-year-old female with history of depression and anxiety, breast cancer last seen on 2024 for migraine headache without aura who returns for follow-up.  Previously she had perimenstrual migraine but after she was diagnosed with breast cancer and treated with chemotherapy she stopped having headaches.  She instead started experiencing headaches couple of times a week.  During her last visit she was started on nortriptyline and MRI brain was ordered that showed no abnormality or any metastatic disease she has noticed significant improvement in her headaches.  For abortive treatment she is using Tylenol in the morning but later in the day she feels the Imitrex.     According to patient her headaches develop in  and previously these headaches or cluster close to her menstrual cycle.  She was using Imitrex that was helpful.  She  was diagnosed with breast cancer and went through chemotherapy and has stopped having monthly cycles and gets headaches at least once or twice a week.  These headaches occur without any warning and they usually are associated with photophobia and phonophobia.  She denies any nausea or vomiting, focal weakness or facial droop.  She has noticed that she wakes up in the middle of the night and has trouble to go to sleep.  She had a CT of the brain in May 2024 that was unremarkable.  In  she had a 7 Eli MRI done that too was normal     PROBLEM LIST   Patient Active Problem List   Diagnosis    Dyspareunia    Ovarian cyst    CARDIOVASCULAR SCREENING; LDL GOAL LESS THAN 160    Malignant neoplasm of upper-outer quadrant of left breast in female, estrogen receptor positive (H)    Adjustment disorder with anxiety    Chronic mixed headache syndrome         PAST MEDICAL & SURGICAL HISTORY     Past Medical History:   Patient  has a past medical history of Malignant neoplasm of upper-outer quadrant of left breast in female, estrogen receptor positive (H) (2024), Migraine, and NO ACTIVE PROBLEMS.    Surgical History:  She  has a past surgical history that includes  DELIVERY ONLY (); tubal ligation (); Colonoscopy with CO2 insufflation (N/A, 2024); Mastectomy, Nipple Sparing (Bilateral, 2024); Insert tissue expander breast bilateral (Bilateral, 2024); IR Chest Port Placement > 5 Yrs of Age (2024); IR Port Removal Right (2024); and Reconstruct breast bilateral, implant prosthesis bilateral, combined (Bilateral, 2024).     SOCIAL HISTORY     Reviewed, and she  reports that she has quit smoking. Her smoking use included cigarettes. She has a 0.3 pack-year smoking history. She has never used smokeless tobacco. She reports that she does not currently use alcohol. She reports that she does not use drugs.     FAMILY HISTORY     Reviewed, and family history includes Alcohol/Drug in  her paternal grandfather; Asthma in her brother; Breast Cancer in her paternal aunt and paternal aunt; Breast Cancer (age of onset: 56) in her mother; C.A.D. in her father; Cancer in her paternal aunt; Cardiovascular in her father; Cerebrovascular Disease in her father; Circulatory in her father; Eczema in her brother; Food Allergy in her brother; Genitourinary Problems in her father; Heart Disease in her father; Hypertension in her paternal grandmother; Myocardial Infarction in her maternal grandfather; No Known Problems in her daughter, maternal grandmother, and son; Ovarian Cancer in her paternal aunt; Thyroid Disease in her mother.     ALLERGIES     No Known Allergies      REVIEW OF SYSTEMS     A 12 point review of system was performed and was negative except as outlined in the history of present illness.     HOME MEDICATIONS     Current Outpatient Rx   Medication Sig Dispense Refill    anastrozole (ARIMIDEX) 1 MG tablet Take 1 tablet (1 mg) by mouth daily 90 tablet 3    buPROPion (WELLBUTRIN XL) 300 MG 24 hr tablet Take 1 tablet (300 mg) by mouth every morning. 90 tablet 3    Calcium Carbonate-Vitamin D (CALCIUM 500 + D PO)       Cetirizine HCl (ZYRTEC ALLERGY PO) Take 10 mg by mouth daily      COMPRESSION STOCKINGS Please measure and distribute 2 pair of 20mmHg - 30mmHg thigh high open or closed toe compression stockings with extra refills as indicated. 2 each 4    Fluticasone Propionate (FLONASE NA) Spray 1 spray in nostril daily      goserelin (ZOLADEX) 3.6 MG injection Inject 3.6 mg subcutaneously once. Monthly      hydrOXYzine HCl (ATARAX) 25 MG tablet Take 1-2 tablets (25-50 mg) by mouth 3 times daily as needed for anxiety or other (sleep). 90 tablet 1    LORazepam (ATIVAN) 0.5 MG tablet Take 1 tablet (0.5 mg) by mouth every 6 hours as needed for anxiety 30 tablet 0    Multiple Vitamins-Minerals (MULTIVITAMIN & MINERAL PO) Take 1 each by mouth daily. [facility] women's active supplement      nortriptyline  (PAMELOR) 50 MG capsule Take 1 capsule (50 mg) by mouth at bedtime 90 capsule 3    Probiotic Product (PROBIOTIC PO) Reported on 3/1/2017      SUMAtriptan (IMITREX) 25 MG tablet Take 1 tablet (25 mg) by mouth at onset of headache for migraine. May repeat in 2 hours. Max 8 tablets/24 hours. 18 tablet 11         PHYSICAL EXAM     Vital signs  /80   Pulse 76   Wt 82.1 kg (181 lb)   BMI 30.12 kg/m      Weight:   181 lbs 0 oz    Patient is alert and oriented x4 in no acute distress. Vital signs were reviewed and are documented in electronic medical record. Neck was supple, no carotid bruits, thyromegaly, JVD, or lymphadenopathy was noted.   NEUROLOGY EXAM:   Patient s speech was normal with no aphasia or dysarthria. Mentation, and affect were also normal.    Funduscopic exam was normal, with normal cup to disc ratio. Cranial nerves II -XII were intact.    Patient had normal mass, tone and motor strength was 5/5 in all extremities without pronator drift.    Sensation was intact to light touch, pinprick, and vibratory sensation.    Reflexes were 1+ symmetrical with downgoing toes.    No dysmetria noted on FNF or HKS. Romberg was negative.   Gait testing was normal. Able to walk on toes/heels. Tandem walk normal.      PERTINENT DIAGNOSTIC STUDIES     Following studies were reviewed:     CT BRAIN 5/21/2024  1.  No intracranial hemorrhage, mass lesions, hydrocephalus or CT evidence for an acute infarct.  2.  Moderate size mucus retention cyst in the left maxillary sinus.    MRI BRAIN 8/21/2024  No MRI finding to explain patient's headaches. No  intracranial metastatic disease. Essentially a normal brain MRI with  and without contrast at 7 T.     PERTINENT LABS  Following labs were reviewed:  No visits with results within 3 Month(s) from this visit.   Latest known visit with results is:   Admission on 07/30/2024, Discharged on 07/30/2024   Component Date Value Ref Range Status    hCG Urine Qualitative 07/30/2024  "Negative  Negative Final    Case Report 07/30/2024    Final                    Value:Surgical Pathology Report                         Case: [id number]                                  Authorizing Provider:  Elisabet Venegas MD Collected:           07/30/2024 12:06 PM          Ordering Location:     [facility]          Received:            07/30/2024 12:25 PM                                 [facility] OR                                                            Pathologist:           Bailey Doshi MD                                                             Specimens:   A) - Breast, Left, left breast explant                                                              B) - Breast, Right, right breast explant                                                   Final Diagnosis 07/30/2024    Final                    Value:GROSS ONLY                          A.  Left breast explant:                          -Intact breast implant (see gross description).                                                    GROSS ONLY                          B.  Right breast explant:                          -Disrupted breast implant (see gross description).                              Clinical Information 07/30/2024    Final                    Value:Procedure:                          RECONSTRUCTION BILATERAL BREASTS WITH IMPLANTS - Bilateral                          Pre-op Diagnosis: History of breast cancer [Z85.3]                          Post-op Diagnosis: breast reconstruction    Gross Description 07/30/2024    Final                    Value:A(1). Breast, Left, left breast explant:                          The specimen is received fresh, labeled with the patient's name, medical                           record number and other identifying information designated \"left breast                           explant\". It consists of a 540.0 g, 15.0 x 15.0 x 7.0 cm intact breast                           implant specimen.  " "Inscribed on the surface is \"ALLERGAN, style SMV, 14 cm                           - 500 cc, lot [id number]\".  No sections are submitted.  Gross exam only.                                                    B(2). Breast, Right, right breast explant:                          The specimen is received fresh, labeled with the patient's name, medical                           record number and other identifying information designated \"right breast                           explant\". It consists of a 200.1 g, 15.0 x 15.0 x 3.1 cm disrupted breast                           implant specimen.  Inscribed on the surface is \"ALLERGAN, style SMV, 14 cm                           - 500 cc, lot [id number]\".  No sections are submitted.  Gross exam only.                                                     (BEBE Hua) 7/30/2024 12:31 PM     Microscopic Description 07/30/2024    Final                    Value:                              Performing Labs 07/30/2024    Final                    Value:The technical component of this testing was completed at [facility].                                                    Stain controls for all stains resulted within this report have been                           reviewed and show appropriate reactivity.          Total time spent for face to face visit, reviewing labs/imaging studies, counseling and coordination of care was: 30 Minutes spent on the date of the encounter doing chart review, review of outside records, review of test results, interpretation of tests, patient visit, and documentation     The longitudinal plan of care for the diagnosis(es)/condition(s) as documented were addressed during this visit. Due to the added complexity in care, I will continue to support Lisandra in the subsequent management and with ongoing continuity of care.    This note was dictated using voice recognition software. "  Any grammatical or context distortions are unintentional and inherent to the software.    No orders of the defined types were placed in this encounter.     New Prescriptions    No medications on file     Modified Medications    Modified Medication Previous Medication    SUMATRIPTAN (IMITREX) 25 MG TABLET SUMAtriptan (IMITREX) 25 MG tablet       Take 1 tablet (25 mg) by mouth at onset of headache for migraine. May repeat in 2 hours. Max 8 tablets/24 hours.    Take 1 tablet (25 mg) by mouth at onset of headache for migraine May repeat in 2 hours. Max 8 tablets/24 hours.

## 2024-12-09 ENCOUNTER — OFFICE VISIT (OUTPATIENT)
Dept: NEUROLOGY | Facility: CLINIC | Age: 44
End: 2024-12-09
Payer: COMMERCIAL

## 2024-12-09 VITALS
BODY MASS INDEX: 30.12 KG/M2 | WEIGHT: 181 LBS | HEART RATE: 76 BPM | SYSTOLIC BLOOD PRESSURE: 123 MMHG | DIASTOLIC BLOOD PRESSURE: 80 MMHG

## 2024-12-09 DIAGNOSIS — G43.829 MENSTRUAL MIGRAINE WITHOUT STATUS MIGRAINOSUS, NOT INTRACTABLE: ICD-10-CM

## 2024-12-09 DIAGNOSIS — G44.89 CHRONIC MIXED HEADACHE SYNDROME: Primary | ICD-10-CM

## 2024-12-09 PROCEDURE — 99214 OFFICE O/P EST MOD 30 MIN: CPT | Performed by: PSYCHIATRY & NEUROLOGY

## 2024-12-09 PROCEDURE — G2211 COMPLEX E/M VISIT ADD ON: HCPCS | Performed by: PSYCHIATRY & NEUROLOGY

## 2024-12-09 RX ORDER — SUMATRIPTAN SUCCINATE 25 MG/1
25 TABLET ORAL
Qty: 18 TABLET | Refills: 11 | Status: SHIPPED | OUTPATIENT
Start: 2024-12-09

## 2024-12-09 NOTE — LETTER
2024      Angelica Gomez  [address]  [facility] [address]      Dear Colleague,    Thank you for referring your patient, Angelica Gomez, to the [facility] [place]. Please see a copy of my visit note below.    NEUROLOGY FOLLOW UP VISIT  NOTE       [facility]UR[facility] [place]  [address] [place] [address]  Tel: [phone]  Fax: [phone]  [email or web address]     Angelica Gomez,  1980, MRN [id number]  PCP: Maddi Sneed  Date: 2024      ASSESSMENT & PLAN     Visit Diagnosis  Chronic mixed headache syndrome     Chronic mixed headache syndrome  Pleasant 44-year-old female with history depression and anxiety, breast cancer who previously had migraine headache without aura but after she was diagnosed with breast cancer developed chronic mixed headache syndrome that responded to nortriptyline.  Her headache frequency has declined significantly.  I have recommended:    1.  Continue nortriptyline 50 mg at bedtime  2.  Patient will use Imitrex for abortive treatment  3.  Follow-up in 1 year when I plan on tapering her off nortriptyline    Thank you again for this referral, please feel free to contact me if you have any questions.    Kike Jones MD  [facility] NEUROLOGY, [place]     HISTORY OF PRESENT ILLNESS     Patient is a pleasant 44-year-old female with history of depression and anxiety, breast cancer last seen on 2024 for migraine headache without aura who returns for follow-up.  Previously she had perimenstrual migraine but after she was diagnosed with breast cancer and treated with chemotherapy she stopped having headaches.  She instead started experiencing headaches couple of times a week.  During her last visit she was started on nortriptyline and MRI brain was ordered that showed no abnormality or any metastatic disease she has noticed significant improvement in her headaches.  For abortive  treatment she is using Tylenol in the morning but later in the day she feels the Imitrex.     According to patient her headaches develop in  and previously these headaches or cluster close to her menstrual cycle.  She was using Imitrex that was helpful.  She was diagnosed with breast cancer and went through chemotherapy and has stopped having monthly cycles and gets headaches at least once or twice a week.  These headaches occur without any warning and they usually are associated with photophobia and phonophobia.  She denies any nausea or vomiting, focal weakness or facial droop.  She has noticed that she wakes up in the middle of the night and has trouble to go to sleep.  She had a CT of the brain in May 2024 that was unremarkable.  In  she had a 7 Eli MRI done that too was normal     PROBLEM LIST   Patient Active Problem List   Diagnosis     Dyspareunia     Ovarian cyst     CARDIOVASCULAR SCREENING; LDL GOAL LESS THAN 160     Malignant neoplasm of upper-outer quadrant of left breast in female, estrogen receptor positive (H)     Adjustment disorder with anxiety     Chronic mixed headache syndrome         PAST MEDICAL & SURGICAL HISTORY     Past Medical History:   Patient  has a past medical history of Malignant neoplasm of upper-outer quadrant of left breast in female, estrogen receptor positive (H) (2024), Migraine, and NO ACTIVE PROBLEMS.    Surgical History:  She  has a past surgical history that includes  DELIVERY ONLY (); tubal ligation (); Colonoscopy with CO2 insufflation (N/A, 2024); Mastectomy, Nipple Sparing (Bilateral, 2024); Insert tissue expander breast bilateral (Bilateral, 2024); IR Chest Port Placement > 5 Yrs of Age (2024); IR Port Removal Right (2024); and Reconstruct breast bilateral, implant prosthesis bilateral, combined (Bilateral, 2024).     SOCIAL HISTORY     Reviewed, and she  reports that she has quit smoking. Her smoking use  included cigarettes. She has a 0.3 pack-year smoking history. She has never used smokeless tobacco. She reports that she does not currently use alcohol. She reports that she does not use drugs.     FAMILY HISTORY     Reviewed, and family history includes Alcohol/Drug in her paternal grandfather; Asthma in her brother; Breast Cancer in her paternal aunt and paternal aunt; Breast Cancer (age of onset: 56) in her mother; C.A.D. in her father; Cancer in her paternal aunt; Cardiovascular in her father; Cerebrovascular Disease in her father; Circulatory in her father; Eczema in her brother; Food Allergy in her brother; Genitourinary Problems in her father; Heart Disease in her father; Hypertension in her paternal grandmother; Myocardial Infarction in her maternal grandfather; No Known Problems in her daughter, maternal grandmother, and son; Ovarian Cancer in her paternal aunt; Thyroid Disease in her mother.     ALLERGIES     No Known Allergies      REVIEW OF SYSTEMS     A 12 point review of system was performed and was negative except as outlined in the history of present illness.     HOME MEDICATIONS     Current Outpatient Rx   Medication Sig Dispense Refill     anastrozole (ARIMIDEX) 1 MG tablet Take 1 tablet (1 mg) by mouth daily 90 tablet 3     buPROPion (WELLBUTRIN XL) 300 MG 24 hr tablet Take 1 tablet (300 mg) by mouth every morning. 90 tablet 3     Calcium Carbonate-Vitamin D (CALCIUM 500 + D PO)        Cetirizine HCl (ZYRTEC ALLERGY PO) Take 10 mg by mouth daily       COMPRESSION STOCKINGS Please measure and distribute 2 pair of 20mmHg - 30mmHg thigh high open or closed toe compression stockings with extra refills as indicated. 2 each 4     Fluticasone Propionate (FLONASE NA) Spray 1 spray in nostril daily       goserelin (ZOLADEX) 3.6 MG injection Inject 3.6 mg subcutaneously once. Monthly       hydrOXYzine HCl (ATARAX) 25 MG tablet Take 1-2 tablets (25-50 mg) by mouth 3 times daily as needed for anxiety or other  (sleep). 90 tablet 1     LORazepam (ATIVAN) 0.5 MG tablet Take 1 tablet (0.5 mg) by mouth every 6 hours as needed for anxiety 30 tablet 0     Multiple Vitamins-Minerals (MULTIVITAMIN & MINERAL PO) Take 1 each by mouth daily. [facility] women's active supplement       nortriptyline (PAMELOR) 50 MG capsule Take 1 capsule (50 mg) by mouth at bedtime 90 capsule 3     Probiotic Product (PROBIOTIC PO) Reported on 3/1/2017       SUMAtriptan (IMITREX) 25 MG tablet Take 1 tablet (25 mg) by mouth at onset of headache for migraine. May repeat in 2 hours. Max 8 tablets/24 hours. 18 tablet 11         PHYSICAL EXAM     Vital signs  /80   Pulse 76   Wt 82.1 kg (181 lb)   BMI 30.12 kg/m      Weight:   181 lbs 0 oz    Patient is alert and oriented x4 in no acute distress. Vital signs were reviewed and are documented in electronic medical record. Neck was supple, no carotid bruits, thyromegaly, JVD, or lymphadenopathy was noted.   NEUROLOGY EXAM:    Patient s speech was normal with no aphasia or dysarthria. Mentation, and affect were also normal.     Funduscopic exam was normal, with normal cup to disc ratio. Cranial nerves II -XII were intact.     Patient had normal mass, tone and motor strength was 5/5 in all extremities without pronator drift.     Sensation was intact to light touch, pinprick, and vibratory sensation.     Reflexes were 1+ symmetrical with downgoing toes.     No dysmetria noted on FNF or HKS. Romberg was negative.    Gait testing was normal. Able to walk on toes/heels. Tandem walk normal.      PERTINENT DIAGNOSTIC STUDIES     Following studies were reviewed:     CT BRAIN 5/21/2024  1.  No intracranial hemorrhage, mass lesions, hydrocephalus or CT evidence for an acute infarct.  2.  Moderate size mucus retention cyst in the left maxillary sinus.    MRI BRAIN 8/21/2024  No MRI finding to explain patient's headaches. No  intracranial metastatic disease. Essentially a normal brain MRI with  and without contrast at 7  T.     PERTINENT LABS  Following labs were reviewed:  No visits with results within 3 Month(s) from this visit.   Latest known visit with results is:   Admission on 07/30/2024, Discharged on 07/30/2024   Component Date Value Ref Range Status     hCG Urine Qualitative 07/30/2024 Negative  Negative Final     Case Report 07/30/2024    Final                    Value:Surgical Pathology Report                         Case: [id number]                                  Authorizing Provider:  Elisabet Venegas MD Collected:           07/30/2024 12:06 PM          Ordering Location:     [facility]          Received:            07/30/2024 12:25 PM                                 [facility] OR                                                            Pathologist:           Bailey Doshi MD                                                             Specimens:   A) - Breast, Left, left breast explant                                                              B) - Breast, Right, right breast explant                                                    Final Diagnosis 07/30/2024    Final                    Value:GROSS ONLY                          A.  Left breast explant:                          -Intact breast implant (see gross description).                                                    GROSS ONLY                          B.  Right breast explant:                          -Disrupted breast implant (see gross description).                               Clinical Information 07/30/2024    Final                    Value:Procedure:                          RECONSTRUCTION BILATERAL BREASTS WITH IMPLANTS - Bilateral                          Pre-op Diagnosis: History of breast cancer [Z85.3]                          Post-op Diagnosis: breast reconstruction     Gross Description 07/30/2024    Final                    Value:A(1). Breast, Left, left breast explant:                          The specimen is received  "fresh, labeled with the patient's name, medical                           record number and other identifying information designated \"left breast                           explant\". It consists of a 540.0 g, 15.0 x 15.0 x 7.0 cm intact breast                           implant specimen.  Inscribed on the surface is \"ALLERGAN, style SMV, 14 cm                           - 500 cc, lot [id number]\".  No sections are submitted.  Gross exam only.                                                    B(2). Breast, Right, right breast explant:                          The specimen is received fresh, labeled with the patient's name, medical                           record number and other identifying information designated \"right breast                           explant\". It consists of a 200.1 g, 15.0 x 15.0 x 3.1 cm disrupted breast                           implant specimen.  Inscribed on the surface is \"ALLERGAN, style SMV, 14 cm                           - 500 cc, lot [id number]\".  No sections are submitted.  Gross exam only.                                                     (BEBE Hua) 7/30/2024 12:31 PM      Microscopic Description 07/30/2024    Final                    Value:                               Performing Labs 07/30/2024    Final                    Value:The technical component of this testing was completed at [facility].                                                    Stain controls for all stains resulted within this report have been                           reviewed and show appropriate reactivity.          Total time spent for face to face visit, reviewing labs/imaging studies, counseling and coordination of care was: 30 Minutes spent on the date of the encounter doing chart review, review of outside records, review of test results, interpretation of tests, patient visit, and documentation     The longitudinal " plan of care for the diagnosis(es)/condition(s) as documented were addressed during this visit. Due to the added complexity in care, I will continue to support Lisandra in the subsequent management and with ongoing continuity of care.    This note was dictated using voice recognition software.  Any grammatical or context distortions are unintentional and inherent to the software.    No orders of the defined types were placed in this encounter.     New Prescriptions    No medications on file     Modified Medications    Modified Medication Previous Medication    SUMATRIPTAN (IMITREX) 25 MG TABLET SUMAtriptan (IMITREX) 25 MG tablet       Take 1 tablet (25 mg) by mouth at onset of headache for migraine. May repeat in 2 hours. Max 8 tablets/24 hours.    Take 1 tablet (25 mg) by mouth at onset of headache for migraine May repeat in 2 hours. Max 8 tablets/24 hours.                 Again, thank you for allowing me to participate in the care of your patient.        Sincerely,        Kike Jones MD

## 2024-12-16 ENCOUNTER — THERAPY VISIT (OUTPATIENT)
Dept: OCCUPATIONAL THERAPY | Facility: CLINIC | Age: 44
End: 2024-12-16
Attending: SURGERY
Payer: COMMERCIAL

## 2024-12-16 DIAGNOSIS — C50.912 INVASIVE DUCTAL CARCINOMA OF BREAST, FEMALE, LEFT (H): ICD-10-CM

## 2024-12-16 DIAGNOSIS — I89.0 LYMPHEDEMA: Primary | ICD-10-CM

## 2024-12-16 DIAGNOSIS — Z90.12 STATUS POST LEFT MASTECTOMY: ICD-10-CM

## 2024-12-16 PROCEDURE — 97140 MANUAL THERAPY 1/> REGIONS: CPT | Mod: GO

## 2024-12-23 ENCOUNTER — INFUSION THERAPY VISIT (OUTPATIENT)
Dept: INFUSION THERAPY | Facility: CLINIC | Age: 44
End: 2024-12-23
Attending: INTERNAL MEDICINE
Payer: COMMERCIAL

## 2024-12-23 ENCOUNTER — OFFICE VISIT (OUTPATIENT)
Dept: FAMILY MEDICINE | Facility: CLINIC | Age: 44
End: 2024-12-23
Payer: COMMERCIAL

## 2024-12-23 VITALS
SYSTOLIC BLOOD PRESSURE: 133 MMHG | HEART RATE: 82 BPM | RESPIRATION RATE: 18 BRPM | TEMPERATURE: 97.9 F | OXYGEN SATURATION: 100 % | DIASTOLIC BLOOD PRESSURE: 90 MMHG

## 2024-12-23 VITALS
BODY MASS INDEX: 29.49 KG/M2 | DIASTOLIC BLOOD PRESSURE: 88 MMHG | TEMPERATURE: 98.1 F | SYSTOLIC BLOOD PRESSURE: 110 MMHG | HEIGHT: 65 IN | HEART RATE: 83 BPM | RESPIRATION RATE: 20 BRPM | OXYGEN SATURATION: 95 % | WEIGHT: 177 LBS

## 2024-12-23 DIAGNOSIS — Z17.0 MALIGNANT NEOPLASM OF UPPER-OUTER QUADRANT OF LEFT BREAST IN FEMALE, ESTROGEN RECEPTOR POSITIVE (H): Primary | ICD-10-CM

## 2024-12-23 DIAGNOSIS — R82.90 CLOUDY URINE: ICD-10-CM

## 2024-12-23 DIAGNOSIS — C50.412 MALIGNANT NEOPLASM OF UPPER-OUTER QUADRANT OF LEFT BREAST IN FEMALE, ESTROGEN RECEPTOR POSITIVE (H): Primary | ICD-10-CM

## 2024-12-23 DIAGNOSIS — N30.00 ACUTE CYSTITIS WITHOUT HEMATURIA: Primary | ICD-10-CM

## 2024-12-23 LAB
ALBUMIN UR-MCNC: NEGATIVE MG/DL
APPEARANCE UR: ABNORMAL
BACTERIA #/AREA URNS HPF: ABNORMAL /HPF
BILIRUB UR QL STRIP: NEGATIVE
COLOR UR AUTO: YELLOW
GLUCOSE UR STRIP-MCNC: NEGATIVE MG/DL
HGB UR QL STRIP: NEGATIVE
KETONES UR STRIP-MCNC: NEGATIVE MG/DL
LEUKOCYTE ESTERASE UR QL STRIP: ABNORMAL
NITRATE UR QL: NEGATIVE
PH UR STRIP: 7 [PH] (ref 5–7)
RBC #/AREA URNS AUTO: ABNORMAL /HPF
SP GR UR STRIP: 1.01 (ref 1–1.03)
SQUAMOUS #/AREA URNS AUTO: ABNORMAL /LPF
UROBILINOGEN UR STRIP-ACNC: 0.2 E.U./DL
WBC #/AREA URNS AUTO: ABNORMAL /HPF
WBC CLUMPS #/AREA URNS HPF: PRESENT /HPF

## 2024-12-23 PROCEDURE — 87186 SC STD MICRODIL/AGAR DIL: CPT | Performed by: PHYSICIAN ASSISTANT

## 2024-12-23 PROCEDURE — 250N000011 HC RX IP 250 OP 636: Mod: JZ | Performed by: INTERNAL MEDICINE

## 2024-12-23 PROCEDURE — 87088 URINE BACTERIA CULTURE: CPT | Performed by: PHYSICIAN ASSISTANT

## 2024-12-23 PROCEDURE — 96402 CHEMO HORMON ANTINEOPL SQ/IM: CPT

## 2024-12-23 PROCEDURE — 87086 URINE CULTURE/COLONY COUNT: CPT | Performed by: PHYSICIAN ASSISTANT

## 2024-12-23 PROCEDURE — G2211 COMPLEX E/M VISIT ADD ON: HCPCS | Performed by: PHYSICIAN ASSISTANT

## 2024-12-23 PROCEDURE — 99213 OFFICE O/P EST LOW 20 MIN: CPT | Performed by: PHYSICIAN ASSISTANT

## 2024-12-23 PROCEDURE — 81001 URINALYSIS AUTO W/SCOPE: CPT | Performed by: PHYSICIAN ASSISTANT

## 2024-12-23 RX ORDER — NITROFURANTOIN 25; 75 MG/1; MG/1
100 CAPSULE ORAL 2 TIMES DAILY
Qty: 10 CAPSULE | Refills: 0 | Status: SHIPPED | OUTPATIENT
Start: 2024-12-23 | End: 2024-12-28

## 2024-12-23 RX ADMIN — GOSERELIN ACETATE 3.6 MG: 3.6 IMPLANT SUBCUTANEOUS at 08:28

## 2024-12-23 ASSESSMENT — PAIN SCALES - GENERAL
PAINLEVEL_OUTOF10: NO PAIN (0)
PAINLEVEL_OUTOF10: NO PAIN (0)

## 2024-12-23 NOTE — PROGRESS NOTES
Infusion Nursing Note:  Angelica Harrellremba presents today for Zoladex.    Patient seen by provider today: No   present during visit today: Not Applicable.    Note: N/A.      Intravenous Access:  No Intravenous access/labs at this visit.    Treatment Conditions:  Not Applicable.      Post Infusion Assessment:  Patient tolerated injection without incident.  Site patent and intact, free from redness, edema or discomfort.       Discharge Plan:   Discharge instructions reviewed with: Patient.  Patient and/or family verbalized understanding of discharge instructions and all questions answered.  AVS to patient via [employer].  Patient will return 1/20/25 for next appointment.   Patient discharged in stable condition accompanied by: self.  Departure Mode: Ambulatory.      Tali Coles RN

## 2024-12-23 NOTE — PROGRESS NOTES
Assessment & Plan     Cloudy urine    - UA Macroscopic with reflex to Microscopic and Culture - Lab Collect; Future  - UA Macroscopic with reflex to Microscopic and Culture - Lab Collect  - Urine Microscopic Exam  - Urine Culture  - nitroFURantoin macrocrystal-monohydrate (MACROBID) 100 MG capsule; Take 1 capsule (100 mg) by mouth 2 times daily for 5 days.    Acute cystitis without hematuria  Take antibiotic with food. Side effects discussed.  Call with worsening symptoms or if no improvement in 1-2 days.  Discussed signs to watch for including worsening back pain, fevers, vomiting, or malaise.  They are to go to the emergency room if these occur or be seen again in clinic immediately.  Drink plenty of fluids.  We will follow up with results of culture and change antibiotic if needed.  The longitudinal plan of care for the diagnosis(es)/condition(s) as documented were addressed during this visit. Due to the added complexity in care, I will continue to support Lisandra in the subsequent management and with ongoing continuity of care.      Subjective   Lisandra is a 44 year old, presenting for the following health issues:  No chief complaint on file.    History of Present Illness       Reason for visit:  Concern for urinary tract infection  Symptom onset:  1-2 weeks ago  Symptoms include:  Cloudy, foul smelling, more concentrated urine  Symptom intensity:  Moderate  Symptom progression:  Staying the same  Had these symptoms before:  No  What makes it worse:  No  What makes it better:  No   She is taking medications regularly.         Genitourinary - Female  Onset/Duration: 2 weeks ago   Description:   Painful urination (Dysuria): No           Frequency: No  Blood in urine (Hematuria): No  Delay in urine (Hesitency): YES  Intensity: moderate  Progression of Symptoms:  same  Accompanying Signs & Symptoms:  Fever/chills: No  Flank pain: No  Nausea and vomiting: YES- nausea   Vaginal symptoms: dyspareunia (pain in labia/pelvis  "with intercourse) and some spotting   Abdominal/Pelvic Pain: YES- mild pelvic pain   History:   History of frequent UTI s: No  History of kidney stones: No  Sexually Active: YES  Possibility of pregnancy: No  Precipitating or alleviating factors: None  Therapies tried and outcome: Increase fluid intake    Patient has noticed that her urine has been cloudy intermittently and has a different smell.     H/o breast cancer. In remission.   Has had one UTI previously. No known recent changes.     Has been about  2 weeks of symptoms. Started off in the morning now more all day. Trying to stay hydrated.   No fever or vomiting.      Objective    /88   Pulse 83   Temp 98.1  F (36.7  C) (Temporal)   Resp 20   Ht 1.645 m (5' 4.76\")   Wt 80.3 kg (177 lb)   LMP  (LMP Unknown)   SpO2 95%   BMI 29.67 kg/m    Body mass index is 29.67 kg/m .  Physical Exam   GENERAL: healthy, alert and no distress  HENT: oral mucosa moist  RESP: lungs clear to auscultation - no rales, rhonchi or wheezes  CV: regular rate and rhythm, normal S1 S2, no S3 or S4, no murmur, click or rub  ABDOMEN: negative costovertebral tenderness, soft abdomen, positive suprapubic tenderness  MS: no gross musculoskeletal defects noted, no edema  NEURO: Normal strength and tone, mentation intact and speech normal  Psych: normal affect  SKIN: no rashes    Results for orders placed or performed in visit on 12/23/24   UA Macroscopic with reflex to Microscopic and Culture - Lab Collect     Status: Abnormal    Specimen: Urine, Clean Catch   Result Value Ref Range    Color Urine Yellow Colorless, Straw, Light Yellow, Yellow    Appearance Urine Slightly Cloudy (A) Clear    Glucose Urine Negative Negative mg/dL    Bilirubin Urine Negative Negative    Ketones Urine Negative Negative mg/dL    Specific Gravity Urine 1.010 1.003 - 1.035    Blood Urine Negative Negative    pH Urine 7.0 5.0 - 7.0    Protein Albumin Urine Negative Negative mg/dL    Urobilinogen Urine 0.2 " 0.2, 1.0 E.U./dL    Nitrite Urine Negative Negative    Leukocyte Esterase Urine Large (A) Negative   Urine Microscopic Exam     Status: Abnormal   Result Value Ref Range    Bacteria Urine Moderate (A) None Seen /HPF    RBC Urine 0-2 0-2 /HPF /HPF    WBC Urine  (A) 0-5 /HPF /HPF    Squamous Epithelials Urine Few (A) None Seen /LPF    WBC Clumps Urine Present (A) None Seen /HPF               Signed Electronically by: Edith Paz PA-C

## 2024-12-24 ENCOUNTER — TELEPHONE (OUTPATIENT)
Dept: ONCOLOGY | Facility: CLINIC | Age: 44
End: 2024-12-24
Payer: COMMERCIAL

## 2024-12-24 LAB — BACTERIA UR CULT: ABNORMAL

## 2024-12-24 NOTE — CONFIDENTIAL NOTE
Called pt to assess for symptoms based on survey. She stated that she was diagnosed with a UTI yesterday and that she is having a headache associated with that. She is now taking abx for treatment but knows to call and follow up if symptoms are not improving.

## 2024-12-26 NOTE — RESULT ENCOUNTER NOTE
Jessica Dominguez,       Your recent test results are attached, if you have any questions or concerns please feel free to contact me via e-mail or call [phone].  The antibiotic we put you on covers the bacteria that grew in your culture.  If your symptoms worsen or persist, please call clinic.           It was a pleasure to see you at your recent office visit.      Sincerely,  Edith Paz PA-C

## 2025-01-03 NOTE — TELEPHONE ENCOUNTER
Please see VOYAAhart message and triage notes from yesterday.     Patient is scheduled for follow up and treatment on 5/6/24.    Routing to provider for further review and recommendations.     Ellie Vega RN, BSN, PHN, OCN  [facility]     No

## 2025-01-07 ENCOUNTER — VIRTUAL VISIT (OUTPATIENT)
Dept: PSYCHIATRY | Facility: CLINIC | Age: 45
End: 2025-01-07
Attending: PSYCHIATRY & NEUROLOGY
Payer: COMMERCIAL

## 2025-01-07 VITALS — BODY MASS INDEX: 29.67 KG/M2 | WEIGHT: 177 LBS

## 2025-01-07 DIAGNOSIS — R11.0 NAUSEA: Primary | ICD-10-CM

## 2025-01-07 DIAGNOSIS — F43.22 ADJUSTMENT DISORDER WITH ANXIETY: Primary | ICD-10-CM

## 2025-01-07 PROCEDURE — G2211 COMPLEX E/M VISIT ADD ON: HCPCS | Mod: 95 | Performed by: PSYCHIATRY & NEUROLOGY

## 2025-01-07 PROCEDURE — 98014 SYNCH AUDIO-ONLY EST MOD 30: CPT | Performed by: PSYCHIATRY & NEUROLOGY

## 2025-01-07 RX ORDER — PROCHLORPERAZINE MALEATE 10 MG
10 TABLET ORAL EVERY 6 HOURS PRN
Qty: 30 TABLET | Refills: 3 | Status: SHIPPED | OUTPATIENT
Start: 2025-01-07

## 2025-01-07 ASSESSMENT — PAIN SCALES - GENERAL: PAINLEVEL_OUTOF10: NO PAIN (0)

## 2025-01-07 NOTE — PATIENT INSTRUCTIONS
PLAN                                                                                                       1) Meds    CONTINUE Bupropion (Wellbutrin XL) to 300 mg daily in the morning  CONTINUE hydroxyzine 25-50 mg as needed for anxiety or sleep   CONTINUE lorazepam 0.5 mg as needed for anxiety or sleep (prescribed by oncology)     Medications Prescribed by Neurology  Sumatriptan 25 mg as needed for migraine headaches  Nortriptyline 50 mg daily for prevention of migraine headaches     2) Other: Continue regular therapy.    3) RTC: April 8th at 1 pm via video    4) CRISIS Numbers:      **For crisis resources, please see the information at the end of this document**     Patient Education      Thank you for coming to the [facility] [place] [facility].     Lab Testing:  If you had lab testing today and your results are reassuring or normal they will be mailed to you or sent through [employer] within 7 days. If the lab tests need quick action we will call you with the results. The phone number we will call with results is # [phone]. If this is not the best number please call our clinic and change the number.     Medication Refills:  If you need any refills please call your pharmacy and they will contact us. Our fax number for refills is [phone].   Three business days of notice are needed for general medication refill requests.   Five business days of notice are needed for controlled substance refill requests.   If you need to change to a different pharmacy, please contact the new pharmacy directly. The new pharmacy will help you get your medications transferred.     Contact Us:  Please call [phone] during business hours (8-5:00 M-F).   If you have medication related questions after clinic hours, or on the weekend, please call [phone].     Financial Assistance [phone]   Medical Records [phone]       MENTAL HEALTH CRISIS RESOURCES:  For a emergency help, please  call 911 or go to the nearest Emergency Department.     Emergency Walk-In Options:   EmPATH Unit @ [facility] ([place]): [phone] - Specialized mental health emergency area designed to be calming  [facility] ([place]): [phone]  [facility] ([place]): [phone]  [facility] ([place]): [phone]    [facility] Crisis Information:   [place]: [phone]  [place]: [phone]  [place] ([place]) - Adult: [phone]     Child: [phone]  [place] - Adult: [phone]     Child: [phone]  [place]: [phone]  List of all [facility] resources:   https://[place].[facility]/dhs/people-we-serve/adults/health-care/mental-health/resources/crisis-contacts.jsp    National Crisis Information:   Crisis Text Line: Text  [place]  to 373380  Suicide & Crisis Lifeline: 988  National Suicide Prevention Lifeline: 0-331-314-TALK (1-309.926.9094)       For online chat options, visit https://suicidepreventionlifeline.org/chat/  Poison Control Center: 1-626.641.2469  Trans Lifeline: 1-588.204.3219 - Hotline for transgender people of all ages  The [place] [employer]: 1-103-069-8362 - Hotline for LGBT youth     For Non-Emergency Support:   Fast Tracker: Mental Health & Substance Use Disorder Resources -   https://www.[employer]re[employer]oodn.org/
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
## 2025-01-07 NOTE — NURSING NOTE
Current patient location: [address] [facility] [address]    Is the patient currently in the state of [place]? YES    Visit mode:VIDEO    If the visit is dropped, the patient can be reconnected by:VIDEO VISIT: Text to cell phone:   Telephone Information:   Mobile [phone]       Will anyone else be joining the visit? NO  (If patient encounters technical issues they should call [phone]    Are changes needed to the allergy or medication list? No    Are refills needed on medications prescribed by this physician? NO    Rooming Documentation:  Questionnaire(s) completed    Reason for visit: RECHECK    Kamryn STEVENSONF

## 2025-01-20 ENCOUNTER — INFUSION THERAPY VISIT (OUTPATIENT)
Dept: INFUSION THERAPY | Facility: CLINIC | Age: 45
End: 2025-01-20
Attending: INTERNAL MEDICINE
Payer: COMMERCIAL

## 2025-01-20 VITALS
DIASTOLIC BLOOD PRESSURE: 85 MMHG | SYSTOLIC BLOOD PRESSURE: 129 MMHG | RESPIRATION RATE: 18 BRPM | HEART RATE: 78 BPM | OXYGEN SATURATION: 100 % | TEMPERATURE: 97.6 F

## 2025-01-20 DIAGNOSIS — Z17.0 MALIGNANT NEOPLASM OF UPPER-OUTER QUADRANT OF LEFT BREAST IN FEMALE, ESTROGEN RECEPTOR POSITIVE (H): Primary | ICD-10-CM

## 2025-01-20 DIAGNOSIS — C50.412 MALIGNANT NEOPLASM OF UPPER-OUTER QUADRANT OF LEFT BREAST IN FEMALE, ESTROGEN RECEPTOR POSITIVE (H): Primary | ICD-10-CM

## 2025-01-20 PROCEDURE — 250N000011 HC RX IP 250 OP 636: Mod: JZ | Performed by: INTERNAL MEDICINE

## 2025-01-20 PROCEDURE — 96402 CHEMO HORMON ANTINEOPL SQ/IM: CPT

## 2025-01-20 RX ADMIN — GOSERELIN ACETATE 3.6 MG: 3.6 IMPLANT SUBCUTANEOUS at 08:08

## 2025-01-20 NOTE — PROGRESS NOTES
Infusion Nursing Note:  Angelica GUPTA Jason presents today for zoladex.    Patient seen by provider today: No   present during visit today: Not Applicable.    Note: No changes or concerns this month.      Intravenous Access:  No Intravenous access/labs at this visit.    Treatment Conditions:  Not Applicable.      Post Infusion Assessment:  Patient tolerated injection without incident.  Site patent and intact, free from redness, edema or discomfort.       Discharge Plan:   AVS to patient via MYCHART.  Patient will return as prev phil for next appointment.   Patient discharged in stable condition accompanied by: self.  Departure Mode: Ambulatory.      Stew Edwards RN

## 2025-01-21 ENCOUNTER — PATIENT OUTREACH (OUTPATIENT)
Dept: CARE COORDINATION | Facility: CLINIC | Age: 45
End: 2025-01-21
Payer: COMMERCIAL

## 2025-02-17 ENCOUNTER — ALLIED HEALTH/NURSE VISIT (OUTPATIENT)
Dept: INFUSION THERAPY | Facility: CLINIC | Age: 45
End: 2025-02-17
Attending: INTERNAL MEDICINE
Payer: COMMERCIAL

## 2025-02-17 VITALS
HEART RATE: 85 BPM | SYSTOLIC BLOOD PRESSURE: 141 MMHG | OXYGEN SATURATION: 100 % | DIASTOLIC BLOOD PRESSURE: 90 MMHG | RESPIRATION RATE: 16 BRPM | TEMPERATURE: 98 F

## 2025-02-17 DIAGNOSIS — Z17.0 MALIGNANT NEOPLASM OF UPPER-OUTER QUADRANT OF LEFT BREAST IN FEMALE, ESTROGEN RECEPTOR POSITIVE (H): Primary | ICD-10-CM

## 2025-02-17 DIAGNOSIS — C50.412 MALIGNANT NEOPLASM OF UPPER-OUTER QUADRANT OF LEFT BREAST IN FEMALE, ESTROGEN RECEPTOR POSITIVE (H): Primary | ICD-10-CM

## 2025-02-17 PROCEDURE — 250N000011 HC RX IP 250 OP 636: Mod: JZ | Performed by: INTERNAL MEDICINE

## 2025-02-17 PROCEDURE — 96402 CHEMO HORMON ANTINEOPL SQ/IM: CPT

## 2025-02-17 RX ADMIN — GOSERELIN ACETATE 3.6 MG: 3.6 IMPLANT SUBCUTANEOUS at 08:53

## 2025-02-17 ASSESSMENT — PAIN SCALES - GENERAL: PAINLEVEL_OUTOF10: NO PAIN (0)

## 2025-02-17 NOTE — PROGRESS NOTES
Infusion Nursing Note:  Angelica GUPTA Jason presents today for zoladex.    Patient seen by provider today: No   present during visit today: Not Applicable.    Note: Patient reports mild hot flashes in the evenings, unchanged. No new concerns to report today.      Intravenous Access:  No Intravenous access/labs at this visit.    Treatment Conditions:  Not Applicable.      Post Infusion Assessment:  Patient tolerated injection without incident.  Site patent and intact, free from redness, edema or discomfort.       Discharge Plan:   Patient discharged in stable condition accompanied by: self.  Departure Mode: Ambulatory.      Angy Mina RN

## 2025-02-25 NOTE — PROGRESS NOTES
[facility]    Hematology/Oncology Established Patient Note      Today's Date: 3/3/2025    Reason for visit: Left breast cancer.    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  12/7/2023: Due to worsening left upper outer breast pain, bilateral diagnostic mammogram performed. No mammographic abnormality in left upper outer breast site of symptoms but at 1:00, there is oval asymmetry measuring 0.7 cm. Remaining left breast and left axillary U/S showed morphologically normal lymph node and breast tissue.  At 1:30, 5 cm from nipple is irregular hypoechoic mass measuring 0.5 x 0.5 cm.  2. 12/15/2023: U/S-guided biopsy of left breast at 1:30 showed grade 2 invasive ductal carcinoma (3 mm) with associated grade 3 DCIS, no LVI; ER positive at 20%, SC positive at 20%, HER-2 negative with IHC = 1+.  3. 2/21/2024: Bilateral mastectomies with left axillary sentinel lymph node excision with Dr. Bean Phillips; bilateral implant removal. Pathology showed 1.2 cm grade 2 invasive ductal carcinoma with 1.4 cm grade 3 DCIS; margins negative; one left axillary SLN negative. Right breast benign. Repeat ER weakly positive at 61-70%, SC moderate positive at 71-80%. MammaPrint + BluePrint showed high risk, Luminal B.  4. 3/25/2024-5/28/2024: Completed adjuvant chemotherapy with 4 cycles of Taxotere and Cytoxan.  5. 7/08/2024: Started Zoladex monthly and anastrozole.    INTERVAL HISTORY:  Lisandra reports improvement in her brain fog and fatigue.  Wellbutrin has helped her fatigue and brain fog at higher dose.        REVIEW OF SYSTEMS:   14 point ROS was reviewed and is negative other than as noted above in HPI.       HOME MEDICATIONS:  Current Outpatient Medications   Medication Sig Dispense Refill    anastrozole (ARIMIDEX) 1 MG tablet Take 1 tablet (1 mg) by mouth daily 90 tablet 3    buPROPion (WELLBUTRIN XL) 300 MG 24 hr  tablet Take 1 tablet (300 mg) by mouth every morning. 90 tablet 3    Calcium Carbonate-Vitamin D (CALCIUM 500 + D PO)       Cetirizine HCl (ZYRTEC ALLERGY PO) Take 10 mg by mouth daily      COMPRESSION STOCKINGS Please measure and distribute 2 pair of 20mmHg - 30mmHg thigh high open or closed toe compression stockings with extra refills as indicated. 2 each 4    Fluticasone Propionate (FLONASE NA) Spray 1 spray in nostril daily      goserelin (ZOLADEX) 3.6 MG injection Inject 3.6 mg subcutaneously once. Monthly      hydrOXYzine HCl (ATARAX) 25 MG tablet Take 1-2 tablets (25-50 mg) by mouth 3 times daily as needed for anxiety or other (sleep). 90 tablet 1    Multiple Vitamins-Minerals (MULTIVITAMIN & MINERAL PO) Take 1 each by mouth daily. [facility] women's active supplement      nortriptyline (PAMELOR) 50 MG capsule Take 1 capsule (50 mg) by mouth at bedtime 90 capsule 3    Probiotic Product (PROBIOTIC PO) Reported on 3/1/2017      prochlorperazine (COMPAZINE) 10 MG tablet Take 1 tablet (10 mg) by mouth every 6 hours as needed for nausea or vomiting. 30 tablet 3    SUMAtriptan (IMITREX) 25 MG tablet Take 1 tablet (25 mg) by mouth at onset of headache for migraine. May repeat in 2 hours. Max 8 tablets/24 hours. 18 tablet 11         ALLERGIES:  No Known Allergies      PAST MEDICAL HISTORY:  Past Medical History:   Diagnosis Date    Malignant neoplasm of upper-outer quadrant of left breast in female, estrogen receptor positive (H) 2024    Migraine     NO ACTIVE PROBLEMS    Menometrorrhagia.    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 Used IUD for 20 years.      PAST SURGICAL HISTORY:  Past Surgical History:   Procedure Laterality Date    COLONOSCOPY WITH CO2 INSUFFLATION N/A 2024    Procedure: Colonoscopy with CO2 insufflation;  Surgeon: Cherise Lima DO;  Location: [facility] OR    INSERT TISSUE EXPANDER BREAST BILATERAL Bilateral 2024     Procedure: Insertion tissue expander, breasts, bilateral;  Surgeon: Elisabet Venegas MD;  Location: [facility] OR    IR CHEST PORT PLACEMENT > 5 YRS OF AGE  2024    IR PORT REMOVAL RIGHT  2024    MASTECTOMY, NIPPLE SPARING Bilateral 2024    Procedure: Bilateral nipple sparing mastectomy, bilateral implant removal, left sentinel lymph node biopsy;  Surgeon: Aba Phillips MD;  Location: [facility] OR    RECONSTRUCT BREAST BILATERAL, IMPLANT PROSTHESIS BILATERAL, COMBINED Bilateral 2024    Procedure: RECONSTRUCTION BILATERAL BREASTS WITH IMPLANTS;  Surgeon: Elisabet Venegas MD;  Location: [facility] OR    TUBAL LIGATION      [facility]  DELIVERY ONLY      superficial wound dehiscence   Bilateral breast augmentation in 2013.      SOCIAL HISTORY:  Social History     Socioeconomic History    Marital status:      Spouse name: Not on file    Number of children: Not on file    Years of education: Not on file    Highest education level: Not on file   Occupational History    Not on file   Tobacco Use    Smoking status: Former     Current packs/day: 0.25     Average packs/day: 0.3 packs/day for 1 year (0.3 ttl pk-yrs)     Types: Cigarettes    Smokeless tobacco: Never   Vaping Use    Vaping status: Never Used   Substance and Sexual Activity    Alcohol use: Not Currently    Drug use: No    Sexual activity: Yes     Partners: Male     Birth control/protection: Surgical, I.U.D., Female Surgical     Comment: Tubal ligation, 2007   Other Topics Concern    Parent/sibling w/ CABG, MI or angioplasty before 65F 55M? Yes     Comment: father 2 MIs   Social History Narrative    Caffeine intake/servings daily - 2-3 pop    Calcium intake/servings daily - 2 yogurts and 1 glass of milk    Exercise 6 times weekly - describe strength training and cardio    Sunscreen used - Yes    Seatbelts used - Yes    Guns stored in the home - No    Self Breast Exam - Yes    Pap test up to date -  Yes, as of today    Eye  exam up to date -  Yes    Dental exam up to date -  Yes    DEXA scan up to date -  Not Applicable    Flex Sig/Colonoscopy up to date -  Not Applicable    Mammography up to date -  Not Applicable    Immunizations reviewed and up to date - Yes, 03/2008    Abuse: Current or Past (Physical, Sexual or Emotional) - No    Do you feel safe in your environment - Yes    Do you cope well with stress - Yes    Do you suffer from insomnia - No    Last updated by: Lit Licona  10/27/2009                 Social Drivers of Health     Financial Resource Strain: Low Risk  (2/1/2024)    Financial Resource Strain     Within the past 12 months, have you or your family members you live with been unable to get utilities (heat, electricity) when it was really needed?: No   Food Insecurity: Low Risk  (2/1/2024)    Food Insecurity     Within the past 12 months, did you worry that your food would run out before you got money to buy more?: No     Within the past 12 months, did the food you bought just not last and you didn t have money to get more?: No   Transportation Needs: Low Risk  (2/1/2024)    Transportation Needs     Within the past 12 months, has lack of transportation kept you from medical appointments, getting your medicines, non-medical meetings or appointments, work, or from getting things that you need?: No   Physical Activity: Not on file   Stress: Not on file   Social Connections: Not on file   Interpersonal Safety: Low Risk  (11/20/2024)    Interpersonal Safety     Do you feel physically and emotionally safe where you currently live?: Yes     Within the past 12 months, have you been hit, slapped, kicked or otherwise physically hurt by someone?: No     Within the past 12 months, have you been humiliated or emotionally abused in other ways by your partner or ex-partner?: No   Housing Stability: Low Risk  (2/1/2024)    Housing Stability     Do you have housing? : Yes     Are you worried about losing your housing?: No   Has 2 grown  children.  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      FAMILY HISTORY:  Family History   Problem Relation Age of Onset    Thyroid Disease Mother     Breast Cancer Mother 56        breast, 3 years later    Heart Disease Father         2x heart attacks    Circulatory Father         blood clots    Cardiovascular Father         patent foramen ovale, repaired x 2, afib    Cerebrovascular Disease Father         x2    C.A.D. Father         x2    Genitourinary Problems Father         kidney disease    Asthma Brother     Food Allergy Brother     Eczema Brother     No Known Problems Maternal Grandmother     Myocardial Infarction Maternal Grandfather     Hypertension Paternal Grandmother     Alcohol/Drug Paternal Grandfather     No Known Problems Daughter     No Known Problems Son     Cancer Paternal Aunt         cervical cancer(another sisiter)    Breast Cancer Paternal Aunt         breast cancer at 70's    Breast Cancer Paternal Aunt     Ovarian Cancer Paternal Aunt     Cancer - colorectal No family hx of     Diabetes No family hx of          PHYSICAL EXAM:  Vital signs:  There were no vitals taken for this visit.   ECO  GENERAL/CONSTITUTIONAL: No acute distress.  EYES: No erythema or scleral icterus.  LYMPH: No cervical, supraclavicular, axillary adenopathy.  BREAST: Bilateral breasts surgically absent with implants in place and no chest wall masses or periprosthetic fluid. No skin erythema. Implants are asymmetric.  RESPIRATORY: No audible cough or wheezing.   GASTROINTESTINAL: No hepatosplenomegaly, masses, or tenderness. No guarding.  No distention.  MUSCULOSKELETAL: Warm and well-perfused, no cyanosis, clubbing, or edema.  NEUROLOGIC: No focal motor deficits. Alert, oriented, answers questions appropriately.  INTEGUMENTARY: No rashes or jaundice.  GAIT: Steady, does not use assistive device       LABS:  CBC RESULTS:   Recent Labs   Lab  Test 06/24/24  1203   WBC 5.0   RBC 4.08   HGB 12.1   HCT 36.4   MCV 89   MCH 29.7   MCHC 33.2   RDW 16.3*         Last Comprehensive Metabolic Panel:  Sodium   Date Value Ref Range Status   05/23/2024 142 135 - 145 mmol/L Final     Comment:     Reference intervals for this test were updated on 09/26/2023 to more accurately reflect our healthy population. There may be differences in the flagging of prior results with similar values performed with this method. Interpretation of those prior results can be made in the context of the updated reference intervals.    09/10/2012 140 133 - 144 mmol/L Final     Potassium   Date Value Ref Range Status   05/23/2024 4.0 3.4 - 5.3 mmol/L Final   12/30/2021 3.9 3.4 - 5.3 mmol/L Final   09/10/2012 4.2 3.4 - 5.3 mmol/L Final     Chloride   Date Value Ref Range Status   05/23/2024 110 (H) 98 - 107 mmol/L Final   12/30/2021 110 (H) 94 - 109 mmol/L Final   09/10/2012 104 94 - 109 mmol/L Final     Carbon Dioxide   Date Value Ref Range Status   09/10/2012 25 20 - 32 mmol/L Final     Carbon Dioxide (CO2)   Date Value Ref Range Status   05/23/2024 24 22 - 29 mmol/L Final   12/30/2021 27 20 - 32 mmol/L Final     Anion Gap   Date Value Ref Range Status   05/23/2024 8 7 - 15 mmol/L Final   12/30/2021 5 3 - 14 mmol/L Final   09/10/2012 11 6 - 17 mmol/L Final     Glucose   Date Value Ref Range Status   05/23/2024 93 70 - 99 mg/dL Final   12/30/2021 73 70 - 99 mg/dL Final   02/21/2017 94 70 - 99 mg/dL Final     Comment:     Fasting specimen     GLUCOSE BY METER POCT   Date Value Ref Range Status   02/22/2024 106 (H) 70 - 99 mg/dL Final     Urea Nitrogen   Date Value Ref Range Status   05/23/2024 15.3 6.0 - 20.0 mg/dL Final   12/30/2021 16 7 - 30 mg/dL Final   09/10/2012 12 5 - 24 mg/dL Final     Creatinine   Date Value Ref Range Status   05/23/2024 1.11 (H) 0.51 - 0.95 mg/dL Final   09/10/2012 0.81 0.52 - 1.04 mg/dL Final     GFR Estimate   Date Value Ref Range Status   05/23/2024 63 >60  mL/min/1.73m2 Final   09/10/2012 82 >60 mL/min/1.7m2 Final     Calcium   Date Value Ref Range Status   05/23/2024 9.0 8.6 - 10.0 mg/dL Final   09/10/2012 9.3 8.5 - 10.4 mg/dL Final     Bilirubin Total   Date Value Ref Range Status   05/23/2024 0.2 <=1.2 mg/dL Final   09/10/2012 0.6 0.2 - 1.3 mg/dL Final     Alkaline Phosphatase   Date Value Ref Range Status   05/23/2024 86 40 - 150 U/L Final   09/10/2012 53 40 - 150 U/L Final     ALT   Date Value Ref Range Status   05/23/2024 31 0 - 50 U/L Final     Comment:     Reference intervals for this test were updated on 6/12/2023 to more accurately reflect our healthy population. There may be differences in the flagging of prior results with similar values performed with this method. Interpretation of those prior results can be made in the context of the updated reference intervals.     09/10/2012 27 0 - 50 U/L Final     AST   Date Value Ref Range Status   05/23/2024 40 0 - 45 U/L Final     Comment:     Reference intervals for this test were updated on 6/12/2023 to more accurately reflect our healthy population. There may be differences in the flagging of prior results with similar values performed with this method. Interpretation of those prior results can be made in the context of the updated reference intervals.   09/10/2012 81 (H) 0 - 45 U/L Final       ASSESSMENT/PLAN:  Angelica Gomez is a 44 year old female with the following issues:  1. Stage IA, [id number], grade 2 invasive ductal carcinoma of the left upper outer breast, ER positive 20%, MO positive 20%, HER-2 negative, MammaPrint high risk, Luminal B  2. CHEK2 mutation  3. Brain fog and fatigue, improved  --Lisandra is s/p bilateral mastectomies with final pathology which showed a left breast 1.2 cm tumor with repeat ER positive at 61-70%, MO positive at 71-80%, HER-2 negative (IHC = 1+)  --MammaPrint + BluePrint showed high risk, Luminal B subtype.  --She completed 4 cycles of adjuvant chemotherapy with Taxotere  and Cytoxan on 5/28/2024.   --On 7/8/2024, she started ovarian suppression therapy with Zoladex and anastrozole, so far tolerating this well.  --Brain fog and fatigue have improved with higher dose of Wellbutrin.  --She will continue with colonoscopy screening on high-risk basis.  --6/12/2024 Baseline DEXA showed normal bone density. Repeat in 2026.  --She will continue seeing Car[facility] at [facility] for 90-minute sessions with cupping and microneedling every 2-3 weeks for maintenance.   --She will undergo implant exchange on the left on 4/1/2025 with Dr. Venegas due to asymmetry.    4. Fertility   --She is s/p tubal ligation and does not wish to have any more children.    5. Insomnia  --May continue lorazepam as needed.    Return in October as she is seeing Dr. Phillips in July.    Magali Roland MD  [facility] Hematology/Oncology     Total time spent today: 30 minutes in chart review, patient evaluation, counseling, documentation, test and/or medication/prescription orders, and coordination of care.      The longitudinal plan of care for the diagnosis(es)/condition(s) as documented were addressed during this visit. Due to the added complexity in care, I will continue to support Lisandra in the subsequent management and with ongoing continuity of care.

## 2025-02-27 ENCOUNTER — OFFICE VISIT (OUTPATIENT)
Dept: SURGERY | Facility: CLINIC | Age: 45
End: 2025-02-27
Payer: COMMERCIAL

## 2025-02-27 VITALS
SYSTOLIC BLOOD PRESSURE: 120 MMHG | BODY MASS INDEX: 30.73 KG/M2 | HEART RATE: 95 BPM | HEIGHT: 64 IN | OXYGEN SATURATION: 100 % | WEIGHT: 180 LBS | RESPIRATION RATE: 16 BRPM | DIASTOLIC BLOOD PRESSURE: 82 MMHG

## 2025-02-27 DIAGNOSIS — C50.412 MALIGNANT NEOPLASM OF UPPER-OUTER QUADRANT OF LEFT BREAST IN FEMALE, ESTROGEN RECEPTOR POSITIVE (H): Primary | ICD-10-CM

## 2025-02-27 DIAGNOSIS — Z17.0 MALIGNANT NEOPLASM OF UPPER-OUTER QUADRANT OF LEFT BREAST IN FEMALE, ESTROGEN RECEPTOR POSITIVE (H): Primary | ICD-10-CM

## 2025-02-27 PROCEDURE — 3074F SYST BP LT 130 MM HG: CPT | Performed by: SURGERY

## 2025-02-27 PROCEDURE — 3079F DIAST BP 80-89 MM HG: CPT | Performed by: SURGERY

## 2025-02-27 PROCEDURE — 99213 OFFICE O/P EST LOW 20 MIN: CPT | Performed by: SURGERY

## 2025-03-03 ENCOUNTER — ONCOLOGY VISIT (OUTPATIENT)
Dept: ONCOLOGY | Facility: CLINIC | Age: 45
End: 2025-03-03
Attending: INTERNAL MEDICINE
Payer: COMMERCIAL

## 2025-03-03 VITALS
WEIGHT: 183.2 LBS | SYSTOLIC BLOOD PRESSURE: 133 MMHG | RESPIRATION RATE: 16 BRPM | OXYGEN SATURATION: 97 % | HEART RATE: 81 BPM | DIASTOLIC BLOOD PRESSURE: 86 MMHG | BODY MASS INDEX: 31.28 KG/M2 | HEIGHT: 64 IN

## 2025-03-03 DIAGNOSIS — F51.02 ADJUSTMENT INSOMNIA: ICD-10-CM

## 2025-03-03 DIAGNOSIS — R41.89 COGNITIVE IMPAIRMENT: ICD-10-CM

## 2025-03-03 DIAGNOSIS — Z15.02 MONOALLELIC MUTATION OF CHEK2 GENE IN FEMALE PATIENT: ICD-10-CM

## 2025-03-03 DIAGNOSIS — C50.412 MALIGNANT NEOPLASM OF UPPER-OUTER QUADRANT OF LEFT BREAST IN FEMALE, ESTROGEN RECEPTOR POSITIVE (H): Primary | ICD-10-CM

## 2025-03-03 DIAGNOSIS — Z15.89 MONOALLELIC MUTATION OF CHEK2 GENE IN FEMALE PATIENT: ICD-10-CM

## 2025-03-03 DIAGNOSIS — Z15.09 MONOALLELIC MUTATION OF CHEK2 GENE IN FEMALE PATIENT: ICD-10-CM

## 2025-03-03 DIAGNOSIS — Z15.01 MONOALLELIC MUTATION OF CHEK2 GENE IN FEMALE PATIENT: ICD-10-CM

## 2025-03-03 DIAGNOSIS — Z17.0 MALIGNANT NEOPLASM OF UPPER-OUTER QUADRANT OF LEFT BREAST IN FEMALE, ESTROGEN RECEPTOR POSITIVE (H): Primary | ICD-10-CM

## 2025-03-03 PROCEDURE — 99214 OFFICE O/P EST MOD 30 MIN: CPT | Performed by: INTERNAL MEDICINE

## 2025-03-03 PROCEDURE — G2211 COMPLEX E/M VISIT ADD ON: HCPCS | Performed by: INTERNAL MEDICINE

## 2025-03-03 PROCEDURE — 99213 OFFICE O/P EST LOW 20 MIN: CPT | Performed by: INTERNAL MEDICINE

## 2025-03-03 ASSESSMENT — PAIN SCALES - GENERAL: PAINLEVEL_OUTOF10: NO PAIN (0)

## 2025-03-03 NOTE — LETTER
3/3/2025      Angelica Gomez  [address]  [facility] [address]      Dear Colleague,    Thank you for referring your patient, Angelica Gomez, to the [facility] [place]. Please see a copy of my visit note below.    [facility]    Hematology/Oncology Established Patient Note      Today's Date: 3/3/2025    Reason for visit: Left breast cancer.    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  12/7/2023: Due to worsening left upper outer breast pain, bilateral diagnostic mammogram performed. No mammographic abnormality in left upper outer breast site of symptoms but at 1:00, there is oval asymmetry measuring 0.7 cm. Remaining left breast and left axillary U/S showed morphologically normal lymph node and breast tissue.  At 1:30, 5 cm from nipple is irregular hypoechoic mass measuring 0.5 x 0.5 cm.  2. 12/15/2023: U/S-guided biopsy of left breast at 1:30 showed grade 2 invasive ductal carcinoma (3 mm) with associated grade 3 DCIS, no LVI; ER positive at 20%, OH positive at 20%, HER-2 negative with IHC = 1+.  3. 2/21/2024: Bilateral mastectomies with left axillary sentinel lymph node excision with Dr. Bean Phillips; bilateral implant removal. Pathology showed 1.2 cm grade 2 invasive ductal carcinoma with 1.4 cm grade 3 DCIS; margins negative; one left axillary SLN negative. Right breast benign. Repeat ER weakly positive at 61-70%, OH moderate positive at 71-80%. MammaPrint + BluePrint showed high risk, Luminal B.  4. 3/25/2024-5/28/2024: Completed adjuvant chemotherapy with 4 cycles of Taxotere and Cytoxan.  5. 7/08/2024: Started Zoladex monthly and anastrozole.    INTERVAL HISTORY:  Lisandra reports improvement in her brain fog and fatigue.  Wellbutrin has helped her fatigue and brain fog at higher dose.        REVIEW OF SYSTEMS:   14 point ROS was reviewed and is negative  other than as noted above in HPI.       HOME MEDICATIONS:  Current Outpatient Medications   Medication Sig Dispense Refill     anastrozole (ARIMIDEX) 1 MG tablet Take 1 tablet (1 mg) by mouth daily 90 tablet 3     buPROPion (WELLBUTRIN XL) 300 MG 24 hr tablet Take 1 tablet (300 mg) by mouth every morning. 90 tablet 3     Calcium Carbonate-Vitamin D (CALCIUM 500 + D PO)        Cetirizine HCl (ZYRTEC ALLERGY PO) Take 10 mg by mouth daily       COMPRESSION STOCKINGS Please measure and distribute 2 pair of 20mmHg - 30mmHg thigh high open or closed toe compression stockings with extra refills as indicated. 2 each 4     Fluticasone Propionate (FLONASE NA) Spray 1 spray in nostril daily       goserelin (ZOLADEX) 3.6 MG injection Inject 3.6 mg subcutaneously once. Monthly       hydrOXYzine HCl (ATARAX) 25 MG tablet Take 1-2 tablets (25-50 mg) by mouth 3 times daily as needed for anxiety or other (sleep). 90 tablet 1     Multiple Vitamins-Minerals (MULTIVITAMIN & MINERAL PO) Take 1 each by mouth daily. [facility] women's active supplement       nortriptyline (PAMELOR) 50 MG capsule Take 1 capsule (50 mg) by mouth at bedtime 90 capsule 3     Probiotic Product (PROBIOTIC PO) Reported on 3/1/2017       prochlorperazine (COMPAZINE) 10 MG tablet Take 1 tablet (10 mg) by mouth every 6 hours as needed for nausea or vomiting. 30 tablet 3     SUMAtriptan (IMITREX) 25 MG tablet Take 1 tablet (25 mg) by mouth at onset of headache for migraine. May repeat in 2 hours. Max 8 tablets/24 hours. 18 tablet 11         ALLERGIES:  No Known Allergies      PAST MEDICAL HISTORY:  Past Medical History:   Diagnosis Date     Malignant neoplasm of upper-outer quadrant of left breast in female, estrogen receptor positive (H) 2024     Migraine      NO ACTIVE PROBLEMS    Menometrorrhagia.    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 Used IUD for 20 years.      PAST SURGICAL  HISTORY:  Past Surgical History:   Procedure Laterality Date     COLONOSCOPY WITH CO2 INSUFFLATION N/A 2024    Procedure: Colonoscopy with CO2 insufflation;  Surgeon: Cherise Lima DO;  Location: [facility] OR     INSERT TISSUE EXPANDER BREAST BILATERAL Bilateral 2024    Procedure: Insertion tissue expander, breasts, bilateral;  Surgeon: Elisabet Venegas MD;  Location: [facility] OR     IR CHEST PORT PLACEMENT > 5 YRS OF AGE  2024     IR PORT REMOVAL RIGHT  2024     MASTECTOMY, NIPPLE SPARING Bilateral 2024    Procedure: Bilateral nipple sparing mastectomy, bilateral implant removal, left sentinel lymph node biopsy;  Surgeon: Aba Phillips MD;  Location: [facility] OR     RECONSTRUCT BREAST BILATERAL, IMPLANT PROSTHESIS BILATERAL, COMBINED Bilateral 2024    Procedure: RECONSTRUCTION BILATERAL BREASTS WITH IMPLANTS;  Surgeon: Elisabet Venegas MD;  Location: [facility] OR     TUBAL LIGATION       [facility]  DELIVERY ONLY      superficial wound dehiscence   Bilateral breast augmentation in 2013.      SOCIAL HISTORY:  Social History     Socioeconomic History     Marital status:      Spouse name: Not on file     Number of children: Not on file     Years of education: Not on file     Highest education level: Not on file   Occupational History     Not on file   Tobacco Use     Smoking status: Former     Current packs/day: 0.25     Average packs/day: 0.3 packs/day for 1 year (0.3 ttl pk-yrs)     Types: Cigarettes     Smokeless tobacco: Never   Vaping Use     Vaping status: Never Used   Substance and Sexual Activity     Alcohol use: Not Currently     Drug use: No     Sexual activity: Yes     Partners: Male     Birth control/protection: Surgical, I.U.D., Female Surgical     Comment: Tubal ligation, 2007   Other Topics Concern     Parent/sibling w/ CABG, MI or angioplasty before 65F 55M? Yes     Comment: father 2 MIs   Social History Narrative    Caffeine intake/servings daily -  2-3 pop    Calcium intake/servings daily - 2 yogurts and 1 glass of milk    Exercise 6 times weekly - describe strength training and cardio    Sunscreen used - Yes    Seatbelts used - Yes    Guns stored in the home - No    Self Breast Exam - Yes    Pap test up to date -  Yes, as of today    Eye exam up to date -  Yes    Dental exam up to date -  Yes    DEXA scan up to date -  Not Applicable    Flex Sig/Colonoscopy up to date -  Not Applicable    Mammography up to date -  Not Applicable    Immunizations reviewed and up to date - Yes, 03/2008    Abuse: Current or Past (Physical, Sexual or Emotional) - No    Do you feel safe in your environment - Yes    Do you cope well with stress - Yes    Do you suffer from insomnia - No    Last updated by: Lit Licona  10/27/2009                 Social Drivers of Health     Financial Resource Strain: Low Risk  (2/1/2024)    Financial Resource Strain      Within the past 12 months, have you or your family members you live with been unable to get utilities (heat, electricity) when it was really needed?: No   Food Insecurity: Low Risk  (2/1/2024)    Food Insecurity      Within the past 12 months, did you worry that your food would run out before you got money to buy more?: No      Within the past 12 months, did the food you bought just not last and you didn t have money to get more?: No   Transportation Needs: Low Risk  (2/1/2024)    Transportation Needs      Within the past 12 months, has lack of transportation kept you from medical appointments, getting your medicines, non-medical meetings or appointments, work, or from getting things that you need?: No   Physical Activity: Not on file   Stress: Not on file   Social Connections: Not on file   Interpersonal Safety: Low Risk  (11/20/2024)    Interpersonal Safety      Do you feel physically and emotionally safe where you currently live?: Yes      Within the past 12 months, have you been hit, slapped, kicked or otherwise physically hurt by  someone?: No      Within the past 12 months, have you been humiliated or emotionally abused in other ways by your partner or ex-partner?: No   Housing Stability: Low Risk  (2024)    Housing Stability      Do you have housing? : Yes      Are you worried about losing your housing?: No   Has 2 grown children.  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      FAMILY HISTORY:  Family History   Problem Relation Age of Onset     Thyroid Disease Mother      Breast Cancer Mother 56        breast, 3 years later     Heart Disease Father         2x heart attacks     Circulatory Father         blood clots     Cardiovascular Father         patent foramen ovale, repaired x 2, afib     Cerebrovascular Disease Father         x2     C.A.D. Father         x2     Genitourinary Problems Father         kidney disease     Asthma Brother      Food Allergy Brother      Eczema Brother      No Known Problems Maternal Grandmother      Myocardial Infarction Maternal Grandfather      Hypertension Paternal Grandmother      Alcohol/Drug Paternal Grandfather      No Known Problems Daughter      No Known Problems Son      Cancer Paternal Aunt         cervical cancer(another sisiter)     Breast Cancer Paternal Aunt         breast cancer at 70's     Breast Cancer Paternal Aunt      Ovarian Cancer Paternal Aunt      Cancer - colorectal No family hx of      Diabetes No family hx of          PHYSICAL EXAM:  Vital signs:  There were no vitals taken for this visit.   ECO  GENERAL/CONSTITUTIONAL: No acute distress.  EYES: No erythema or scleral icterus.  LYMPH: No cervical, supraclavicular, axillary adenopathy.  BREAST: Bilateral breasts surgically absent with implants in place and no chest wall masses or periprosthetic fluid. No skin erythema. Implants are asymmetric.  RESPIRATORY: No audible cough or wheezing.   GASTROINTESTINAL: No hepatosplenomegaly, masses, or  tenderness. No guarding.  No distention.  MUSCULOSKELETAL: Warm and well-perfused, no cyanosis, clubbing, or edema.  NEUROLOGIC: No focal motor deficits. Alert, oriented, answers questions appropriately.  INTEGUMENTARY: No rashes or jaundice.  GAIT: Steady, does not use assistive device       LABS:  CBC RESULTS:   Recent Labs   Lab Test 06/24/24  1203   WBC 5.0   RBC 4.08   HGB 12.1   HCT 36.4   MCV 89   MCH 29.7   MCHC 33.2   RDW 16.3*         Last Comprehensive Metabolic Panel:  Sodium   Date Value Ref Range Status   05/23/2024 142 135 - 145 mmol/L Final     Comment:     Reference intervals for this test were updated on 09/26/2023 to more accurately reflect our healthy population. There may be differences in the flagging of prior results with similar values performed with this method. Interpretation of those prior results can be made in the context of the updated reference intervals.    09/10/2012 140 133 - 144 mmol/L Final     Potassium   Date Value Ref Range Status   05/23/2024 4.0 3.4 - 5.3 mmol/L Final   12/30/2021 3.9 3.4 - 5.3 mmol/L Final   09/10/2012 4.2 3.4 - 5.3 mmol/L Final     Chloride   Date Value Ref Range Status   05/23/2024 110 (H) 98 - 107 mmol/L Final   12/30/2021 110 (H) 94 - 109 mmol/L Final   09/10/2012 104 94 - 109 mmol/L Final     Carbon Dioxide   Date Value Ref Range Status   09/10/2012 25 20 - 32 mmol/L Final     Carbon Dioxide (CO2)   Date Value Ref Range Status   05/23/2024 24 22 - 29 mmol/L Final   12/30/2021 27 20 - 32 mmol/L Final     Anion Gap   Date Value Ref Range Status   05/23/2024 8 7 - 15 mmol/L Final   12/30/2021 5 3 - 14 mmol/L Final   09/10/2012 11 6 - 17 mmol/L Final     Glucose   Date Value Ref Range Status   05/23/2024 93 70 - 99 mg/dL Final   12/30/2021 73 70 - 99 mg/dL Final   02/21/2017 94 70 - 99 mg/dL Final     Comment:     Fasting specimen     GLUCOSE BY METER POCT   Date Value Ref Range Status   02/22/2024 106 (H) 70 - 99 mg/dL Final     Urea Nitrogen   Date  Value Ref Range Status   05/23/2024 15.3 6.0 - 20.0 mg/dL Final   12/30/2021 16 7 - 30 mg/dL Final   09/10/2012 12 5 - 24 mg/dL Final     Creatinine   Date Value Ref Range Status   05/23/2024 1.11 (H) 0.51 - 0.95 mg/dL Final   09/10/2012 0.81 0.52 - 1.04 mg/dL Final     GFR Estimate   Date Value Ref Range Status   05/23/2024 63 >60 mL/min/1.73m2 Final   09/10/2012 82 >60 mL/min/1.7m2 Final     Calcium   Date Value Ref Range Status   05/23/2024 9.0 8.6 - 10.0 mg/dL Final   09/10/2012 9.3 8.5 - 10.4 mg/dL Final     Bilirubin Total   Date Value Ref Range Status   05/23/2024 0.2 <=1.2 mg/dL Final   09/10/2012 0.6 0.2 - 1.3 mg/dL Final     Alkaline Phosphatase   Date Value Ref Range Status   05/23/2024 86 40 - 150 U/L Final   09/10/2012 53 40 - 150 U/L Final     ALT   Date Value Ref Range Status   05/23/2024 31 0 - 50 U/L Final     Comment:     Reference intervals for this test were updated on 6/12/2023 to more accurately reflect our healthy population. There may be differences in the flagging of prior results with similar values performed with this method. Interpretation of those prior results can be made in the context of the updated reference intervals.     09/10/2012 27 0 - 50 U/L Final     AST   Date Value Ref Range Status   05/23/2024 40 0 - 45 U/L Final     Comment:     Reference intervals for this test were updated on 6/12/2023 to more accurately reflect our healthy population. There may be differences in the flagging of prior results with similar values performed with this method. Interpretation of those prior results can be made in the context of the updated reference intervals.   09/10/2012 81 (H) 0 - 45 U/L Final       ASSESSMENT/PLAN:  Angelica Gomez is a 44 year old female with the following issues:  1. Stage IA, [id number], grade 2 invasive ductal carcinoma of the left upper outer breast, ER positive 20%, AL positive 20%, HER-2 negative, MammaPrint high risk, Luminal B  2. CHEK2 mutation  3. Brain fog  "and fatigue, improved  --Lisandra is s/p bilateral mastectomies with final pathology which showed a left breast 1.2 cm tumor with repeat ER positive at 61-70%, SD positive at 71-80%, HER-2 negative (IHC = 1+)  --MammaPrint + BluePrint showed high risk, Luminal B subtype.  --She completed 4 cycles of adjuvant chemotherapy with Taxotere and Cytoxan on 5/28/2024.   --On 7/8/2024, she started ovarian suppression therapy with Zoladex and anastrozole, so far tolerating this well.  --Brain fog and fatigue have improved with higher dose of Wellbutrin.  --She will continue with colonoscopy screening on high-risk basis.  --6/12/2024 Baseline DEXA showed normal bone density. Repeat in 2026.  --She will continue seeing Carina at [facility] for 90-minute sessions with cupping and microneedling every 2-3 weeks for maintenance.   --She will undergo implant exchange on the left on 4/1/2025 with Dr. Venegas due to asymmetry.    4. Fertility   --She is s/p tubal ligation and does not wish to have any more children.    5. Insomnia  --May continue lorazepam as needed.    Return in October as she is seeing Dr. Phillips in July.    Magali Roland MD  [facility] Hematology/Oncology     Total time spent today: 30 minutes in chart review, patient evaluation, counseling, documentation, test and/or medication/prescription orders, and coordination of care.      The longitudinal plan of care for the diagnosis(es)/condition(s) as documented were addressed during this visit. Due to the added complexity in care, I will continue to support Lisandra in the subsequent management and with ongoing continuity of care.              Oncology Rooming Note    March 3, 2025 8:45 AM   Angelica Gomez is a 44 year old female who presents for:    Chief Complaint   Patient presents with     Oncology Clinic Visit     Initial Vitals: /86   Pulse 81   Resp 16   Ht 1.626 m (5' 4\")   Wt 83.1 kg (183 lb 3.2 oz)   SpO2 97%   BMI 31.45 kg/m   " "Estimated body mass index is 31.45 kg/m  as calculated from the following:    Height as of this encounter: 1.626 m (5' 4\").    Weight as of this encounter: 83.1 kg (183 lb 3.2 oz). Body surface area is 1.94 meters squared.  No Pain (0) Comment: Data Unavailable   No LMP recorded. Patient is perimenopausal.  Allergies reviewed: Yes  Medications reviewed: Yes    Medications: Medication refills not needed today.  Pharmacy name entered into [facility]:    [facility] [place], [place] - [address]  [facility] [place], [place] - [address]    Frailty Screening:   Is the patient here for a new oncology consult visit in cancer care? 2. No    PHQ9:  Did this patient require a PHQ9?: No          Neyda Hayden MA              Again, thank you for allowing me to participate in the care of your patient.        Sincerely,        Magali Roland MD    Electronically signed"

## 2025-03-03 NOTE — PROGRESS NOTES
"        Oncology Rooming Note    March 3, 2025 8:45 AM   Angelica Gomez is a 44 year old female who presents for:    Chief Complaint   Patient presents with    Oncology Clinic Visit     Initial Vitals: /86   Pulse 81   Resp 16   Ht 1.626 m (5' 4\")   Wt 83.1 kg (183 lb 3.2 oz)   SpO2 97%   BMI 31.45 kg/m   Estimated body mass index is 31.45 kg/m  as calculated from the following:    Height as of this encounter: 1.626 m (5' 4\").    Weight as of this encounter: 83.1 kg (183 lb 3.2 oz). Body surface area is 1.94 meters squared.  No Pain (0) Comment: Data Unavailable   No LMP recorded. Patient is perimenopausal.  Allergies reviewed: Yes  Medications reviewed: Yes    Medications: Medication refills not needed today.  Pharmacy name entered into [employer]:    [facility] - [place], [place] - [address]  [facility] [place], [place] - [address]    Frailty Screening:   Is the patient here for a new oncology consult visit in cancer care? 2. No    PHQ9:  Did this patient require a PHQ9?: No          Neyda Hayden MA            "

## 2025-03-04 NOTE — PROGRESS NOTES
"Breast care follow-up note    Angelica Gomez presents today for breast care follow-up exam.  She underwent bilateral nipple sparing mastectomy in February of last year.  She underwent adjuvant chemotherapy but no radiation.  She has had her final implant placement and is healing well.      Physical Exam:  /82   Pulse 95   Resp 16   Ht 1.626 m (5' 4\")   Wt 81.6 kg (180 lb)   SpO2 100%   BMI 30.90 kg/m    Patient appears healthy and alert.  Pleasant affect  No cervical lymphadenopathy or thyroid fullness.  Breathing comfortably, lung fields clear  Bilateral breast exam performed.  Well-healed bilateral nipple sparing mastectomy.  No evidence for new lumps or bumps.  No axillary lymphadenopathy.    No recent imaging studies    Assessment and plan: Angelica Gomez returns for breast care follow-up.  She is 1 year out from bilateral mastectomy for invasive ductal carcinoma.  Underwent immediate reconstruction and required adjuvant chemotherapy.  She has no new complaints or evidence of recurrence on anastrozole and Zoladex.  I would like to see her again in 1 year.  Thank you for the opportunity to help in her care.    Bean Phillips M.D.  [facility]  [phone]    Please route or send letter to:  Primary Care Provider (PCP) and Referring Provider    "

## 2025-03-05 ENCOUNTER — OFFICE VISIT (OUTPATIENT)
Dept: FAMILY MEDICINE | Facility: CLINIC | Age: 45
End: 2025-03-05
Payer: COMMERCIAL

## 2025-03-05 VITALS
HEART RATE: 71 BPM | WEIGHT: 185 LBS | HEIGHT: 64 IN | OXYGEN SATURATION: 100 % | TEMPERATURE: 98.1 F | BODY MASS INDEX: 31.58 KG/M2 | RESPIRATION RATE: 16 BRPM | DIASTOLIC BLOOD PRESSURE: 74 MMHG | SYSTOLIC BLOOD PRESSURE: 128 MMHG

## 2025-03-05 DIAGNOSIS — C50.412 MALIGNANT NEOPLASM OF UPPER-OUTER QUADRANT OF LEFT BREAST IN FEMALE, ESTROGEN RECEPTOR POSITIVE (H): ICD-10-CM

## 2025-03-05 DIAGNOSIS — F43.22 ADJUSTMENT DISORDER WITH ANXIETY: ICD-10-CM

## 2025-03-05 DIAGNOSIS — Z17.0 MALIGNANT NEOPLASM OF UPPER-OUTER QUADRANT OF LEFT BREAST IN FEMALE, ESTROGEN RECEPTOR POSITIVE (H): ICD-10-CM

## 2025-03-05 DIAGNOSIS — Z01.818 PREOP GENERAL PHYSICAL EXAM: Primary | ICD-10-CM

## 2025-03-05 LAB
ANION GAP SERPL CALCULATED.3IONS-SCNC: 9 MMOL/L (ref 7–15)
BASOPHILS # BLD AUTO: 0 10E3/UL (ref 0–0.2)
BASOPHILS NFR BLD AUTO: 1 %
BUN SERPL-MCNC: 12.1 MG/DL (ref 6–20)
CALCIUM SERPL-MCNC: 9.6 MG/DL (ref 8.8–10.4)
CHLORIDE SERPL-SCNC: 105 MMOL/L (ref 98–107)
CREAT SERPL-MCNC: 1 MG/DL (ref 0.51–0.95)
EGFRCR SERPLBLD CKD-EPI 2021: 71 ML/MIN/1.73M2
EOSINOPHIL # BLD AUTO: 0.1 10E3/UL (ref 0–0.7)
EOSINOPHIL NFR BLD AUTO: 2 %
ERYTHROCYTE [DISTWIDTH] IN BLOOD BY AUTOMATED COUNT: 12.1 % (ref 10–15)
GLUCOSE SERPL-MCNC: 89 MG/DL (ref 70–99)
HCO3 SERPL-SCNC: 27 MMOL/L (ref 22–29)
HCT VFR BLD AUTO: 39.1 % (ref 35–47)
HGB BLD-MCNC: 13.3 G/DL (ref 11.7–15.7)
IMM GRANULOCYTES # BLD: 0 10E3/UL
IMM GRANULOCYTES NFR BLD: 0 %
LYMPHOCYTES # BLD AUTO: 0.8 10E3/UL (ref 0.8–5.3)
LYMPHOCYTES NFR BLD AUTO: 21 %
MCH RBC QN AUTO: 29.2 PG (ref 26.5–33)
MCHC RBC AUTO-ENTMCNC: 34 G/DL (ref 31.5–36.5)
MCV RBC AUTO: 86 FL (ref 78–100)
MONOCYTES # BLD AUTO: 0.3 10E3/UL (ref 0–1.3)
MONOCYTES NFR BLD AUTO: 9 %
NEUTROPHILS # BLD AUTO: 2.6 10E3/UL (ref 1.6–8.3)
NEUTROPHILS NFR BLD AUTO: 68 %
PLATELET # BLD AUTO: 209 10E3/UL (ref 150–450)
POTASSIUM SERPL-SCNC: 4.5 MMOL/L (ref 3.4–5.3)
RBC # BLD AUTO: 4.56 10E6/UL (ref 3.8–5.2)
SODIUM SERPL-SCNC: 141 MMOL/L (ref 135–145)
WBC # BLD AUTO: 3.9 10E3/UL (ref 4–11)

## 2025-03-05 ASSESSMENT — PAIN SCALES - GENERAL: PAINLEVEL_OUTOF10: NO PAIN (0)

## 2025-03-05 NOTE — PROGRESS NOTES
Preoperative Evaluation  [address] [address]  Phone: [phone]  Fax: [phone]  Primary Provider: DEISI Hein CNP  Pre-op Performing Provider: Elise Chun DO  Mar 5, 2025             3/3/2025   Surgical Information   What procedure is being done? Breast Reconstruction, Fat Grafting   Facility or Hospital where procedure/surgery will be performed: [facility]   Who is doing the procedure / surgery? Dr. Elisabet Venegas   Date of surgery / procedure: 4/1/2025   Time of surgery / procedure: 1:30pm   Where do you plan to recover after surgery? at home with family     Fax number for surgical facility: [phone]    Assessment & Plan     The proposed surgical procedure is considered INTERMEDIATE risk.    Preop general physical exam  Approval given to proceed with surgery. Preop labs CBC and BMP today. Last labs done during chemo    Malignant neoplasm of upper-outer quadrant of left breast in female, estrogen receptor positive (H)  Scheduled for breast reconstruction surgery. Continue anastrozole     Adjustment disorder with anxiety  On wellbutrin, stable. Continue on day of surgery            - No identified additional risk factors other than previously addressed    Antiplatelet or Anticoagulation Medication Instructions   - We reviewed the medication list and the patient is not on an antiplatelet or anticoagulation medications.    Additional Medication Instructions      - Continue wellbutrin and anastrazole  the day of surgery   - Can take pamleor the night before surgery  - HOLD all supplements the week before surgery   - Can use PRN medications before surgery (imitrex, compazine, zyrtec, hydroxyzine).      Recommendation  Approval given to proceed with proposed procedure, without further diagnostic evaluation.    Geovany Fry is a 44 year old, presenting for the following:  Pre-Op Exam           3/5/2025     8:28 AM   Additional Questions   Roomed by Estrellita HUNTER: this is third surgery. Had mastectomy earlier this year. Having fat grafting from abdomen.      Does have heavy periods since coming off birth contro when diagnosed with breast cancer.     Dad with MI in his 30s and had PFO then stroke.     Not a smoker.   Occasional alcohol use     Exercise - treadmill and step machine, weight lifting, yoga/pilates at least 4 days/week.                     3/3/2025   Pre-Op Questionnaire   Have you ever had a heart attack or stroke? No   Have you ever had surgery on your heart or blood vessels, such as a stent placement, a coronary artery bypass, or surgery on an artery in your head, neck, heart, or legs? No   Do you have chest pain with activity? No   Do you have a history of heart failure? No   Do you currently have a cold, bronchitis or symptoms of other infection? No   Do you have a cough, shortness of breath, or wheezing? No   Do you or anyone in your family have previous history of blood clots? (!) YES - had PFO, a fib and CVA/MI    Do you or does anyone in your family have a serious bleeding problem such as prolonged bleeding following surgeries or cuts? No   Have you ever had problems with anemia or been told to take iron pills? No   Have you had any abnormal blood loss such as black, tarry or bloody stools, or abnormal vaginal bleeding? No   Have you ever had a blood transfusion? No   Are you willing to have a blood transfusion if it is medically needed before, during, or after your surgery? Yes   Have you or any of your relatives ever had problems with anesthesia? No   Do you have sleep apnea, excessive snoring or daytime drowsiness? No   Do you have any artifical heart valves or other implanted medical devices like a pacemaker, defibrillator, or continuous glucose monitor? No   Do you have artificial joints? No   Are you allergic to latex? No     Health Care Directive  Patient has a Health Care  Directive on file      Preoperative Review of    reviewed - no record of controlled substances prescribed.      Status of Chronic Conditions:  See problem list for active medical problems.  Problems all longstanding and stable, except as noted/documented.  See ROS for pertinent symptoms related to these conditions.    Patient Active Problem List    Diagnosis Date Noted    Chronic mixed headache syndrome 08/10/2024     Priority: Medium    Malignant neoplasm of upper-outer quadrant of left breast in female, estrogen receptor positive (H) 03/11/2024     Priority: Medium    Adjustment disorder with anxiety 03/11/2024     Priority: Medium    Ovarian cyst 11/08/2009     Priority: Medium    Dyspareunia 10/27/2009     Priority: Medium      Past Medical History:   Diagnosis Date    Malignant neoplasm of upper-outer quadrant of left breast in female, estrogen receptor positive (H) 03/11/2024    Migraine     NO ACTIVE PROBLEMS      Past Surgical History:   Procedure Laterality Date    BIOPSY  12/2023    COLONOSCOPY  2/2024    COLONOSCOPY WITH CO2 INSUFFLATION N/A 02/01/2024    Procedure: Colonoscopy with CO2 insufflation;  Surgeon: Cherise Lima DO;  Location:  OR    COSMETIC SURGERY  6/2013    Breast Augmentation (saline)    INSERT TISSUE EXPANDER BREAST BILATERAL Bilateral 02/21/2024    Procedure: Insertion tissue expander, breasts, bilateral;  Surgeon: Elisabet Venegas MD;  Location:  OR    IR CHEST PORT PLACEMENT > 5 YRS OF AGE  04/11/2024    IR PORT REMOVAL RIGHT  06/14/2024    MASTECTOMY, NIPPLE SPARING Bilateral 02/21/2024    Procedure: Bilateral nipple sparing mastectomy, bilateral implant removal, left sentinel lymph node biopsy;  Surgeon: Aba Phillips MD;  Location: [facility] OR    RECONSTRUCT BREAST BILATERAL, IMPLANT PROSTHESIS BILATERAL, COMBINED Bilateral 07/30/2024    Procedure: RECONSTRUCTION BILATERAL BREASTS WITH IMPLANTS;  Surgeon: Elisabet Venegas MD;  Location:  OR    TUBAL  LIGATION  2007    [facility]  DELIVERY ONLY  2007    superficial wound dehiscence     Current Outpatient Medications   Medication Sig Dispense Refill    anastrozole (ARIMIDEX) 1 MG tablet Take 1 tablet (1 mg) by mouth daily 90 tablet 3    buPROPion (WELLBUTRIN XL) 300 MG 24 hr tablet Take 1 tablet (300 mg) by mouth every morning. 90 tablet 3    Calcium Carbonate-Vitamin D (CALCIUM 500 + D PO)       Cetirizine HCl (ZYRTEC ALLERGY PO) Take 10 mg by mouth daily      COMPRESSION STOCKINGS Please measure and distribute 2 pair of 20mmHg - 30mmHg thigh high open or closed toe compression stockings with extra refills as indicated. 2 each 4    Fluticasone Propionate (FLONASE NA) Spray 1 spray in nostril daily      goserelin (ZOLADEX) 3.6 MG injection Inject 3.6 mg subcutaneously once. Monthly      hydrOXYzine HCl (ATARAX) 25 MG tablet Take 1-2 tablets (25-50 mg) by mouth 3 times daily as needed for anxiety or other (sleep). 90 tablet 1    Multiple Vitamins-Minerals (MULTIVITAMIN & MINERAL PO) Take 1 each by mouth daily. [facility] women's active supplement      nortriptyline (PAMELOR) 50 MG capsule Take 1 capsule (50 mg) by mouth at bedtime 90 capsule 3    Probiotic Product (PROBIOTIC PO) Reported on 3/1/2017      prochlorperazine (COMPAZINE) 10 MG tablet Take 1 tablet (10 mg) by mouth every 6 hours as needed for nausea or vomiting. 30 tablet 3    SUMAtriptan (IMITREX) 25 MG tablet Take 1 tablet (25 mg) by mouth at onset of headache for migraine. May repeat in 2 hours. Max 8 tablets/24 hours. 18 tablet 11       No Known Allergies     Social History     Tobacco Use    Smoking status: Former     Current packs/day: 0.25     Average packs/day: 0.3 packs/day for 1 year (0.3 ttl pk-yrs)     Types: Cigarettes    Smokeless tobacco: Never   Substance Use Topics    Alcohol use: Yes     Family History   Problem Relation Age of Onset    Thyroid Disease Mother     Breast Cancer Mother 56        breast, 3 years later    Heart  "Disease Father         2x heart attacks    Circulatory Father         blood clots    Cardiovascular Father         patent foramen ovale, repaired x 2, afib    Cerebrovascular Disease Father         x2    C.A.D. Father         x2    Genitourinary Problems Father         kidney disease    Diabetes Father     Hypertension Father     Substance Abuse Father     Obesity Father     Asthma Brother     Food Allergy Brother     Eczema Brother     Cerebrovascular Disease Maternal Grandmother     Myocardial Infarction Maternal Grandfather     Hypertension Paternal Grandmother     Cerebrovascular Disease Paternal Grandmother     Alcohol/Drug Paternal Grandfather     Substance Abuse Paternal Grandfather     No Known Problems Daughter     No Known Problems Son     Cancer Paternal Aunt         cervical cancer(another sisiter)    Breast Cancer Paternal Aunt         breast cancer at 70's    Breast Cancer Paternal Aunt     Ovarian Cancer Paternal Aunt     Hypertension Brother     Substance Abuse Brother     Asthma Brother     Obesity Brother     Breast Cancer Other         Paternal Aunt    Cancer - colorectal No family hx of      History   Drug Use No             Review of Systems  Constitutional, HEENT, cardiovascular, pulmonary, gi and gu systems are negative, except as otherwise noted.    Objective    /74   Pulse 71   Temp 98.1  F (36.7  C) (Oral)   Resp 16   Ht 1.626 m (5' 4\")   Wt 83.9 kg (185 lb)   SpO2 100%   BMI 31.76 kg/m     Estimated body mass index is 31.76 kg/m  as calculated from the following:    Height as of this encounter: 1.626 m (5' 4\").    Weight as of this encounter: 83.9 kg (185 lb).  Physical Exam  GENERAL: alert and no distress  EYES: Eyes grossly normal to inspection, PERRL and conjunctivae and sclerae normal  HENT: ear canals and TM's normal, nose and mouth without ulcers or lesions  NECK: no adenopathy, no asymmetry, masses, or scars  RESP: lungs clear to auscultation - no rales, rhonchi or " wheezes  CV: regular rate and rhythm, normal S1 S2, no S3 or S4, no murmur, click or rub, no peripheral edema  ABDOMEN: soft, nontender, no hepatosplenomegaly, no masses and bowel sounds normal  MS: no gross musculoskeletal defects noted, no edema  SKIN: no suspicious lesions or rashes  NEURO: Normal strength and tone, mentation intact and speech normal  PSYCH: mentation appears normal, affect normal/bright    Recent Labs   Lab Test 06/24/24  1203 05/23/24  1427 05/21/24  1927   HGB 12.1 10.8* 10.8*    172 166   NA  --  142 140   POTASSIUM  --  4.0 4.2   CR  --  1.11* 0.83        Diagnostics  Labs pending at this time.  Results will be reviewed when available.  Recent Results (from the past 24 hours)   CBC with platelets and differential    Collection Time: 03/05/25  9:06 AM   Result Value Ref Range    WBC Count 3.9 (L) 4.0 - 11.0 10e3/uL    RBC Count 4.56 3.80 - 5.20 10e6/uL    Hemoglobin 13.3 11.7 - 15.7 g/dL    Hematocrit 39.1 35.0 - 47.0 %    MCV 86 78 - 100 fL    MCH 29.2 26.5 - 33.0 pg    MCHC 34.0 31.5 - 36.5 g/dL    RDW 12.1 10.0 - 15.0 %    Platelet Count 209 150 - 450 10e3/uL    % Neutrophils 68 %    % Lymphocytes 21 %    % Monocytes 9 %    % Eosinophils 2 %    % Basophils 1 %    % Immature Granulocytes 0 %    Absolute Neutrophils 2.6 1.6 - 8.3 10e3/uL    Absolute Lymphocytes 0.8 0.8 - 5.3 10e3/uL    Absolute Monocytes 0.3 0.0 - 1.3 10e3/uL    Absolute Eosinophils 0.1 0.0 - 0.7 10e3/uL    Absolute Basophils 0.0 0.0 - 0.2 10e3/uL    Absolute Immature Granulocytes 0.0 <=0.4 10e3/uL      No EKG required, no history of coronary heart disease, significant arrhythmia, peripheral arterial disease or other structural heart disease.    Revised Cardiac Risk Index (RCRI)  The patient has the following serious cardiovascular risks for perioperative complications:   - No serious cardiac risks = 0 points     RCRI Interpretation: 0 points: Class I (very low risk - 0.4% complication rate)         Signed  Electronically by: Elise Chun, DO  A copy of this evaluation report is provided to the requesting physician.

## 2025-03-05 NOTE — PATIENT INSTRUCTIONS
Medication instructions:   - Continue wellbutrin and anastrazole  the day of surgery   - Can take pamleor the night before surgery  - HOLD all supplements the week before surgery   - Can use PRN medications before surgery (imitrex, compazine, zyrtec, hydroxyzine).

## 2025-03-17 ENCOUNTER — ALLIED HEALTH/NURSE VISIT (OUTPATIENT)
Dept: INFUSION THERAPY | Facility: CLINIC | Age: 45
End: 2025-03-17
Attending: INTERNAL MEDICINE
Payer: COMMERCIAL

## 2025-03-17 VITALS
HEART RATE: 69 BPM | DIASTOLIC BLOOD PRESSURE: 85 MMHG | OXYGEN SATURATION: 100 % | SYSTOLIC BLOOD PRESSURE: 127 MMHG | RESPIRATION RATE: 18 BRPM | TEMPERATURE: 97.6 F

## 2025-03-17 DIAGNOSIS — Z17.0 MALIGNANT NEOPLASM OF UPPER-OUTER QUADRANT OF LEFT BREAST IN FEMALE, ESTROGEN RECEPTOR POSITIVE (H): Primary | ICD-10-CM

## 2025-03-17 DIAGNOSIS — C50.412 MALIGNANT NEOPLASM OF UPPER-OUTER QUADRANT OF LEFT BREAST IN FEMALE, ESTROGEN RECEPTOR POSITIVE (H): Primary | ICD-10-CM

## 2025-03-17 PROCEDURE — 1126F AMNT PAIN NOTED NONE PRSNT: CPT

## 2025-03-17 PROCEDURE — 3079F DIAST BP 80-89 MM HG: CPT

## 2025-03-17 PROCEDURE — 250N000011 HC RX IP 250 OP 636: Mod: JZ | Performed by: INTERNAL MEDICINE

## 2025-03-17 PROCEDURE — 96402 CHEMO HORMON ANTINEOPL SQ/IM: CPT

## 2025-03-17 PROCEDURE — 3074F SYST BP LT 130 MM HG: CPT

## 2025-03-17 RX ADMIN — GOSERELIN ACETATE 3.6 MG: 3.6 IMPLANT SUBCUTANEOUS at 08:45

## 2025-03-17 NOTE — PROGRESS NOTES
Infusion Nursing Note:  Angelica Harrellremba presents today for Zoladex.    Patient seen by provider today: No   present during visit today: Not Applicable.    Note: N/A.      Intravenous Access:  No Intravenous access/labs at this visit.    Treatment Conditions:  Not Applicable.      Post Infusion Assessment:  Patient tolerated injection without incident.  Site patent and intact, free from redness, edema or discomfort.       Discharge Plan:   Discharge instructions reviewed with: Patient.  Patient and/or family verbalized understanding of discharge instructions and all questions answered.  AVS to patient via [employer] | [employer].  Patient will return 4/14/25 for next appointment.   Patient discharged in stable condition accompanied by: self.  Departure Mode: Ambulatory.      Tali Coles RN

## 2025-04-08 ENCOUNTER — VIRTUAL VISIT (OUTPATIENT)
Dept: PSYCHIATRY | Facility: CLINIC | Age: 45
End: 2025-04-08
Attending: PSYCHIATRY & NEUROLOGY
Payer: COMMERCIAL

## 2025-04-08 VITALS — BODY MASS INDEX: 30.02 KG/M2 | WEIGHT: 180.2 LBS | HEIGHT: 65 IN

## 2025-04-08 DIAGNOSIS — G47.00 INSOMNIA, UNSPECIFIED TYPE: ICD-10-CM

## 2025-04-08 DIAGNOSIS — F43.22 ADJUSTMENT DISORDER WITH ANXIETY: ICD-10-CM

## 2025-04-08 RX ORDER — BUPROPION HYDROCHLORIDE 300 MG/1
300 TABLET ORAL EVERY MORNING
Qty: 90 TABLET | Refills: 3 | Status: SHIPPED | OUTPATIENT
Start: 2025-04-08

## 2025-04-08 RX ORDER — HYDROXYZINE HYDROCHLORIDE 25 MG/1
25-50 TABLET, FILM COATED ORAL 3 TIMES DAILY PRN
Qty: 90 TABLET | Refills: 1 | Status: SHIPPED | OUTPATIENT
Start: 2025-04-08

## 2025-04-08 NOTE — PATIENT INSTRUCTIONS
PLAN                                                                                                       1) Meds    CONTINUE Bupropion (Wellbutrin XL) 300 mg daily in the morning  CONTINUE hydroxyzine 25-50 mg as needed for anxiety or sleep   CONTINUE lorazepam 0.5 mg as needed for anxiety or sleep (prescribed by oncology)     Medications Prescribed by Neurology  Sumatriptan 25 mg as needed for migraine headaches  Nortriptyline 50 mg daily for prevention of migraine headaches     2) Other: Continue regular therapy.    3) RTC: As needed.     4) CRISIS Numbers:      **For crisis resources, please see the information at the end of this document**     Patient Education      Thank you for coming to the [facility] [place] [facility].     Lab Testing:  If you had lab testing today and your results are reassuring or normal they will be mailed to you or sent through [employer] within 7 days. If the lab tests need quick action we will call you with the results. The phone number we will call with results is # [phone]. If this is not the best number please call our clinic and change the number.     Medication Refills:  If you need any refills please call your pharmacy and they will contact us. Our fax number for refills is [phone].   Three business days of notice are needed for general medication refill requests.   Five business days of notice are needed for controlled substance refill requests.   If you need to change to a different pharmacy, please contact the new pharmacy directly. The new pharmacy will help you get your medications transferred.     Contact Us:  Please call [phone] during business hours (8-5:00 M-F).   If you have medication related questions after clinic hours, or on the weekend, please call [phone].     Financial Assistance [phone]   Medical Records [phone]       MENTAL HEALTH CRISIS RESOURCES:  For a emergency help, please call 911 or go to the  [facility] Emergency Department.     Emergency Walk-In Options:   EmPATH Unit @ [place] ([place]): [phone] - Specialized mental health emergency area designed to be calming  [facility] ([place]): [phone]  [facility] ([place]): [phone]  [facility] ([place]): [phone]    [facility] Crisis Information:   [place]: [phone]  [place]: [phone]  [place] ([place]) - Adult: [phone]     Child: [phone]  [place] - Adult: [phone]     Child: [phone]  [place]: [phone]  List of all [facility] resources:   https://[email or web address]/dhs/people-[email or web address]-serve/adults/health-care/mental-health/resources/crisis-contacts.jsp    National Crisis Information:   Crisis Text Line: Text  [place]  to 709386  Suicide & Crisis Lifeline: 988  National Suicide Prevention Lifeline: 1-915-846-TALK (1-213.470.9108)       For online chat options, visit https://suicidepreventionlifeline.org/chat/  Poison Control Center: 1-111.440.8564  Trans Lifeline: 1-499.904.4539 - Hotline for transgender people of all ages  [employer] [place] [employer]: 5-094-455-0694 - Hotline for LGBT youth     For Non-Emergency Support:   Fast Tracker: Mental Health & Substance Use Disorder Resources -   https://www.[employer].org/
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
## 2025-04-08 NOTE — NURSING NOTE
Current patient location: [address] [facility] [address]    Is the patient currently in the state of [place]? YES    Visit mode: VIDEO    If the visit is dropped, the patient can be reconnected by:VIDEO VISIT: Text to cell phone:   Telephone Information:   Mobile [phone]       Will anyone else be joining the visit? NO  (If patient encounters technical issues they should call [phone]    Are changes needed to the allergy or medication list? No    Are refills needed on medications prescribed by this physician? NO    Rooming Documentation:  Patient will complete questionnaire(s) in [facility]    Reason for visit: RECHECK    Shawna STEVENSONF

## 2025-04-10 ENCOUNTER — OFFICE VISIT (OUTPATIENT)
Dept: FAMILY MEDICINE | Facility: CLINIC | Age: 45
End: 2025-04-10
Payer: COMMERCIAL

## 2025-04-10 VITALS
HEIGHT: 64 IN | HEART RATE: 78 BPM | SYSTOLIC BLOOD PRESSURE: 118 MMHG | WEIGHT: 186 LBS | TEMPERATURE: 97.5 F | OXYGEN SATURATION: 92 % | DIASTOLIC BLOOD PRESSURE: 74 MMHG | BODY MASS INDEX: 31.76 KG/M2

## 2025-04-10 DIAGNOSIS — L04.8 ACUTE INGUINAL LYMPHADENITIS: Primary | ICD-10-CM

## 2025-04-10 NOTE — PROGRESS NOTES
"  Assessment & Plan     Acute inguinal lymphadenitis  Exam concerning for lymphadenitis in bilateral (R>L) groin. Exam challenging due to post-surgical changes. She does have swelling and possible induration of suprapubic region. Tenderness in b/l groin, worse on right. She is also having night sweats and chills. Vitals today are stable. Given recent surgery, would recommend being proactive and starting Augmentin to cover for lymphadenopathy and cellulitis. Has follow up with surgeon next week.   - amoxicillin-clavulanate (AUGMENTIN) 875-125 MG tablet; Take 1 tablet by mouth 2 times daily for 7 days.        Subjective   Lisandra is a 45 year old, presenting for the following health issues:  Swelling    History of Present Illness       Reason for visit:  Concern for postop infection  Symptom onset:  1-3 days ago  Symptoms include:  Lymphadenopathy, pain, inflammation  Symptom intensity:  Moderate  Symptom progression:  Worsening  Had these symptoms before:  No  What makes it worse:  Sitting or standing up, certain movements  What makes it better:  Lying still   She is taking medications regularly.        Post op-  DOS 04/01/2025  Breast Reconstruction, Fat Grafting   Appt with surgeon next Wednesday     Lower abdomen-  All across lower abdomen. Now spreading to right side of abdomen towards belly button  Lymphopathy started 2-3 days ago. Pain,swelling, red(bruised). Creeping upward. More swelling. Having chills and night sweats.   Problem below grafting site. Minimal improvement.   Concerns for cellulitis                   Review of Systems  Constitutional, HEENT, cardiovascular, pulmonary, gi and gu systems are negative, except as otherwise noted.      Objective    /74 (BP Location: Right arm, Cuff Size: Adult Regular)   Pulse 78   Temp 97.5  F (36.4  C) (Oral)   Ht 1.626 m (5' 4\")   Wt 84.4 kg (186 lb)   LMP 02/15/2024 (Approximate)   SpO2 92%   BMI 31.93 kg/m    Body mass index is 31.93 kg/m .  Physical " Exam   GENERAL: alert and no distress  ABDOMEN: soft, tender on suprapubic region with firmness and swelling extending into bilateral groins with lymphadenopathy and tenderness and pain extension into RLQ abdomen, no hepatosplenomegaly, no masses and bowel sounds normal  SKIN: diffuse bruising on lower abdomen. Two intact clean incision sites on bilateral lateral lower abdomen.    PSYCH: mentation appears normal, affect normal/bright            Signed Electronically by: Elise Chun DO

## 2025-04-14 ENCOUNTER — ALLIED HEALTH/NURSE VISIT (OUTPATIENT)
Dept: INFUSION THERAPY | Facility: CLINIC | Age: 45
End: 2025-04-14
Attending: INTERNAL MEDICINE
Payer: COMMERCIAL

## 2025-04-14 VITALS
DIASTOLIC BLOOD PRESSURE: 91 MMHG | HEART RATE: 81 BPM | SYSTOLIC BLOOD PRESSURE: 138 MMHG | RESPIRATION RATE: 18 BRPM | OXYGEN SATURATION: 96 % | TEMPERATURE: 97.7 F

## 2025-04-14 DIAGNOSIS — C50.412 MALIGNANT NEOPLASM OF UPPER-OUTER QUADRANT OF LEFT BREAST IN FEMALE, ESTROGEN RECEPTOR POSITIVE (H): Primary | ICD-10-CM

## 2025-04-14 DIAGNOSIS — Z17.0 MALIGNANT NEOPLASM OF UPPER-OUTER QUADRANT OF LEFT BREAST IN FEMALE, ESTROGEN RECEPTOR POSITIVE (H): Primary | ICD-10-CM

## 2025-04-14 PROCEDURE — 250N000011 HC RX IP 250 OP 636: Mod: JZ | Performed by: INTERNAL MEDICINE

## 2025-04-14 PROCEDURE — 3075F SYST BP GE 130 - 139MM HG: CPT

## 2025-04-14 PROCEDURE — 96402 CHEMO HORMON ANTINEOPL SQ/IM: CPT

## 2025-04-14 PROCEDURE — 3080F DIAST BP >= 90 MM HG: CPT

## 2025-04-14 PROCEDURE — 1126F AMNT PAIN NOTED NONE PRSNT: CPT

## 2025-04-14 RX ADMIN — GOSERELIN ACETATE 3.6 MG: 3.6 IMPLANT SUBCUTANEOUS at 08:46

## 2025-04-14 ASSESSMENT — PAIN SCALES - GENERAL: PAINLEVEL_OUTOF10: NO PAIN (0)

## 2025-04-14 NOTE — PROGRESS NOTES
Infusion Nursing Note:  Angelica CANDY Gomez presents today for zoladex injection.    Patient seen by provider today: No   present during visit today: Not Applicable.    Note: N/A.      Intravenous Access:  No Intravenous access/labs at this visit.    Treatment Conditions:  Not Applicable.      Post Infusion Assessment:  Patient tolerated injection without incident.       Discharge Plan:   Discharge instructions reviewed with: Patient.  Patient and/or family verbalized understanding of discharge instructions and all questions answered.  Patient discharged in stable condition accompanied by: self.  Departure Mode: Ambulatory.      Marlena Tenorio RN

## 2025-05-12 ENCOUNTER — ALLIED HEALTH/NURSE VISIT (OUTPATIENT)
Dept: INFUSION THERAPY | Facility: CLINIC | Age: 45
End: 2025-05-12
Attending: INTERNAL MEDICINE
Payer: COMMERCIAL

## 2025-05-12 VITALS
DIASTOLIC BLOOD PRESSURE: 87 MMHG | HEART RATE: 74 BPM | RESPIRATION RATE: 18 BRPM | SYSTOLIC BLOOD PRESSURE: 134 MMHG | TEMPERATURE: 97.7 F | OXYGEN SATURATION: 97 %

## 2025-05-12 DIAGNOSIS — Z17.0 MALIGNANT NEOPLASM OF UPPER-OUTER QUADRANT OF LEFT BREAST IN FEMALE, ESTROGEN RECEPTOR POSITIVE (H): Primary | ICD-10-CM

## 2025-05-12 DIAGNOSIS — Z17.0 MALIGNANT NEOPLASM OF UPPER-OUTER QUADRANT OF LEFT BREAST IN FEMALE, ESTROGEN RECEPTOR POSITIVE (H): ICD-10-CM

## 2025-05-12 DIAGNOSIS — C50.412 MALIGNANT NEOPLASM OF UPPER-OUTER QUADRANT OF LEFT BREAST IN FEMALE, ESTROGEN RECEPTOR POSITIVE (H): ICD-10-CM

## 2025-05-12 DIAGNOSIS — C50.412 MALIGNANT NEOPLASM OF UPPER-OUTER QUADRANT OF LEFT BREAST IN FEMALE, ESTROGEN RECEPTOR POSITIVE (H): Primary | ICD-10-CM

## 2025-05-12 PROCEDURE — 96402 CHEMO HORMON ANTINEOPL SQ/IM: CPT

## 2025-05-12 PROCEDURE — 3075F SYST BP GE 130 - 139MM HG: CPT

## 2025-05-12 PROCEDURE — 250N000011 HC RX IP 250 OP 636: Mod: JZ | Performed by: INTERNAL MEDICINE

## 2025-05-12 PROCEDURE — 1126F AMNT PAIN NOTED NONE PRSNT: CPT

## 2025-05-12 PROCEDURE — 3079F DIAST BP 80-89 MM HG: CPT

## 2025-05-12 RX ORDER — ANASTROZOLE 1 MG/1
1 TABLET ORAL DAILY
Qty: 90 TABLET | Refills: 3 | Status: SHIPPED | OUTPATIENT
Start: 2025-05-12

## 2025-05-12 RX ADMIN — GOSERELIN ACETATE 3.6 MG: 3.6 IMPLANT SUBCUTANEOUS at 09:01

## 2025-05-12 NOTE — PROGRESS NOTES
Infusion Nursing Note:  Angelica Harrellremba presents today for Zoladex.    Patient seen by provider today: No   present during visit today: Not Applicable.    Note: N/A.      Intravenous Access:  No Intravenous access/labs at this visit.    Treatment Conditions:  Not Applicable.      Post Infusion Assessment:  Patient tolerated injection without incident.  Site patent and intact, free from redness, edema or discomfort.  No evidence of extravasations.       Discharge Plan:   Patient declined prescription refills.  Discharge instructions reviewed with: Patient.  Patient and/or family verbalized understanding of discharge instructions and all questions answered.  AVS to patient via [employer].  Patient will return 6/10 for next appointment.   Patient discharged in stable condition accompanied by: self.  Departure Mode: Ambulatory.      Wallace Jimenez RN

## 2025-06-02 NOTE — ADDENDUM NOTE
Addended by: CHAYA BECKHAM on: 9/5/2024 09:40 AM     Modules accepted: Orders     General Sunscreen Counseling: I recommended a broad spectrum sunscreen with a SPF of 30 or higher.  I explained that SPF 30 sunscreens block approximately 97 percent of the sun's harmful rays.  Sunscreens should be applied at least 15 minutes prior to expected sun exposure and then every 2 hours after that as long as sun exposure continues. If swimming or exercising sunscreen should be reapplied every 45 minutes to an hour after getting wet or sweating.  One ounce, or the equivalent of a shot glass full of sunscreen, is adequate to protect the skin not covered by a bathing suit. I also recommended a lip balm with a sunscreen as well. Sun protective clothing can be used in lieu of sunscreen but must be worn the entire time you are exposed to the sun's rays. Detail Level: Detailed

## 2025-06-10 ENCOUNTER — ALLIED HEALTH/NURSE VISIT (OUTPATIENT)
Dept: INFUSION THERAPY | Facility: CLINIC | Age: 45
End: 2025-06-10
Attending: INTERNAL MEDICINE
Payer: COMMERCIAL

## 2025-06-10 VITALS
DIASTOLIC BLOOD PRESSURE: 87 MMHG | OXYGEN SATURATION: 100 % | HEART RATE: 79 BPM | TEMPERATURE: 97.9 F | RESPIRATION RATE: 18 BRPM | SYSTOLIC BLOOD PRESSURE: 125 MMHG

## 2025-06-10 DIAGNOSIS — C50.412 MALIGNANT NEOPLASM OF UPPER-OUTER QUADRANT OF LEFT BREAST IN FEMALE, ESTROGEN RECEPTOR POSITIVE (H): Primary | ICD-10-CM

## 2025-06-10 DIAGNOSIS — Z17.0 MALIGNANT NEOPLASM OF UPPER-OUTER QUADRANT OF LEFT BREAST IN FEMALE, ESTROGEN RECEPTOR POSITIVE (H): Primary | ICD-10-CM

## 2025-06-10 PROCEDURE — 250N000011 HC RX IP 250 OP 636: Mod: JZ | Performed by: INTERNAL MEDICINE

## 2025-06-10 PROCEDURE — 96402 CHEMO HORMON ANTINEOPL SQ/IM: CPT

## 2025-06-10 RX ADMIN — GOSERELIN ACETATE 3.6 MG: 3.6 IMPLANT SUBCUTANEOUS at 08:52

## 2025-06-10 NOTE — PROGRESS NOTES
Infusion Nursing Note:  Angelica Harrellremba presents today for Zoladex.    Patient seen by provider today: No   present during visit today: Not Applicable.    Note: N/A.      Intravenous Access:  No Intravenous access/labs at this visit.    Treatment Conditions:  Not Applicable.      Post Infusion Assessment:  Patient tolerated injection without incident.  Site patent and intact, free from redness, edema or discomfort.  No evidence of extravasations.       Discharge Plan:   Patient declined prescription refills.  Discharge instructions reviewed with: Patient.  Patient and/or family verbalized understanding of discharge instructions and all questions answered.  AVS to patient via [employer].  Patient will return 7/7 for next appointment.   Patient discharged in stable condition accompanied by: self.  Departure Mode: Ambulatory.      Wallace Jimenez RN

## 2025-06-14 DIAGNOSIS — R21 RASH AND NONSPECIFIC SKIN ERUPTION: ICD-10-CM

## 2025-06-24 SDOH — HEALTH STABILITY: PHYSICAL HEALTH: ON AVERAGE, HOW MANY DAYS PER WEEK DO YOU ENGAGE IN MODERATE TO STRENUOUS EXERCISE (LIKE A BRISK WALK)?: 4 DAYS

## 2025-06-24 SDOH — HEALTH STABILITY: PHYSICAL HEALTH: ON AVERAGE, HOW MANY MINUTES DO YOU ENGAGE IN EXERCISE AT THIS LEVEL?: 80 MIN

## 2025-06-24 ASSESSMENT — SOCIAL DETERMINANTS OF HEALTH (SDOH): HOW OFTEN DO YOU GET TOGETHER WITH FRIENDS OR RELATIVES?: TWICE A WEEK

## 2025-06-25 ENCOUNTER — OFFICE VISIT (OUTPATIENT)
Dept: FAMILY MEDICINE | Facility: CLINIC | Age: 45
End: 2025-06-25
Payer: COMMERCIAL

## 2025-06-25 VITALS
RESPIRATION RATE: 17 BRPM | WEIGHT: 185 LBS | HEIGHT: 65 IN | OXYGEN SATURATION: 92 % | DIASTOLIC BLOOD PRESSURE: 82 MMHG | BODY MASS INDEX: 30.82 KG/M2 | HEART RATE: 76 BPM | SYSTOLIC BLOOD PRESSURE: 124 MMHG | TEMPERATURE: 97.5 F

## 2025-06-25 DIAGNOSIS — Z13.220 SCREENING CHOLESTEROL LEVEL: ICD-10-CM

## 2025-06-25 DIAGNOSIS — Z90.10 STATUS POST MASTECTOMY, UNSPECIFIED LATERALITY: ICD-10-CM

## 2025-06-25 DIAGNOSIS — C50.912 INVASIVE DUCTAL CARCINOMA OF BREAST, FEMALE, LEFT (H): ICD-10-CM

## 2025-06-25 DIAGNOSIS — Z00.00 ROUTINE GENERAL MEDICAL EXAMINATION AT A HEALTH CARE FACILITY: Primary | ICD-10-CM

## 2025-06-25 DIAGNOSIS — R79.89 ELEVATED SERUM CREATININE: ICD-10-CM

## 2025-06-25 DIAGNOSIS — Z13.1 SCREENING FOR DIABETES MELLITUS: ICD-10-CM

## 2025-06-25 DIAGNOSIS — Z13.29 SCREENING FOR THYROID DISORDER: ICD-10-CM

## 2025-06-25 LAB
ALBUMIN SERPL BCG-MCNC: 4.3 G/DL (ref 3.5–5.2)
ALP SERPL-CCNC: 95 U/L (ref 40–150)
ALT SERPL W P-5'-P-CCNC: 17 U/L (ref 0–50)
ANION GAP SERPL CALCULATED.3IONS-SCNC: 11 MMOL/L (ref 7–15)
AST SERPL W P-5'-P-CCNC: 23 U/L (ref 0–45)
BASOPHILS # BLD AUTO: 0 10E3/UL (ref 0–0.2)
BASOPHILS NFR BLD AUTO: 0 %
BILIRUB SERPL-MCNC: 0.2 MG/DL
BUN SERPL-MCNC: 10.3 MG/DL (ref 6–20)
CALCIUM SERPL-MCNC: 9.6 MG/DL (ref 8.8–10.4)
CHLORIDE SERPL-SCNC: 104 MMOL/L (ref 98–107)
CHOLEST SERPL-MCNC: 197 MG/DL
CREAT SERPL-MCNC: 1.04 MG/DL (ref 0.51–0.95)
EGFRCR SERPLBLD CKD-EPI 2021: 68 ML/MIN/1.73M2
EOSINOPHIL # BLD AUTO: 0.3 10E3/UL (ref 0–0.7)
EOSINOPHIL NFR BLD AUTO: 6 %
ERYTHROCYTE [DISTWIDTH] IN BLOOD BY AUTOMATED COUNT: 11.8 % (ref 10–15)
FASTING STATUS PATIENT QL REPORTED: YES
FASTING STATUS PATIENT QL REPORTED: YES
GLUCOSE SERPL-MCNC: 89 MG/DL (ref 70–99)
HCO3 SERPL-SCNC: 26 MMOL/L (ref 22–29)
HCT VFR BLD AUTO: 40 % (ref 35–47)
HDLC SERPL-MCNC: 72 MG/DL
HGB BLD-MCNC: 13.4 G/DL (ref 11.7–15.7)
IMM GRANULOCYTES # BLD: 0 10E3/UL
IMM GRANULOCYTES NFR BLD: 0 %
LDLC SERPL CALC-MCNC: 111 MG/DL
LYMPHOCYTES # BLD AUTO: 0.9 10E3/UL (ref 0.8–5.3)
LYMPHOCYTES NFR BLD AUTO: 20 %
MCH RBC QN AUTO: 28.6 PG (ref 26.5–33)
MCHC RBC AUTO-ENTMCNC: 33.5 G/DL (ref 31.5–36.5)
MCV RBC AUTO: 85 FL (ref 78–100)
MONOCYTES # BLD AUTO: 0.5 10E3/UL (ref 0–1.3)
MONOCYTES NFR BLD AUTO: 10 %
NEUTROPHILS # BLD AUTO: 3 10E3/UL (ref 1.6–8.3)
NEUTROPHILS NFR BLD AUTO: 64 %
NONHDLC SERPL-MCNC: 125 MG/DL
PLATELET # BLD AUTO: 220 10E3/UL (ref 150–450)
POTASSIUM SERPL-SCNC: 4.3 MMOL/L (ref 3.4–5.3)
PROT SERPL-MCNC: 6.7 G/DL (ref 6.4–8.3)
RBC # BLD AUTO: 4.69 10E6/UL (ref 3.8–5.2)
SODIUM SERPL-SCNC: 141 MMOL/L (ref 135–145)
TRIGL SERPL-MCNC: 72 MG/DL
TSH SERPL DL<=0.005 MIU/L-ACNC: 1.75 UIU/ML (ref 0.3–4.2)
WBC # BLD AUTO: 4.7 10E3/UL (ref 4–11)

## 2025-06-25 PROCEDURE — 36415 COLL VENOUS BLD VENIPUNCTURE: CPT | Performed by: STUDENT IN AN ORGANIZED HEALTH CARE EDUCATION/TRAINING PROGRAM

## 2025-06-25 PROCEDURE — 84443 ASSAY THYROID STIM HORMONE: CPT | Performed by: STUDENT IN AN ORGANIZED HEALTH CARE EDUCATION/TRAINING PROGRAM

## 2025-06-25 PROCEDURE — 85025 COMPLETE CBC W/AUTO DIFF WBC: CPT | Performed by: STUDENT IN AN ORGANIZED HEALTH CARE EDUCATION/TRAINING PROGRAM

## 2025-06-25 PROCEDURE — 99396 PREV VISIT EST AGE 40-64: CPT | Mod: 25 | Performed by: STUDENT IN AN ORGANIZED HEALTH CARE EDUCATION/TRAINING PROGRAM

## 2025-06-25 PROCEDURE — 91320 SARSCV2 VAC 30MCG TRS-SUC IM: CPT | Performed by: STUDENT IN AN ORGANIZED HEALTH CARE EDUCATION/TRAINING PROGRAM

## 2025-06-25 PROCEDURE — 99213 OFFICE O/P EST LOW 20 MIN: CPT | Mod: 25 | Performed by: STUDENT IN AN ORGANIZED HEALTH CARE EDUCATION/TRAINING PROGRAM

## 2025-06-25 PROCEDURE — 3079F DIAST BP 80-89 MM HG: CPT | Performed by: STUDENT IN AN ORGANIZED HEALTH CARE EDUCATION/TRAINING PROGRAM

## 2025-06-25 PROCEDURE — 80061 LIPID PANEL: CPT | Performed by: STUDENT IN AN ORGANIZED HEALTH CARE EDUCATION/TRAINING PROGRAM

## 2025-06-25 PROCEDURE — 80053 COMPREHEN METABOLIC PANEL: CPT | Performed by: STUDENT IN AN ORGANIZED HEALTH CARE EDUCATION/TRAINING PROGRAM

## 2025-06-25 PROCEDURE — 3074F SYST BP LT 130 MM HG: CPT | Performed by: STUDENT IN AN ORGANIZED HEALTH CARE EDUCATION/TRAINING PROGRAM

## 2025-06-25 PROCEDURE — 90480 ADMN SARSCOV2 VAC 1/ONLY CMP: CPT | Performed by: STUDENT IN AN ORGANIZED HEALTH CARE EDUCATION/TRAINING PROGRAM

## 2025-06-25 NOTE — PATIENT INSTRUCTIONS
Patient Education   Preventive Care Advice   This is general advice given by our system to help you stay healthy. However, your care team may have specific advice just for you. Please talk to your care team about your preventive care needs.  Nutrition  Eat 5 or more servings of fruits and vegetables each day.  Try wheat bread, brown rice and whole grain pasta (instead of white bread, rice, and pasta).  Get enough calcium and vitamin D. Check the label on foods and aim for 100% of the RDA (recommended daily allowance).  Lifestyle  Exercise at least 150 minutes each week  (30 minutes a day, 5 days a week).  Do muscle strengthening activities 2 days a week. These help control your weight and prevent disease.  No smoking.  Wear sunscreen to prevent skin cancer.  Have a dental exam and cleaning every 6 months.  Yearly exams  See your health care team every year to talk about:  Any changes in your health.  Any medicines your care team has prescribed.  Preventive care, family planning, and ways to prevent chronic diseases.  Shots (vaccines)   HPV shots (up to age 26), if you've never had them before.  Hepatitis B shots (up to age 59), if you've never had them before.  COVID-19 shot: Get this shot when it's due.  Flu shot: Get a flu shot every year.  Tetanus shot: Get a tetanus shot every 10 years.  Pneumococcal, hepatitis A, and RSV shots: Ask your care team if you need these based on your risk.  Shingles shot (for age 50 and up)  General health tests  Diabetes screening:  Starting at age 35, Get screened for diabetes at least every 3 years.  If you are younger than age 35, ask your care team if you should be screened for diabetes.  Cholesterol test: At age 39, start having a cholesterol test every 5 years, or more often if advised.  Bone density scan (DEXA): At age 50, ask your care team if you should have this scan for osteoporosis (brittle bones).  Hepatitis C: Get tested at least once in your life.  STIs (sexually  transmitted infections)  Before age 24: Ask your care team if you should be screened for STIs.  After age 24: Get screened for STIs if you're at risk. You are at risk for STIs (including HIV) if:  You are sexually active with more than one person.  You don't use condoms every time.  You or a partner was diagnosed with a sexually transmitted infection.  If you are at risk for HIV, ask about PrEP medicine to prevent HIV.  Get tested for HIV at least once in your life, whether you are at risk for HIV or not.  Cancer screening tests  Cervical cancer screening: If you have a cervix, begin getting regular cervical cancer screening tests starting at age 21.  Breast cancer scan (mammogram): If you've ever had breasts, begin having regular mammograms starting at age 40. This is a scan to check for breast cancer.  Colon cancer screening: It is important to start screening for colon cancer at age 45.  Have a colonoscopy test every 10 years (or more often if you're at risk) Or, ask your provider about stool tests like a FIT test every year or Cologuard test every 3 years.  To learn more about your testing options, visit:   .  For help making a decision, visit:   https://bit.ly/rm93138.  Prostate cancer screening test: If you have a prostate, ask your care team if a prostate cancer screening test (PSA) at age 55 is right for you.  Lung cancer screening: If you are a current or former smoker ages 50 to 80, ask your care team if ongoing lung cancer screenings are right for you.  For informational purposes only. Not to replace the advice of your health care provider. Copyright   2023 [employer]. All rights reserved. Clinically reviewed by the [facility] Transitions Program. [employer] 985221 - REV 01/24.

## 2025-06-25 NOTE — PROGRESS NOTES
"Preventive Care Visit  [facility]  Elise Chun DO, Family Medicine  Jun 25, 2025      Assessment & Plan     Routine general medical examination at a health care facility  - Comprehensive metabolic panel (BMP + Alb, Alk Phos, ALT, AST, Total. Bili, TP); Future  - CBC with platelets and differential; Future  - Comprehensive metabolic panel (BMP + Alb, Alk Phos, ALT, AST, Total. Bili, TP)  - CBC with platelets and differential    Invasive ductal carcinoma of breast, female, left (H)  Status post mastectomy, unspecified laterality  Extend her physical therapy referral to continue working on scar tissue.  This has been improving with physical therapy, cupping, acupuncture.  He  - Physical Therapy  Referral; Future    Elevated serum creatinine  Recommend rechecking creatinine, has been improving.  Mildly elevated  - Comprehensive metabolic panel (BMP + Alb, Alk Phos, ALT, AST, Total. Bili, TP); Future  - Comprehensive metabolic panel (BMP + Alb, Alk Phos, ALT, AST, Total. Bili, TP)    Screening for diabetes mellitus  - Comprehensive metabolic panel (BMP + Alb, Alk Phos, ALT, AST, Total. Bili, TP); Future  - Comprehensive metabolic panel (BMP + Alb, Alk Phos, ALT, AST, Total. Bili, TP)    Screening cholesterol leve  - Lipid panel reflex to direct LDL Fasting; Future  - Lipid panel reflex to direct LDL Fasting    Screening for thyroid disorder  - TSH with free T4 reflex; Future  - TSH with free T4 reflex    Patient has been advised of split billing requirements and indicates understanding: Yes        BMI  Estimated body mass index is 30.79 kg/m  as calculated from the following:    Height as of this encounter: 1.651 m (5' 5\").    Weight as of this encounter: 83.9 kg (185 lb).   Weight management plan: Discussed healthy diet and exercise guidelines  Reviewed preventive health counseling, as reflected in patient instructions       Regular exercise       Healthy diet/nutrition       " Colorectal Cancer Screening  Counseling  Appropriate preventive services were addressed with this patient via screening, questionnaire, or discussion as appropriate for fall prevention, nutrition, physical activity, Tobacco-use cessation, social engagement, weight loss and cognition.  Checklist reviewing preventive services available has been given to the patient.  Reviewed patient's diet, addressing concerns and/or questions.         Follow-up    Follow-up Visit   Expected date:  Jun 25, 2026 (Approximate)      Follow Up Appointment Details:     Follow-up with whom?: PCP    Follow-Up for what?: Adult Preventive    How?: In Person                 Subjective   Lisandra is a 45 year old, presenting for the following:  Physical           HPI    Fasting    Referral-  For PT/OT Scar adhesions right breast. Has been doing this and it has been helpful   Also does cupping and acupuncture and this helps as well.   Went back to work full time last fall   Had a lot of chemo brain prior.   Still feels brain fog is still there but more mildly. Did cognitive OT.     Mental health has been okay. Had a lot of anxiety in beginning of diagnosis. Sees psychiatry and therapy. On wellbutrin to help with brain fog also. On hormone suppression which is affecting her executive functioning.     Sleeps been good. Doing acupuncture to help with sleep.     Sees neurology for migraines.     Has noticed more hormone suppression.   Eating well rounded.   Having more menopause central adiposity. Doing acupuncture.   Walks 4-5 days/week.   Does some home weight lifting. Prior to cancer diagnosis was doing power lifting.      Breast surgeon and plastic surgeon every 6 months.   Oncology was 3 months and now 6 months.           Advance Care Planning    Document on file is a Health Care Directive or POLST.        6/24/2025   General Health   How would you rate your overall physical health? Good   Feel stress (tense, anxious, or unable to sleep) Only a  little   (!) STRESS CONCERN      6/24/2025   Nutrition   Three or more servings of calcium each day? Yes   Diet: Regular (no restrictions)   How many servings of fruit and vegetables per day? 4 or more   How many sweetened beverages each day? 0-1         6/24/2025   Exercise   Days per week of moderate/strenous exercise 4 days   Average minutes spent exercising at this level 80 min         6/24/2025   Social Factors   Frequency of gathering with friends or relatives Twice a week   Worry food won't last until get money to buy more No   Food not last or not have enough money for food? No   Do you have housing? (Housing is defined as stable permanent housing and does not include staying outside in a car, in a tent, in an abandoned building, in an overnight shelter, or couch-surfing.) Yes   Are you worried about losing your housing? No   Lack of transportation? No   Unable to get utilities (heat,electricity)? No         6/24/2025   Dental   Dentist two times every year? Yes           Today's PHQ-2 Score:       4/8/2025    12:44 PM   PHQ-2 ( 1999 [employer])   Q1: Little interest or pleasure in doing things 0   Q2: Feeling down, depressed or hopeless 0   PHQ-2 Score 0    Q1: Little interest or pleasure in doing things Not at all   Q2: Feeling down, depressed or hopeless Not at all   PHQ-2 Score 0       Patient-reported         6/24/2025   Substance Use   Alcohol more than 3/day or more than 7/wk No   Do you use any other substances recreationally? No     Social History     Tobacco Use    Smoking status: Former     Current packs/day: 0.25     Average packs/day: 0.3 packs/day for 1 year (0.3 ttl pk-yrs)     Types: Cigarettes    Smokeless tobacco: Never   Vaping Use    Vaping status: Never Used   Substance Use Topics    Alcohol use: Yes    Drug use: No           12/7/2023   LAST FHS-7 RESULTS   1st degree relative breast or ovarian cancer Yes   Any relative bilateral breast cancer No   Any male have breast cancer No   Any ONE  woman have BOTH breast AND ovarian cancer No   Any woman with breast cancer before 50yrs No   2 or more relatives with breast AND/OR ovarian cancer Yes   2 or more relatives with breast AND/OR bowel cancer No        Mammogram Screening - Annual screen due to history of breast cancer, carcinoma in situ, or hyperplasia        6/24/2025   STI Screening   New sexual partner(s) since last STI/HIV test? No     History of abnormal Pap smear: No - age 30- 64 PAP with HPV every 5 years recommended        Latest Ref Rng & Units 12/29/2021     5:10 PM 3/8/2017     3:34 PM 3/8/2017     3:25 PM   PAP / HPV   PAP  Negative for Intraepithelial Lesion or Malignancy (NILM)      PAP (Historical)    NIL    HPV 16 DNA Negative Negative  Negative     HPV 18 DNA Negative Negative  Negative     Other HR HPV Negative Negative  Negative       ASCVD Risk   The 10-year ASCVD risk score (Malinda REYNOLDS, et al., 2019) is: 0.3%    Values used to calculate the score:      Age: 45 years      Sex: Female      Is Non- : No      Diabetic: No      Tobacco smoker: No      Systolic Blood Pressure: 124 mmHg      Is BP treated: No      HDL Cholesterol: 82 mg/dL      Total Cholesterol: 183 mg/dL       Reviewed and updated as needed this visit by Provider     Meds                Past Medical History:   Diagnosis Date    Malignant neoplasm of upper-outer quadrant of left breast in female, estrogen receptor positive (H) 03/11/2024    Migraine     NO ACTIVE PROBLEMS      Past Surgical History:   Procedure Laterality Date    BIOPSY  12/2023    COLONOSCOPY  2/2024    COLONOSCOPY WITH CO2 INSUFFLATION N/A 02/01/2024    Procedure: Colonoscopy with CO2 insufflation;  Surgeon: Cehrise Lima DO;  Location: [facility] OR    COSMETIC SURGERY  6/2013    Breast Augmentation (saline)    INSERT TISSUE EXPANDER BREAST BILATERAL Bilateral 02/21/2024    Procedure: Insertion tissue expander, breasts, bilateral;  Surgeon: Elisabet Venegas MD;   Location: [facility] OR    IR CHEST PORT PLACEMENT > 5 YRS OF AGE  2024    IR PORT REMOVAL RIGHT  2024    MASTECTOMY, NIPPLE SPARING Bilateral 2024    Procedure: Bilateral nipple sparing mastectomy, bilateral implant removal, left sentinel lymph node biopsy;  Surgeon: Aba Phillips MD;  Location: [facility] OR    RECONSTRUCT BREAST BILATERAL, IMPLANT PROSTHESIS BILATERAL, COMBINED Bilateral 2024    Procedure: RECONSTRUCTION BILATERAL BREASTS WITH IMPLANTS;  Surgeon: Elisabet Venegas MD;  Location: [facility] OR    TUBAL LIGATION  2007    ZZC  DELIVERY ONLY  2007    superficial wound dehiscence     OB History    Para Term  AB Living   2 2 2 0 0 2   SAB IAB Ectopic Multiple Live Births   0 0 0 0 2      # Outcome Date GA Lbr Morteza/2nd Weight Sex Type Anes PTL Lv   2 Term 07 40w0d   F CS-Unspec   JUMA   1 Term 03/10/03 40w0d   M    JUMA     Lab work is in process  Labs reviewed in EPIC  BP Readings from Last 3 Encounters:   25 124/82   06/10/25 125/87   25 134/87    Wt Readings from Last 3 Encounters:   25 83.9 kg (185 lb)   04/10/25 84.4 kg (186 lb)   25 83.9 kg (185 lb)                  Patient Active Problem List   Diagnosis    Dyspareunia    Ovarian cyst    Malignant neoplasm of upper-outer quadrant of left breast in female, estrogen receptor positive (H)    Adjustment disorder with anxiety    Chronic mixed headache syndrome     Past Surgical History:   Procedure Laterality Date    BIOPSY  2023    COLONOSCOPY  2024    COLONOSCOPY WITH CO2 INSUFFLATION N/A 2024    Procedure: Colonoscopy with CO2 insufflation;  Surgeon: Cherise Lima DO;  Location: [facility]    COSMETIC SURGERY  2013    Breast Augmentation (saline)    INSERT TISSUE EXPANDER BREAST BILATERAL Bilateral 2024    Procedure: Insertion tissue expander, breasts, bilateral;  Surgeon: Elisabet Venegas MD;  Location: [facility]    IR CHEST PORT PLACEMENT > 5 YRS  OF AGE  2024    IR PORT REMOVAL RIGHT  2024    MASTECTOMY, NIPPLE SPARING Bilateral 2024    Procedure: Bilateral nipple sparing mastectomy, bilateral implant removal, left sentinel lymph node biopsy;  Surgeon: Aba Phillips MD;  Location: [facility]    RECONSTRUCT BREAST BILATERAL, IMPLANT PROSTHESIS BILATERAL, COMBINED Bilateral 2024    Procedure: RECONSTRUCTION BILATERAL BREASTS WITH IMPLANTS;  Surgeon: Elisabet Venegas MD;  Location: [facility]    TUBAL LIGATION  2007    ZZC  DELIVERY ONLY  2007    superficial wound dehiscence       Social History     Tobacco Use    Smoking status: Former     Current packs/day: 0.25     Average packs/day: 0.3 packs/day for 1 year (0.3 ttl pk-yrs)     Types: Cigarettes    Smokeless tobacco: Never   Substance Use Topics    Alcohol use: Yes     Family History   Problem Relation Age of Onset    Thyroid Disease Mother     Breast Cancer Mother 56        breast, 3 years later    Heart Disease Father         2x heart attacks    Circulatory Father         blood clots    Cardiovascular Father         patent foramen ovale, repaired x 2, afib    Cerebrovascular Disease Father         x2    C.A.D. Father         x2    Genitourinary Problems Father         kidney disease    Diabetes Father     Hypertension Father     Substance Abuse Father     Obesity Father     Asthma Brother     Food Allergy Brother     Eczema Brother     Cerebrovascular Disease Maternal Grandmother     Myocardial Infarction Maternal Grandfather     Hypertension Paternal Grandmother     Cerebrovascular Disease Paternal Grandmother     Alcohol/Drug Paternal Grandfather     Substance Abuse Paternal Grandfather     No Known Problems Daughter     No Known Problems Son     Cancer Paternal Aunt         cervical cancer(another sisiter)    Breast Cancer Paternal Aunt         breast cancer at 70's    Breast Cancer Paternal Aunt     Ovarian Cancer Paternal Aunt     Hypertension Brother      Substance Abuse Brother     Asthma Brother     Obesity Brother     Breast Cancer Other         Paternal Aunt    Cancer - colorectal No family hx of          Current Outpatient Medications   Medication Sig Dispense Refill    anastrozole (ARIMIDEX) 1 MG tablet Take 1 tablet (1 mg) by mouth daily. 90 tablet 3    buPROPion (WELLBUTRIN XL) 300 MG 24 hr tablet Take 1 tablet (300 mg) by mouth every morning. 90 tablet 3    Calcium Carbonate-Vitamin D (CALCIUM 500 + D PO)       Cetirizine HCl (ZYRTEC ALLERGY PO) Take 10 mg by mouth daily      COMPRESSION STOCKINGS Please measure and distribute 2 pair of 20mmHg - 30mmHg thigh high open or closed toe compression stockings with extra refills as indicated. 2 each 4    Fluticasone Propionate (FLONASE NA) Spray 1 spray in nostril daily      goserelin (ZOLADEX) 3.6 MG injection Inject 3.6 mg subcutaneously once. Monthly      hydrOXYzine HCl (ATARAX) 25 MG tablet Take 1-2 tablets (25-50 mg) by mouth 3 times daily as needed for anxiety or other (sleep). 90 tablet 1    Multiple Vitamins-Minerals (MULTIVITAMIN & MINERAL PO) Take 1 each by mouth daily. [facility] women's active supplement      nortriptyline (PAMELOR) 50 MG capsule Take 1 capsule (50 mg) by mouth at bedtime 90 capsule 3    Probiotic Product (PROBIOTIC PO) Reported on 3/1/2017      prochlorperazine (COMPAZINE) 10 MG tablet Take 1 tablet (10 mg) by mouth every 6 hours as needed for nausea or vomiting. 30 tablet 3    SUMAtriptan (IMITREX) 25 MG tablet Take 1 tablet (25 mg) by mouth at onset of headache for migraine. May repeat in 2 hours. Max 8 tablets/24 hours. 18 tablet 11     No Known Allergies  Recent Labs   Lab Test 03/05/25  0906 05/23/24  1427 05/21/24  1927 05/15/24  1421 02/05/24  1513 10/16/23  0856 01/19/22  1538 08/04/21  1335 06/07/19  1106   LDL  --   --   --   --   --  88  --   --   --    HDL  --   --   --   --   --  82  --   --   --    TRIG  --   --   --   --   --  67  --   --   --    ALT  --  31 28 25   < >   "--   --    < >  --    CR 1.00* 1.11* 0.83 1.08*   < >  --   --    < >  --    GFRESTIMATED 71 63 89 65   < >  --   --    < >  --    POTASSIUM 4.5 4.0 4.2 3.6   < >  --   --    < >  --    TSH  --   --   --   --   --   --  1.63  --  1.28    < > = values in this interval not displayed.          Review of Systems  Constitutional, HEENT, cardiovascular, pulmonary, gi and gu systems are negative, except as otherwise noted.     Objective    Exam  /82 (BP Location: Right arm, Cuff Size: Adult Regular)   Pulse 76   Temp 97.5  F (36.4  C) (Oral)   Resp 17   Ht 1.651 m (5' 5\")   Wt 83.9 kg (185 lb)   LMP 04/07/2024 (Approximate)   SpO2 92%   BMI 30.79 kg/m     Estimated body mass index is 30.79 kg/m  as calculated from the following:    Height as of this encounter: 1.651 m (5' 5\").    Weight as of this encounter: 83.9 kg (185 lb).    Physical Exam  GENERAL: alert and no distress  EYES: Eyes grossly normal to inspection, PERRL and conjunctivae and sclerae normal  HENT: ear canals and TM's normal, nose and mouth without ulcers or lesions  NECK: no adenopathy, no asymmetry, masses, or scars  RESP: lungs clear to auscultation - no rales, rhonchi or wheezes  CV: regular rate and rhythm, normal S1 S2, no S3 or S4, no murmur, click or rub, no peripheral edema  ABDOMEN: soft, nontender, no hepatosplenomegaly, no masses and bowel sounds normal  MS: no gross musculoskeletal defects noted, no edema  SKIN: no suspicious lesions or rashes  NEURO: Normal strength and tone, mentation intact and speech normal  PSYCH: mentation appears normal, affect normal/bright        Signed Electronically by: Elise Chun, DO    "

## 2025-06-26 ENCOUNTER — RESULTS FOLLOW-UP (OUTPATIENT)
Dept: FAMILY MEDICINE | Facility: CLINIC | Age: 45
End: 2025-06-26

## 2025-07-06 ENCOUNTER — E-VISIT (OUTPATIENT)
Dept: FAMILY MEDICINE | Facility: CLINIC | Age: 45
End: 2025-07-06
Payer: COMMERCIAL

## 2025-07-06 DIAGNOSIS — N30.90 BLADDER INFECTION: Primary | ICD-10-CM

## 2025-07-06 RX ORDER — NITROFURANTOIN 25; 75 MG/1; MG/1
100 CAPSULE ORAL 2 TIMES DAILY
Qty: 10 CAPSULE | Refills: 0 | Status: SHIPPED | OUTPATIENT
Start: 2025-07-06 | End: 2025-07-11

## 2025-07-07 ENCOUNTER — ALLIED HEALTH/NURSE VISIT (OUTPATIENT)
Dept: INFUSION THERAPY | Facility: CLINIC | Age: 45
End: 2025-07-07
Attending: INTERNAL MEDICINE
Payer: COMMERCIAL

## 2025-07-07 ENCOUNTER — LAB (OUTPATIENT)
Dept: LAB | Facility: CLINIC | Age: 45
End: 2025-07-07
Attending: INTERNAL MEDICINE
Payer: COMMERCIAL

## 2025-07-07 VITALS
HEART RATE: 79 BPM | TEMPERATURE: 97.5 F | SYSTOLIC BLOOD PRESSURE: 131 MMHG | RESPIRATION RATE: 16 BRPM | OXYGEN SATURATION: 100 % | DIASTOLIC BLOOD PRESSURE: 85 MMHG

## 2025-07-07 DIAGNOSIS — C50.412 MALIGNANT NEOPLASM OF UPPER-OUTER QUADRANT OF LEFT BREAST IN FEMALE, ESTROGEN RECEPTOR POSITIVE (H): Primary | ICD-10-CM

## 2025-07-07 DIAGNOSIS — Z17.0 MALIGNANT NEOPLASM OF UPPER-OUTER QUADRANT OF LEFT BREAST IN FEMALE, ESTROGEN RECEPTOR POSITIVE (H): Primary | ICD-10-CM

## 2025-07-07 DIAGNOSIS — N30.90 BLADDER INFECTION: ICD-10-CM

## 2025-07-07 LAB
ALBUMIN UR-MCNC: NEGATIVE MG/DL
APPEARANCE UR: CLEAR
BILIRUB UR QL STRIP: NEGATIVE
COLOR UR AUTO: NORMAL
GLUCOSE UR STRIP-MCNC: NEGATIVE MG/DL
HGB UR QL STRIP: NEGATIVE
KETONES UR STRIP-MCNC: NEGATIVE MG/DL
LEUKOCYTE ESTERASE UR QL STRIP: NEGATIVE
NITRATE UR QL: NEGATIVE
PH UR STRIP: 7 [PH] (ref 5–7)
SP GR UR STRIP: 1 (ref 1–1.03)
UROBILINOGEN UR STRIP-MCNC: NORMAL MG/DL

## 2025-07-07 PROCEDURE — 81003 URINALYSIS AUTO W/O SCOPE: CPT

## 2025-07-07 PROCEDURE — 250N000011 HC RX IP 250 OP 636: Mod: JZ | Performed by: INTERNAL MEDICINE

## 2025-07-07 PROCEDURE — 96402 CHEMO HORMON ANTINEOPL SQ/IM: CPT

## 2025-07-07 RX ORDER — TRIAMCINOLONE ACETONIDE 1 MG/G
CREAM TOPICAL
Qty: 45 G | Refills: 1 | Status: SHIPPED | OUTPATIENT
Start: 2025-07-07

## 2025-07-07 RX ADMIN — GOSERELIN ACETATE 3.6 MG: 3.6 IMPLANT SUBCUTANEOUS at 08:48

## 2025-07-07 NOTE — TELEPHONE ENCOUNTER
Pending Prescriptions:                       Disp   Refills    triamcinolone (KENALOG) 0.1 % external cr*45 g   1            Sig: APPLY TO AFFECTED AREA(S) TOPICALLY TWO TIMES A           DAY          Last Written Prescription Date:  5/3/2024  Last Fill Quantity: 45 g,   # refills: 1  Last Office Visit: 3/3/2025 with Dr Roland   Future Office visit:   1/26/2026 with Dr Roland     Routing refill request to provider for review/approval.  Coco Hui RN on 7/7/2025 at 10:09 AM

## 2025-07-07 NOTE — PROGRESS NOTES
Infusion Nursing Note:  Angelica CANDY Gomez presents today for Zoladex  Patient seen by provider today: No   present during visit today: Not Applicable.    Note: No new concerns this month.      Intravenous Access:  No Intravenous access/labs at this visit.    Treatment Conditions:  Not Applicable.      Post Infusion Assessment:  Patient tolerated injection without incident.  Site patent and intact, free from redness, edema or discomfort.       Discharge Plan:   AVS to patient via MYCHART.  Patient will return as prev phil for next appointment.   Patient discharged in stable condition accompanied by: self.  Departure Mode: Ambulatory.      Stew Edwards RN

## 2025-07-22 ENCOUNTER — E-VISIT (OUTPATIENT)
Dept: FAMILY MEDICINE | Facility: CLINIC | Age: 45
End: 2025-07-22
Payer: COMMERCIAL

## 2025-07-22 DIAGNOSIS — R30.0 DYSURIA: Primary | ICD-10-CM

## 2025-07-22 LAB
ALBUMIN UR-MCNC: NEGATIVE MG/DL
APPEARANCE UR: CLEAR
BACTERIA #/AREA URNS HPF: ABNORMAL /HPF
BILIRUB UR QL STRIP: NEGATIVE
COLOR UR AUTO: YELLOW
GLUCOSE UR STRIP-MCNC: NEGATIVE MG/DL
HGB UR QL STRIP: ABNORMAL
KETONES UR STRIP-MCNC: NEGATIVE MG/DL
LEUKOCYTE ESTERASE UR QL STRIP: ABNORMAL
NITRATE UR QL: NEGATIVE
PH UR STRIP: 6 [PH] (ref 5–7)
RBC #/AREA URNS AUTO: ABNORMAL /HPF
SP GR UR STRIP: <=1.005 (ref 1–1.03)
SQUAMOUS #/AREA URNS AUTO: ABNORMAL /LPF
UROBILINOGEN UR STRIP-ACNC: 0.2 E.U./DL
WBC #/AREA URNS AUTO: ABNORMAL /HPF

## 2025-07-22 RX ORDER — NITROFURANTOIN 25; 75 MG/1; MG/1
100 CAPSULE ORAL 2 TIMES DAILY
Qty: 10 CAPSULE | Refills: 0 | Status: SHIPPED | OUTPATIENT
Start: 2025-07-22 | End: 2025-07-27

## 2025-07-24 LAB — BACTERIA UR CULT: NORMAL

## 2025-07-31 ENCOUNTER — OFFICE VISIT (OUTPATIENT)
Dept: SURGERY | Facility: CLINIC | Age: 45
End: 2025-07-31
Payer: COMMERCIAL

## 2025-07-31 VITALS
RESPIRATION RATE: 16 BRPM | DIASTOLIC BLOOD PRESSURE: 72 MMHG | BODY MASS INDEX: 30.32 KG/M2 | HEART RATE: 77 BPM | HEIGHT: 65 IN | OXYGEN SATURATION: 98 % | WEIGHT: 182 LBS | SYSTOLIC BLOOD PRESSURE: 116 MMHG

## 2025-07-31 DIAGNOSIS — Z17.0 MALIGNANT NEOPLASM OF UPPER-OUTER QUADRANT OF LEFT BREAST IN FEMALE, ESTROGEN RECEPTOR POSITIVE (H): Primary | ICD-10-CM

## 2025-07-31 DIAGNOSIS — C50.412 MALIGNANT NEOPLASM OF UPPER-OUTER QUADRANT OF LEFT BREAST IN FEMALE, ESTROGEN RECEPTOR POSITIVE (H): Primary | ICD-10-CM

## 2025-07-31 NOTE — PROGRESS NOTES
"Breast care follow-up note    Angelica Gomez presents today for breast care follow-up exam.  She is doing well following bilateral mastectomy and adjuvant chemotherapy for breast cancer.  No new issues.  No breast pain or skin nodules.    Physical Exam:  /72   Pulse 77   Resp 16   Ht 1.651 m (5' 5\")   Wt 82.6 kg (182 lb)   LMP 04/07/2024 (Approximate)   SpO2 98%   BMI 30.29 kg/m    Patient appears healthy and alert.  Pleasant affect  No cervical lymphadenopathy or thyroid fullness.  Breathing comfortably, lung fields clear  Bilateral breast exam performed.  Well-healed bilateral nipple sparing mastectomy.  No skin changes or nipple discharge.  No new nipple inversion.  No new discrete lumps or bumps.  No suggestion of axillary lymphadenopathy on either side.    Recent imaging studies were personally reviewed by me and pertinent positives are noted.    Assessment and plan: Angelica Gomez returns for breast care follow-up.  She is doing well approximately 1-1/2 years out from locally advanced left breast cancer.  No evidence for recurrence.  She continues to take Arimidex and tolerated well.  I have asked her to come back and see me again in 1 year or sooner if there are are issues.  Thank you for the opportunity to help in her care.    Bean Phillips M.D.  [facility]  [phone]    Please route or send letter to:  Primary Care Provider (PCP) and Referring Provider    "

## 2025-07-31 NOTE — LETTER
"July 31, 2025          Maddiayana Dickerson Nedra, APRN CNP  [address]  [place] [address]      RE:   Angelica Gomez 1980      Dear Colleague,    Thank you for referring your patient, Angelica Gomez, to [facility] [place]. Please see a copy of my visit note below.    Angelica Gomez presents today for breast care follow-up exam.  She is doing well following bilateral mastectomy and adjuvant chemotherapy for breast cancer.  No new issues.  No breast pain or skin nodules.     Physical Exam:  /72   Pulse 77   Resp 16   Ht 1.651 m (5' 5\")   Wt 82.6 kg (182 lb)   LMP 04/07/2024 (Approximate)   SpO2 98%   BMI 30.29 kg/m    Patient appears healthy and alert.  Pleasant affect  No cervical lymphadenopathy or thyroid fullness.  Breathing comfortably, lung fields clear  Bilateral breast exam performed.  Well-healed bilateral nipple sparing mastectomy.  No skin changes or nipple discharge.  No new nipple inversion.  No new discrete lumps or bumps.  No suggestion of axillary lymphadenopathy on either side.     Recent imaging studies were personally reviewed by me and pertinent positives are noted.     Assessment and plan: Angelica Gomez returns for breast care follow-up.  She is doing well approximately 1-1/2 years out from locally advanced left breast cancer.  No evidence for recurrence.  She continues to take Arimidex and tolerated well.  I have asked her to come back and see me again in 1 year or sooner if there are are issues.    Again, thank you for allowing me to participate in the care of your patient.      Sincerely,      Aba Phillips MD    "

## 2025-08-04 ENCOUNTER — ALLIED HEALTH/NURSE VISIT (OUTPATIENT)
Dept: INFUSION THERAPY | Facility: CLINIC | Age: 45
End: 2025-08-04
Attending: INTERNAL MEDICINE
Payer: COMMERCIAL

## 2025-08-04 VITALS
DIASTOLIC BLOOD PRESSURE: 79 MMHG | RESPIRATION RATE: 18 BRPM | SYSTOLIC BLOOD PRESSURE: 119 MMHG | HEART RATE: 75 BPM | OXYGEN SATURATION: 100 % | TEMPERATURE: 97.6 F

## 2025-08-04 DIAGNOSIS — C50.412 MALIGNANT NEOPLASM OF UPPER-OUTER QUADRANT OF LEFT BREAST IN FEMALE, ESTROGEN RECEPTOR POSITIVE (H): Primary | ICD-10-CM

## 2025-08-04 DIAGNOSIS — Z17.0 MALIGNANT NEOPLASM OF UPPER-OUTER QUADRANT OF LEFT BREAST IN FEMALE, ESTROGEN RECEPTOR POSITIVE (H): Primary | ICD-10-CM

## 2025-08-04 PROCEDURE — 96402 CHEMO HORMON ANTINEOPL SQ/IM: CPT

## 2025-08-04 PROCEDURE — 250N000011 HC RX IP 250 OP 636: Mod: JZ | Performed by: INTERNAL MEDICINE

## 2025-08-04 RX ADMIN — GOSERELIN ACETATE 3.6 MG: 3.6 IMPLANT SUBCUTANEOUS at 08:41

## 2025-09-02 ENCOUNTER — ALLIED HEALTH/NURSE VISIT (OUTPATIENT)
Dept: INFUSION THERAPY | Facility: CLINIC | Age: 45
End: 2025-09-02
Attending: INTERNAL MEDICINE
Payer: COMMERCIAL

## 2025-09-02 VITALS
HEART RATE: 74 BPM | SYSTOLIC BLOOD PRESSURE: 128 MMHG | OXYGEN SATURATION: 100 % | DIASTOLIC BLOOD PRESSURE: 74 MMHG | RESPIRATION RATE: 20 BRPM | TEMPERATURE: 97.6 F

## 2025-09-02 DIAGNOSIS — C50.412 MALIGNANT NEOPLASM OF UPPER-OUTER QUADRANT OF LEFT BREAST IN FEMALE, ESTROGEN RECEPTOR POSITIVE (H): Primary | ICD-10-CM

## 2025-09-02 DIAGNOSIS — Z17.0 MALIGNANT NEOPLASM OF UPPER-OUTER QUADRANT OF LEFT BREAST IN FEMALE, ESTROGEN RECEPTOR POSITIVE (H): Primary | ICD-10-CM

## 2025-09-02 PROCEDURE — 96402 CHEMO HORMON ANTINEOPL SQ/IM: CPT

## 2025-09-02 PROCEDURE — 250N000011 HC RX IP 250 OP 636: Mod: JZ | Performed by: INTERNAL MEDICINE

## 2025-09-02 RX ADMIN — GOSERELIN ACETATE 3.6 MG: 3.6 IMPLANT SUBCUTANEOUS at 08:48

## 2025-09-02 ASSESSMENT — PAIN SCALES - GENERAL: PAINLEVEL_OUTOF10: NO PAIN (0)

## (undated) DEVICE — KIT ENDO FIRST STEP DISINFECTANT 200ML W/POUCH EP-4

## (undated) DEVICE — ESU ELEC BLADE 2.75" COATED/INSULATED E1455

## (undated) DEVICE — ADH LIQUID MASTISOL TOPICAL VIAL 2-3ML 0523-48

## (undated) DEVICE — SU MONOCRYL 4-0 PS-2 18" UND Y496G

## (undated) DEVICE — SPONGE LAP 18X18" X8435

## (undated) DEVICE — DRSG XEROFORM 5X9" 8884431605

## (undated) DEVICE — PAD CHUX UNDERPAD 23X24" 7136

## (undated) DEVICE — ESU ELEC EDGE INSULATED BLADE 4" E1455-4

## (undated) DEVICE — RETRACTOR RADIALUX 4 INTCHNG BLADE CRDLS 50-101-1

## (undated) DEVICE — PREP CHLORAPREP 26ML TINTED ORANGE  260815

## (undated) DEVICE — SU PDS II 3-0 SH 27" Z316H

## (undated) DEVICE — ESU PENCIL W/HOLSTER E2350H

## (undated) DEVICE — CLEANSER WOUND DEBRIDEMENT VASHE 250ML 00313

## (undated) DEVICE — DRAIN JACKSON PRATT 15FR ROUND SU130-1323

## (undated) DEVICE — PEN MARKING SKIN

## (undated) DEVICE — STPL SKIN 35W ROTATING HEAD PRW35

## (undated) DEVICE — CATH TRAY FOLEY 16FR BARDEX W/DRAIN BAG STATLOCK 300316A

## (undated) DEVICE — ESU ELEC BLADE 6" COATED E1450-6

## (undated) DEVICE — PREP SKIN SCRUB TRAY 4461A

## (undated) DEVICE — GLOVE BIOGEL PI MICRO SZ 7.5 48575

## (undated) DEVICE — SOL NACL 0.9% IRRIG 1000ML BOTTLE 2F7124

## (undated) DEVICE — BLADE KNIFE SURG 10 371110

## (undated) DEVICE — SU VICRYL 2-0 TIE 12X18" J905T

## (undated) DEVICE — SOLUTION WOUND CLEANSING 3/4OZ 10% PVP EA-L3011FB-50

## (undated) DEVICE — DECANTER BAG 2002S

## (undated) DEVICE — SOL WATER IRRIG 1000ML BOTTLE 2F7114

## (undated) DEVICE — BLADE KNIFE SURG 15 371115

## (undated) DEVICE — Device

## (undated) DEVICE — PACK MAJOR SBA15MAFSI

## (undated) DEVICE — DRSG KERLIX 4 1/2"X4YDS ROLL 6715

## (undated) DEVICE — SOL NACL 0.9% INJ 1000ML BAG 2B1324X

## (undated) DEVICE — SU VICRYL 3-0 TIE 12X18" J904T

## (undated) DEVICE — CLIP ETHICON LIGACLIP SM BLUE LT100

## (undated) DEVICE — SU MONOCRYL 3-0 PS-2 27" Y427H

## (undated) DEVICE — PREP CHLORAPREP 26ML TINTED HI-LITE ORANGE 930815

## (undated) DEVICE — GLOVE BIOGEL PI MICRO SZ 6.5 48565

## (undated) DEVICE — SU PDS II 2-0 CT-1 27" Z339H

## (undated) DEVICE — SUCTION SLEEVE NEPTUNE 2 125MM 0703-005-125

## (undated) DEVICE — SU SILK 2-0 FSL 18" 677G

## (undated) DEVICE — SYR BULB IRRIG 50ML LATEX FREE 0035280

## (undated) DEVICE — GLOVE BIOGEL PI MICRO INDICATOR UNDERGLOVE SZ 7.5 48975

## (undated) DEVICE — DRAPE BREAST/CHEST 29420

## (undated) DEVICE — SU ETHILON 3-0 FS-1 18" 669H

## (undated) DEVICE — DRAIN JACKSON PRATT RESERVOIR 100ML SU130-1305

## (undated) DEVICE — DRSG TEGADERM 6X8" 1628

## (undated) DEVICE — LINEN TOWEL PACK X5 5464

## (undated) RX ORDER — HYDROMORPHONE HCL IN WATER/PF 6 MG/30 ML
PATIENT CONTROLLED ANALGESIA SYRINGE INTRAVENOUS
Status: DISPENSED
Start: 2024-02-21

## (undated) RX ORDER — DEXAMETHASONE SODIUM PHOSPHATE 4 MG/ML
INJECTION, SOLUTION INTRA-ARTICULAR; INTRALESIONAL; INTRAMUSCULAR; INTRAVENOUS; SOFT TISSUE
Status: DISPENSED
Start: 2024-07-30

## (undated) RX ORDER — PROPOFOL 10 MG/ML
INJECTION, EMULSION INTRAVENOUS
Status: DISPENSED
Start: 2024-02-21

## (undated) RX ORDER — CEFAZOLIN SODIUM 1 G/3ML
INJECTION, POWDER, FOR SOLUTION INTRAMUSCULAR; INTRAVENOUS
Status: DISPENSED
Start: 2024-02-21

## (undated) RX ORDER — CEFAZOLIN SODIUM/WATER 2 G/20 ML
SYRINGE (ML) INTRAVENOUS
Status: DISPENSED
Start: 2024-02-21

## (undated) RX ORDER — ONDANSETRON 2 MG/ML
INJECTION INTRAMUSCULAR; INTRAVENOUS
Status: DISPENSED
Start: 2024-07-30

## (undated) RX ORDER — HYDROMORPHONE HYDROCHLORIDE 1 MG/ML
INJECTION, SOLUTION INTRAMUSCULAR; INTRAVENOUS; SUBCUTANEOUS
Status: DISPENSED
Start: 2024-02-21

## (undated) RX ORDER — ACETAMINOPHEN 500 MG
TABLET ORAL
Status: DISPENSED
Start: 2024-02-21

## (undated) RX ORDER — ATROPINE SULFATE 1 MG/ML
INJECTION, SOLUTION INTRAVENOUS
Status: DISPENSED
Start: 2024-07-30

## (undated) RX ORDER — ACETAMINOPHEN 500 MG
TABLET ORAL
Status: DISPENSED
Start: 2024-07-30

## (undated) RX ORDER — FENTANYL CITRATE 50 UG/ML
INJECTION, SOLUTION INTRAMUSCULAR; INTRAVENOUS
Status: DISPENSED
Start: 2024-07-30

## (undated) RX ORDER — LIDOCAINE HYDROCHLORIDE 10 MG/ML
INJECTION, SOLUTION INFILTRATION; PERINEURAL
Status: DISPENSED
Start: 2024-04-11

## (undated) RX ORDER — FENTANYL CITRATE 50 UG/ML
INJECTION, SOLUTION INTRAMUSCULAR; INTRAVENOUS
Status: DISPENSED
Start: 2024-04-11

## (undated) RX ORDER — OXYCODONE HYDROCHLORIDE 5 MG/1
TABLET ORAL
Status: DISPENSED
Start: 2024-07-30

## (undated) RX ORDER — HEPARIN SODIUM (PORCINE) LOCK FLUSH IV SOLN 100 UNIT/ML 100 UNIT/ML
SOLUTION INTRAVENOUS
Status: DISPENSED
Start: 2024-04-11

## (undated) RX ORDER — HYDROXYZINE HYDROCHLORIDE 25 MG/1
TABLET, FILM COATED ORAL
Status: DISPENSED
Start: 2024-02-21

## (undated) RX ORDER — HYDROXYZINE HYDROCHLORIDE 50 MG/ML
INJECTION, SOLUTION INTRAMUSCULAR
Status: DISPENSED
Start: 2024-02-21

## (undated) RX ORDER — SIMETHICONE 40MG/0.6ML
SUSPENSION, DROPS(FINAL DOSAGE FORM)(ML) ORAL
Status: DISPENSED
Start: 2024-02-01

## (undated) RX ORDER — PROPOFOL 10 MG/ML
INJECTION, EMULSION INTRAVENOUS
Status: DISPENSED
Start: 2024-02-01

## (undated) RX ORDER — ONDANSETRON 2 MG/ML
INJECTION INTRAMUSCULAR; INTRAVENOUS
Status: DISPENSED
Start: 2024-02-21

## (undated) RX ORDER — APREPITANT 40 MG/1
CAPSULE ORAL
Status: DISPENSED
Start: 2024-02-21

## (undated) RX ORDER — CEFAZOLIN SODIUM 2 G/100ML
INJECTION, SOLUTION INTRAVENOUS
Status: DISPENSED
Start: 2024-04-11

## (undated) RX ORDER — FENTANYL CITRATE 50 UG/ML
INJECTION, SOLUTION INTRAMUSCULAR; INTRAVENOUS
Status: DISPENSED
Start: 2024-02-21